# Patient Record
Sex: MALE | Race: WHITE | Employment: OTHER | URBAN - METROPOLITAN AREA
[De-identification: names, ages, dates, MRNs, and addresses within clinical notes are randomized per-mention and may not be internally consistent; named-entity substitution may affect disease eponyms.]

---

## 2017-08-11 ENCOUNTER — CARE COORDINATION (OUTPATIENT)
Dept: CARE COORDINATION | Age: 67
End: 2017-08-11

## 2017-08-23 PROBLEM — I72.4 POPLITEAL ANEURYSM (HCC): Chronic | Status: ACTIVE | Noted: 2017-08-23

## 2018-01-26 PROBLEM — E11.622 DIABETIC ULCER OF LOWER LEG (HCC): Status: ACTIVE | Noted: 2018-01-26

## 2018-01-26 PROBLEM — L97.909 DIABETIC ULCER OF LOWER LEG (HCC): Status: ACTIVE | Noted: 2018-01-26

## 2018-03-15 ENCOUNTER — HOSPITAL ENCOUNTER (OUTPATIENT)
Dept: WOUND CARE | Age: 68
Discharge: HOME | End: 2018-03-15
Payer: MEDICARE

## 2018-03-15 VITALS
RESPIRATION RATE: 18 BRPM | HEART RATE: 72 BPM | DIASTOLIC BLOOD PRESSURE: 68 MMHG | TEMPERATURE: 98.7 F | SYSTOLIC BLOOD PRESSURE: 118 MMHG

## 2018-03-15 PROCEDURE — 11042 DBRDMT SUBQ TIS 1ST 20SQCM/<: CPT

## 2018-03-15 PROCEDURE — 11045 DBRDMT SUBQ TISS EACH ADDL: CPT

## 2018-03-15 NOTE — PROGRESS NOTES
[facility]aling Center Followup Visit Note    Referring Physician : Ginger Ho  Luis F Bruner  MEDICAL RECORD NUMBER:  [id number]  AGE: 79 y.o. GENDER: male  : 1950  EPISODE DATE:  3/15/2018    Subjective:     Chief Complaint   Patient presents with    Wound Check     Right Leg wound Care Follow Up Appt      HISTORY of PRESENT ILLNESS HPI   Luis F Bruner is a 79 y.o. male who presents today for wound/ulcer evaluation. 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     Past Surgical History:   Procedure Laterality Date    FRACTURE SURGERY      right ankle repair     Family History   Problem Relation Age of Onset    Heart Disease Mother     Heart Disease Father      Social History   Substance Use Topics    Smoking status: Never Smoker    Smokeless tobacco: Never Used    Alcohol use No     Allergies   Allergen Reactions    Metformin And Related Diarrhea     Current Outpatient Prescriptions on File Prior to Encounter   Medication Sig Dispense Refill    clotrimazole (LOTRIMIN) 1 % cream Apply 1 % topically 2 times daily Apply topically 2 times daily.  aspirin (ASPIRIN CHILDRENS) 81 MG chewable tablet Take 1 tablet by mouth daily 30 tablet 3     No current facility-administered medications on file prior to encounter.         REVIEW OF SYSTEMS See HPI    Objective:    /68   Pulse 72   Temp 98.7 °F (37.1 °C) (Oral) Resp 18   Wt Readings from Last 3 Encounters:   03/01/18 (!) 370 lb (167.8 kg)   02/22/18 (!) 370 lb (167.8 kg)   02/20/18 (!) 370 lb (167.8 kg)     PHYSICAL EXAM  CONSTITUTIONAL:   Awake, alert, cooperative   EYES:  lids and lashes normal   ENT: external ears and nose without lesions   NECK:  supple, symmetrical, trachea midline   SKIN:  Open wound Present    Assessment:     Patient Active Problem List   Diagnosis Code    Controlled type 2 diabetes mellitus with hyperglycemia ([address] E11.65    Obesity E66.9    Lymphedema I89.0    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 with fat layer exposed ([address] L97.912    Non-pressure chronic ulcer of left lower leg with fat layer exposed ([address] L97.922    Venous ulcer of left leg ([address] I83.029    Venous ulcer of right leg ([address] I83.019    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 Performed by: Raul Serna DPM    Consent obtained:  Yes    Time out taken:  Yes    Pain Control: Anesthetic  Anesthetic: 2% Lidocaine Gel Topical     Debridement:Excisional Debridement    Using curette the wound(s)/ulcer(s) was/were sharply debrided down through and including the removal of subcutaneous tissue. Devitalized Tissue Debrided:  fibrin, biofilm, slough and necrotic/eschar to stimulate bleeding to promote healing, post debridement good bleeding base and wound edges noted    Pre Debridement Measurements:  Are located in the [place]  Documentation Flow Sheet    Wound/Ulcer #: 6    Post Debridement Measurements:  Wound/Ulcer Descriptions are Pre Debridement except measurements:    Wound 07/20/17 Venous ulcer Leg Right; Lower; Lateral;Posterior #6 acq 6/20/17 peterson 1 (Active)   Wound Image   3/1/2018  1:03 PM   Wound Type Wound 3/1/2018  1:03 PM

## 2018-03-22 ENCOUNTER — HOSPITAL ENCOUNTER (OUTPATIENT)
Dept: WOUND CARE | Age: 68
Discharge: HOME | End: 2018-03-22
Payer: MEDICARE

## 2018-03-22 VITALS
TEMPERATURE: 98.4 F | RESPIRATION RATE: 20 BRPM | WEIGHT: 315 LBS | DIASTOLIC BLOOD PRESSURE: 80 MMHG | HEART RATE: 84 BPM | HEIGHT: 74 IN | BODY MASS INDEX: 40.43 KG/M2 | SYSTOLIC BLOOD PRESSURE: 130 MMHG

## 2018-03-22 PROCEDURE — 11042 DBRDMT SUBQ TIS 1ST 20SQCM/<: CPT

## 2018-03-22 PROCEDURE — 87186 SC STD MICRODIL/AGAR DIL: CPT

## 2018-03-22 PROCEDURE — 11045 DBRDMT SUBQ TISS EACH ADDL: CPT

## 2018-03-22 PROCEDURE — 87077 CULTURE AEROBIC IDENTIFY: CPT

## 2018-03-22 PROCEDURE — 87147 CULTURE TYPE IMMUNOLOGIC: CPT

## 2018-03-22 PROCEDURE — 87070 CULTURE OTHR SPECIMN AEROBIC: CPT

## 2018-03-22 PROCEDURE — 87075 CULTR BACTERIA EXCEPT BLOOD: CPT

## 2018-03-22 ASSESSMENT — PAIN SCALES - GENERAL: PAINLEVEL_OUTOF10: 0

## 2018-03-22 NOTE — PROGRESS NOTES
(36.9 °C) (Oral)   Resp 20   Ht 6' 2\" (1.88 m)   Wt (!) 370 lb (167.8 kg)   BMI 47.51 kg/m²   Wt Readings from Last 3 Encounters:   03/22/18 (!) 370 lb (167.8 kg)   03/01/18 (!) 370 lb (167.8 kg)   02/22/18 (!) 370 lb (167.8 kg)     PHYSICAL EXAM  CONSTITUTIONAL:   Awake, alert, cooperative   EYES:  lids and lashes normal   ENT: external ears and nose without lesions   NECK:  supple, symmetrical, trachea midline   SKIN:  Open wound Present    Assessment:     Patient Active Problem List   Diagnosis Code    Controlled type 2 diabetes mellitus with hyperglycemia ([facility]) E11.65    Obesity E66.9    Lymphedema I89.0    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 with fat layer exposed ([address] L97.912    Non-pressure chronic ulcer of left lower leg with fat layer exposed ([address] L97.922    Venous ulcer of left leg ([facility]) I83.029    Venous ulcer of right leg ([address] I83.019    Popliteal aneurysm ([facility]) I72.4    Diabetic ulcer of lower leg ([facility]) E11.622, L97.909     Procedure Note  Indications:  Based on my examination of this patient's wound(s)/ulcer(s) today, debridement is required to promote healing and evaluate the wound base. Performed by: Oly Johnson DPM    Consent obtained:  Yes    Time out taken:  Yes    Pain Control: Anesthetic  Anesthetic: 2% Lidocaine Gel Topical     Debridement:Excisional Debridement    Using #15 blade scalpel the wound(s)/ulcer(s) was/were sharply debrided down through and including the removal of subcutaneous tissue. Devitalized Tissue Debrided:  fibrin, biofilm, slough and necrotic/eschar to stimulate bleeding to promote healing, post debridement good bleeding base and wound edges noted    Pre Debridement Measurements:  Are located in the [place]  Documentation Flow Sheet    Wound/Ulcer #: 6 and 8    Post Debridement Measurements:  Wound/Ulcer Descriptions are Pre Debridement except measurements:    Wound 07/20/17 Venous ulcer Leg Right; Lower; Lateral;Posterior #6

## 2018-03-24 LAB — ANAEROBIC CULTURE: NORMAL

## 2018-03-26 LAB
ORGANISM: ABNORMAL
WOUND/ABSCESS: ABNORMAL

## 2018-03-29 ENCOUNTER — HOSPITAL ENCOUNTER (OUTPATIENT)
Dept: WOUND CARE | Age: 68
Discharge: HOME | End: 2018-03-29
Payer: MEDICARE

## 2018-03-29 VITALS
RESPIRATION RATE: 20 BRPM | DIASTOLIC BLOOD PRESSURE: 80 MMHG | HEIGHT: 74 IN | TEMPERATURE: 97.7 F | SYSTOLIC BLOOD PRESSURE: 122 MMHG | HEART RATE: 80 BPM | WEIGHT: 315 LBS | BODY MASS INDEX: 40.43 KG/M2

## 2018-03-29 PROCEDURE — 11045 DBRDMT SUBQ TISS EACH ADDL: CPT

## 2018-03-29 PROCEDURE — 11042 DBRDMT SUBQ TIS 1ST 20SQCM/<: CPT

## 2018-03-29 RX ORDER — CEPHALEXIN 500 MG/1
500 CAPSULE ORAL 4 TIMES DAILY
Qty: 40 CAPSULE | Refills: 0 | Status: SHIPPED | OUTPATIENT
Start: 2018-03-29 | End: 2018-04-12 | Stop reason: ALTCHOICE

## 2018-03-29 NOTE — PROGRESS NOTES
Depth (cm)  0.9 3/29/2018  2:43 PM   Calculated Wound Size (cm^2) (l*w) 253.5 cm^2 3/29/2018  2:43 PM   Change in Wound Size % (l*w) 69.77 3/29/2018  2:43 PM   Undermining Starts ___ O'Clock 12 3/22/2018  1:25 PM   Undermining Ends___ O'Clock 12 3/22/2018  1:25 PM   Undermining Maxium Distance (cm) 1.0 3/22/2018  1:25 PM   Wound Assessment Pink;Slough; Yellow 3/29/2018  1:48 PM   Drainage Amount Copious 3/29/2018  1:48 PM   Drainage Description Black 3/29/2018  1:48 PM   Odor Mild 3/29/2018  1:48 PM   Colleen-wound Assessment Dry; Intact 3/29/2018  1:48 PM   [place]%Wound Bed 100 2/8/2018  1:27 PM   Culture Taken Yes 1/18/2018  8:54 AM   Debridement per physician Subcutaneous 2/22/2018  2:22 PM   Time out Yes 3/29/2018  2:43 PM   Procedural Pain 0 3/29/2018  2:43 PM   Post procedural Pain 0 3/1/2018  1:53 PM   Number of days: 252       Wound 03/22/18 Venous ulcer Leg Lateral;Left;Lower Wound 8 acquired 3/20/2018 (Active)   Wound Image   3/22/2018  1:25 PM   Dressing Status Clean;Dry; Intact 3/22/2018  2:37 PM   Dressing Changed Changed/New 3/22/2018  2:37 PM   Dressing/Treatment Silver dressing;Dry dressing 3/22/2018  2:37 PM   Wound Cleansed Wound cleanser 3/22/2018  2:37 PM   Wound Length (cm) 11 cm 3/29/2018  2:43 PM   Wound Width (cm) 11 cm 3/29/2018  2:43 PM   Wound Depth (cm)  0.2 3/29/2018  2:43 PM   Calculated Wound Size (cm^2) (l*w) 121 cm^2 3/29/2018  2:43 PM   Change in Wound Size % (l*w) -72.86 3/29/2018  2:43 PM   Wound Assessment Yellow;Pink 3/29/2018  1:48 PM   Drainage Amount Moderate 3/29/2018  1:48 PM   Drainage Description Yellow;Brown 3/29/2018  1:48 PM   Odor None 3/29/2018  1:48 PM   Colleen-wound Assessment Pink 3/22/2018  1:25 PM   Time out Yes 3/29/2018  2:43 PM   Procedural Pain 0 3/29/2018  2:43 PM   Number of days: 7     Percent of Wound/Ulcer Debrided: 100%    Total Surface Area Debrided:  370 sq cm     Estimated Blood Loss:  Minimal  Hemostasis Achieved:  by pressure    Procedural Pain:  2  / 10 Post Procedural Pain:  2 / 10     Response to treatment:  Well tolerated by patient. Plan:   Treatment Note please see attached Discharge Instructions    Written patient dismissal instructions given to patient and signed by patient or POA. Discharge Instructions       Visit Discharge/Physician Orders      Discharge condition: Stable      Assessment of pain at discharge:no      Anesthetic used:LIDOCAINE 2 % GEL      Discharge to: HOME      Left via:VAN      Accompanied by: Shailesh Stevenson  ECF/HHA: [address] WITH SOAP AND WATER WITH EACH DRESSING CHANGE      Dressing Orders:CLEANSE RIGHT  AND LEFT ULCERS WITH NORMAL STERILE SALINE, APPLY AQUACEL AG , PACK INTO AREA OF DEPTH, Billy Angst . Change 3 x week Tuesday Thursday AND SATURDAY  ANTIFUNGAL CREAM TO BE APPLIED TO LOWER LEG DRY THICK SKIN WITH EACH DRESSING CHANGE            ELEVATE LEGS ABOVE THE LEVEL OF THE HEART FOR 30 MINUTES 3-4 X DAY--      Treatment Orders:Eat a diet high in protein and vitamin C. Take a multiple vitamin daily unless contraindicated.   CULTURE DONE 3-22-18     CONSIDERING DERIC MLD TREATMENTS IF CULTURE NEGATIVE  [facility] followup visit  1  WEEK DR Lamar White _______________________                             (Please note your next appointment above and if you are unable to keep, kindly give a 24 hour notice.  Thank you.)  If unable to schedule rides with [employer] service attempt to initiate [address] to change wraps Monday Wednesday Friday.      Physician signature:__________________________  Ting Galvan  If you experience any of the following, please call the [facility] during business hours:      * Increase in Pain  * Temperature over 101  * Increase in drainage from your wound  * Drainage with a foul odor  * Bleeding  * Increase in swelling  * Need for compression bandage changes due to slippage, breakthrough drainage.      If you need medical attention outside of the business hours of the [facility]

## 2018-04-05 ENCOUNTER — HOSPITAL ENCOUNTER (OUTPATIENT)
Dept: WOUND CARE | Age: 68
Discharge: HOME | End: 2018-04-05
Payer: MEDICARE

## 2018-04-05 VITALS
BODY MASS INDEX: 40.43 KG/M2 | RESPIRATION RATE: 18 BRPM | HEIGHT: 74 IN | WEIGHT: 315 LBS | TEMPERATURE: 98 F | SYSTOLIC BLOOD PRESSURE: 144 MMHG | HEART RATE: 84 BPM | DIASTOLIC BLOOD PRESSURE: 72 MMHG

## 2018-04-05 PROCEDURE — 99213 OFFICE O/P EST LOW 20 MIN: CPT

## 2018-04-12 ENCOUNTER — HOSPITAL ENCOUNTER (OUTPATIENT)
Dept: WOUND CARE | Age: 68
Discharge: HOME | End: 2018-04-12
Payer: MEDICARE

## 2018-04-12 ENCOUNTER — HOSPITAL ENCOUNTER (OUTPATIENT)
Age: 68
Discharge: HOME | End: 2018-04-14
Payer: MEDICARE

## 2018-04-12 VITALS
HEART RATE: 72 BPM | SYSTOLIC BLOOD PRESSURE: 138 MMHG | DIASTOLIC BLOOD PRESSURE: 68 MMHG | TEMPERATURE: 98.7 F | HEIGHT: 74 IN | RESPIRATION RATE: 20 BRPM | WEIGHT: 315 LBS | BODY MASS INDEX: 40.43 KG/M2

## 2018-04-12 PROCEDURE — 88305 TISSUE EXAM BY PATHOLOGIST: CPT

## 2018-04-12 PROCEDURE — 11045 DBRDMT SUBQ TISS EACH ADDL: CPT

## 2018-04-12 PROCEDURE — 11042 DBRDMT SUBQ TIS 1ST 20SQCM/<: CPT

## 2018-04-19 ENCOUNTER — HOSPITAL ENCOUNTER (OUTPATIENT)
Dept: WOUND CARE | Age: 68
Discharge: HOME | End: 2018-04-19
Payer: MEDICARE

## 2018-04-19 VITALS
DIASTOLIC BLOOD PRESSURE: 70 MMHG | SYSTOLIC BLOOD PRESSURE: 128 MMHG | TEMPERATURE: 98 F | HEART RATE: 80 BPM | RESPIRATION RATE: 18 BRPM

## 2018-04-19 PROCEDURE — 87070 CULTURE OTHR SPECIMN AEROBIC: CPT

## 2018-04-19 PROCEDURE — 97597 DBRDMT OPN WND 1ST 20 CM/<: CPT

## 2018-04-19 PROCEDURE — 11045 DBRDMT SUBQ TISS EACH ADDL: CPT

## 2018-04-19 PROCEDURE — 87077 CULTURE AEROBIC IDENTIFY: CPT

## 2018-04-19 PROCEDURE — 87186 SC STD MICRODIL/AGAR DIL: CPT

## 2018-04-19 PROCEDURE — 11042 DBRDMT SUBQ TIS 1ST 20SQCM/<: CPT

## 2018-04-22 LAB
ORGANISM: ABNORMAL
ORGANISM: ABNORMAL
WOUND/ABSCESS: ABNORMAL

## 2018-04-26 ENCOUNTER — HOSPITAL ENCOUNTER (OUTPATIENT)
Dept: WOUND CARE | Age: 68
Discharge: HOME | End: 2018-04-26
Payer: MEDICARE

## 2018-04-26 VITALS
SYSTOLIC BLOOD PRESSURE: 122 MMHG | RESPIRATION RATE: 18 BRPM | TEMPERATURE: 98 F | HEIGHT: 74 IN | WEIGHT: 315 LBS | BODY MASS INDEX: 40.43 KG/M2 | HEART RATE: 84 BPM | DIASTOLIC BLOOD PRESSURE: 80 MMHG

## 2018-04-26 PROCEDURE — 11042 DBRDMT SUBQ TIS 1ST 20SQCM/<: CPT

## 2018-04-26 PROCEDURE — 11045 DBRDMT SUBQ TISS EACH ADDL: CPT

## 2018-05-03 ENCOUNTER — HOSPITAL ENCOUNTER (OUTPATIENT)
Dept: WOUND CARE | Age: 68
Discharge: HOME | End: 2018-05-03
Payer: MEDICARE

## 2018-05-03 VITALS
TEMPERATURE: 98.7 F | BODY MASS INDEX: 40.43 KG/M2 | HEART RATE: 78 BPM | HEIGHT: 74 IN | WEIGHT: 315 LBS | SYSTOLIC BLOOD PRESSURE: 130 MMHG | DIASTOLIC BLOOD PRESSURE: 60 MMHG | RESPIRATION RATE: 20 BRPM

## 2018-05-03 PROCEDURE — 11042 DBRDMT SUBQ TIS 1ST 20SQCM/<: CPT

## 2018-05-03 PROCEDURE — 11045 DBRDMT SUBQ TISS EACH ADDL: CPT

## 2018-05-03 ASSESSMENT — PAIN DESCRIPTION - PAIN TYPE: TYPE: CHRONIC PAIN

## 2018-05-03 ASSESSMENT — PAIN SCALES - GENERAL: PAINLEVEL_OUTOF10: 0

## 2018-05-10 ENCOUNTER — HOSPITAL ENCOUNTER (OUTPATIENT)
Dept: WOUND CARE | Age: 68
Discharge: HOME | End: 2018-05-10
Payer: MEDICARE

## 2018-05-10 VITALS
BODY MASS INDEX: 40.43 KG/M2 | SYSTOLIC BLOOD PRESSURE: 122 MMHG | RESPIRATION RATE: 18 BRPM | TEMPERATURE: 98.4 F | HEART RATE: 80 BPM | WEIGHT: 315 LBS | HEIGHT: 74 IN | DIASTOLIC BLOOD PRESSURE: 70 MMHG

## 2018-05-10 PROCEDURE — 97597 DBRDMT OPN WND 1ST 20 CM/<: CPT

## 2018-05-10 PROCEDURE — 11042 DBRDMT SUBQ TIS 1ST 20SQCM/<: CPT

## 2018-05-10 ASSESSMENT — PAIN SCALES - GENERAL: PAINLEVEL_OUTOF10: 0

## 2018-05-17 ENCOUNTER — HOSPITAL ENCOUNTER (OUTPATIENT)
Dept: WOUND CARE | Age: 68
Discharge: HOME | End: 2018-05-17
Payer: MEDICARE

## 2018-05-17 VITALS
WEIGHT: 315 LBS | HEART RATE: 72 BPM | BODY MASS INDEX: 40.43 KG/M2 | SYSTOLIC BLOOD PRESSURE: 104 MMHG | RESPIRATION RATE: 18 BRPM | DIASTOLIC BLOOD PRESSURE: 60 MMHG | TEMPERATURE: 98.3 F | HEIGHT: 74 IN

## 2018-05-17 PROCEDURE — 11042 DBRDMT SUBQ TIS 1ST 20SQCM/<: CPT

## 2018-05-17 PROCEDURE — 99212 OFFICE O/P EST SF 10 MIN: CPT

## 2018-05-17 RX ORDER — CLOTRIMAZOLE 1 %
CREAM (GRAM) TOPICAL
Qty: 60 G | Refills: 1 | Status: SHIPPED | OUTPATIENT
Start: 2018-05-17 | End: 2018-05-24

## 2018-05-25 ENCOUNTER — TELEPHONE (OUTPATIENT)
Dept: INTERNAL MEDICINE | Age: 68
End: 2018-05-25

## 2018-06-11 ENCOUNTER — OFFICE VISIT (OUTPATIENT)
Dept: INTERNAL MEDICINE | Age: 68
End: 2018-06-11
Payer: MEDICARE

## 2018-06-11 VITALS
RESPIRATION RATE: 18 BRPM | WEIGHT: 315 LBS | SYSTOLIC BLOOD PRESSURE: 122 MMHG | TEMPERATURE: 97.7 F | DIASTOLIC BLOOD PRESSURE: 71 MMHG | HEART RATE: 64 BPM | HEIGHT: 75 IN | BODY MASS INDEX: 39.17 KG/M2

## 2018-06-11 DIAGNOSIS — R73.03 PREDIABETES: ICD-10-CM

## 2018-06-11 DIAGNOSIS — E78.01 FAMILIAL HYPERCHOLESTEROLEMIA: Primary | ICD-10-CM

## 2018-06-11 LAB — HBA1C MFR BLD: 5.9 %

## 2018-06-11 PROCEDURE — 99212 OFFICE O/P EST SF 10 MIN: CPT | Performed by: INTERNAL MEDICINE

## 2018-06-11 PROCEDURE — 4040F PNEUMOC VAC/ADMIN/RCVD: CPT | Performed by: INTERNAL MEDICINE

## 2018-06-11 PROCEDURE — 83036 HEMOGLOBIN GLYCOSYLATED A1C: CPT | Performed by: INTERNAL MEDICINE

## 2018-06-11 PROCEDURE — 3017F COLORECTAL CA SCREEN DOC REV: CPT | Performed by: INTERNAL MEDICINE

## 2018-06-11 PROCEDURE — 1036F TOBACCO NON-USER: CPT | Performed by: INTERNAL MEDICINE

## 2018-06-11 PROCEDURE — 1123F ACP DISCUSS/DSCN MKR DOCD: CPT | Performed by: INTERNAL MEDICINE

## 2018-06-11 PROCEDURE — G8427 DOCREV CUR MEDS BY ELIG CLIN: HCPCS | Performed by: INTERNAL MEDICINE

## 2018-06-11 PROCEDURE — 99213 OFFICE O/P EST LOW 20 MIN: CPT | Performed by: INTERNAL MEDICINE

## 2018-06-11 PROCEDURE — G8417 CALC BMI ABV UP PARAM F/U: HCPCS | Performed by: INTERNAL MEDICINE

## 2018-06-11 RX ORDER — ASPIRIN 81 MG/1
81 TABLET, CHEWABLE ORAL DAILY
Qty: 30 TABLET | Refills: 5 | Status: SHIPPED | OUTPATIENT
Start: 2018-06-11 | End: 2018-11-26 | Stop reason: SDUPTHER

## 2018-06-11 ASSESSMENT — ENCOUNTER SYMPTOMS
HEARTBURN: 0
DIARRHEA: 0
BLURRED VISION: 0
CONSTIPATION: 0
COUGH: 0
NAUSEA: 0
ABDOMINAL PAIN: 0
DOUBLE VISION: 0
SHORTNESS OF BREATH: 0
VOMITING: 0

## 2018-11-08 ENCOUNTER — HOSPITAL ENCOUNTER (OUTPATIENT)
Age: 68
Discharge: HOME | End: 2018-11-08
Payer: MEDICARE

## 2018-11-08 DIAGNOSIS — E78.01 FAMILIAL HYPERCHOLESTEROLEMIA: ICD-10-CM

## 2018-11-08 LAB
CHOLESTEROL, TOTAL: 168 MG/DL (ref 0–199)
HDLC SERPL-MCNC: 54 MG/DL
LDL CHOLESTEROL CALCULATED: 104 MG/DL (ref 0–99)
TRIGL SERPL-MCNC: 48 MG/DL (ref 0–149)
VLDLC SERPL CALC-MCNC: 10 MG/DL

## 2018-11-08 PROCEDURE — 36415 COLL VENOUS BLD VENIPUNCTURE: CPT

## 2018-11-08 PROCEDURE — 80061 LIPID PANEL: CPT

## 2018-11-26 ENCOUNTER — OFFICE VISIT (OUTPATIENT)
Dept: INTERNAL MEDICINE | Age: 68
End: 2018-11-26
Payer: MEDICARE

## 2018-11-26 VITALS
DIASTOLIC BLOOD PRESSURE: 78 MMHG | RESPIRATION RATE: 20 BRPM | HEART RATE: 76 BPM | WEIGHT: 315 LBS | BODY MASS INDEX: 39.17 KG/M2 | SYSTOLIC BLOOD PRESSURE: 120 MMHG | HEIGHT: 75 IN | TEMPERATURE: 97.4 F

## 2018-11-26 DIAGNOSIS — R73.03 PREDIABETES: Primary | ICD-10-CM

## 2018-11-26 DIAGNOSIS — I83.029 VENOUS ULCER OF LEFT LEG (HCC): ICD-10-CM

## 2018-11-26 DIAGNOSIS — I72.4 POPLITEAL ANEURYSM (HCC): ICD-10-CM

## 2018-11-26 DIAGNOSIS — L97.929 VENOUS ULCER OF LEFT LEG (HCC): ICD-10-CM

## 2018-11-26 DIAGNOSIS — L97.919 VENOUS ULCER OF RIGHT LEG (HCC): ICD-10-CM

## 2018-11-26 DIAGNOSIS — I83.019 VENOUS ULCER OF RIGHT LEG (HCC): ICD-10-CM

## 2018-11-26 DIAGNOSIS — E78.5 DYSLIPIDEMIA: ICD-10-CM

## 2018-11-26 PROCEDURE — G8417 CALC BMI ABV UP PARAM F/U: HCPCS | Performed by: INTERNAL MEDICINE

## 2018-11-26 PROCEDURE — 1036F TOBACCO NON-USER: CPT | Performed by: INTERNAL MEDICINE

## 2018-11-26 PROCEDURE — 4040F PNEUMOC VAC/ADMIN/RCVD: CPT | Performed by: INTERNAL MEDICINE

## 2018-11-26 PROCEDURE — 99212 OFFICE O/P EST SF 10 MIN: CPT | Performed by: INTERNAL MEDICINE

## 2018-11-26 PROCEDURE — G8427 DOCREV CUR MEDS BY ELIG CLIN: HCPCS | Performed by: INTERNAL MEDICINE

## 2018-11-26 PROCEDURE — G8484 FLU IMMUNIZE NO ADMIN: HCPCS | Performed by: INTERNAL MEDICINE

## 2018-11-26 PROCEDURE — 1123F ACP DISCUSS/DSCN MKR DOCD: CPT | Performed by: INTERNAL MEDICINE

## 2018-11-26 PROCEDURE — 99213 OFFICE O/P EST LOW 20 MIN: CPT | Performed by: INTERNAL MEDICINE

## 2018-11-26 PROCEDURE — 1101F PT FALLS ASSESS-DOCD LE1/YR: CPT | Performed by: INTERNAL MEDICINE

## 2018-11-26 PROCEDURE — 3017F COLORECTAL CA SCREEN DOC REV: CPT | Performed by: INTERNAL MEDICINE

## 2018-11-26 RX ORDER — ASPIRIN 81 MG/1
81 TABLET, CHEWABLE ORAL DAILY
Qty: 30 TABLET | Refills: 5 | Status: SHIPPED | OUTPATIENT
Start: 2018-11-26

## 2018-11-26 RX ORDER — ATORVASTATIN CALCIUM 40 MG/1
40 TABLET, FILM COATED ORAL NIGHTLY
Qty: 30 TABLET | Refills: 5 | Status: SHIPPED | OUTPATIENT
Start: 2018-11-26 | End: 2019-05-23

## 2018-11-26 ASSESSMENT — ENCOUNTER SYMPTOMS
CONSTIPATION: 0
VOMITING: 0
ABDOMINAL PAIN: 0
SHORTNESS OF BREATH: 0
COUGH: 0
NAUSEA: 0
DIARRHEA: 0

## 2018-11-26 ASSESSMENT — PATIENT HEALTH QUESTIONNAIRE - PHQ9
SUM OF ALL RESPONSES TO PHQ QUESTIONS 1-9: 0
SUM OF ALL RESPONSES TO PHQ9 QUESTIONS 1 & 2: 0
2. FEELING DOWN, DEPRESSED OR HOPELESS: 0
1. LITTLE INTEREST OR PLEASURE IN DOING THINGS: 0
SUM OF ALL RESPONSES TO PHQ QUESTIONS 1-9: 0

## 2018-11-26 NOTE — PROGRESS NOTES
Patient verbalized understanding of office instructions. He will call with questions or concerns. Pt was given discharge instructions, all questions were fully answered.  Printed AVS given

## 2018-11-26 NOTE — PROGRESS NOTES
[facility]  InternalMedicine Residency Program  ACC Note      SUBJECTIVE:  CC: had concerns including Check-Up (recently had Lab work done). Cem Fields with PMH of prediabetes, venous stasis ulcers with chronic lymphedema, morbid obesity presents to the ambulatory care clinic for routine follow-up appointment. He doesn't have any active complaints today. He is following with treatment or for his venous stasis ulcers. He denies any increased complaints and his ulcers are stable. No complaints for now. Recently completed wound care process.       DM  Hemoglobin A1C 6.2 12/27/17; A1c on 6/18 was 5.9  He is watching his diet. He lost 4 pounds since last visit on June. Keeps losing weight  Not taking anything for now  Follows [facility] for the leg wounds, with venous stasis ulcers     Venous stasis, chronic lymphedema  -With the pressure bandages bilaterally below knee  -Was following with wound care clinic every Thursday; recently released  - Now follows with [facility]     HFpEF  Stage 1 DD on ECHO 2014     Morbid Obesity   Lost weight  Not interested in surgery     Popliteal artery aneurysm  Vascular surgery doesn't think it is aneurysm  -On Aspirin  - No symptoms    The 10-year ASCVD risk score (Elvia Paul., et al., 2013) is: 23.1%  Agreeable to start high intensity statin    HCM  Refuses vaccinations  Refuses colonoscopy      Review of Systems   Constitutional: Negative for chills and fever. HENT: Negative for hearing loss and tinnitus. Respiratory: Negative for cough and shortness of breath. Cardiovascular: Negative for chest pain and leg swelling. Gastrointestinal: Negative for abdominal pain, constipation, diarrhea, nausea and vomiting. Genitourinary: Negative for dysuria and urgency. Musculoskeletal: Negative for myalgias. Skin: Negative for rash. Dressing on place for b/l lower extremities   Neurological: Negative for dizziness and headaches. Psychiatric/Behavioral: Negative for suicidal ideas. Current Outpatient Prescriptions on File Prior to Visit   Medication Sig Dispense Refill    clotrimazole (LOTRIMIN) 1 % cream Apply 1 % topically 2 times daily Apply topically 2 times daily. No current facility-administered medications on file prior to visit. OBJECTIVE:    VS: /78   Pulse 76   Temp 97.4 °F (36.3 °C) (Oral)   Resp 20   Ht 6' 3\" (1.905 m)   Wt (!) 362 lb 8 oz (164.4 kg)   BMI 45.31 kg/m²   Physical Exam   Constitutional: He is oriented to person, place, and time. He appears well-developed and well-nourished. HENT:   Head: Normocephalic and atraumatic. Eyes: Pupils are equal, round, and reactive to light. EOM are normal.   Neck: Normal range of motion. Neck supple. Cardiovascular: Normal rate, regular rhythm and normal heart sounds. Exam reveals no gallop and no friction rub. No murmur heard. Pulmonary/Chest: Effort normal and breath sounds normal. No respiratory distress. He has no wheezes. He has no rales. Abdominal: Soft. Bowel sounds are normal. He exhibits no distension. There is no tenderness. Musculoskeletal:   uses walker since he has b/l lower extremity wounds. Neurological: He is alert and oriented to person, place, and time. Skin: Skin is warm and dry. Psychiatric: He has a normal mood and affect. His behavior is normal.       ASSESSMENT/PLAN:  Kristy Rowe was seen today for check-up. Diagnoses and all orders for this visit:    Prediabetes  -     atorvastatin (LIPITOR) 40 MG tablet; Take 1 tablet by mouth nightly  -     aspirin (ASPIRIN CHILDRENS) 81 MG chewable tablet; Take 1 tablet by mouth daily    Venous ulcer of b/l legs ([address]  Follows with [facility]    Dyslipidemia  -     atorvastatin (LIPITOR) 40 MG tablet; Take 1 tablet by mouth nightly    HCC gaps addressed.      I have reviewed allpertient PMHx, PSHx, FamHx, Social Hx, medications, and allergies and updated history as

## 2019-05-23 ENCOUNTER — HOSPITAL ENCOUNTER (OUTPATIENT)
Dept: WOUND CARE | Age: 69
Discharge: HOME | End: 2019-05-23
Payer: MEDICARE

## 2019-05-23 VITALS
HEIGHT: 76 IN | WEIGHT: 315 LBS | BODY MASS INDEX: 38.36 KG/M2 | HEART RATE: 66 BPM | RESPIRATION RATE: 18 BRPM | TEMPERATURE: 98.8 F

## 2019-05-23 DIAGNOSIS — I83.019 VENOUS ULCER OF RIGHT LEG (HCC): Primary | ICD-10-CM

## 2019-05-23 DIAGNOSIS — L97.929 VENOUS ULCER OF LEFT LEG (HCC): ICD-10-CM

## 2019-05-23 DIAGNOSIS — I83.029 VENOUS ULCER OF LEFT LEG (HCC): ICD-10-CM

## 2019-05-23 DIAGNOSIS — L97.922 NON-PRESSURE CHRONIC ULCER OF LEFT LOWER LEG WITH FAT LAYER EXPOSED (HCC): ICD-10-CM

## 2019-05-23 DIAGNOSIS — L97.912 NON-PRESSURE CHRONIC ULCER OF RIGHT LOWER LEG WITH FAT LAYER EXPOSED (HCC): ICD-10-CM

## 2019-05-23 DIAGNOSIS — L97.919 VENOUS ULCER OF RIGHT LEG (HCC): Primary | ICD-10-CM

## 2019-05-23 DIAGNOSIS — I89.0 LYMPHEDEMA: Chronic | ICD-10-CM

## 2019-05-23 PROCEDURE — 99213 OFFICE O/P EST LOW 20 MIN: CPT

## 2019-05-23 PROCEDURE — 11042 DBRDMT SUBQ TIS 1ST 20SQCM/<: CPT

## 2019-05-23 RX ORDER — LIDOCAINE HYDROCHLORIDE 20 MG/ML
JELLY TOPICAL ONCE
Status: DISCONTINUED | OUTPATIENT
Start: 2019-05-23 | End: 2019-05-24 | Stop reason: HOSPADM

## 2019-05-23 NOTE — PROGRESS NOTES
Wound Healing Center  History and Physical/Consultation  Podiatry    Referring Physician : Deanne Carlson MD  [address] [place] [address] [place] RECORD NUMBER:  [id number]  AGE: 76 y.o. GENDER: male  : 1950  EPISODE DATE:  2019  Subjective:     Chief Complaint   Patient presents with    Wound Check     cassie lower legs         HISTORY of PRESENT ILLNESS HPI     Jah Alaniz is a 76 y.o. male who presents today for wound/ulcer evaluation. History of Wound Context:  The patient has had a wound of lower extremities, chronic in nature. Patient has been following with [facility] for lymphedema therapy. Patient last seen here in the wound care center approximately 1 year ago. Overall doing well, responding to the therapy. Patient has had a history of chronic wounds of the lower extremities for quite some time. Patient at present relates responding to treatment. He denies any nausea, vomiting, fever or chills. No shortness of breath or chest pain. Pt is currently not on abx.       Wound/Ulcer Pain Timing/Severity: none  Quality of pain: N/A  Severity:  0 / 10   Modifying Factors: None  Associated Signs/Symptoms: none    Ulcer Identification:  Ulcer Type: venous  Contributing Factors: edema, venous stasis and lymphedema    Diabetic/Pressure/Non Pressure Ulcers onl y:  Ulcer: Non-Pressure ulcer, fat layer exposed    If patient has diabetic lower extremity wounds  Mitchell Classification of diabetic lower extremity wounds:    Grade Description   []  0 No open wound   []  1 Superficial ulcer involving the full skin thickness   []  2 Deep ulcer involves ligament, tendon, joint capsule, or fascia  No bone involvement or abscess presence   []  3 Deep Ulcer with abcess formation and/or osteomyelitis   []  4 Localized gangrene   []  5 Extensive gangrene of the foot     Wound: N/A        PAST MEDICAL HISTORY      Diagnosis Date    Cellulitis of right lower leg     Diabetes mellitus (HCC)     Lymphedema     Obesity, Class III, intact to light touch   Palpation of the foot does not cause pain   5/5 strength DF/PF  L LE Open wounds are noted, superficial. Serous drainage. No odor. Skin color is abnormal with stasis changes   Edema is  noted   Sensation intact to light touch   Palpation of the foot does not cause pain   5/5 strength DF/PF  R dorsalis pedis + L dorsalis pedis +   R posterior tibial + L posterior tibial +     Assessment:     Problem List Items Addressed This Visit     Lymphedema (Chronic)    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 with fat layer exposed ([address]    Non-pressure chronic ulcer of left lower leg with fat layer exposed ([address]    Venous ulcer of left leg ([address]    Venous ulcer of right leg ([address] - Primary         Procedure Note  Indications:  Based on my examination of this patient's wound(s)/ulcer(s) today, debridement is required to promote healing and evaluate the wound base. Performed by: Winifred Marina DPM    Consent obtained:  Yes    Time out taken:  Yes    Pain Control:       Debridement:Excisional Debridement    Using curette the wound(s)/ulcer(s) was/were sharply debrided down through and including the removal of subcutaneous tissue. Devitalized Tissue Debrided:  fibrin, biofilm and slough    Pre Debridement Measurements:  Are located in the [place]  Documentation Flow Sheet    Wound/Ulcer #: 1    Post Debridement Measurements:  Wound/Ulcer Descriptions are Pre Debridement except measurements:    Wound 05/23/19 Leg Right; Lower; Lateral #1 aquired 1-3-19 (Active)   Wound Image   5/23/2019  1:26 PM   Wound Venous 5/23/2019  1:26 PM   Wound Length (cm) 10.5 cm 5/23/2019  1:26 PM   Wound Width (cm) 6 cm 5/23/2019  1:26 PM   Wound Depth (cm) 0.5 cm 5/23/2019  1:26 PM   Wound Surface Area (cm^2) 63 cm^2 5/23/2019  1:26 PM   Wound Volume (cm^3) 31.5 cm^3 5/23/2019  1:26 PM   Number of days: 0       Wound 05/23/19 Leg Left; Lower; Lateral #2 aquired 1-3-19 (Active)   Wound Image   5/23/2019  1:28 PM Wound Venous 5/23/2019  1:28 PM   Wound Length (cm) 2 cm 5/23/2019  1:28 PM   Wound Width (cm) 5 cm 5/23/2019  1:28 PM   Wound Depth (cm) 0.1 cm 5/23/2019  1:28 PM   Wound Surface Area (cm^2) 10 cm^2 5/23/2019  1:28 PM   Wound Volume (cm^3) 1 cm^3 5/23/2019  1:28 PM   Number of days: 0       Percent of Wound/Ulcer Debrided: 10%    Total Surface Area Debrided:  6.3 sq cm     Estimated Blood Loss:  Minimal    Hemostasis Achieved:  by pressure    Procedural Pain:  0  / 10     Post Procedural Pain:  0 / 10     Response to treatment:  Well tolerated by patient. A culture was done. Plan:   Lymphedema therapy with compression. Elevate as much as possible. Any questions or concerns contact wound care center  Pt has never been a smoker   - Discussed relationship of smoking and negative affects on wound healing   - Emphasized importance of tobacco avoidace/cessation       In my professional opinion and based off the information that is available at this time this patient has appropriate indication for HBO Therapy: No    Treatment Note please see attached Discharge Instructions    Written patient dismissal instructions given to patient and signed by patient or POA. Discharge Instructions       Visit Discharge/Physician Orders    Discharge condition: Stable    Assessment of pain at discharge:none    Anesthetic used: 2% lidocaine    Discharge to: Home    Left via:Private automobile    Accompanied by: accompanied by self    ECF/HHA:     Dressing Orders:CLEANSE ULCERS BILATERAL LOWER LEGS WITH NORMAL SALINE APPLY AQUACEL [address] 3 X WEEK    Treatment Orders:  CONTINUE DERIC LYMPHEDEMA TREATMENT    Eat foods high in protein and vitamin c    Take multivitamin daily. [facility] followup visit ________1 week Dr. Franklin_____________________  (Please note your next appointment above and if you are unable to keep, kindly give a 24 hour notice.  Thank you.)    Physician signature:__________________________      If you experience any of the following, please call the "[facility] during business hours:    * Increase in Pain  * Temperature over 101  * Increase in drainage from your wound  * Drainage with a foul odor  * Bleeding  * Increase in swelling  * Need for compression bandage changes due to slippage, breakthrough drainage. If you need medical attention outside of the business hours of the "[facility] [employer] please contact your PCP or go to the nearest emergency room.         Electronically signed by Margy Merino DPM on 5/23/2019 at 1:29 PM

## 2019-05-30 ENCOUNTER — HOSPITAL ENCOUNTER (OUTPATIENT)
Dept: WOUND CARE | Age: 69
Discharge: HOME | End: 2019-05-30
Payer: MEDICARE

## 2019-05-30 VITALS
SYSTOLIC BLOOD PRESSURE: 112 MMHG | TEMPERATURE: 97.9 F | RESPIRATION RATE: 20 BRPM | DIASTOLIC BLOOD PRESSURE: 72 MMHG | HEART RATE: 82 BPM

## 2019-05-30 DIAGNOSIS — L97.912 NON-PRESSURE CHRONIC ULCER OF RIGHT LOWER LEG WITH FAT LAYER EXPOSED (HCC): ICD-10-CM

## 2019-05-30 DIAGNOSIS — I83.029 VENOUS ULCER OF LEFT LEG (HCC): Primary | ICD-10-CM

## 2019-05-30 DIAGNOSIS — I83.019 VENOUS ULCER OF RIGHT LEG (HCC): ICD-10-CM

## 2019-05-30 DIAGNOSIS — L97.929 VENOUS ULCER OF LEFT LEG (HCC): Primary | ICD-10-CM

## 2019-05-30 DIAGNOSIS — I89.0 LYMPHEDEMA: Chronic | ICD-10-CM

## 2019-05-30 DIAGNOSIS — L97.919 VENOUS ULCER OF RIGHT LEG (HCC): ICD-10-CM

## 2019-05-30 DIAGNOSIS — L97.922 NON-PRESSURE CHRONIC ULCER OF LEFT LOWER LEG WITH FAT LAYER EXPOSED (HCC): ICD-10-CM

## 2019-05-30 PROCEDURE — 11045 DBRDMT SUBQ TISS EACH ADDL: CPT

## 2019-05-30 PROCEDURE — 11042 DBRDMT SUBQ TIS 1ST 20SQCM/<: CPT

## 2019-05-30 NOTE — PROGRESS NOTES
Wound Healing Center Followup Visit Note    Referring Physician : Francis Yarbrough MD  [address] [place] [address] [place] RECORD NUMBER:  [id number]  AGE: 76 y.o. GENDER: male  : 1950  EPISODE DATE:  2019    Subjective:     No chief complaint on file. HISTORY of PRESENT ILLNESS HPI   Valeri Morse is a 76 y.o. male who presents today in regards to follow up evaluation and treatment of wound/ulcer. That patient's past medical, family and social hx were reviewed and changes were made if present. Patient relates that he did get his dressing and wraps changed on Tuesday however prior to that it was last Thursday. Apparently he did not get his supplies so his niece could not change them. He is tolerating dressing changes without issue. No nausea, vomiting, fever or chills. No other complaints.     History of Wound Context:       Wound/Ulcer Pain Timing/Severity: none  Quality of pain: N/A  Severity:  0 / 10   Modifying Factors: None  Associated Signs/Symptoms: none    Ulcer Identification:  Ulcer Type: venous  Contributing Factors: edema, venous stasis and lymphedema    Diabetic/Pressure/Non Pressure Ulcers only:  Ulcer: Non-Pressure ulcer, fat layer exposed    Wound: N/A        PAST MEDICAL HISTORY      Diagnosis Date    Cellulitis of right lower leg     [facility] mellitus ([facility])     Lymphedema     Obesity, Class III, BMI 40-49.9 (morbid obesity) ([facility])     [facility] 2017    Left     Past Surgical History:   Procedure Laterality Date    FRACTURE SURGERY      right ankle repair     Family History   Problem Relation Age of Onset    Heart Disease Mother     Heart Disease Father      Social History     Tobacco Use    Smoking status: Never Smoker    Smokeless tobacco: Never Used   Substance Use Topics    Alcohol use: No     Comment: on special occassions    Drug use: No     Allergies   Allergen Reactions    Metformin And Related Diarrhea     Current Outpatient Medications on File Prior to Encounter   Medication Sig Dispense Refill    aspirin (ASPIRIN CHILDRENS) 81 MG chewable tablet Take 1 tablet by mouth daily 30 tablet 5     No current facility-administered medications on file prior to encounter. REVIEW OF SYSTEMS See HPI    Objective:    /72   Pulse 82   Temp 97.9 °F (36.6 °C) (Oral)   Resp 20   Wt Readings from Last 3 Encounters:   05/23/19 (!) 340 lb (154.2 kg)   11/26/18 (!) 362 lb 8 oz (164.4 kg)   06/11/18 (!) 366 lb 12.8 oz (166.4 kg)     PHYSICAL EXAM  CONSTITUTIONAL:   Awake, alert, cooperative   EYES:  lids and lashes normal   ENT: external ears and nose without lesions   NECK:  supple, symmetrical, trachea midline   SKIN:  Open wounds bilateral lower extremities. Both areas increased in size, 30% devitalized nonviable tissue. Through subcutaneous tissue. No purulence or odor. No surrounding erythema. Stasis changes. Vascular intact. Positive edema. Assessment:     Venous ulcers bilateral lower extremities. Edema. Procedure Note  Indications:  Based on my examination of this patient's wound(s)/ulcer(s) today, debridement is required to promote healing and evaluate the wound base. Performed by: Lizz Gama DPM    Consent obtained:  Yes    Time out taken:  Yes    Pain Control:       Debridement:Excisional Debridement    Using curette the wound(s)/ulcer(s) was/were sharply debrided down through and including the removal of subcutaneous tissue. Devitalized Tissue Debrided:  biofilm, slough and necrotic/eschar to stimulate bleeding to promote healing, post debridement good bleeding base and wound edges noted    Pre Debridement Measurements:  Are located in the Wound/Ulcer Documentation Flow Sheet    Wound/Ulcer #: 1 and 2    Post Debridement Measurements:  Wound/Ulcer Descriptions are Pre Debridement except measurements:    Wound 05/23/19 Leg Right; Lower; Lateral #1 aquired 1-3-19 (Active)   Wound Image   5/23/2019  1:26 PM   Wound Venous 5/23/2019 1:26 PM   Dressing Status Clean;Dry; Intact 5/23/2019  1:30 PM   Dressing Changed Changed/New 5/23/2019  1:30 PM   Dressing/Treatment Alginate 5/23/2019  1:30 PM   Wound Cleansed Rinsed/Irrigated with saline 5/23/2019  1:30 PM   Wound Length (cm) 11 cm 5/30/2019  1:33 PM   Wound Width (cm) 6 cm 5/30/2019  1:33 PM   Wound Depth (cm) 0.5 cm 5/30/2019  1:33 PM   Wound Surface Area (cm^2) 66 cm^2 5/30/2019  1:33 PM   Change in Wound Size % (l*w) -4.76 5/30/2019  1:33 PM   Wound Volume (cm^3) 33 cm^3 5/30/2019  1:33 PM   Wound Healing % -5 5/30/2019  1:33 PM   Post-Procedure Length (cm) 11 cm 5/30/2019  1:49 PM   Post-Procedure Width (cm) 6 cm 5/30/2019  1:49 PM   Post-Procedure Depth (cm) 0.5 cm 5/30/2019  1:49 PM   Post-Procedure Surface Area (cm^2) 66 cm^2 5/30/2019  1:49 PM   Post-Procedure Volume (cm^3) 33 cm^3 5/30/2019  1:49 PM   Wound Assessment White;Pale 5/30/2019  1:33 PM   Drainage Amount Moderate 5/30/2019  1:33 PM   Drainage Description Serosanguinous 5/30/2019  1:33 PM   Odor None 5/30/2019  1:33 PM   Colleen-wound Assessment Maceration 5/30/2019  1:33 PM   Number of days: 7       Wound 05/23/19 Leg Left; Lower; Lateral #2 aquired 1-3-19 (Active)   Wound Image   5/23/2019  1:28 PM   Wound Venous 5/23/2019  1:28 PM   Dressing Status Clean;Dry; Intact 5/23/2019  1:30 PM   Dressing Changed Changed/New 5/23/2019  1:30 PM   Dressing/Treatment Alginate 5/23/2019  1:30 PM   Wound Cleansed Rinsed/Irrigated with saline 5/23/2019  1:30 PM   Wound Length (cm) 9 cm 5/30/2019  1:33 PM   Wound Width (cm) 7.6 cm 5/30/2019  1:33 PM   Wound Depth (cm) 0.2 cm 5/30/2019  1:33 PM   Wound Surface Area (cm^2) 68.4 cm^2 5/30/2019  1:33 PM   Change in Wound Size % (l*w) -584 5/30/2019  1:33 PM   Wound Volume (cm^3) 13.68 cm^3 5/30/2019  1:33 PM   Wound Healing % -1268 5/30/2019  1:33 PM   Post-Procedure Length (cm) 9 cm 5/30/2019  1:49 PM   Post-Procedure Width (cm) 7.6 cm 5/30/2019  1:49 PM   Post-Procedure Depth (cm) 0.2 cm 5/30/2019 1:49 PM   Post-Procedure Surface Area (cm^2) 68.4 cm^2 5/30/2019  1:49 PM   Post-Procedure Volume (cm^3) 13.68 cm^3 5/30/2019  1:49 PM   Wound Assessment Pink;Red 5/30/2019  1:33 PM   Drainage Amount Moderate 5/30/2019  1:33 PM   Drainage Description Serous 5/30/2019  1:33 PM   Odor None 5/30/2019  1:33 PM   Colleen-wound Assessment Fragile; Maceration 5/30/2019  1:33 PM   Number of days: 7     Percent of Wound/Ulcer Debrided: 30%    Total Surface Area Debrided:  39 sq cm     Estimated Blood Loss:  Minimal  Hemostasis Achieved:  by pressure    Procedural Pain:  0  / 10   Post Procedural Pain:  0 / 10     Response to treatment:  Well tolerated by patient. Plan:   Treatment Note please see attached Discharge Instructions. Supply company was contacted, he should have his supplies spiked today or tomorrow. If any questions or concerns contact wound care center. Written patient dismissal instructions given to patient and signed by patient or POA. Discharge Instructions         Visit Discharge/Physician Orders     Discharge condition: Stable     Assessment of pain at discharge:none     Anesthetic used: 2% lidocaine     Discharge to: Home     Left via:Private automobile     Accompanied by: accompanied by self     ECF/HHA:      Dressing Orders:CLEANSE ULCERS BILATERAL LOWER LEGS WITH NORMAL SALINE APPLY AQUACEL KERLIX AND COBAN  3 X WEEK     Treatment Orders:  CONTINUE DERIC LYMPHEDEMA TREATMENT     Eat foods high in protein and vitamin c     Take multivitamin daily.     [facility] followup visit ________1 week Dr. Franklin_____________________  (Please note your next appointment above and if you are unable to keep, kindly give a 24 hour notice.  Thank you.)     Physician signature:__________________________        If you experience any of the following, please call the [facility] [address] during business hours:     * Increase in Pain  * Temperature over 101  * Increase in drainage from your wound  * Drainage with a foul odor  * Bleeding  * Increase in swelling  * Need for compression bandage changes due to slippage, breakthrough drainage.     If you need medical attention outside of the business hours of the [address] [place] [address] Road please contact your PCP or go to the nearest emergency room.                   Electronically signed by Winifred Marina DPM on 5/30/2019 at 1:56 PM

## 2019-06-06 ENCOUNTER — HOSPITAL ENCOUNTER (OUTPATIENT)
Dept: WOUND CARE | Age: 69
Discharge: HOME | End: 2019-06-06
Payer: MEDICARE

## 2019-06-06 VITALS
RESPIRATION RATE: 20 BRPM | HEART RATE: 84 BPM | HEIGHT: 76 IN | TEMPERATURE: 98.1 F | WEIGHT: 315 LBS | DIASTOLIC BLOOD PRESSURE: 78 MMHG | SYSTOLIC BLOOD PRESSURE: 122 MMHG | BODY MASS INDEX: 38.36 KG/M2

## 2019-06-06 PROCEDURE — 11045 DBRDMT SUBQ TISS EACH ADDL: CPT

## 2019-06-06 PROCEDURE — 11042 DBRDMT SUBQ TIS 1ST 20SQCM/<: CPT

## 2019-06-06 RX ORDER — LIDOCAINE HYDROCHLORIDE 20 MG/ML
JELLY TOPICAL PRN
Status: DISCONTINUED | OUTPATIENT
Start: 2019-06-06 | End: 2019-06-07 | Stop reason: HOSPADM

## 2019-06-06 NOTE — PROGRESS NOTES
Wound Healing Center Followup Visit Note    Referring Physician : Merari Glass MD  [address] [place] [address] [place] RECORD NUMBER:  [id number]  AGE: 76 y.o. GENDER: male  : 1950  EPISODE DATE:  2019    Subjective:     Chief Complaint   Patient presents with    Wound Check     right and left leg wounds      HISTORY of PRESENT ILLNESS HPI   Abi Holguin is a 76 y.o. male who presents today in regards to follow up evaluation and treatment of wound/ulcer. That patient's past medical, family and social hx were reviewed and changes were made if present. Patient at present denies any pain. Tolerating dressing changes, he relates his niece did attempt to do it once however difficult. With her busy schedule. Patient denies any nausea, vomiting, fever or chills.     History of Wound Context:       Wound/Ulcer Pain Timing/Severity: none  Quality of pain: N/A  Severity:  0 / 10   Modifying Factors: None  Associated Signs/Symptoms: none    Ulcer Identification:  Ulcer Type: venous  Contributing Factors: edema, venous stasis and lymphedema    Diabetic/Pressure/Non Pressure Ulcers only:  Ulcer: Non-Pressure ulcer, fat layer exposed    Wound: N/A        PAST MEDICAL HISTORY      Diagnosis Date    Cellulitis of right lower leg     Diabetes mellitus (H[facility])     Lymphedema     Obesity, Class III, BMI 40-49.9 (morbid obesity) ([facility])     [facility]opl[facility] ([facility] 2017    Left     Past Surgical History:   Procedure Laterality Date    FRACTURE SURGERY      right ankle repair     Family History   Problem Relation Age of Onset    Heart Disease Mother     Heart Disease Father      Social History     Tobacco Use    Smoking status: Never Smoker    Smokeless tobacco: Never Used   Substance Use Topics    Alcohol use: No     Comment: on special occassions    Drug use: No     Allergies   Allergen Reactions    Metformin And Related Diarrhea     Current Outpatient Medications on File Prior to Encounter   Medication Sig Dispense Refill    aspirin (ASPIRIN CHILDRENS) 81 MG chewable tablet Take 1 tablet by mouth daily 30 tablet 5     No current facility-administered medications on file prior to encounter. REVIEW OF SYSTEMS See HPI    Objective:    /78   Pulse 84   Temp 98.1 °F (36.7 °C) (Oral)   Resp 20   Ht 6' 4\" (1.93 m)   Wt (!) 340 lb (154.2 kg)   BMI 41.39 kg/m²   Wt Readings from Last 3 Encounters:   06/06/19 (!) 340 lb (154.2 kg)   05/23/19 (!) 340 lb (154.2 kg)   11/26/18 (!) 362 lb 8 oz (164.4 kg)     PHYSICAL EXAM  CONSTITUTIONAL:   Awake, alert, cooperative   EYES:  lids and lashes normal   ENT: external ears and nose without lesions   NECK:  supple, symmetrical, trachea midline   SKIN:  Open wounds bilateral lower extremities, 30% devitalized nonviable tissue. Through subcutaneous tissue. No purulence or odor. No surrounding erythema. Stasis changes. Vascular intact. Positive edema. Assessment:     Problem List Items Addressed This Visit     None        Procedure Note  Indications:  Based on my examination of this patient's wound(s)/ulcer(s) today, debridement is required to promote healing and evaluate the wound base. Performed by: Winifred Marina DPM    Consent obtained:  Yes    Time out taken:  Yes    Pain Control: Anesthetic  Anesthetic: 2% Lidocaine Gel Topical     Debridement:Excisional Debridement    Using curette the wound(s)/ulcer(s) was/were sharply debrided down through and including the removal of subcutaneous tissue. Devitalized Tissue Debrided:  fibrin, biofilm and slough to stimulate bleeding to promote healing, post debridement good bleeding base and wound edges noted    Pre Debridement Measurements:  Are located in the Wound/Ulcer Documentation Flow Sheet    Wound/Ulcer #: 1 and 2    Post Debridement Measurements:  Wound/Ulcer Descriptions are Pre Debridement except measurements:    Wound 05/23/19 Leg Right; Lower; Lateral #1 aquired 1-3-19 (Active)   Wound Image 5/23/2019  1:26 PM   Wound Venous 5/23/2019  1:26 PM   Dressing Status Clean;Dry; Intact 5/30/2019  2:05 PM   Dressing Changed Changed/New 5/30/2019  2:05 PM   Dressing/Treatment Alginate 5/30/2019  2:05 PM   Wound Cleansed Rinsed/Irrigated with saline 5/30/2019  2:05 PM   Wound Length (cm) 12.5 cm 6/6/2019  1:47 PM   Wound Width (cm) 13 cm 6/6/2019  1:47 PM   Wound Depth (cm) 0.4 cm 6/6/2019  1:47 PM   Wound Surface Area (cm^2) 162.5 cm^2 6/6/2019  1:47 PM   Change in Wound Size % (l*w) -157.94 6/6/2019  1:47 PM   Wound Volume (cm^3) 65 cm^3 6/6/2019  1:47 PM   Wound Healing % -106 6/6/2019  1:47 PM   Post-Procedure Length (cm) 12.5 cm 6/6/2019  1:56 PM   Post-Procedure Width (cm) 13 cm 6/6/2019  1:56 PM   Post-Procedure Depth (cm) 0.4 cm 6/6/2019  1:56 PM   Post-Procedure Surface Area (cm^2) 162.5 cm^2 6/6/2019  1:56 PM   Post-Procedure Volume (cm^3) 65 cm^3 6/6/2019  1:56 PM   Wound Assessment Pale;Pink; White 6/6/2019  1:47 PM   Drainage Amount Large 6/6/2019  1:47 PM   Drainage Description Serosanguinous;Tan;Yellow 6/6/2019  1:47 PM   Odor None 6/6/2019  1:47 PM   Colleen-wound Assessment Intact 6/6/2019  1:47 PM   Number of days: 14       Wound 05/23/19 Leg Left; Lower; Lateral #2 aquired 1-3-19 (Active)   Wound Image   5/23/2019  1:28 PM   Wound Venous 5/23/2019  1:28 PM   Dressing Status Clean;Dry; Intact 5/30/2019  2:05 PM   Dressing Changed Changed/New 5/30/2019  2:05 PM   Dressing/Treatment Alginate 5/30/2019  2:05 PM   Wound Cleansed Rinsed/Irrigated with saline 5/30/2019  2:05 PM   Wound Length (cm) 13.5 cm 6/6/2019  1:47 PM   Wound Width (cm) 7.5 cm 6/6/2019  1:47 PM   Wound Depth (cm) 0.1 cm 6/6/2019  1:47 PM   Wound Surface Area (cm^2) 101.25 cm^2 6/6/2019  1:47 PM   Change in Wound Size % (l*w) -912.5 6/6/2019  1:47 PM   Wound Volume (cm^3) 10.12 cm^3 6/6/2019  1:47 PM   Wound Healing % -912 6/6/2019  1:47 PM   Post-Procedure Length (cm) 13.5 cm 6/6/2019  1:56 PM   Post-Procedure Width (cm) 7.5 cm 6/6/2019

## 2019-06-13 ENCOUNTER — HOSPITAL ENCOUNTER (OUTPATIENT)
Dept: WOUND CARE | Age: 69
Discharge: HOME | End: 2019-06-13
Payer: MEDICARE

## 2019-06-13 VITALS
TEMPERATURE: 98.1 F | HEIGHT: 76 IN | RESPIRATION RATE: 16 BRPM | WEIGHT: 315 LBS | DIASTOLIC BLOOD PRESSURE: 70 MMHG | SYSTOLIC BLOOD PRESSURE: 120 MMHG | HEART RATE: 68 BPM | BODY MASS INDEX: 38.36 KG/M2

## 2019-06-13 PROCEDURE — 11042 DBRDMT SUBQ TIS 1ST 20SQCM/<: CPT

## 2019-06-13 RX ORDER — LIDOCAINE HYDROCHLORIDE 20 MG/ML
JELLY TOPICAL ONCE
Status: DISCONTINUED | OUTPATIENT
Start: 2019-06-13 | End: 2019-06-14 | Stop reason: HOSPADM

## 2019-06-13 NOTE — PROGRESS NOTES
mouth daily 30 tablet 5     No current facility-administered medications on file prior to encounter. REVIEW OF SYSTEMS See HPI    Objective:    /70   Pulse 68   Temp 98.1 °F (36.7 °C) (Oral)   Resp 16   Ht 6' 4\" (1.93 m)   Wt (!) 340 lb (154.2 kg)   BMI 41.39 kg/m²   Wt Readings from Last 3 Encounters:   06/13/19 (!) 340 lb (154.2 kg)   06/06/19 (!) 340 lb (154.2 kg)   05/23/19 (!) 340 lb (154.2 kg)     PHYSICAL EXAM  CONSTITUTIONAL:   Awake, alert, cooperative   EYES:  lids and lashes normal   ENT: external ears and nose without lesions   NECK:  supple, symmetrical, trachea midline   SKIN:  Open wounds bilateral lower extremities, 10% devitalized nonviable tissue. Through subcutaneous tissue. No purulence or odor. No surrounding erythema. Stasis changes. Vascular intact. Positive edema. Assessment:     Venous ulcers    Procedure Note  Indications:  Based on my examination of this patient's wound(s)/ulcer(s) today, debridement is required to promote healing and evaluate the wound base. Performed by: Tera Richter DPM    Consent obtained:  Yes    Time out taken:  Yes    Pain Control: Anesthetic  Anesthetic: 2% Lidocaine Gel Topical     Debridement:Excisional Debridement    Using curette the wound(s)/ulcer(s) was/were sharply debrided down through and including the removal of subcutaneous tissue. Devitalized Tissue Debrided:  fibrin, biofilm and slough to stimulate bleeding to promote healing, post debridement good bleeding base and wound edges noted    Pre Debridement Measurements:  Are located in the Wound/Ulcer Documentation Flow Sheet    Wound/Ulcer #: 1 and 2    Post Debridement Measurements:  Wound/Ulcer Descriptions are Pre Debridement except measurements:    Wound 05/23/19 Leg Right; Lower; Lateral #1 aquired 1-3-19 (Active)   Wound Image   5/23/2019  1:26 PM   Wound Venous 5/23/2019  1:26 PM   Dressing Status Clean;Dry; Intact 6/13/2019  1:41 PM   Dressing Changed Changed/New 6/13/2019  1:41 PM   Dressing/Treatment Alginate;Dry Dressing 6/13/2019  1:41 PM   Wound Cleansed Rinsed/Irrigated with saline 6/13/2019  1:41 PM   Wound Length (cm) 10 cm 6/13/2019  1:17 PM   Wound Width (cm) 7 cm 6/13/2019  1:17 PM   Wound Depth (cm) 0.1 cm 6/13/2019  1:17 PM   Wound Surface Area (cm^2) 70 cm^2 6/13/2019  1:17 PM   Change in Wound Size % (l*w) -11.11 6/13/2019  1:17 PM   Wound Volume (cm^3) 7 cm^3 6/13/2019  1:17 PM   Wound Healing % 78 6/13/2019  1:17 PM   Post-Procedure Length (cm) 10 cm 6/13/2019  1:38 PM   Post-Procedure Width (cm) 7 cm 6/13/2019  1:38 PM   Post-Procedure Depth (cm) 0.1 cm 6/13/2019  1:38 PM   Post-Procedure Surface Area (cm^2) 70 cm^2 6/13/2019  1:38 PM   Post-Procedure Volume (cm^3) 7 cm^3 6/13/2019  1:38 PM   Wound Assessment Pale;Pink; White 6/13/2019  1:17 PM   Drainage Amount Large 6/13/2019  1:17 PM   Drainage Description Serosanguinous;Tan;Yellow 6/13/2019  1:17 PM   Odor None 6/13/2019  1:17 PM   Colleen-wound Assessment Intact;Dry 6/13/2019  1:17 PM   Number of days: 21       Wound 05/23/19 Leg Left; Lower; Lateral #2 aquired 1-3-19 (Active)   Wound Image   5/23/2019  1:28 PM   Wound Venous 5/23/2019  1:28 PM   Dressing Status Clean;Dry; Intact 6/13/2019  1:41 PM   Dressing Changed Changed/New 6/13/2019  1:41 PM   Dressing/Treatment Alginate;Dry Dressing 6/13/2019  1:41 PM   Wound Cleansed Rinsed/Irrigated with saline 6/13/2019  1:41 PM   Wound Length (cm) 10.3 cm 6/13/2019  1:17 PM   Wound Width (cm) 9 cm 6/13/2019  1:17 PM   Wound Depth (cm) 0.1 cm 6/13/2019  1:17 PM   Wound Surface Area (cm^2) 92.7 cm^2 6/13/2019  1:17 PM   Change in Wound Size % (l*w) -827 6/13/2019  1:17 PM   Wound Volume (cm^3) 9.27 cm^3 6/13/2019  1:17 PM   Wound Healing % -827 6/13/2019  1:17 PM   Post-Procedure Length (cm) 10.3 cm 6/13/2019  1:38 PM   Post-Procedure Width (cm) 7 cm 6/13/2019  1:38 PM   Post-Procedure Depth (cm) 0.1 cm 6/13/2019  1:38 PM   Post-Procedure Surface Area (cm^2) 72.1 cm^2 6/13/2019  1:38 PM   Post-Procedure Volume (cm^3) 7.21 cm^3 6/13/2019  1:38 PM   Wound Assessment Pink; White 6/13/2019  1:17 PM   Drainage Amount Moderate 6/13/2019  1:17 PM   Drainage Description Serous; Yellow;Serosanguinous 6/13/2019  1:17 PM   Odor None 6/13/2019  1:17 PM   Colleen-wound Assessment Intact;Dry 6/13/2019  1:17 PM   Number of days: 21     Percent of Wound/Ulcer Debrided: 10%    Total Surface Area Debrided:  17 sq cm     Estimated Blood Loss:  Minimal  Hemostasis Achieved:  by pressure    Procedural Pain:  0  / 10   Post Procedural Pain:  0 / 10     Response to treatment:  Well tolerated by patient. Plan:   Treatment Note please see attached Discharge Instructions    Written patient dismissal instructions given to patient and signed by patient or POA. Discharge Instructions         Visit Discharge/Physician Orders     Discharge condition: Stable     Assessment of pain at discharge:none     Anesthetic used: 2% lidocaine     Discharge to: Home     Left via:Private automobile     Accompanied by: accompanied by self     ECF/HHA: [facility]     Dressing Orders:CLEANSE ULCERS BILATERAL LOWER LEGS WITH NORMAL SALINE APPLY AQUACEL KERLIX AND COBAN  3 X WEEK     Treatment Orders:     Eat foods high in protein and vitamin c     Take multivitamin daily.     [facility] followup visit ________1 week Dr. Franklin_____________________  (Please note your next appointment above and if you are unable to keep, kindly give a 24 hour notice.  Thank you.)     Physician signature:__________________________        If you experience any of the following, please call the [address] [place] [address] Road during business hours:     * Increase in Pain  * Temperature over 101  * Increase in drainage from your wound  * Drainage with a foul odor  * Bleeding  * Increase in swelling  * Need for compression bandage changes due to slippage, breakthrough drainage.     If you need medical attention outside of the business hours of the Wound Care Centers please contact your PCP or go to the nearest emergency room.                   Electronically signed by Huber Daugherty DPM on 6/13/2019 at 1:46 PM

## 2019-06-20 ENCOUNTER — HOSPITAL ENCOUNTER (OUTPATIENT)
Dept: WOUND CARE | Age: 69
Discharge: HOME | End: 2019-06-20
Payer: MEDICARE

## 2019-06-20 VITALS
RESPIRATION RATE: 18 BRPM | HEIGHT: 76 IN | WEIGHT: 315 LBS | HEART RATE: 72 BPM | SYSTOLIC BLOOD PRESSURE: 136 MMHG | DIASTOLIC BLOOD PRESSURE: 74 MMHG | BODY MASS INDEX: 38.36 KG/M2 | TEMPERATURE: 97.7 F

## 2019-06-20 PROCEDURE — 11045 DBRDMT SUBQ TISS EACH ADDL: CPT

## 2019-06-20 PROCEDURE — 11042 DBRDMT SUBQ TIS 1ST 20SQCM/<: CPT

## 2019-06-20 RX ORDER — LIDOCAINE HYDROCHLORIDE 20 MG/ML
JELLY TOPICAL ONCE
Status: DISCONTINUED | OUTPATIENT
Start: 2019-06-20 | End: 2019-06-21 | Stop reason: HOSPADM

## 2019-06-20 NOTE — PROGRESS NOTES
No current facility-administered medications on file prior to encounter. REVIEW OF SYSTEMS See HPI    Objective:    /74   Pulse 72   Temp 97.7 °F (36.5 °C) (Oral)   Resp 18   Ht 6' 4\" (1.93 m)   Wt (!) 340 lb (154.2 kg)   BMI 41.39 kg/m²   Wt Readings from Last 3 Encounters:   06/20/19 (!) 340 lb (154.2 kg)   06/13/19 (!) 340 lb (154.2 kg)   06/06/19 (!) 340 lb (154.2 kg)     PHYSICAL EXAM  CONSTITUTIONAL:   Awake, alert, cooperative   EYES:  lids and lashes normal   ENT: external ears and nose without lesions   NECK:  supple, symmetrical, trachea midline   SKIN:  Open wound Present    Assessment:     Problem List Items Addressed This Visit     None        Procedure Note  Indications:  Based on my examination of this patient's wound(s)/ulcer(s) today, debridement is required to promote healing and evaluate the wound base. Performed by: Shaquille Ibrahim DPM    Consent obtained:  Yes    Time out taken:  Yes    Pain Control: Anesthetic  Anesthetic: 2% Lidocaine Gel Topical     Debridement:Excisional Debridement    Using curette the wound(s)/ulcer(s) was/were sharply debrided down through and including the removal of subcutaneous tissue. Devitalized Tissue Debrided:  fibrin, biofilm and slough to stimulate bleeding to promote healing, post debridement good bleeding base and wound edges noted    Pre Debridement Measurements:  Are located in the [place]  Documentation Flow Sheet    Wound/Ulcer #: 2    Post Debridement Measurements:  Wound/Ulcer Descriptions are Pre Debridement except measurements:    Wound 05/23/19 Leg Right; Lower; Lateral #1 aquired 1-3-19 (Active)   Wound Image   6/20/2019  1:31 PM   Wound Venous 5/23/2019  1:26 PM   Dressing Status Clean;Dry; Intact 6/13/2019  1:41 PM   Dressing Changed Changed/New 6/13/2019  1:41 PM   Dressing/Treatment Alginate;Dry Dressing 6/13/2019  1:41 PM   Wound Cleansed Rinsed/Irrigated with saline 6/13/2019  1:41 PM   Wound Length (cm) 8.6 cm 6/20/2019  1:31 PM   Wound Width (cm) 8 cm 6/20/2019  1:31 PM   Wound Depth (cm) 0.2 cm 6/20/2019  1:31 PM   Wound Surface Area (cm^2) 68.8 cm^2 6/20/2019  1:31 PM   Change in Wound Size % (l*w) -9.21 6/20/2019  1:31 PM   Wound Volume (cm^3) 13.76 cm^3 6/20/2019  1:31 PM   Wound Healing % 56 6/20/2019  1:31 PM   Post-Procedure Length (cm) 8.6 cm 6/20/2019  1:59 PM   Post-Procedure Width (cm) 8 cm 6/20/2019  1:59 PM   Post-Procedure Depth (cm) 0.2 cm 6/20/2019  1:59 PM   Post-Procedure Surface Area (cm^2) 68.8 cm^2 6/20/2019  1:59 PM   Post-Procedure Volume (cm^3) 13.76 cm^3 6/20/2019  1:59 PM   Wound Assessment Pink;Yellow 6/20/2019  1:31 PM   Drainage Amount Small 6/20/2019  1:31 PM   Drainage Description Serosanguinous; Yellow 6/20/2019  1:31 PM   Odor None 6/20/2019  1:31 PM   Colleen-wound Assessment Intact 6/20/2019  1:31 PM   Number of days: 28       Wound 05/23/19 Leg Left; Lower; Lateral #2 aquired 1-3-19 (Active)   Wound Image   6/20/2019  1:31 PM   Wound Venous 5/23/2019  1:28 PM   Dressing Status Clean;Dry; Intact 6/13/2019  1:41 PM   Dressing Changed Changed/New 6/13/2019  1:41 PM   Dressing/Treatment Alginate;Dry Dressing 6/13/2019  1:41 PM   Wound Cleansed Rinsed/Irrigated with saline 6/13/2019  1:41 PM   Wound Length (cm) 13.4 cm 6/20/2019  1:31 PM   Wound Width (cm) 12.1 cm 6/20/2019  1:31 PM   Wound Depth (cm) 0.1 cm 6/20/2019  1:31 PM   Wound Surface Area (cm^2) 162.14 cm^2 6/20/2019  1:31 PM   Change in Wound Size % (l*w) -1521.4 6/20/2019  1:31 PM   Wound Volume (cm^3) 16.21 cm^3 6/20/2019  1:31 PM   Wound Healing % -1521 6/20/2019  1:31 PM   Post-Procedure Length (cm) 13.4 cm 6/20/2019  1:59 PM   Post-Procedure Width (cm) 12.1 cm 6/20/2019  1:59 PM   Post-Procedure Depth (cm) 0.2 cm 6/20/2019  1:59 PM   Post-Procedure Surface Area (cm^2) 162.14 cm^2 6/20/2019  1:59 PM   Post-Procedure Volume (cm^3) 32.43 cm^3 6/20/2019  1:59 PM   Wound Assessment Pale;Pink 6/20/2019  1:31 PM   Drainage Amount Large 6/20/2019  1:31 PM   Drainage Description Serous; Yellow 6/20/2019  1:31 PM   Odor None 6/20/2019  1:31 PM   Colleen-wound Assessment Maceration 6/20/2019  1:31 PM   Number of days: 28     Percent of Wound/Ulcer Debrided: 100%    Total Surface Area Debrided:  160 sq cm     Estimated Blood Loss:  Minimal  Hemostasis Achieved:  by pressure    Procedural Pain:  2  / 10   Post Procedural Pain:  0 / 10     Response to treatment:  Well tolerated by patient. Plan:   Treatment Note please see attached Discharge Instructions    Written patient dismissal instructions given to patient and signed by patient or POA. Discharge Instructions         Visit Discharge/Physician Orders     Discharge condition: Stable     Assessment of pain at discharge:none     Anesthetic used: 2% lidocaine     Discharge to: Home     Left via:Private automobile     Accompanied by: accompanied by self     ECF/HHA: [facility]     Dressing Orders:CLEANSE ULCERS BILATERAL LOWER LEGS WITH NORMAL SALINE APPLY AQUACEL [address] [facility] [address] [place]  3 X WEEK     Treatment Orders:     Eat foods high in protein and vitamin c     Take multivitamin daily.     [facility] followup visit ________1 week ____________________  (Please note your next appointment above and if you are unable to keep, kindly give a 24 hour notice.  Thank you.)     Physician signature:__________________________        If you experience any of the following, please call the [facility] during business hours:     * Increase in Pain  * Temperature over 101  * Increase in drainage from your wound  * Drainage with a foul odor  * Bleeding  * Increase in swelling  * Need for compression bandage changes due to slippage, breakthrough drainage.     If you need medical attention outside of the business hours of the [facility] please contact your PCP or go to the nearest emergency room.                                Electronically signed by Lissa Moran DPM on 6/20/2019 at 2:18

## 2019-06-27 ENCOUNTER — HOSPITAL ENCOUNTER (OUTPATIENT)
Dept: WOUND CARE | Age: 69
Discharge: HOME | End: 2019-06-27
Payer: MEDICARE

## 2019-06-27 VITALS
BODY MASS INDEX: 38.36 KG/M2 | TEMPERATURE: 98 F | HEIGHT: 76 IN | DIASTOLIC BLOOD PRESSURE: 70 MMHG | RESPIRATION RATE: 16 BRPM | WEIGHT: 315 LBS | HEART RATE: 68 BPM | SYSTOLIC BLOOD PRESSURE: 120 MMHG

## 2019-06-27 PROCEDURE — 11045 DBRDMT SUBQ TISS EACH ADDL: CPT

## 2019-06-27 PROCEDURE — 11042 DBRDMT SUBQ TIS 1ST 20SQCM/<: CPT

## 2019-06-27 RX ORDER — LIDOCAINE HYDROCHLORIDE 20 MG/ML
JELLY TOPICAL ONCE
Status: DISCONTINUED | OUTPATIENT
Start: 2019-06-27 | End: 2019-06-28 | Stop reason: HOSPADM

## 2019-06-27 NOTE — PROGRESS NOTES
Wound Healing Center Followup Visit Note    Referring Physician : Mateo Mcduffie MD  [address] [place] [address] [place] RECORD NUMBER:  [id number]  AGE: 76 y.o. GENDER: male  : 1950  EPISODE DATE:  2019    Subjective:     Chief Complaint   Patient presents with    Wound Check     patient here for treatment of bilateral lower leg ulcers      HISTORY of PRESENT ILLNESS VAMSHI Brewster is a 76 y.o. male who presents today in regards to follow up evaluation and treatment of wound/ulcer. That patient's past medical, family and social hx were reviewed and changes were made if present.     History of Wound Context:  Follow up and debridement     Wound/Ulcer Pain Timing/Severity: none  Quality of pain: N/A  Severity:  0 / 10   Modifying Factors: None  Associated Signs/Symptoms: edema and drainage    Ulcer Identification:  Ulcer Type: venous  Contributing Factors: edema, venous stasis and lymphedema    Diabetic/Pressure/Non Pressure Ulcers only:  Ulcer: Non-Pressure ulcer, fat layer exposed    Wound: N/A        PAST MEDICAL HISTORY      Diagnosis Date    Cellulitis of right lower leg     Diabetes mellitus (HCC)     Lymphedema     Obesity, Class III, BMI 40-49.9 (morbid obesity) ([facility])     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     Past Surgical History:   Procedure Laterality Date    FRACTURE SURGERY  2005    right ankle repair     Family History   Problem Relation Age of Onset    Heart Disease Mother     Heart Disease Father      Social History     Tobacco Use    Smoking status: Never Smoker    Smokeless tobacco: Never Used   Substance Use Topics    Alcohol use: No     Comment: on special occassions    Drug use: No     Allergies   Allergen Reactions    Metformin And Related Diarrhea     Current Outpatient Medications on File Prior to Encounter   Medication Sig Dispense Refill    aspirin (ASPIRIN CHILDRENS) 81 MG chewable tablet Take 1 tablet by mouth daily 30 tablet 5     No current facility-administered medications on file prior to encounter. REVIEW OF SYSTEMS See HPI    Objective:    /70   Pulse 68   Temp 98 °F (36.7 °C) (Oral)   Resp 16   Ht 6' 4\" (1.93 m)   Wt (!) 340 lb (154.2 kg)   BMI 41.39 kg/m²   Wt Readings from Last 3 Encounters:   06/27/19 (!) 340 lb (154.2 kg)   06/20/19 (!) 340 lb (154.2 kg)   06/13/19 (!) 340 lb (154.2 kg)     PHYSICAL EXAM  CONSTITUTIONAL:   Awake, alert, cooperative   EYES:  lids and lashes normal   ENT: external ears and nose without lesions   NECK:  supple, symmetrical, trachea midline   SKIN:  Open wound Present    Assessment:     Problem List Items Addressed This Visit     None        Procedure Note  Indications:  Based on my examination of this patient's wound(s)/ulcer(s) today, debridement is required to promote healing and evaluate the wound base. Performed by: Yovany Daugherty DPM    Consent obtained:  Yes    Time out taken:  Yes    Pain Control: Anesthetic  Anesthetic: 2% Lidocaine Gel Topical     Debridement:Excisional Debridement    Using curette the wound(s)/ulcer(s) was/were sharply debrided down through and including the removal of subcutaneous tissue. Devitalized Tissue Debrided:  fibrin, biofilm and slough to stimulate bleeding to promote healing, post debridement good bleeding base and wound edges noted    Pre Debridement Measurements:  Are located in the [place]  Documentation Flow Sheet    Wound/Ulcer #: 1    Post Debridement Measurements:  Wound/Ulcer Descriptions are Pre Debridement except measurements:    Wound 05/23/19 Leg Right; Lower; Lateral #1 aquired 1-3-19 (Active)   Wound Image   6/20/2019  1:31 PM   Wound Venous 5/23/2019  1:26 PM   Dressing Status Clean;Dry; Intact 6/13/2019  1:41 PM   Dressing Changed Changed/New 6/20/2019  4:12 PM   Dressing/Treatment Alginate 6/20/2019  4:12 PM   Wound Cleansed Rinsed/Irrigated with saline 6/20/2019  4:12 PM   Wound Length (cm) 4.5 cm 6/27/2019  1:21 PM   Wound Width (cm) 7.5 cm 6/27/2019  1:21 PM   Wound Depth (cm) 0.2 cm 6/27/2019  1:21 PM   Wound Surface Area (cm^2) 33.75 cm^2 6/27/2019  1:21 PM   Change in Wound Size % (l*w) 46.43 6/27/2019  1:21 PM   Wound Volume (cm^3) 6.75 cm^3 6/27/2019  1:21 PM   Wound Healing % 79 6/27/2019  1:21 PM   Post-Procedure Length (cm) 4.5 cm 6/27/2019  1:29 PM   Post-Procedure Width (cm) 7.5 cm 6/27/2019  1:29 PM   Post-Procedure Depth (cm) 0.2 cm 6/27/2019  1:29 PM   Post-Procedure Surface Area (cm^2) 33.75 cm^2 6/27/2019  1:29 PM   Post-Procedure Volume (cm^3) 6.75 cm^3 6/27/2019  1:29 PM   Wound Assessment Pink;Yellow 6/27/2019  1:21 PM   Drainage Amount Small 6/27/2019  1:21 PM   Drainage Description Serosanguinous; Yellow 6/27/2019  1:21 PM   Odor None 6/27/2019  1:21 PM   Colleen-wound Assessment Intact 6/27/2019  1:21 PM   Number of days: 35       Wound 05/23/19 Leg Left; Lower; Lateral #2 aquired 1-3-19 (Active)   Wound Image   6/20/2019  1:31 PM   Wound Venous 5/23/2019  1:28 PM   Dressing Status Clean;Dry; Intact 6/13/2019  1:41 PM   Dressing Changed Changed/New 6/20/2019  4:12 PM   Dressing/Treatment Alginate 6/20/2019  4:12 PM   Wound Cleansed Rinsed/Irrigated with saline 6/20/2019  4:12 PM   Wound Length (cm) 14.5 cm 6/27/2019  1:21 PM   Wound Width (cm) 10 cm 6/27/2019  1:21 PM   Wound Depth (cm) 0.2 cm 6/27/2019  1:21 PM   Wound Surface Area (cm^2) 145 cm^2 6/27/2019  1:21 PM   Change in Wound Size % (l*w) -1350 6/27/2019  1:21 PM   Wound Volume (cm^3) 29 cm^3 6/27/2019  1:21 PM   Wound Healing % -2800 6/27/2019  1:21 PM   Post-Procedure Length (cm) 14.5 cm 6/27/2019  1:29 PM   Post-Procedure Width (cm) 10 cm 6/27/2019  1:29 PM   Post-Procedure Depth (cm) 0.2 cm 6/27/2019  1:29 PM   Post-Procedure Surface Area (cm^2) 145 cm^2 6/27/2019  1:29 PM   Post-Procedure Volume (cm^3) 29 cm^3 6/27/2019  1:29 PM   Wound Assessment Pale;Pink 6/27/2019  1:21 PM   Drainage Amount Large 6/27/2019  1:21 PM   Drainage Description Serous; Yellow

## 2019-07-11 ENCOUNTER — HOSPITAL ENCOUNTER (OUTPATIENT)
Dept: WOUND CARE | Age: 69
Discharge: HOME | End: 2019-07-11
Payer: MEDICARE

## 2019-07-11 VITALS
HEART RATE: 70 BPM | DIASTOLIC BLOOD PRESSURE: 70 MMHG | TEMPERATURE: 98.7 F | SYSTOLIC BLOOD PRESSURE: 138 MMHG | RESPIRATION RATE: 18 BRPM

## 2019-07-11 PROCEDURE — 11042 DBRDMT SUBQ TIS 1ST 20SQCM/<: CPT

## 2019-07-11 PROCEDURE — 11045 DBRDMT SUBQ TISS EACH ADDL: CPT

## 2019-07-12 ENCOUNTER — PREP FOR PROCEDURE (OUTPATIENT)
Dept: PODIATRY | Age: 69
End: 2019-07-12

## 2019-07-12 NOTE — PROGRESS NOTES
on file prior to encounter. REVIEW OF SYSTEMS See HPI    Objective:    /70   Pulse 70   Temp 98.7 °F (37.1 °C) (Oral)   Resp 18   Wt Readings from Last 3 Encounters:   06/27/19 (!) 340 lb (154.2 kg)   06/20/19 (!) 340 lb (154.2 kg)   06/13/19 (!) 340 lb (154.2 kg)     PHYSICAL EXAM  CONSTITUTIONAL:   Awake, alert, cooperative   EYES:  lids and lashes normal   ENT: external ears and nose without lesions   NECK:  supple, symmetrical, trachea midline   SKIN:  Open wound Present bilateral LE, 30% devitalized nonviable tissue, no purulence or odor. Stasis changes, + edema. Assessment:     Venous ulcers Bilateral LE. Lymphedema. Procedure Note  Indications:  Based on my examination of this patient's wound(s)/ulcer(s) today, debridement is required to promote healing and evaluate the wound base. Performed by: Jean Paul Israel DPM    Consent obtained:  Yes    Time out taken:  Yes    Pain Control:       Debridement:Excisional Debridement    Using curette the wound(s)/ulcer(s) was/were sharply debrided down through and including the removal of subcutaneous tissue. Devitalized Tissue Debrided:  fibrin, biofilm, slough and necrotic/eschar to stimulate bleeding to promote healing, post debridement good bleeding base and wound edges noted    Pre Debridement Measurements:  Are located in the Wound/Ulcer Documentation Flow Sheet    Wound/Ulcer #: 1 and 2    Post Debridement Measurements:  Wound/Ulcer Descriptions are Pre Debridement except measurements:    Wound 05/23/19 Leg Right; Lower; Lateral #1 aquired 1-3-19 (Active)   Wound Image   6/20/2019  1:31 PM   Wound Venous 5/23/2019  1:26 PM   Dressing Status Clean;Dry; Intact 7/11/2019  2:22 PM   Dressing Changed Changed/New 7/11/2019  2:22 PM   Dressing/Treatment ABD; Alginate;Dry Dressing 7/11/2019  2:22 PM   Wound Cleansed Rinsed/Irrigated with saline 7/11/2019  2:22 PM   Wound Length (cm) 13.5 cm 7/11/2019  1:18 PM   Wound Width (cm) 9 cm 7/11/2019 PM

## 2019-07-16 ENCOUNTER — TELEPHONE (OUTPATIENT)
Dept: INTERNAL MEDICINE | Age: 69
End: 2019-07-16

## 2019-07-18 ENCOUNTER — HOSPITAL ENCOUNTER (OUTPATIENT)
Dept: WOUND CARE | Age: 69
Discharge: HOME | End: 2019-07-18
Payer: MEDICARE

## 2019-07-18 VITALS
WEIGHT: 315 LBS | HEART RATE: 80 BPM | SYSTOLIC BLOOD PRESSURE: 150 MMHG | BODY MASS INDEX: 38.36 KG/M2 | HEIGHT: 76 IN | DIASTOLIC BLOOD PRESSURE: 80 MMHG | TEMPERATURE: 98.5 F | RESPIRATION RATE: 16 BRPM

## 2019-07-18 PROCEDURE — 11045 DBRDMT SUBQ TISS EACH ADDL: CPT

## 2019-07-18 PROCEDURE — 11042 DBRDMT SUBQ TIS 1ST 20SQCM/<: CPT

## 2019-07-18 RX ORDER — LIDOCAINE HYDROCHLORIDE 20 MG/ML
JELLY TOPICAL ONCE
Status: DISCONTINUED | OUTPATIENT
Start: 2019-07-18 | End: 2019-07-19 | Stop reason: HOSPADM

## 2019-07-18 NOTE — PROGRESS NOTES
Wound Healing Center Followup Visit Note    Referring Physician : Andrea Guillermo MD  [address] [place] [address] [place] RECORD NUMBER:  [id number]  AGE: 71 y.o. GENDER: male  : 1950  EPISODE DATE:  2019    Subjective:     Chief Complaint   Patient presents with    Wound Check     patient here for treatment of  bilateral lower leg ulcers       HISTORY of PRESENT ILLNESS HPI    Yarely Greene is a 71 y.o. male who presents today in regards to follow up evaluation and treatment of wound/ulcer. That patient's past medical, family and social hx were reviewed and changes were made if present.   Doing well, tolerating dressing changes, no N/V/F/C/SOB/CP.     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     Past Surgical History:   Procedure Laterality Date    FRACTURE SURGERY      right ankle repair     Family History   Problem Relation Age of Onset    Heart Disease Mother     Heart Disease Father      Social History     Tobacco Use    Smoking status: Never Smoker    Smokeless tobacco: Never Used   Substance Use Topics    Alcohol use: No     Comment: on special occassions    Drug use: No     Allergies   Allergen Reactions    Metformin And Related Diarrhea     Current Outpatient Medications on File Prior to Encounter   Medication Sig Dispense Refill    aspirin (ASPIRIN CHILDRENS) 81 MG chewable tablet Take 1 tablet by mouth daily 30 tablet 5 No current facility-administered medications on file prior to encounter. REVIEW OF SYSTEMS See HPI    Objective:    BP (!) 150/80   Pulse 80   Temp 98.5 °F (36.9 °C) (Oral)   Resp 16   Ht 6' 4\" (1.93 m)   Wt (!) 340 lb (154.2 kg)   BMI 41.39 kg/m²   Wt Readings from Last 3 Encounters:   07/18/19 (!) 340 lb (154.2 kg)   06/27/19 (!) 340 lb (154.2 kg)   06/20/19 (!) 340 lb (154.2 kg)     PHYSICAL EXAM  CONSTITUTIONAL:   Awake, alert, cooperative   EYES:  lids and lashes normal   ENT: external ears and nose without lesions   NECK:  supple, symmetrical, trachea midline   SKIN:  Open wound Present bilateral LE, 30% devitalized nonviable tissue, no purulence or odor. Stasis changes, + edema. Assessment:     Venous ulcers Bilateral LE. Lymphedema. Procedure Note  Indications:  Based on my examination of this patient's wound(s)/ulcer(s) today, debridement is required to promote healing and evaluate the wound base. Performed by: Oli Mojica DPM    Consent obtained:  Yes    Time out taken:  Yes    Pain Control: Anesthetic  Anesthetic: 2% Lidocaine Gel Topical     Debridement:Excisional Debridement    Using curette the wound(s)/ulcer(s) was/were sharply debrided down through and including the removal of subcutaneous tissue. Devitalized Tissue Debrided:  fibrin, biofilm and slough to stimulate bleeding to promote healing, post debridement good bleeding base and wound edges noted    Pre Debridement Measurements:  Are located in the Wound/Ulcer Documentation Flow Sheet    Wound/Ulcer #: 1 and 2    Post Debridement Measurements:  Wound/Ulcer Descriptions are Pre Debridement except measurements:    Wound 05/23/19 Leg Right; Lower; Lateral #1 aquired 1-3-19 (Active)   Wound Image   7/18/2019  1:37 PM   Wound Venous 5/23/2019  1:26 PM   Dressing Status Clean;Dry; Intact 7/11/2019  2:22 PM   Dressing Changed Changed/New 7/11/2019  2:22 PM   Dressing/Treatment ABD; Alginate;Dry Dressing 7/11/2019

## 2019-07-19 ENCOUNTER — TELEPHONE (OUTPATIENT)
Dept: INTERNAL MEDICINE | Age: 69
End: 2019-07-19

## 2019-07-23 ENCOUNTER — OFFICE VISIT (OUTPATIENT)
Dept: INTERNAL MEDICINE | Age: 69
End: 2019-07-23
Payer: MEDICARE

## 2019-07-23 VITALS
HEART RATE: 57 BPM | OXYGEN SATURATION: 97 % | WEIGHT: 315 LBS | DIASTOLIC BLOOD PRESSURE: 78 MMHG | HEIGHT: 74 IN | SYSTOLIC BLOOD PRESSURE: 138 MMHG | BODY MASS INDEX: 40.43 KG/M2

## 2019-07-23 DIAGNOSIS — D64.9 NORMOCYTIC ANEMIA: ICD-10-CM

## 2019-07-23 DIAGNOSIS — L97.912 NON-PRESSURE CHRONIC ULCER OF RIGHT LOWER LEG WITH FAT LAYER EXPOSED (HCC): ICD-10-CM

## 2019-07-23 DIAGNOSIS — E66.01 MORBID OBESITY WITH BMI OF 45.0-49.9, ADULT (HCC): Primary | ICD-10-CM

## 2019-07-23 DIAGNOSIS — L97.922 NON-PRESSURE CHRONIC ULCER OF LEFT LOWER LEG WITH FAT LAYER EXPOSED (HCC): ICD-10-CM

## 2019-07-23 DIAGNOSIS — R73.03 PREDIABETES: ICD-10-CM

## 2019-07-23 PROBLEM — I72.4 POPLITEAL ANEURYSM (HCC): Status: ACTIVE | Noted: 2017-08-23

## 2019-07-23 LAB — HBA1C MFR BLD: 5.8 %

## 2019-07-23 PROCEDURE — 4040F PNEUMOC VAC/ADMIN/RCVD: CPT | Performed by: INTERNAL MEDICINE

## 2019-07-23 PROCEDURE — G8417 CALC BMI ABV UP PARAM F/U: HCPCS | Performed by: INTERNAL MEDICINE

## 2019-07-23 PROCEDURE — 83036 HEMOGLOBIN GLYCOSYLATED A1C: CPT | Performed by: INTERNAL MEDICINE

## 2019-07-23 PROCEDURE — 3017F COLORECTAL CA SCREEN DOC REV: CPT | Performed by: INTERNAL MEDICINE

## 2019-07-23 PROCEDURE — 99212 OFFICE O/P EST SF 10 MIN: CPT | Performed by: INTERNAL MEDICINE

## 2019-07-23 PROCEDURE — 1123F ACP DISCUSS/DSCN MKR DOCD: CPT | Performed by: INTERNAL MEDICINE

## 2019-07-23 PROCEDURE — 99214 OFFICE O/P EST MOD 30 MIN: CPT | Performed by: INTERNAL MEDICINE

## 2019-07-23 PROCEDURE — 1036F TOBACCO NON-USER: CPT | Performed by: INTERNAL MEDICINE

## 2019-07-23 PROCEDURE — G8427 DOCREV CUR MEDS BY ELIG CLIN: HCPCS | Performed by: INTERNAL MEDICINE

## 2019-07-23 RX ORDER — ATORVASTATIN CALCIUM 40 MG/1
40 TABLET, FILM COATED ORAL DAILY
Qty: 30 TABLET | Refills: 3 | Status: CANCELLED | OUTPATIENT
Start: 2019-07-23

## 2019-07-23 ASSESSMENT — ENCOUNTER SYMPTOMS
ABDOMINAL PAIN: 0
CONSTIPATION: 0
SHORTNESS OF BREATH: 0
NAUSEA: 0
VOMITING: 0
COUGH: 0
DIARRHEA: 0

## 2019-07-23 ASSESSMENT — PATIENT HEALTH QUESTIONNAIRE - PHQ9
SUM OF ALL RESPONSES TO PHQ9 QUESTIONS 1 & 2: 0
1. LITTLE INTEREST OR PLEASURE IN DOING THINGS: 0
SUM OF ALL RESPONSES TO PHQ QUESTIONS 1-9: 0
SUM OF ALL RESPONSES TO PHQ QUESTIONS 1-9: 0
2. FEELING DOWN, DEPRESSED OR HOPELESS: 0

## 2019-07-23 NOTE — PROGRESS NOTES
[facility]    Attending Physician Statement:  Tk Hoffman M.D., F.A.C.P. I have discussed the case, including pertinent history and exam findings with the resident. I have seen and examined the patient and the key elements of the encounter have been performed by me. I agree with the assessment, plan and orders as documented by the resident. Patient here for routine follow up of medical problems. No acute complaints   Following with Wound care for chronic wounds- likely secondary to chronic lymphedema- improving at this time   Tolerating all meds    Pre-DM- A1c 5.6%   Not on meds   A1c remains stable    Lifestyle changes recommended     Stage III Obesity   BMI was elevated today, and weight loss plan recommended is : refusing any referral to surgical or medical weight loss centers. Patient is motivated to lose weight. 8 lb weight loss since last appt in Nov 2018 per report   Additional weights in computer are not accurate at this time    HPL   ASCVD risk score is elevated with association of Pre-DM    Recommend statin initiation- discussed importance but patient refusing   Stated previous intolerance to atorvastatin- discussed additional options- but patient continued to refuse    Normocytic anemia    Repeat CBC and follow   Consider additional work-up including bita smear     Healthcare maintenance    Refusing all screening measures as outlined    Refuses colonoscopy referral- understands and accepts all risks of delay in potential dx of colorectal cancer; discussed normocytic anemia and recommend strongly screening- gave options for colonoscopy referral vs. FIT testing to start- patient understands but continues to refuse despite counseling. Refusing all vaccinations despite counseling based on ACIP criteria       Remainder of medical problems as per resident note.
medications, and allergies and updated history as appropriate.     RTC: 6 months    I have reviewed my findings and recommendations with Tyrese Quintero and Dr Nate Sosa MD PGY-2  7/23/2019 11:44 AM

## 2019-07-25 ENCOUNTER — HOSPITAL ENCOUNTER (OUTPATIENT)
Dept: WOUND CARE | Age: 69
Discharge: HOME | End: 2019-07-25
Payer: MEDICARE

## 2019-07-25 VITALS
WEIGHT: 315 LBS | SYSTOLIC BLOOD PRESSURE: 130 MMHG | BODY MASS INDEX: 40.43 KG/M2 | HEART RATE: 70 BPM | DIASTOLIC BLOOD PRESSURE: 66 MMHG | RESPIRATION RATE: 18 BRPM | TEMPERATURE: 98.2 F | HEIGHT: 74 IN

## 2019-07-25 PROCEDURE — 11042 DBRDMT SUBQ TIS 1ST 20SQCM/<: CPT

## 2019-07-25 RX ORDER — LIDOCAINE HYDROCHLORIDE 20 MG/ML
JELLY TOPICAL ONCE
Status: DISCONTINUED | OUTPATIENT
Start: 2019-07-25 | End: 2019-07-26 | Stop reason: HOSPADM

## 2019-08-01 ENCOUNTER — HOSPITAL ENCOUNTER (OUTPATIENT)
Dept: WOUND CARE | Age: 69
Discharge: HOME | End: 2019-08-01
Payer: MEDICARE

## 2019-08-01 VITALS
TEMPERATURE: 97.7 F | HEART RATE: 68 BPM | DIASTOLIC BLOOD PRESSURE: 76 MMHG | SYSTOLIC BLOOD PRESSURE: 132 MMHG | RESPIRATION RATE: 16 BRPM

## 2019-08-01 PROCEDURE — 11042 DBRDMT SUBQ TIS 1ST 20SQCM/<: CPT

## 2019-08-01 RX ORDER — LIDOCAINE HYDROCHLORIDE 20 MG/ML
JELLY TOPICAL ONCE
Status: DISCONTINUED | OUTPATIENT
Start: 2019-08-01 | End: 2019-08-02 | Stop reason: HOSPADM

## 2019-08-01 NOTE — PROGRESS NOTES
4:02 PM   Dressing/Treatment Alginate;Dry Dressing 7/25/2019  4:02 PM   Wound Cleansed Rinsed/Irrigated with saline 7/25/2019  4:02 PM   Wound Length (cm) 2 cm 8/1/2019  1:08 PM   Wound Width (cm) 12.5 cm 8/1/2019  1:08 PM   Wound Depth (cm) 0.2 cm 8/1/2019  1:08 PM   Wound Surface Area (cm^2) 25 cm^2 8/1/2019  1:08 PM   Change in Wound Size % (l*w) 60.32 8/1/2019  1:08 PM   Wound Volume (cm^3) 5 cm^3 8/1/2019  1:08 PM   Wound Healing % 84 8/1/2019  1:08 PM   Post-Procedure Length (cm) 2 cm 8/1/2019  1:20 PM   Post-Procedure Width (cm) 12 cm 8/1/2019  1:20 PM   Post-Procedure Depth (cm) 0.2 cm 8/1/2019  1:20 PM   Post-Procedure Surface Area (cm^2) 24 cm^2 8/1/2019  1:20 PM   Post-Procedure Volume (cm^3) 4.8 cm^3 8/1/2019  1:20 PM   Wound Assessment Pink; White 8/1/2019  1:06 PM   Drainage Amount Scant 8/1/2019  1:06 PM   Drainage Description Tan 8/1/2019  1:06 PM   Odor None 8/1/2019  1:06 PM   Colleen-wound Assessment Dry;Calloused 8/1/2019  1:08 PM   Number of days: 70       Wound 05/23/19 Leg Left; Lower; Lateral #2 aquired 1-3-19 (Active)   Wound Image   7/18/2019  1:37 PM   Wound Venous 5/23/2019  1:28 PM   Dressing Status Clean;Dry; Intact 7/25/2019  4:02 PM   Dressing Changed Changed/New 7/25/2019  4:02 PM   Dressing/Treatment Alginate;Dry Dressing 7/25/2019  4:02 PM   Wound Cleansed Rinsed/Irrigated with saline 7/25/2019  4:02 PM   Wound Length (cm) 12.3 cm 8/1/2019  1:08 PM   Wound Width (cm) 12 cm 8/1/2019  1:08 PM   Wound Depth (cm) 0.1 cm 8/1/2019  1:08 PM   Wound Surface Area (cm^2) 147.6 cm^2 8/1/2019  1:08 PM   Change in Wound Size % (l*w) -1376 8/1/2019  1:08 PM   Wound Volume (cm^3) 14.76 cm^3 8/1/2019  1:08 PM   Wound Healing % -1376 8/1/2019  1:08 PM   Post-Procedure Length (cm) 12.3 cm 8/1/2019  1:20 PM   Post-Procedure Width (cm) 12 cm 8/1/2019  1:20 PM   Post-Procedure Depth (cm) 0.1 cm 8/1/2019  1:20 PM   Post-Procedure Surface Area (cm^2) 147.6 cm^2 8/1/2019  1:20 PM   Post-Procedure Volume (cm^3)

## 2019-08-08 ENCOUNTER — HOSPITAL ENCOUNTER (OUTPATIENT)
Dept: WOUND CARE | Age: 69
Discharge: HOME | End: 2019-08-08
Payer: MEDICARE

## 2019-08-08 VITALS — SYSTOLIC BLOOD PRESSURE: 128 MMHG | RESPIRATION RATE: 20 BRPM | HEART RATE: 80 BPM | DIASTOLIC BLOOD PRESSURE: 70 MMHG

## 2019-08-08 PROCEDURE — 11042 DBRDMT SUBQ TIS 1ST 20SQCM/<: CPT

## 2019-08-08 NOTE — PROGRESS NOTES
Take 1 tablet by mouth daily 30 tablet 5     No current facility-administered medications on file prior to encounter. REVIEW OF SYSTEMS See HPI    Objective:    /70   Pulse 80   Resp 20   Wt Readings from Last 3 Encounters:   07/25/19 (!) 342 lb (155.1 kg)   07/23/19 (!) 355 lb (161 kg)   07/18/19 (!) 340 lb (154.2 kg)     PHYSICAL EXAM  CONSTITUTIONAL:   Awake, alert, cooperative   EYES:  lids and lashes normal   ENT: external ears and nose without lesions   NECK:  supple, symmetrical, trachea midline   SKIN:  Open wound right lower leg, area 10% with some devitalized nonviable tissue. There is no purulence or odor. No surrounding erythema or increase in temperature. Left lower leg at present superficial, + edema. Stasis changes. Vascular intact       Assessment:     Venous ulcer. Lymphedema. Procedure Note  Indications:  Based on my examination of this patient's wound(s)/ulcer(s) today, debridement is required to promote healing and evaluate the wound base. Performed by: Angel Smith DPM    Consent obtained:  Yes    Time out taken:  Yes    Pain Control:       Debridement:Excisional Debridement    Using curette the wound(s)/ulcer(s) was/were sharply debrided down through and including the removal of subcutaneous tissue. Devitalized Tissue Debrided:  biofilm, slough and necrotic/eschar to stimulate bleeding to promote healing, post debridement good bleeding base and wound edges noted    Pre Debridement Measurements:  Are located in the [facility]  Documentation Flow Sheet    Wound/Ulcer #: 1    Post Debridement Measurements:  Wound/Ulcer Descriptions are Pre Debridement except measurements:    Wound 05/23/19 Leg Right; Lower; Lateral #1 aquired 1-3-19 (Active)   Wound Image   7/18/2019  1:37 PM   Wound Venous 5/23/2019  1:26 PM   Dressing Status Clean;Dry; Intact 8/1/2019  1:37 PM   Dressing Changed Changed/New 8/1/2019  1:37 PM   Dressing/Treatment Alginate;Dry Dressing 8/1/2019 Wound Assessment Pale;Pink 7/25/2019  1:28 PM   Drainage Amount Large 8/8/2019  1:29 PM   Drainage Description Yellow; Tan 8/8/2019  1:29 PM   Odor None 8/8/2019  1:29 PM   Colleen-wound Assessment White; Maceration 8/8/2019  1:29 PM   Number of days: 77     Percent of Wound/Ulcer Debrided: 10%    Total Surface Area Debrided:  10 sq cm     Estimated Blood Loss:  Minimal  Hemostasis Achieved:  by pressure    Procedural Pain:  0  / 10   Post Procedural Pain:  0 / 10     Response to treatment:  Well tolerated by patient. Plan:   Treatment Note please see attached Discharge Instructions. Elevate as much as possible. Written patient dismissal instructions given to patient and signed by patient or POA. Discharge Instructions       Visit Discharge/Physician Orders     Discharge condition: Stable     Assessment of pain at discharge:none     Anesthetic used: 2% lidocaine     Discharge to: Home     Left via:Private automobile     Accompanied by: accompanied by self     ECF/HHA: [facility]      Dressing Orders:Apply hydrophor or comparable emollient to dry, intact skin of legs and fee. Sung Chalk ULCERS BILATERAL LOWER LEGS WITH IODINE APPLY AQUACEL [address] X WEEK     Treatment Orders:     Eat foods high in protein and vitamin c     Take multivitamin daily.     [facility] followup visit ________1 week __DR. ABBOTT__________________  (Please note your next appointment above and if you are unable to keep, kindly give a 24 hour notice.  Thank you.)     Physician signature:__________________________        If you experience any of the following, please call the [address] [place] [address] Road during business hours:     * Increase in Pain  * Temperature over 101  * Increase in drainage from your wound  * Drainage with a foul odor  * Bleeding  * Increase in swelling  * Need for compression bandage changes due to slippage, breakthrough drainage.     If you need medical attention outside of the business hours of the Wound Care

## 2019-08-15 ENCOUNTER — HOSPITAL ENCOUNTER (OUTPATIENT)
Dept: WOUND CARE | Age: 69
Discharge: HOME | End: 2019-08-15
Payer: MEDICARE

## 2019-08-15 VITALS
DIASTOLIC BLOOD PRESSURE: 64 MMHG | HEART RATE: 80 BPM | RESPIRATION RATE: 20 BRPM | WEIGHT: 315 LBS | SYSTOLIC BLOOD PRESSURE: 110 MMHG | TEMPERATURE: 98.3 F | HEIGHT: 74 IN | BODY MASS INDEX: 40.43 KG/M2

## 2019-08-15 PROCEDURE — 11042 DBRDMT SUBQ TIS 1ST 20SQCM/<: CPT

## 2019-08-15 RX ORDER — LIDOCAINE HYDROCHLORIDE 20 MG/ML
JELLY TOPICAL ONCE
Status: DISCONTINUED | OUTPATIENT
Start: 2019-08-15 | End: 2019-08-16 | Stop reason: HOSPADM

## 2019-08-15 NOTE — PROGRESS NOTES
CHILDRENS) 81 MG chewable tablet Take 1 tablet by mouth daily 30 tablet 5     No current facility-administered medications on file prior to encounter. REVIEW OF SYSTEMS See HPI    Objective:    /64   Pulse 80   Temp 98.3 °F (36.8 °C) (Oral)   Resp 20   Ht 6' 2\" (1.88 m)   Wt (!) 342 lb (155.1 kg)   BMI 43.91 kg/m²   Wt Readings from Last 3 Encounters:   08/15/19 (!) 342 lb (155.1 kg)   07/25/19 (!) 342 lb (155.1 kg)   07/23/19 (!) 355 lb (161 kg)     PHYSICAL EXAM  CONSTITUTIONAL:   Awake, alert, cooperative   EYES:  lids and lashes normal   ENT: external ears and nose without lesions   NECK:  supple, symmetrical, trachea midline   SKIN:  Open wound RLE10% with some devitalized nonviable tissue.  There is no purulence or odor.  No surrounding erythema or increase in temperature.  Left lower leg at present superficial, + edema.  Stasis changes.  Vascular intact    Assessment:   Venous ulcer. Lymphedema. Procedure Note  Indications:  Based on my examination of this patient's wound(s)/ulcer(s) today, debridement is required to promote healing and evaluate the wound base. Performed by: Angel Smith DPM    Consent obtained:  Yes    Time out taken:  Yes    Pain Control: Anesthetic  Anesthetic: 2% Lidocaine Gel Topical     Debridement:Excisional Debridement    Using curette the wound(s)/ulcer(s) was/were sharply debrided down through and including the removal of subcutaneous tissue. Devitalized Tissue Debrided:  biofilm, slough and necrotic/eschar to stimulate bleeding to promote healing, post debridement good bleeding base and wound edges noted    Pre Debridement Measurements:  Are located in the [facility]  Documentation Flow Sheet    Wound/Ulcer #: 1    Post Debridement Measurements:  Wound/Ulcer Descriptions are Pre Debridement except measurements:    Wound 05/23/19 Leg Right; Lower; Lateral #1 aquired 1-3-19 (Active)   Wound Image   8/15/2019  1:01 PM   Wound Venous 5/23/2019  1:26 PM

## 2019-08-22 ENCOUNTER — HOSPITAL ENCOUNTER (OUTPATIENT)
Dept: WOUND CARE | Age: 69
Discharge: HOME | End: 2019-08-22
Payer: MEDICARE

## 2019-08-22 VITALS
SYSTOLIC BLOOD PRESSURE: 132 MMHG | WEIGHT: 315 LBS | HEART RATE: 84 BPM | HEIGHT: 74 IN | RESPIRATION RATE: 20 BRPM | TEMPERATURE: 97.9 F | BODY MASS INDEX: 40.43 KG/M2 | DIASTOLIC BLOOD PRESSURE: 76 MMHG

## 2019-08-22 PROCEDURE — 11042 DBRDMT SUBQ TIS 1ST 20SQCM/<: CPT

## 2019-08-22 RX ORDER — LIDOCAINE HYDROCHLORIDE 20 MG/ML
JELLY TOPICAL ONCE
Status: DISCONTINUED | OUTPATIENT
Start: 2019-08-22 | End: 2019-08-23 | Stop reason: HOSPADM

## 2019-08-29 ENCOUNTER — HOSPITAL ENCOUNTER (OUTPATIENT)
Dept: WOUND CARE | Age: 69
Discharge: HOME | End: 2019-08-29
Payer: MEDICARE

## 2019-08-29 VITALS
WEIGHT: 315 LBS | SYSTOLIC BLOOD PRESSURE: 140 MMHG | RESPIRATION RATE: 18 BRPM | DIASTOLIC BLOOD PRESSURE: 80 MMHG | TEMPERATURE: 98.2 F | HEART RATE: 88 BPM | HEIGHT: 74 IN | BODY MASS INDEX: 40.43 KG/M2

## 2019-08-29 PROCEDURE — 11042 DBRDMT SUBQ TIS 1ST 20SQCM/<: CPT

## 2019-08-29 RX ORDER — LIDOCAINE HYDROCHLORIDE 20 MG/ML
JELLY TOPICAL ONCE
Status: DISCONTINUED | OUTPATIENT
Start: 2019-08-29 | End: 2019-08-30 | Stop reason: HOSPADM

## 2019-08-29 NOTE — PROGRESS NOTES
tablet 5     No current facility-administered medications on file prior to encounter. REVIEW OF SYSTEMS See HPI    Objective:    BP (!) 140/80   Pulse 88   Temp 98.2 °F (36.8 °C) (Oral)   Resp 18   Ht 6' 2\" (1.88 m)   Wt (!) 342 lb (155.1 kg)   BMI 43.91 kg/m²   Wt Readings from Last 3 Encounters:   08/29/19 (!) 342 lb (155.1 kg)   08/22/19 (!) 342 lb (155.1 kg)   08/15/19 (!) 342 lb (155.1 kg)     PHYSICAL EXAM  CONSTITUTIONAL:   Awake, alert, cooperative   EYES:  lids and lashes normal   ENT: external ears and nose without lesions   NECK:  supple, symmetrical, trachea midline   SKIN:  Open wounds bilateral lower extremity, left superficial some areas denuded. There is islands of healed tissue in between. Positive serous drainage only. Right lower extremity area with devitalized nonviable tissue approximately 50%. No purulence or odor. Stasis changes. Positive edema. Intact vascular status  Assessment:     Venous ulcer. Lymphedema. Procedure Note  Indications:  Based on my examination of this patient's wound(s)/ulcer(s) today, debridement is required to promote healing and evaluate the wound base. Performed by: Jean Paul Israel DPM    Consent obtained:  Yes    Time out taken:  Yes    Pain Control: Anesthetic  Anesthetic: 2% Lidocaine Gel Topical     Debridement:Excisional Debridement    Using curette the wound(s)/ulcer(s) was/were sharply debrided down through and including the removal of subcutaneous tissue. Devitalized Tissue Debrided:  biofilm, slough and necrotic/eschar to stimulate bleeding to promote healing, post debridement good bleeding base and wound edges noted    Pre Debridement Measurements:  Are located in the [facility]  Documentation Flow Sheet    Wound/Ulcer #: 1    Post Debridement Measurements:  Wound/Ulcer Descriptions are Pre Debridement except measurements:    Wound 05/23/19 Leg Right; Lower; Lateral #1 aquired 1-3-19 (Active)   Wound Image   8/15/2019  1:01 PM Wound Venous 5/23/2019  1:26 PM   Dressing Status Clean;Dry; Intact 8/15/2019  1:51 PM   Dressing Changed Changed/New 8/15/2019  1:51 PM   Dressing/Treatment Alginate;Dry Dressing 8/15/2019  1:51 PM   Wound Cleansed Rinsed/Irrigated with saline 8/15/2019  1:51 PM   Wound Length (cm) 15 cm 8/29/2019  1:10 PM   Wound Width (cm) 13.5 cm 8/29/2019  1:10 PM   Wound Depth (cm) 0.1 cm 8/29/2019  1:10 PM   Wound Surface Area (cm^2) 202.5 cm^2 8/29/2019  1:10 PM   Change in Wound Size % (l*w) -221.43 8/29/2019  1:10 PM   Wound Volume (cm^3) 20.25 cm^3 8/29/2019  1:10 PM   Wound Healing % 36 8/29/2019  1:10 PM   Post-Procedure Length (cm) 3 cm 8/22/2019  1:23 PM   Post-Procedure Width (cm) 1 cm 8/22/2019  1:23 PM   Post-Procedure Depth (cm) 0.2 cm 8/22/2019  1:23 PM   Post-Procedure Surface Area (cm^2) 3 cm^2 8/22/2019  1:23 PM   Post-Procedure Volume (cm^3) 0.6 cm^3 8/22/2019  1:23 PM   Wound Assessment Pink 8/29/2019  1:10 PM   Drainage Amount Moderate 8/29/2019  1:10 PM   Drainage Description Yellow 8/29/2019  1:10 PM   Odor None 8/29/2019  1:10 PM   Colleen-wound Assessment Maceration 8/29/2019  1:10 PM   Number of days: 98       Wound 05/23/19 Leg Left; Lower; Lateral #2 aquired 1-3-19 (Active)   Wound Image   8/15/2019  1:01 PM   Wound Venous 5/23/2019  1:28 PM   Dressing Status Clean;Dry; Intact 8/15/2019  1:51 PM   Dressing Changed Changed/New 8/15/2019  1:51 PM   Dressing/Treatment Dry Dressing 8/15/2019  1:51 PM   Wound Cleansed Rinsed/Irrigated with saline 8/15/2019  1:51 PM   Wound Length (cm) 12 cm 8/29/2019  1:10 PM   Wound Width (cm) 10 cm 8/29/2019  1:10 PM   Wound Depth (cm) 0.1 cm 8/29/2019  1:10 PM   Wound Surface Area (cm^2) 120 cm^2 8/29/2019  1:10 PM   Change in Wound Size % (l*w) -1100 8/29/2019  1:10 PM   Wound Volume (cm^3) 12 cm^3 8/29/2019  1:10 PM   Wound Healing % -1100 8/29/2019  1:10 PM   Post-Procedure Length (cm) 12 cm 8/29/2019  1:15 PM   Post-Procedure Width (cm) 10 cm 8/29/2019  1:15 PM

## 2019-09-05 ENCOUNTER — HOSPITAL ENCOUNTER (OUTPATIENT)
Dept: WOUND CARE | Age: 69
Discharge: HOME | End: 2019-09-05
Payer: MEDICARE

## 2019-09-05 VITALS
BODY MASS INDEX: 46.22 KG/M2 | HEART RATE: 72 BPM | SYSTOLIC BLOOD PRESSURE: 132 MMHG | DIASTOLIC BLOOD PRESSURE: 70 MMHG | TEMPERATURE: 98.5 F | WEIGHT: 315 LBS

## 2019-09-05 PROCEDURE — 11042 DBRDMT SUBQ TIS 1ST 20SQCM/<: CPT

## 2019-09-12 ENCOUNTER — HOSPITAL ENCOUNTER (OUTPATIENT)
Dept: WOUND CARE | Age: 69
Discharge: HOME | End: 2019-09-12
Payer: MEDICARE

## 2019-09-12 VITALS — RESPIRATION RATE: 20 BRPM | HEART RATE: 68 BPM | DIASTOLIC BLOOD PRESSURE: 66 MMHG | SYSTOLIC BLOOD PRESSURE: 138 MMHG

## 2019-09-12 PROCEDURE — 11042 DBRDMT SUBQ TIS 1ST 20SQCM/<: CPT

## 2019-09-12 ASSESSMENT — PAIN SCALES - GENERAL: PAINLEVEL_OUTOF10: 0

## 2019-09-12 NOTE — PROGRESS NOTES
Instructions. Continue local care, compression. Elevate. Written patient dismissal instructions given to patient and signed by patient or POA. Discharge Instructions        Visit Discharge/Physician Orders     Discharge condition: Stable     Assessment of pain at discharge:none     Anesthetic used: 2% lidocaine     Discharge to: Home     Left via:Private automobile     Accompanied by: accompanied by self     ECF/HHA: [facility]      Dressing Orders:Apply hydrophor or comparable emollient to dry, intact skin of legs and fee. Tsosie Ligas LEFT LOWER LEGS WITH IODINE-TO LEFT LEG APPLY AQUACEL [address] X WEEK     Treatment Orders:     Eat foods high in protein and vitamin c     Take multivitamin daily.     [facility] followup visit ________1 week __DR. ABBOTT__________________  (Please note your next appointment above and if you are unable to keep, kindly give a 24 hour notice.  Thank you.)     Physician signature:__________________________        If you experience any of the following, please call the [facility] during business hours:     * Increase in Pain  * Temperature over 101  * Increase in drainage from your wound  * Drainage with a foul odor  * Bleeding  * Increase in swelling  * Need for compression bandage changes due to slippage, breakthrough drainage.     If you need medical attention outside of the business hours of the [facility] please contact your PCP or go to the nearest emergency room.                                                     Electronically signed by Jaelyn Sanchez DPM on 9/12/2019 at 2:11 PM

## 2019-09-19 ENCOUNTER — HOSPITAL ENCOUNTER (OUTPATIENT)
Dept: WOUND CARE | Age: 69
Discharge: HOME | End: 2019-09-19
Payer: MEDICARE

## 2019-09-19 VITALS
BODY MASS INDEX: 40.43 KG/M2 | WEIGHT: 315 LBS | TEMPERATURE: 98 F | RESPIRATION RATE: 18 BRPM | HEIGHT: 74 IN | SYSTOLIC BLOOD PRESSURE: 126 MMHG | HEART RATE: 70 BPM | DIASTOLIC BLOOD PRESSURE: 70 MMHG

## 2019-09-19 PROCEDURE — 11042 DBRDMT SUBQ TIS 1ST 20SQCM/<: CPT

## 2019-09-19 RX ORDER — LIDOCAINE HYDROCHLORIDE 20 MG/ML
JELLY TOPICAL ONCE
Status: DISCONTINUED | OUTPATIENT
Start: 2019-09-19 | End: 2019-09-20 | Stop reason: HOSPADM

## 2019-09-19 ASSESSMENT — PAIN SCALES - GENERAL: PAINLEVEL_OUTOF10: 0

## 2019-09-19 NOTE — PROGRESS NOTES
Wound Healing Center Followup Visit Note    Referring Physician : Reilly Reid MD  [address] [place] [address] [place] RECORD NUMBER:  [id number]  AGE: 71 y.o. GENDER: male  : 1950  EPISODE DATE:  2019    Subjective:     Chief Complaint   Patient presents with    Wound Check     right and left leg wounds      HISTORY of PRESENT ILLNESS HPI   Shira Webster is a 71 y.o. male who presents today in regards to follow up evaluation and treatment of wound/ulcer. That patient's past medical, family and social hx were reviewed and changes were made if present. Pt went well, no pain. No nausea, vomiting, fever or chills.     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     Past Surgical History:   Procedure Laterality Date    FRACTURE SURGERY      right ankle repair     Family History   Problem Relation Age of Onset    Heart Disease Mother     Heart Disease Father      Social History     Tobacco Use    Smoking status: Never Smoker    Smokeless tobacco: Never Used   Substance Use Topics    Alcohol use: No     Comment: on special occassions    Drug use: No     Allergies   Allergen Reactions    Metformin And Related Diarrhea     Current Outpatient Medications on File Prior to Encounter   Medication Sig Dispense Refill    aspirin (ASPIRIN CHILDRENS) 81 MG chewable tablet Take 1 tablet by mouth daily 30 tablet 5     No current facility-administered medications on file prior to Intact; Pink 9/19/2019  1:26 PM   Number of days: 119         Plan:   Treatment Note please see attached Discharge Instructions call care left. Needs to start utilizing his compression stocking on the right. I want him to bring it in next week so I can evaluate. Questions or concerns contact wound care center    Written patient dismissal instructions given to patient and signed by patient or POA. Discharge Instructions        Visit Discharge/Physician Orders     Discharge condition: Stable     Assessment of pain at discharge:none     Anesthetic used: 2% lidocaine     Discharge to: Home     Left via:Private automobile     Accompanied by: accompanied by self     ECF/HHA: [facility]      Dressing Orders:Apply hydrophor or comparable emollient to dry, intact skin of legs and fee. Geri Fish bilateral LOWER LEGS WITH IODINE-TO LEFT LEG APPLY AQUACEL [address] X WEEK     Treatment Orders:     Eat foods high in protein and vitamin c     Take multivitamin daily.     [facility] followup visit ________1 week __DR. ABBOTT__________________  (Please note your next appointment above and if you are unable to keep, kindly give a 24 hour notice.  Thank you.)     Physician signature:__________________________        If you experience any of the following, please call the [email or web address] during business hours:     * Increase in Pain  * Temperature over 101  * Increase in drainage from your wound  * Drainage with a foul odor  * Bleeding  * Increase in swelling  * Need for compression bandage changes due to slippage, breakthrough drainage.     If you need medical attention outside of the business hours of the [email or web address] please contact your PCP or go to the nearest emergency room.                                                                          Electronically signed by Miriam Jenkins DPM on 9/19/2019 at 1:50 PM

## 2019-09-26 ENCOUNTER — HOSPITAL ENCOUNTER (OUTPATIENT)
Dept: WOUND CARE | Age: 69
Discharge: HOME | End: 2019-09-26
Payer: MEDICARE

## 2019-09-26 VITALS
BODY MASS INDEX: 40.43 KG/M2 | WEIGHT: 315 LBS | HEART RATE: 64 BPM | SYSTOLIC BLOOD PRESSURE: 126 MMHG | HEIGHT: 74 IN | TEMPERATURE: 98.3 F | DIASTOLIC BLOOD PRESSURE: 60 MMHG | RESPIRATION RATE: 20 BRPM

## 2019-09-26 PROCEDURE — 99213 OFFICE O/P EST LOW 20 MIN: CPT

## 2019-09-26 RX ORDER — LIDOCAINE HYDROCHLORIDE 20 MG/ML
JELLY TOPICAL ONCE
Status: DISCONTINUED | OUTPATIENT
Start: 2019-09-26 | End: 2019-09-27 | Stop reason: HOSPADM

## 2019-09-26 NOTE — PROGRESS NOTES
Wound Healing Center Followup Visit Note    Referring Physician : Gustavo Cardoso MD  [address] [place] [address] [place] RECORD NUMBER:  [id number]  AGE: 71 y.o. GENDER: male  : 1950  EPISODE DATE:  2019    Subjective:     Chief Complaint   Patient presents with    Wound Check     patient here for treatment of bilateral lower leg ulcers      HISTORY of PRESENT ILLNESS VAMSHI Kelley is a 71 y.o. male who presents today in regards to follow up evaluation and treatment of wound/ulcer. That patient's past medical, family and social hx were reviewed and changes were made if present. Doing well, pleased, no N/V/F/C.     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     Past Surgical History:   Procedure Laterality Date    FRACTURE SURGERY      right ankle repair     Family History   Problem Relation Age of Onset    Heart Disease Mother     Heart Disease Father      Social History     Tobacco Use    Smoking status: Never Smoker    Smokeless tobacco: Never Used   Substance Use Topics    Alcohol use: No     Comment: on special occassions    Drug use: No     Allergies   Allergen Reactions    Metformin And Related Diarrhea     Current Outpatient Medications on File Prior to Encounter   Medication Sig Dispense Refill    aspirin (ASPIRIN CHILDRENS) 81 MG chewable tablet Take 1 tablet by mouth daily 30 tablet 5     No current facility-administered medications on file prior to

## 2019-10-03 ENCOUNTER — HOSPITAL ENCOUNTER (OUTPATIENT)
Dept: WOUND CARE | Age: 69
Discharge: HOME | End: 2019-10-03
Payer: MEDICARE

## 2019-10-03 VITALS
BODY MASS INDEX: 40.43 KG/M2 | TEMPERATURE: 98.6 F | RESPIRATION RATE: 18 BRPM | HEIGHT: 74 IN | HEART RATE: 70 BPM | DIASTOLIC BLOOD PRESSURE: 66 MMHG | WEIGHT: 315 LBS | SYSTOLIC BLOOD PRESSURE: 118 MMHG

## 2019-10-03 PROCEDURE — 11042 DBRDMT SUBQ TIS 1ST 20SQCM/<: CPT

## 2019-10-03 RX ORDER — LIDOCAINE HYDROCHLORIDE 20 MG/ML
JELLY TOPICAL ONCE
Status: DISCONTINUED | OUTPATIENT
Start: 2019-10-03 | End: 2019-10-04 | Stop reason: HOSPADM

## 2019-10-03 ASSESSMENT — PAIN SCALES - GENERAL: PAINLEVEL_OUTOF10: 0

## 2019-10-10 ENCOUNTER — HOSPITAL ENCOUNTER (OUTPATIENT)
Dept: WOUND CARE | Age: 69
Discharge: HOME | End: 2019-10-10
Payer: MEDICARE

## 2019-10-10 VITALS
DIASTOLIC BLOOD PRESSURE: 62 MMHG | RESPIRATION RATE: 16 BRPM | HEIGHT: 74 IN | SYSTOLIC BLOOD PRESSURE: 120 MMHG | TEMPERATURE: 98.2 F | WEIGHT: 315 LBS | BODY MASS INDEX: 40.43 KG/M2 | HEART RATE: 72 BPM

## 2019-10-10 PROCEDURE — 99213 OFFICE O/P EST LOW 20 MIN: CPT

## 2019-10-10 RX ORDER — LIDOCAINE HYDROCHLORIDE 20 MG/ML
JELLY TOPICAL ONCE
Status: DISCONTINUED | OUTPATIENT
Start: 2019-10-10 | End: 2019-10-11 | Stop reason: HOSPADM

## 2019-10-11 ENCOUNTER — TELEPHONE (OUTPATIENT)
Dept: ADMINISTRATIVE | Age: 69
End: 2019-10-11

## 2019-10-17 ENCOUNTER — HOSPITAL ENCOUNTER (OUTPATIENT)
Dept: WOUND CARE | Age: 69
Discharge: HOME | End: 2019-10-17
Payer: MEDICARE

## 2019-10-17 VITALS — SYSTOLIC BLOOD PRESSURE: 118 MMHG | DIASTOLIC BLOOD PRESSURE: 70 MMHG

## 2019-10-17 PROCEDURE — 99213 OFFICE O/P EST LOW 20 MIN: CPT

## 2019-10-18 ENCOUNTER — HOSPITAL ENCOUNTER (OUTPATIENT)
Age: 69
Discharge: HOME | End: 2019-10-18
Payer: MEDICARE

## 2019-10-18 DIAGNOSIS — D64.9 NORMOCYTIC ANEMIA: ICD-10-CM

## 2019-10-18 DIAGNOSIS — E66.01 MORBID OBESITY WITH BMI OF 45.0-49.9, ADULT (HCC): ICD-10-CM

## 2019-10-18 DIAGNOSIS — R73.03 PREDIABETES: ICD-10-CM

## 2019-10-18 LAB
ALBUMIN SERPL-MCNC: 3.9 G/DL (ref 3.5–5.2)
ALP BLD-CCNC: 64 U/L (ref 40–129)
ALT SERPL-CCNC: 12 U/L (ref 0–40)
ANION GAP SERPL CALCULATED.3IONS-SCNC: 13 MMOL/L (ref 7–16)
AST SERPL-CCNC: 15 U/L (ref 0–39)
BILIRUB SERPL-MCNC: 0.3 MG/DL (ref 0–1.2)
BILIRUBIN DIRECT: <0.2 MG/DL (ref 0–0.3)
BILIRUBIN, INDIRECT: ABNORMAL MG/DL (ref 0–1)
BUN BLDV-MCNC: 23 MG/DL (ref 8–23)
CALCIUM SERPL-MCNC: 9.2 MG/DL (ref 8.6–10.2)
CHLORIDE BLD-SCNC: 98 MMOL/L (ref 98–107)
CO2: 26 MMOL/L (ref 22–29)
CREAT SERPL-MCNC: 1.1 MG/DL (ref 0.7–1.2)
CREATININE URINE: 122 MG/DL (ref 40–278)
GFR AFRICAN AMERICAN: >60
GFR NON-AFRICAN AMERICAN: >60 ML/MIN/1.73
GLUCOSE BLD-MCNC: 110 MG/DL (ref 74–99)
HCT VFR BLD CALC: 41.2 % (ref 37–54)
HEMOGLOBIN: 13.1 G/DL (ref 12.5–16.5)
MCH RBC QN AUTO: 27.6 PG (ref 26–35)
MCHC RBC AUTO-ENTMCNC: 31.8 % (ref 32–34.5)
MCV RBC AUTO: 86.9 FL (ref 80–99.9)
MICROALBUMIN UR-MCNC: <12 MG/L
MICROALBUMIN/CREAT UR-RTO: ABNORMAL (ref 0–30)
PDW BLD-RTO: 15.3 FL (ref 11.5–15)
PLATELET # BLD: 232 E9/L (ref 130–450)
PMV BLD AUTO: 10 FL (ref 7–12)
POTASSIUM SERPL-SCNC: 4.1 MMOL/L (ref 3.5–5)
RBC # BLD: 4.74 E12/L (ref 3.8–5.8)
SODIUM BLD-SCNC: 137 MMOL/L (ref 132–146)
TOTAL PROTEIN: 8.8 G/DL (ref 6.4–8.3)
WBC # BLD: 7.9 E9/L (ref 4.5–11.5)

## 2019-10-18 PROCEDURE — 82044 UR ALBUMIN SEMIQUANTITATIVE: CPT

## 2019-10-18 PROCEDURE — 82570 ASSAY OF URINE CREATININE: CPT

## 2019-10-18 PROCEDURE — 36415 COLL VENOUS BLD VENIPUNCTURE: CPT

## 2019-10-18 PROCEDURE — 80076 HEPATIC FUNCTION PANEL: CPT

## 2019-10-18 PROCEDURE — 80048 BASIC METABOLIC PNL TOTAL CA: CPT

## 2019-10-18 PROCEDURE — 85027 COMPLETE CBC AUTOMATED: CPT

## 2019-10-24 ENCOUNTER — HOSPITAL ENCOUNTER (OUTPATIENT)
Dept: WOUND CARE | Age: 69
Discharge: HOME | End: 2019-10-24
Payer: MEDICARE

## 2019-10-24 VITALS
TEMPERATURE: 98.1 F | SYSTOLIC BLOOD PRESSURE: 128 MMHG | HEART RATE: 72 BPM | RESPIRATION RATE: 18 BRPM | DIASTOLIC BLOOD PRESSURE: 70 MMHG

## 2019-10-24 PROCEDURE — 99213 OFFICE O/P EST LOW 20 MIN: CPT

## 2019-10-24 PROCEDURE — 11042 DBRDMT SUBQ TIS 1ST 20SQCM/<: CPT

## 2019-10-24 ASSESSMENT — PAIN SCALES - GENERAL: PAINLEVEL_OUTOF10: 0

## 2019-10-31 ENCOUNTER — HOSPITAL ENCOUNTER (OUTPATIENT)
Dept: WOUND CARE | Age: 69
Discharge: HOME | End: 2019-10-31
Payer: MEDICARE

## 2019-10-31 VITALS
RESPIRATION RATE: 18 BRPM | BODY MASS INDEX: 40.43 KG/M2 | SYSTOLIC BLOOD PRESSURE: 144 MMHG | HEIGHT: 74 IN | HEART RATE: 84 BPM | TEMPERATURE: 97 F | WEIGHT: 315 LBS | DIASTOLIC BLOOD PRESSURE: 70 MMHG

## 2019-10-31 PROCEDURE — 99213 OFFICE O/P EST LOW 20 MIN: CPT

## 2019-11-07 ENCOUNTER — HOSPITAL ENCOUNTER (OUTPATIENT)
Dept: WOUND CARE | Age: 69
Discharge: HOME | End: 2019-11-07
Payer: MEDICARE

## 2019-11-07 VITALS
RESPIRATION RATE: 16 BRPM | HEIGHT: 74 IN | SYSTOLIC BLOOD PRESSURE: 136 MMHG | DIASTOLIC BLOOD PRESSURE: 80 MMHG | BODY MASS INDEX: 40.43 KG/M2 | TEMPERATURE: 98.1 F | HEART RATE: 80 BPM | WEIGHT: 315 LBS

## 2019-11-07 PROCEDURE — 99213 OFFICE O/P EST LOW 20 MIN: CPT

## 2019-11-07 RX ORDER — LIDOCAINE HYDROCHLORIDE 20 MG/ML
JELLY TOPICAL ONCE
Status: DISCONTINUED | OUTPATIENT
Start: 2019-11-07 | End: 2019-11-08 | Stop reason: HOSPADM

## 2019-11-14 ENCOUNTER — HOSPITAL ENCOUNTER (OUTPATIENT)
Dept: WOUND CARE | Age: 69
Discharge: HOME | End: 2019-11-14
Payer: MEDICARE

## 2019-11-14 VITALS
DIASTOLIC BLOOD PRESSURE: 80 MMHG | SYSTOLIC BLOOD PRESSURE: 136 MMHG | HEART RATE: 76 BPM | RESPIRATION RATE: 18 BRPM | TEMPERATURE: 97.2 F

## 2019-11-14 PROCEDURE — 99213 OFFICE O/P EST LOW 20 MIN: CPT

## 2019-11-14 ASSESSMENT — PAIN SCALES - GENERAL: PAINLEVEL_OUTOF10: 0

## 2019-11-21 ENCOUNTER — HOSPITAL ENCOUNTER (OUTPATIENT)
Dept: WOUND CARE | Age: 69
Discharge: HOME | End: 2019-11-21
Payer: MEDICARE

## 2019-11-21 VITALS
BODY MASS INDEX: 40.43 KG/M2 | WEIGHT: 315 LBS | RESPIRATION RATE: 16 BRPM | HEART RATE: 76 BPM | DIASTOLIC BLOOD PRESSURE: 80 MMHG | TEMPERATURE: 97.9 F | SYSTOLIC BLOOD PRESSURE: 150 MMHG | HEIGHT: 74 IN

## 2019-11-21 PROCEDURE — 99213 OFFICE O/P EST LOW 20 MIN: CPT

## 2019-11-21 RX ORDER — LIDOCAINE HYDROCHLORIDE 20 MG/ML
JELLY TOPICAL ONCE
Status: DISCONTINUED | OUTPATIENT
Start: 2019-11-21 | End: 2019-11-22 | Stop reason: HOSPADM

## 2019-12-05 ENCOUNTER — HOSPITAL ENCOUNTER (OUTPATIENT)
Dept: WOUND CARE | Age: 69
Discharge: HOME | End: 2019-12-05
Payer: MEDICARE

## 2019-12-05 VITALS
HEART RATE: 80 BPM | RESPIRATION RATE: 18 BRPM | DIASTOLIC BLOOD PRESSURE: 60 MMHG | HEIGHT: 74 IN | TEMPERATURE: 98 F | BODY MASS INDEX: 40.43 KG/M2 | SYSTOLIC BLOOD PRESSURE: 130 MMHG | WEIGHT: 315 LBS

## 2019-12-05 PROCEDURE — 99212 OFFICE O/P EST SF 10 MIN: CPT

## 2019-12-05 RX ORDER — LIDOCAINE HYDROCHLORIDE 20 MG/ML
JELLY TOPICAL ONCE
Status: DISCONTINUED | OUTPATIENT
Start: 2019-12-05 | End: 2019-12-06 | Stop reason: HOSPADM

## 2019-12-05 ASSESSMENT — PAIN SCALES - GENERAL: PAINLEVEL_OUTOF10: 0

## 2019-12-12 ENCOUNTER — HOSPITAL ENCOUNTER (OUTPATIENT)
Dept: WOUND CARE | Age: 69
Discharge: HOME | End: 2019-12-12
Payer: MEDICARE

## 2019-12-12 VITALS
SYSTOLIC BLOOD PRESSURE: 136 MMHG | DIASTOLIC BLOOD PRESSURE: 68 MMHG | TEMPERATURE: 97.7 F | BODY MASS INDEX: 40.43 KG/M2 | RESPIRATION RATE: 18 BRPM | HEART RATE: 75 BPM | HEIGHT: 74 IN | WEIGHT: 315 LBS

## 2019-12-12 PROCEDURE — 99213 OFFICE O/P EST LOW 20 MIN: CPT

## 2019-12-12 RX ORDER — LIDOCAINE 40 MG/G
CREAM TOPICAL ONCE
Status: DISCONTINUED | OUTPATIENT
Start: 2019-12-12 | End: 2019-12-13 | Stop reason: HOSPADM

## 2019-12-19 ENCOUNTER — HOSPITAL ENCOUNTER (OUTPATIENT)
Dept: WOUND CARE | Age: 69
Discharge: HOME | End: 2019-12-19
Payer: MEDICARE

## 2019-12-19 VITALS
SYSTOLIC BLOOD PRESSURE: 120 MMHG | HEART RATE: 68 BPM | RESPIRATION RATE: 16 BRPM | DIASTOLIC BLOOD PRESSURE: 70 MMHG | TEMPERATURE: 97.9 F | HEIGHT: 74 IN | WEIGHT: 315 LBS | BODY MASS INDEX: 40.43 KG/M2

## 2019-12-19 PROCEDURE — 99212 OFFICE O/P EST SF 10 MIN: CPT

## 2019-12-19 RX ORDER — LIDOCAINE HYDROCHLORIDE 40 MG/ML
SOLUTION TOPICAL ONCE
Status: DISCONTINUED | OUTPATIENT
Start: 2019-12-19 | End: 2019-12-19

## 2020-01-02 ENCOUNTER — TELEPHONE (OUTPATIENT)
Dept: INTERNAL MEDICINE | Age: 70
End: 2020-01-02

## 2020-02-19 ENCOUNTER — OFFICE VISIT (OUTPATIENT)
Dept: INTERNAL MEDICINE | Age: 70
End: 2020-02-19
Payer: MEDICARE

## 2020-02-19 VITALS
WEIGHT: 315 LBS | HEART RATE: 70 BPM | OXYGEN SATURATION: 95 % | TEMPERATURE: 98.2 F | DIASTOLIC BLOOD PRESSURE: 82 MMHG | SYSTOLIC BLOOD PRESSURE: 138 MMHG | HEIGHT: 74 IN | BODY MASS INDEX: 40.43 KG/M2 | RESPIRATION RATE: 22 BRPM

## 2020-02-19 PROCEDURE — 1123F ACP DISCUSS/DSCN MKR DOCD: CPT | Performed by: INTERNAL MEDICINE

## 2020-02-19 PROCEDURE — G0438 PPPS, INITIAL VISIT: HCPCS | Performed by: INTERNAL MEDICINE

## 2020-02-19 PROCEDURE — 4040F PNEUMOC VAC/ADMIN/RCVD: CPT | Performed by: INTERNAL MEDICINE

## 2020-02-19 PROCEDURE — 3017F COLORECTAL CA SCREEN DOC REV: CPT | Performed by: INTERNAL MEDICINE

## 2020-02-19 PROCEDURE — 99212 OFFICE O/P EST SF 10 MIN: CPT | Performed by: INTERNAL MEDICINE

## 2020-02-19 PROCEDURE — G8484 FLU IMMUNIZE NO ADMIN: HCPCS | Performed by: INTERNAL MEDICINE

## 2020-02-19 PROCEDURE — 99212 OFFICE O/P EST SF 10 MIN: CPT

## 2020-02-19 SDOH — ECONOMIC STABILITY: INCOME INSECURITY: HOW HARD IS IT FOR YOU TO PAY FOR THE VERY BASICS LIKE FOOD, HOUSING, MEDICAL CARE, AND HEATING?: HARD

## 2020-02-19 SDOH — ECONOMIC STABILITY: FOOD INSECURITY: WITHIN THE PAST 12 MONTHS, THE FOOD YOU BOUGHT JUST DIDN'T LAST AND YOU DIDN'T HAVE MONEY TO GET MORE.: NEVER TRUE

## 2020-02-19 SDOH — ECONOMIC STABILITY: FOOD INSECURITY: WITHIN THE PAST 12 MONTHS, YOU WORRIED THAT YOUR FOOD WOULD RUN OUT BEFORE YOU GOT MONEY TO BUY MORE.: NEVER TRUE

## 2020-02-19 ASSESSMENT — PATIENT HEALTH QUESTIONNAIRE - PHQ9
SUM OF ALL RESPONSES TO PHQ QUESTIONS 1-9: 0
SUM OF ALL RESPONSES TO PHQ QUESTIONS 1-9: 0

## 2020-02-19 NOTE — PROGRESS NOTES
Discharge instructions reviewed with patient. Patient verbalized understanding. Patient will call with any questions or concerns.

## 2020-02-19 NOTE — PATIENT INSTRUCTIONS
These can increase your chances of quitting for good. · Limit alcohol to 2 drinks a day for men and 1 drink a day for women. Too much alcohol can cause health problems. If you have a BMI higher than 25  · Your doctor may do other tests to check your risk for weight-related health problems. This may include measuring the distance around your waist. A waist measurement of more than 40 inches in men or 35 inches in women can increase the risk of weight-related health problems. · Talk with your doctor about steps you can take to stay healthy or improve your health. You may need to make lifestyle changes to lose weight and stay healthy, such as changing your diet and getting regular exercise. If you have a BMI lower than 18.5  · Your doctor may do other tests to check your risk for health problems. · Talk with your doctor about steps you can take to stay healthy or improve your health. You may need to make lifestyle changes to gain or maintain weight and stay healthy, such as getting more healthy foods in your diet and doing exercises to build muscle. Where can you learn more? Go to https://Totsydong.[employer]. org and sign in to your [facility] account. Enter S176 in the Signal box to learn more about \"Body Mass Index: Care Instructions. \"     If you do not have an account, please click on the \"Sign Up Now\" link. Current as of: March 28, 2019  Content Version: 12.3  © 6400-2488 Healthwise, Incorporated. Care instructions adapted under license by [facility] ([facility]). If you have questions about a medical condition or this instruction, always ask your healthcare professional. [place] [address] any warranty or liability for your use of this information. DASH Diet: Care Instructions  Your Care Instructions    The DASH diet is an eating plan that can help lower your blood pressure. DASH stands for Dietary Approaches to Stop Hypertension.  Hypertension is high blood pressure. The DASH diet focuses on eating foods that are high in calcium, potassium, and magnesium. These nutrients can lower blood pressure. The foods that are highest in these nutrients are fruits, vegetables, low-fat dairy products, nuts, seeds, and legumes. But taking calcium, potassium, and magnesium supplements instead of eating foods that are high in those nutrients does not have the same effect. The DASH diet also includes whole grains, fish, and poultry. The DASH diet is one of several lifestyle changes your doctor may recommend to lower your high blood pressure. Your doctor may also want you to decrease the amount of sodium in your diet. Lowering sodium while following the DASH diet can lower blood pressure even further than just the DASH diet alone. Follow-up care is a key part of your treatment and safety. Be sure to make and go to all appointments, and call your doctor if you are having problems. It's also a good idea to know your test results and keep a list of the medicines you take. How can you care for yourself at home? Following the DASH diet  · Eat 4 to 5 servings of fruit each day. A serving is 1 medium-sized piece of fruit, ½ cup chopped or canned fruit, 1/4 cup dried fruit, or 4 ounces (½ cup) of fruit juice. Choose fruit more often than fruit juice. · Eat 4 to 5 servings of vegetables each day. A serving is 1 cup of lettuce or raw leafy vegetables, ½ cup of chopped or cooked vegetables, or 4 ounces (½ cup) of vegetable juice. Choose vegetables more often than vegetable juice. · Get 2 to 3 servings of low-fat and fat-free dairy each day. A serving is 8 ounces of milk, 1 cup of yogurt, or 1 ½ ounces of cheese. · Eat 6 to 8 servings of grains each day. A serving is 1 slice of bread, 1 ounce of dry cereal, or ½ cup of cooked rice, pasta, or cooked cereal. Try to choose whole-grain products as much as possible. · Limit lean meat, poultry, and fish to 2 servings each day.  A serving is 3 ounces, about the size of a deck of cards. · Eat 4 to 5 servings of nuts, seeds, and legumes (cooked dried beans, lentils, and split peas) each week. A serving is 1/3 cup of nuts, 2 tablespoons of seeds, or ½ cup of cooked beans or peas. · Limit fats and oils to 2 to 3 servings each day. A serving is 1 teaspoon of vegetable oil or 2 tablespoons of salad dressing. · Limit sweets and added sugars to 5 servings or less a week. A serving is 1 tablespoon jelly or jam, ½ cup sorbet, or 1 cup of lemonade. · Eat less than 2,300 milligrams (mg) of sodium a day. If you limit your sodium to 1,500 mg a day, you can lower your blood pressure even more. Tips for success  · Start small. Do not try to make dramatic changes to your diet all at once. You might feel that you are missing out on your favorite foods and then be more likely to not follow the plan. Make small changes, and stick with them. Once those changes become habit, add a few more changes. · Try some of the following:  ? Make it a goal to eat a fruit or vegetable at every meal and at snacks. This will make it easy to get the recommended amount of fruits and vegetables each day. ? Try yogurt topped with fruit and nuts for a snack or healthy dessert. ? Add lettuce, tomato, cucumber, and onion to sandwiches. ? Combine a ready-made pizza crust with low-fat mozzarella cheese and lots of vegetable toppings. Try using tomatoes, squash, spinach, broccoli, carrots, cauliflower, and onions. ? Have a variety of cut-up vegetables with a low-fat dip as an appetizer instead of chips and dip. ? Sprinkle sunflower seeds or chopped almonds over salads. Or try adding chopped walnuts or almonds to cooked vegetables. ? Try some vegetarian meals using beans and peas. Add garbanzo or kidney beans to salads. Make burritos and tacos with mashed herrera beans or black beans. Where can you learn more? Go to https://Innovational Fundingdong.health-partners. org and sign in to your MyChart account. Enter E865 in the [facility] box to learn more about \"DASH Diet: Care Instructions. \"     If you do not have an account, please click on the \"Sign Up Now\" link. Current as of: April 9, 2019  Content Version: 12.3  © 9863-2525 Healthwise, Incorporated. Care instructions adapted under license by [facility] ([facility]). If you have questions about a medical condition or this instruction, always ask your healthcare professional. [address] any warranty or liability for your use of this information. Learning About Cutting Calories  How do calories affect your weight? Food gives your body energy. Energy from the food you eat is measured in calories. This energy keeps your heart beating, your brain active, and your muscles working. Your body needs a certain number of calories each day. After your body uses the calories it needs, it stores extra calories as fat. To lose weight safely, you have to eat fewer calories while eating in a healthy way. How many calories do you need each day? The more active you are, the more calories you need. When you are less active, you need fewer calories. How many calories you need each day also depends on several things, including your age and whether you are male or female. Here are some general guidelines for adults:  · Less active women and older adults need 1,600 to 2,000 calories each day. · Active women and less active men need 2,000 to 2,400 calories each day. · Active men need 2,400 to 3,000 calories each day. How can you cut calories and eat healthy meals? Whole grains, vegetables and fruits, and dried beans are good lower-calorie foods. They give you lots of nutrients and fiber. And they fill you up. Sweets, energy drinks, and soda pop are high in calories. They give you few nutrients and no fiber. Try to limit soda pop, fruit juice, and energy drinks. Drink water instead. Some fats can be part of a healthy diet.  But cutting back on fats from highly processed foods like fast foods and many snack foods is a good way to lower the calories in your diet. Also, use smaller amounts of fats like butter, margarine, salad dressing, and mayonnaise. Add fresh garlic, lemon, or herbs to your meals to add flavor without adding fat. Meats and dairy products can be a big source of hidden fats. Try to choose lean or low-fat versions of these products. Fat-free cookies, candies, chips, and frozen treats can still be high in sugar and calories. Some fat-free foods have more calories than regular ones. Eat fat-free treats in moderation, as you would other foods. If your favorite foods are high in fat, salt, sugar, or calories, limit how often you eat them. Eat smaller servings, or look for healthy substitutes. Fill up on fruits, vegetables, and whole grains. Eating at home  · Use meat as a side dish instead of as the main part of your meal.  · Try main dishes that use whole wheat pasta, brown rice, dried beans, or vegetables. · Find ways to cook with little or no fat, such as broiling, steaming, or grilling. · Use cooking spray instead of oil. If you use oil, use a monounsaturated oil, such as canola or olive oil. · Trim fat from meats before you cook them. · Drain off fat after you brown the meat or while you roast it. · Chill soups and stews after you cook them. Then skim the fat off the top after it hardens. Eating out  · Order foods that are broiled or poached rather than fried or breaded. · Cut back on the amount of butter or margarine that you use on bread. · Order sauces, gravies, and salad dressings on the side, and use only a little. · When you order pasta, choose tomato sauce rather than cream sauce. · Ask for salsa with your baked potato instead of sour cream, butter, cheese, or marks. · Order meals in a small size instead of upgrading to a large.   · Share an entree, or take part of your food home to eat as another meal.  · Share appetizers and desserts. Where can you learn more? Go to https://chpepiceweb.[employer]mittance [email or web address]. org and sign in to your "[employer] account. Enter K505 in the [facility] box to learn more about \"Learning About Cutting Calories. \"     If you do not have an account, please click on the \"Sign Up Now\" link. Current as of: August 21, 2019  Content Version: 12.3  © 4043-0532 Healthwise, [employer]. Care instructions adapted under license by [facility] ([facility]). If you have questions about a medical condition or this instruction, always ask your healthcare professional. [address] any warranty or liability for your use of this information. Learning About Healthy Weight  What is a healthy weight? A healthy weight is the weight at which you feel good about yourself and have energy for work and play. It's also one that lowers your risk for health problems. What can you do to stay at a healthy weight? It can be hard to stay at a healthy weight, especially when fast food, vending-machine snacks, and processed foods are so easy to find. And with your busy lifestyle, activity may be low on your list of things to do. But staying at a healthy weight may be easier than you think. Here are some dos and don'ts for staying at a healthy weight:  Do eat healthy foods  The kinds of foods you eat have a big impact on both your weight and your health. Reaching and staying at a healthy weight is not about going on a diet. It's about making healthier food choices every day and changing your diet for good. Healthy eating means eating a variety of foods so that you get all the nutrients you need. Your body needs protein, carbohydrate, and fats for energy. They keep your heart beating, your brain active, and your muscles working. On most days, try to eat from each food group. This means eating a variety of:  · Whole grains, such as whole wheat breads and pastas.   · Fruits and activity that makes your heart beat faster and keeps it there for a while counts. A brisk walk, run, or swim will get your heart beating faster. So will climbing stairs, shooting baskets, or cycling. Even some household chores like vacuuming and mowing the lawn will get your heart rate up. Pick activities that you enjoy--ones that make your heart beat faster, your muscles stronger, and your muscles and joints more flexible. If you find more than one thing you like doing, do them all. You don't have to do the same thing every day. Don't diet  Diets don't work. Diets are temporary. Because you give up so much when you diet, you may be hungry and think about food all the time. And after you stop dieting, you also may overeat to make up for what you missed. Most people who diet end up gaining back the pounds they lost--and more. Remember that healthy bodies come in lots of shapes and sizes. Everyone can get healthier by eating better and being more active. Where can you learn more? Go to https://finalsite.[employer]. org and sign in to your [employer] account. Enter [id number] [id number] in the [employer] box to learn more about \"Learning About Healthy Weight. \"     If you do not have an account, please click on the \"Sign Up Now\" link. Current as of: March 28, 2019  Content Version: 12.3  © 3823-9055 Healthwise, Incorporated. Care instructions adapted under license by [facility] ([facility]). If you have questions about a medical condition or this instruction, always ask your healthcare professional. [place] [address] any warranty or liability for your use of this information. Personalized Preventive Plan for Humaira Buenrostro - 2/19/2020  Medicare offers a range of preventive health benefits. Some of the tests and screenings are paid in full while other may be subject to a deductible, co-insurance, and/or copay.     Some of these benefits include a comprehensive review of your medical history including

## 2020-02-19 NOTE — PROGRESS NOTES
Medicare Annual Wellness Visit  Name: Randi Mortimer Date: 2020   MRN: <[id number]> Sex: Male   Age: 71 y.o. Ethnicity: Non-/Non    : 1950 Race: Ozzie Platt is here for Medicare AWV    Screenings for behavioral, psychosocial and functional/safety risks, and cognitive dysfunction are all negative except as indicated below. These results, as well as other patient data from the [address] form, are documented in Flowsheets linked to this Encounter. Allergies   Allergen Reactions    Metformin And Related Diarrhea       Prior to Visit Medications    Medication Sig Taking? Authorizing Provider   aspirin (ASPIRIN CHILDRENS) 81 MG chewable tablet Take 1 tablet by mouth daily Yes Humaira Del Rio MD       Past Medical History:   Diagnosis Date    Cellulitis of right lower leg     Diabetes mellitus ([address]     Lymphedema     Obesity, Class III, BMI 40-49.9 (morbid obesity) ([address]     Popliteal aneurysm (HCC) 2017    Left       Past Surgical History:   Procedure Laterality Date    FRACTURE SURGERY      right ankle repair       Family History   Problem Relation Age of Onset    Heart Disease Mother     Heart Disease Father        CareTeam (Including outside providers/suppliers regularly involved in providing care):   Patient Care Team:  Humaira Del Rio MD as PCP - General (Internal Medicine)    Wt Readings from Last 3 Encounters:   20 (!) 362 lb (164.2 kg)   19 (!) 355 lb (161 kg)   19 (!) 355 lb (161 kg)     Vitals:    20 1008   BP: 138/82   Site: Right Upper Arm   Position: Sitting   Cuff Size: Large Adult   Pulse: 70   Resp: 22   Temp: 98.2 °F (36.8 °C)   TempSrc: Temporal   SpO2: 95%   Weight: (!) 362 lb (164.2 kg)   Height: 6' 2\" (1.88 m)     Body mass index is 46.48 kg/m². Based upon direct observation of the patient, evaluation of cognition reveals recent and remote memory intact.     General Appearance: alert and oriented to person, place and time, well-developed and well-nourished, in no acute distress  Head: normocephalic and atraumatic  Pulmonary/Chest: clear to auscultation bilaterally- no wheezes, rales or rhonchi, normal air movement, no respiratory distress  Cardiovascular: normal rate, normal S1 and S2, no gallops, intact distal pulses and no carotid bruits  Abdomen: soft, non-tender, non-distended, normal bowel sounds, no masses or organomegaly  Extremities: no cyanosis and no clubbing  Musculoskeletal: normal range of motion, no joint swelling, deformity or tenderness  Neurologic: gait and coordination normal and speech normal    Patient's complete Health Risk Assessment and screening values have been reviewed and are found in Flowsheets. The following problems were reviewed today and where indicated follow up appointments were made and/or referrals ordered. Positive Risk Factor Screenings with Interventions:     General Health:  General  In general, how would you say your health is?: Fair  In the past 7 days, have you experienced any of the following? New or Increased Pain, New or Increased Fatigue, Loneliness, Social Isolation, Stress or Anger?: None of These  Do you get the social and emotional support that you need?: Yes  Do you have a Living Will?: (!) No  General Health Risk Interventions:  · No Living Will: additional information provided discussed the options and notified him to think about them    Health Habits/Nutrition:  Health Habits/Nutrition  Do you exercise for at least 20 minutes 2-3 times per week?: (!) No  Have you lost any weight without trying in the past 3 months?: No  Do you eat fewer than 2 meals per day?: No  Have you seen a dentist within the past year?: (!) No  Body mass index is 46.48 kg/m².   Health Habits/Nutrition Interventions:  · Inadequate physical activity:  patient is not ready to increase his/her physical activity level at this time    Hearing/Vision:  No exam data present  Hearing/Vision  Do you or your family notice any trouble with your hearing?: No  Do you have difficulty driving, watching TV, or doing any of your daily activities because of your eyesight?: No  Have you had an eye exam within the past year?: (!) No  Hearing/Vision Interventions:  · none    Safety:  Safety  Do you have working smoke detectors?: (!) No  Have all throw rugs been removed or fastened?: (!) No  Do you have non-slip mats or surfaces in all bathtubs/showers?: Yes  Do all of your stairways have a railing or banister?: Yes  Are your doorways, halls and stairs free of clutter?: Yes  Do you always fasten your seatbelt when you are in a car?: (!) No  Safety Interventions:  · Patient declines any further evaluation/treatment for this issue    ADL:  ADLs  In the past 7 days, did you need help from others to perform any of the following everyday activities? Eating, dressing, grooming, bathing, toileting, or walking/balance?: (!) Walking/Balance  In the past 7 days, did you need help from others to take care of any of the following? Laundry, housekeeping, banking/finances, shopping, telephone use, food preparation, transportation, or taking medications?: [employer], Housekeeping, Shopping, Food Preparation, Transportation  ADL Interventions:  · Patient declines any further evaluation/treatment for this issue    Personalized Preventive Plan   Current Health Maintenance Status    There is no immunization history on file for this patient.      Health Maintenance   Topic Date Due    DTaP/Tdap/Td vaccine (1 - Tdap) 07/10/1961    Hepatitis B vaccine (1 of 3 - Risk 3-dose series) 07/10/1969    Shingles Vaccine (1 of 2) 07/10/2000    Colon cancer screen colonoscopy  07/10/2000    Pneumococcal 65+ years Vaccine (1 of 1 - PPSV23) 07/10/2015    Diabetic retinal exam  06/10/2016    Diabetic foot exam  06/21/2018    Annual Wellness Visit (AWV)  06/19/2019    Flu vaccine (1) 09/01/2019    Lipid screen

## 2020-07-02 ENCOUNTER — HOSPITAL ENCOUNTER (OUTPATIENT)
Dept: WOUND CARE | Age: 70
Discharge: HOME | End: 2020-07-02
Payer: MEDICARE

## 2020-07-02 VITALS
HEART RATE: 76 BPM | WEIGHT: 315 LBS | HEIGHT: 74 IN | BODY MASS INDEX: 40.43 KG/M2 | SYSTOLIC BLOOD PRESSURE: 142 MMHG | TEMPERATURE: 97.1 F | DIASTOLIC BLOOD PRESSURE: 76 MMHG | RESPIRATION RATE: 16 BRPM

## 2020-07-02 PROBLEM — M79.674 PAIN IN TOES OF BOTH FEET: Status: ACTIVE | Noted: 2020-07-02

## 2020-07-02 PROBLEM — I87.2 PERIPHERAL VENOUS INSUFFICIENCY: Status: ACTIVE | Noted: 2020-07-02

## 2020-07-02 PROBLEM — M79.675 PAIN IN TOES OF BOTH FEET: Status: ACTIVE | Noted: 2020-07-02

## 2020-07-02 PROBLEM — L97.921 NON-PRESSURE CHRONIC ULCER OF LEFT LOWER LEG, LIMITED TO BREAKDOWN OF SKIN (HCC): Status: ACTIVE | Noted: 2020-07-02

## 2020-07-02 PROBLEM — B35.1 DERMATOPHYTOSIS OF NAIL: Status: ACTIVE | Noted: 2020-07-02

## 2020-07-02 PROCEDURE — 99211 OFF/OP EST MAY X REQ PHY/QHP: CPT

## 2020-07-02 PROCEDURE — 11721 DEBRIDE NAIL 6 OR MORE: CPT

## 2020-07-02 RX ORDER — LIDOCAINE HYDROCHLORIDE 40 MG/ML
SOLUTION TOPICAL ONCE
Status: DISCONTINUED | OUTPATIENT
Start: 2020-07-02 | End: 2020-07-03 | Stop reason: HOSPADM

## 2020-07-02 NOTE — PROGRESS NOTES
Wound Healing Center  History and Physical/Consultation  Podiatry    Referring Physician : Shanon Rodriguez MD  [address] [place] [address] [place] RECORD NUMBER:  [id number]  AGE: 71 y.o. GENDER: male  : 1950  EPISODE DATE:  2020  Subjective:     Chief Complaint   Patient presents with    Wound Check     left lower leg ulcer         HISTORY of PRESENT ILLNESS HPI     Raeann Rader is a 71 y.o. male who presents today for wound/ulcer evaluation. History of Wound Context:  The patient has had a wound of left leg which was first noted approximately 2020. Charo Arreguin This has not been treated. On their initial visit to the wound healing center, 20 ,  the patient has noted that the wound has been improving. Relates initially serous drainage, this has decreased. The patient has had similar previous wounds in the past.      2020 pt also c/o very painful toenails with ambulation and pressure from shoe gear that severely limits activity. At present he specifically points 1 through 5 on the right as well as first left. Pt is not on abx at time of initial visit.       Wound/Ulcer Pain Timing/Severity: none  Quality of pain: N/A  Severity:  0 / 10   Modifying Factors: None  Associated Signs/Symptoms: none    Ulcer Identification:  Ulcer Type: venous  Contributing Factors: edema, venous stasis and lymphedema    Diabetic/Pressure/Non Pressure Ulcers onl y:  Ulcer: N/A    If patient has diabetic lower extremity wounds  Mitchell Classification of diabetic lower extremity wounds:    Grade Description   []  0 No open wound   []  1 Superficial ulcer involving the full skin thickness   []  2 Deep ulcer involves ligament, tendon, joint capsule, or fascia  No bone involvement or abscess presence   []  3 Deep Ulcer with abcess formation and/or osteomyelitis   []  4 Localized gangrene   []  5 Extensive gangrene of the foot     Wound: N/A        PAST MEDICAL HISTORY      Diagnosis Date    Cellulitis of right lower leg     Diabetes mellitus (HCC)     Lymphedema     Obesity, Class III, BMI 40-49.9 (morbid obesity) [facility]     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     Past Surgical History:   Procedure Laterality Date    FRACTURE SURGERY  2005    right ankle repair     Family History   Problem Relation Age of Onset    Heart Disease Mother     Heart Disease Father      Social History     Tobacco Use    Smoking status: Never Smoker    Smokeless tobacco: Never Used   Substance Use Topics    Alcohol use: No     Comment: on special occassions    Drug use: No     Allergies   Allergen Reactions    Metformin And Related Diarrhea     Current Outpatient Medications on File Prior to Encounter   Medication Sig Dispense Refill    aspirin (ASPIRIN CHILDRENS) 81 MG chewable tablet Take 1 tablet by mouth daily 30 tablet 5     No current facility-administered medications on file prior to encounter. REVIEW OF SYSTEMS   ROS : All others Negative if blank [], Positive if [x]  General Vascular   [] Fevers [] Claudication   [] Chills [] Rest Pain   Skin Neurologic   [x] Tissue Loss [] Lower extremity neuropathy     Objective:    BP (!) 142/76   Pulse 76   Temp 97.1 °F (36.2 °C) (Temporal)   Resp 16   Ht 6' 2\" (1.88 m)   Wt (!) 363 lb (164.7 kg)   BMI 46.61 kg/m²   Wt Readings from Last 3 Encounters:   07/02/20 (!) 363 lb (164.7 kg)   02/19/20 (!) 362 lb (164.2 kg)   12/19/19 (!) 355 lb (161 kg)       PHYSICAL EXAM   CONSTITUTIONAL:   Awake, alert, cooperative   PSYCHIATRIC :  Oriented to time, place and person      normal insight to disease process  EXTREMITIES:   R LE Open wounds are not noted   Skin color is abnormal with stasis changes   Edema is  noted   Sensation intact to light touch   Palpation of the leg does not cause pain   5/5 strength DF/PF  L LE Open wounds are noted, at present superficial.  No purulence or odor. Serous drainage.    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)    Peripheral venous insufficiency    Dermatophytosis of nail    Pain in toes of both feet             Wound 07/02/20 Leg Left; Lower; Posterior #1 acq: 6-16-20  Venous (Active)   Wound Image   7/2/2020  1:11 PM   Wound Venous 7/2/2020  1:11 PM   Wound Length (cm) 15.5 cm 7/2/2020  1:11 PM   Wound Width (cm) 25 cm 7/2/2020  1:11 PM   Wound Depth (cm) 0.1 cm 7/2/2020  1:11 PM   Wound Surface Area (cm^2) 387.5 cm^2 7/2/2020  1:11 PM   Wound Volume (cm^3) 38.75 cm^3 7/2/2020  1:11 PM   Wound Assessment Pink; White 7/2/2020  1:11 PM   Drainage Amount Large 7/2/2020  1:11 PM   Drainage Description Yellow 7/2/2020  1:11 PM   Odor None 7/2/2020  1:11 PM   Colleen-wound Assessment Dry; Intact 7/2/2020  1:11 PM   Number of days: 0                A culture was not done. Plan:   Local care as instructed, compression, elevate. Toenails as above were debrided in length and thickness to decrease discomfort as well as prevent such hazards as secondary infection. Recommend topical antifungal daily. Follow-up as instructed  Pt is not a smoker   - Discussed relationship of smoking and negative affects on wound healing       In my professional opinion and based off the information that is available at this time this patient has appropriate indication for HBO Therapy: No    Treatment Note please see attached Discharge Instructions    Written patient dismissal instructions given to patient and signed by patient or POA.          Discharge Instructions       Visit Discharge/Physician Orders    Discharge condition: Stable    Assessment of pain at discharge:none    Anesthetic used: 4% lidocaine    Discharge to: Home    Left via:Private automobile    Accompanied by: accompanied by self    ECF/HHA:     Dressing Orders:LEFT LOWER LEG CLEANSE WITH BETADINE APPLY ALGINATE LYOFOAM AND PROFORE WRAP WEEKLY AT [address] [place] [address],[address]    Treatment Orders:  COMPRESSION HOSE TO RIGHT LEG  EAT FOODS HIGH IN PROTEIN AND VITAMIN C    TAKE MULTIVITAMIN DAILY. [facility] followup visit _______1 WEEK DR. ABBOTT_____________________  (Please note your next appointment above and if you are unable to keep, kindly give a 24 hour notice. Thank you.)    Physician signature:__________________________      If you experience any of the following, please call the [facility] [employer] during business hours:    * Increase in Pain  * Temperature over 101  * Increase in drainage from your wound  * Drainage with a foul odor  * Bleeding  * Increase in swelling  * Need for compression bandage changes due to slippage, breakthrough drainage. If you need medical attention outside of the business hours of the [facility] [employer] please contact your PCP or go to the nearest emergency room. Contact your doctor if you have a temperature above 100.4 with any of the following:                         Persistent cough             Shortness of breath            Chills            Fatigue            Chest pain or pressure            Difficulty breathing            Decreased consciousness of confusion             Headache    Recommend:                       Wash hands often and for 20 seconds or more             Avoid touching eyes, nose, mouth and face             Social distance ( 6 feet)             Stay at home as much as possible             Call health care provider with any concerns          Electronically signed by Caro Montiel DPM on 7/2/2020 at 1:45 PM

## 2020-07-09 ENCOUNTER — HOSPITAL ENCOUNTER (OUTPATIENT)
Dept: WOUND CARE | Age: 70
Discharge: HOME | End: 2020-07-09
Payer: MEDICARE

## 2020-07-09 VITALS
BODY MASS INDEX: 40.43 KG/M2 | WEIGHT: 315 LBS | RESPIRATION RATE: 18 BRPM | TEMPERATURE: 96.8 F | SYSTOLIC BLOOD PRESSURE: 142 MMHG | HEART RATE: 75 BPM | DIASTOLIC BLOOD PRESSURE: 70 MMHG | HEIGHT: 74 IN

## 2020-07-09 PROCEDURE — 99213 OFFICE O/P EST LOW 20 MIN: CPT

## 2020-07-09 RX ORDER — LIDOCAINE HYDROCHLORIDE 40 MG/ML
SOLUTION TOPICAL ONCE
Status: DISCONTINUED | OUTPATIENT
Start: 2020-07-09 | End: 2020-07-10 | Stop reason: HOSPADM

## 2020-07-09 NOTE — PROGRESS NOTES
Wound Healing Center Followup Visit Note    Referring Physician : Concepción Burris MD  [address] [place] [address] [place] RECORD NUMBER:  [id number]  AGE: 71 y.o. GENDER: male  : 1950  EPISODE DATE:  2020    Subjective:     Chief Complaint   Patient presents with    Wound Check     lt leg       HISTORY of PRESENT ILLNESS VAMSHI Porras is a 71 y.o. male who presents today in regards to follow up evaluation and treatment of wound/ulcer. That patient's past medical, family and social hx were reviewed and changes were made if present. History of Wound Context:  The patient has had a wound of left leg which was first noted approximately 2020. Charline Fernandes This has not been treated. On their initial visit to the wound healing center, 20 ,  the patient has noted that the wound has been improving. Relates initially serous drainage, this has decreased. The patient has had similar previous wounds in the past.       2020 pt also c/o very painful toenails with ambulation and pressure from shoe gear that severely limits activity. At present he specifically points 1 through 5 on the right as well as first left.     Pt is not on abx at time of initial visit.       Wound/Ulcer Pain Timing/Severity: none  Quality of pain: N/A  Severity:  0 / 10   Modifying Factors: None  Associated Signs/Symptoms: none     Ulcer Identification:  Ulcer Type: venous  Contributing Factors: edema, venous stasis and lymphedema        PAST MEDICAL HISTORY      Diagnosis Date    Cellulitis of right lower leg     Diabetes mellitus (HCC)     Lymphedema     Obesity, Class III, BMI 40-49.9 (morbid obesity) ([address]     Popliteal aneurysm ([facility] 2017    Left     Past Surgical History:   Procedure Laterality Date    FRACTURE SURGERY      right ankle repair     Family History   Problem Relation Age of Onset    Heart Disease Mother     Heart Disease Father      Social History     Tobacco Use    Smoking status: Never Smoker    Smokeless tobacco: Never Used   Substance Use Topics    Alcohol use: No     Comment: on special occassions    Drug use: No     Allergies   Allergen Reactions    Metformin And Related Diarrhea     Current Outpatient Medications on File Prior to Encounter   Medication Sig Dispense Refill    aspirin (ASPIRIN CHILDRENS) 81 MG chewable tablet Take 1 tablet by mouth daily 30 tablet 5     No current facility-administered medications on file prior to encounter. REVIEW OF SYSTEMS See HPI    Objective:    BP (!) 142/70   Pulse 75   Temp 96.8 °F (36 °C) (Temporal)   Resp 18   Ht 6' 2\" (1.88 m)   Wt (!) 363 lb (164.7 kg)   BMI 46.61 kg/m²   Wt Readings from Last 3 Encounters:   07/09/20 (!) 363 lb (164.7 kg)   07/02/20 (!) 363 lb (164.7 kg)   02/19/20 (!) 362 lb (164.2 kg)     PHYSICAL EXAM  CONSTITUTIONAL:   Awake, alert, cooperative   EYES:  lids and lashes normal   ENT: external ears and nose without lesions   NECK:  supple, symmetrical, trachea midline   SKIN:  Open wound at present superficial.  No purulence or odor. Serous drainage. ++ edema, decreased. Vascular intact, stasis changes. Assessment:     Problem List Items Addressed This Visit     Non-pressure chronic ulcer of left lower leg, limited to breakdown of skin ([address] - Primary             Wound 07/02/20 Leg Left; Lower; Posterior #1 acq: 6-16-20  Venous (Active)   Wound Image   7/2/2020  1:11 PM   Wound Venous 7/2/2020  1:11 PM   Dressing Status Clean;Dry; Intact; Changed 7/9/2020  1:34 PM   Dressing Changed Changed/New 7/9/2020  1:34 PM   Dressing/Treatment Alginate;Foam;ABD 7/9/2020  1:34 PM   Wound Cleansed Other (Comment) 7/9/2020  1:34 PM   Wound Length (cm) 13 cm 7/9/2020  1:11 PM   Wound Width (cm) 18.5 cm 7/9/2020  1:11 PM   Wound Depth (cm) 0.1 cm 7/9/2020  1:11 PM   Wound Surface Area (cm^2) 240.5 cm^2 7/9/2020  1:11 PM   Change in Wound Size % (l*w) 37.94 7/9/2020  1:11 PM   Wound Volume (cm^3) 24.05 cm^3 7/9/2020  1:11 PM   Wound Healing % 38 7/9/2020  1:11 PM   Wound Assessment Pink;Slough; White;Gray 7/9/2020  1:11 PM   Drainage Amount Large 7/9/2020  1:11 PM   Drainage Description Yellow 7/9/2020  1:11 PM   Odor Mild 7/9/2020  1:11 PM   Colleen-wound Assessment Dry; Intact 7/9/2020  1:11 PM   Number of days: 7              Plan:   Treatment Note please see attached Discharge Instructions, local care, elevation, compression, pumps. Written patient dismissal instructions given to patient and signed by patient or POA. Discharge Instructions       Visit Discharge/Physician Orders     Discharge condition: Stable     Assessment of pain at discharge:none     Anesthetic used: 4% lidocaine     Discharge to: Home     Left via:Private automobile     Accompanied by: accompanied by self     ECF/HHA:      Dressing Orders:LEFT LOWER LEG CLEANSE WITH BETADINE APPLY ALGINATE LYOFOAM AND PROFORE WRAP WEEKLY AT WOUND CARE CENTER     Treatment Orders:  COMPRESSION HOSE TO RIGHT LEG  EAT FOODS HIGH IN PROTEIN AND VITAMIN C     TAKE MULTIVITAMIN DAILY.     [facility] followup visit _______1 WEEK DR. ABBOTT_____________________  (Please note your next appointment above and if you are unable to keep, kindly give a 24 hour notice. Thank you.)     Physician signature:__________________________        If you experience any of the following, please call the [facility] during business hours:     * Increase in Pain  * Temperature over 101  * Increase in drainage from your wound  * Drainage with a foul odor  * Bleeding  * Increase in swelling  * Need for compression bandage changes due to slippage, breakthrough drainage.     If you need medical attention outside of the business hours of the [facility] please contact your PCP or go to the nearest emergency room. Contact your doctor if you have a temperature above 100.4 with any of the following:                         Persistent cough             Shortness of breath            Chills            Fatigue Chest pain or pressure            Difficulty breathing            Decreased consciousness of confusion             Headache     Recommend:                       Wash hands often and for 20 seconds or more             Avoid touching eyes, nose, mouth and face             Social distance ( 6 feet)             Stay at home as much as possible             Call health care provider with any concerns                    Electronically signed by Krishna Hou DPM on 7/9/2020 at 1:40 PM

## 2020-07-16 ENCOUNTER — HOSPITAL ENCOUNTER (OUTPATIENT)
Dept: WOUND CARE | Age: 70
Discharge: HOME | End: 2020-07-16
Payer: MEDICARE

## 2020-07-16 VITALS
WEIGHT: 315 LBS | TEMPERATURE: 97.5 F | HEART RATE: 76 BPM | HEIGHT: 74 IN | SYSTOLIC BLOOD PRESSURE: 114 MMHG | DIASTOLIC BLOOD PRESSURE: 64 MMHG | RESPIRATION RATE: 18 BRPM | BODY MASS INDEX: 40.43 KG/M2

## 2020-07-16 PROCEDURE — 11042 DBRDMT SUBQ TIS 1ST 20SQCM/<: CPT

## 2020-07-16 PROCEDURE — 11045 DBRDMT SUBQ TISS EACH ADDL: CPT

## 2020-07-16 RX ORDER — LIDOCAINE HYDROCHLORIDE 40 MG/ML
SOLUTION TOPICAL ONCE
Status: DISCONTINUED | OUTPATIENT
Start: 2020-07-16 | End: 2020-07-17 | Stop reason: HOSPADM

## 2020-07-16 NOTE — PROGRESS NOTES
Wound Healing Center Followup Visit Note    Referring Physician : Francis Paiz MD  [address] [place] [address] [place] RECORD NUMBER:  [id number]  AGE: 79 y.o. GENDER: male  : 1950  EPISODE DATE:  2020    Subjective:     Chief Complaint   Patient presents with    Wound Check     right and left leg wounds      HISTORY of PRESENT ILLNESS VAMSHI Meredith is a 79 y.o. male who presents today in regards to follow up evaluation and treatment of wound/ulcer. That patient's past medical, family and social hx were reviewed and changes were made if present. History of Wound Context:  The patient has had a wound of left leg which was first noted approximately 2020. .  This has not been treated. On their initial visit to the wound healing center, 20 ,  the patient has noted that the wound has been improving.  Relates initially serous drainage, this has decreased. The patient has had similar previous wounds in the past.       2020 pt also c/o very painful toenails with ambulation and pressure from shoe gear that severely limits activity.  At present he specifically points 1 through 5 on the right as well as first left. 2020 noticed drainage on his right lower leg recently.   No pain.     Pt is not on abx at time of initial visit.      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     Past Surgical History:   Procedure Laterality Date    FRACTURE SURGERY  2005    right ankle repair     Family History   Problem Relation Age of Onset    Heart Disease Mother     Heart Disease Father      Social History     Tobacco Use    Smoking status: Never Smoker    Smokeless tobacco: Never Used   Substance Use Topics    Alcohol use: No     Comment: on special occassions    Drug use: No     Allergies   Allergen Reactions    Metformin And Related Diarrhea     Current Outpatient Medications on File Prior to Encounter   Medication Sig Dispense Refill    aspirin (ASPIRIN CHILDRENS) 81 MG chewable tablet Take 1 tablet by mouth daily 30 tablet 5     No current facility-administered medications on file prior to encounter. REVIEW OF SYSTEMS See HPI    Objective:    /64   Pulse 76   Temp 97.5 °F (36.4 °C)   Resp 18   Ht 6' 2\" (1.88 m)   Wt (!) 363 lb (164.7 kg)   BMI 46.61 kg/m²   Wt Readings from Last 3 Encounters:   07/16/20 (!) 363 lb (164.7 kg)   07/09/20 (!) 363 lb (164.7 kg)   07/02/20 (!) 363 lb (164.7 kg)     PHYSICAL EXAM  CONSTITUTIONAL:   Awake, alert, cooperative   EYES:  lids and lashes normal   ENT: external ears and nose without lesions   NECK:  supple, symmetrical, trachea midline   SKIN:  Open wound at present superficial.  No purulence or odor.  Serous drainage. ++ edema, decreased. Vascular intact, stasis changes. Right lower leg with open wound 10% devitalized nonviable tissue through subcutaneous tissue. No purulence or odor. No surrounding erythema. Stasis changes. Negative Homans. +++ edema. Assessment:     Problem List Items Addressed This Visit     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 with fat layer exposed ([address] - Primary          Pre Debridement Measurements:  Are located in the [facility]  Documentation Flow Sheet  Post Debridement Measurements:  Wound/Ulcer Descriptions are Pre Debridement except measurements:     Wound 07/02/20 Leg Left; Lower; Posterior #1 acq: 6-16-20  Venous (Active)   Wound Image   07/02/20 1311   Wound Venous 07/02/20 1311   Dressing Status Clean;Dry; Intact; Changed obtained:  Yes    Time out taken:  Yes    Pain Control: Anesthetic  Anesthetic: 4% Lidocaine Liquid Topical     Debridement:Excisional Debridement    Using curette the wound(s)/ulcer(s) was/were sharply debrided down through and including the removal of subcutaneous tissue. Devitalized Tissue Debrided:  fibrin, biofilm, slough and necrotic/eschar to stimulate bleeding to promote healing, post debridement good bleeding base and wound edges noted    Wound/Ulcer #: 2    Percent of Wound/Ulcer Debrided: 10%    Total Surface Area Debrided:  23 sq cm     Estimated Blood Loss:  Minimal  Hemostasis Achieved:  by pressure    Procedural Pain:  0  / 10   Post Procedural Pain:  0 / 10     Response to treatment:  Well tolerated by patient. Plan:   Treatment Note please see attached Discharge Instructions. Compression, elevate. Written patient dismissal instructions given to patient and signed by patient or POA. Discharge Instructions        Visit Discharge/Physician Orders     Discharge condition: Stable     Assessment of pain at discharge:none     Anesthetic used: 4% lidocaine     Discharge to: Home     Left via:Private automobile     Accompanied by: accompanied by self     ECF/HHA:      Dressing Orders:LEFT/RIGHT LOWER LEGS CLEANSE WITH BETADINE APPLY ALGINATE LYOFOAM AND PROFORE WRAP WEEKLY AT WOUND CARE CENTER     Treatment Orders:  COMPRESSION HOSE TO RIGHT LEG  EAT FOODS HIGH IN PROTEIN AND VITAMIN C     TAKE MULTIVITAMIN DAILY.     [facility] followup visit _______1 WEEK DR. ABBOTT_____________________  (Please note your next appointment above and if you are unable to keep, kindly give a 24 hour notice.  Thank you.)     Physician signature:__________________________        If you experience any of the following, please call the [address] [place] [address] during business hours:     * Increase in Pain  * Temperature over 101  * Increase in drainage from your wound  * Drainage with a foul odor  * Bleeding  * Increase in swelling  * Need for compression bandage changes due to slippage, breakthrough drainage.     If you need medical attention outside of the business hours of the [address] [place] [address] Road please contact your PCP or go to the nearest emergency room. Contact your doctor if you have a temperature above 100.4 with any of the following:                         Persistent cough             Shortness of breath            Chills            Fatigue            Chest pain or pressure            Difficulty breathing            Decreased consciousness of confusion             Headache     Recommend:                       Wash hands often and for 20 seconds or more             Avoid touching eyes, nose, mouth and face             Social distance ( 6 feet)             Stay at home as much as possible             Call health care provider with any concerns                                           Electronically signed by Joss [place]en[place]ro DPM on 7/16/2020 at 1:36 PM

## 2020-07-23 ENCOUNTER — HOSPITAL ENCOUNTER (OUTPATIENT)
Dept: WOUND CARE | Age: 70
Discharge: HOME | End: 2020-07-23
Payer: MEDICARE

## 2020-07-23 VITALS
RESPIRATION RATE: 20 BRPM | DIASTOLIC BLOOD PRESSURE: 80 MMHG | TEMPERATURE: 98.1 F | HEART RATE: 96 BPM | SYSTOLIC BLOOD PRESSURE: 130 MMHG | HEIGHT: 74 IN | WEIGHT: 315 LBS | BODY MASS INDEX: 40.43 KG/M2

## 2020-07-23 PROCEDURE — 11042 DBRDMT SUBQ TIS 1ST 20SQCM/<: CPT

## 2020-07-23 RX ORDER — LIDOCAINE HYDROCHLORIDE 40 MG/ML
SOLUTION TOPICAL ONCE
Status: DISCONTINUED | OUTPATIENT
Start: 2020-07-23 | End: 2020-07-24 | Stop reason: HOSPADM

## 2020-07-23 NOTE — PROGRESS NOTES
Onset    Heart Disease Mother     Heart Disease Father      Social History     Tobacco Use    Smoking status: Never Smoker    Smokeless tobacco: Never Used   Substance Use Topics    Alcohol use: No     Comment: on special occassions    Drug use: No     Allergies   Allergen Reactions    Metformin And Related Diarrhea     Current Outpatient Medications on File Prior to Encounter   Medication Sig Dispense Refill    aspirin (ASPIRIN CHILDRENS) 81 MG chewable tablet Take 1 tablet by mouth daily 30 tablet 5     No current facility-administered medications on file prior to encounter. REVIEW OF SYSTEMS See HPI    Objective:    /80   Pulse 96   Temp 98.1 °F (36.7 °C) (Temporal)   Resp 20   Ht 6' 2\" (1.88 m)   Wt (!) 363 lb (164.7 kg)   BMI 46.61 kg/m²   Wt Readings from Last 3 Encounters:   07/23/20 (!) 363 lb (164.7 kg)   07/16/20 (!) 363 lb (164.7 kg)   07/09/20 (!) 363 lb (164.7 kg)     PHYSICAL EXAM  CONSTITUTIONAL:   Awake, alert, cooperative   EYES:  lids and lashes normal   ENT: external ears and nose without lesions   NECK:  supple, symmetrical, trachea midline   SKIN:  Open wound LLE at present superficial.  No purulence or odor.  Serous drainage. + edema, decreased. Vascular intact, stasis changes. Right lower leg with open wound 10% devitalized nonviable tissue through subcutaneous tissue. No purulence or odor. No surrounding erythema. Stasis changes. Negative Homans. ++ edema. Assessment:     Problem List Items Addressed This Visit     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 with fat layer exposed ([address]    Non-pressure chronic ulcer of left lower leg, limited to breakdown of skin ([address] - Primary          Pre Debridement Measurements:  Are located in the [facility]  Documentation Flow Sheet  Post Debridement Measurements:  Wound/Ulcer Descriptions are Pre Debridement except measurements:     Wound 07/02/20 Leg Left; Lower; Posterior #1 acq: 6-16-20  Venous (Active)   Wound Image   07/02/20 1311   Wound Venous 07/02/20 1311   Dressing Status Clean;Dry; Intact 07/16/20 1353   Dressing Changed Changed/New 07/16/20 1353   Dressing/Treatment ABD; Alginate;Dry dressing; Foam 07/16/20 1353   Wound Cleansed Rinsed/Irrigated with saline 07/16/20 1353   Wound Length (cm) 10.8 cm 07/23/20 1324   Wound Width (cm) 10 cm 07/23/20 1324   Wound Depth (cm) 0.1 cm 07/23/20 1324   Wound Surface Area (cm^2) 108 cm^2 07/23/20 1324   Change in Wound Size % (l*w) 72.13 07/23/20 1324   Wound Volume (cm^3) 10.8 cm^3 07/23/20 1324   Wound Healing % 72 07/23/20 1324   Post-Procedure Length (cm) 13 cm 07/23/20 1335   Post-Procedure Width (cm) 10 cm 07/23/20 1335   Post-Procedure Depth (cm) 0.1 cm 07/23/20 1335   Post-Procedure Surface Area (cm^2) 130 cm^2 07/23/20 1335   Post-Procedure Volume (cm^3) 13 cm^3 07/23/20 1335   Wound Assessment Pink;Yellow 07/23/20 1324   Drainage Amount Large 07/23/20 1324   Drainage Description Yellow; Silva 07/23/20 1324   Odor None 07/23/20 1324   Colleen-wound Assessment Maceration;Pink 07/23/20 1324   Number of days: 21       Wound 07/16/20 Leg Right;Lateral #2 acquired 6/10/20 (Active)   Dressing Status Clean;Dry; Intact 07/16/20 1353   Dressing Changed Changed/New 07/16/20 1353   Dressing/Treatment ABD; Alginate;Dry dressing; Foam 07/16/20 1353   Wound Cleansed Rinsed/Irrigated with saline 07/16/20 1353   Wound Length (cm) 12.8 cm 07/23/20 1324   Wound Width (cm) 20 cm 07/23/20 1324   Wound Depth (cm) 0.1 cm 07/23/20 1324   Wound Surface Area (cm^2) 256 cm^2 07/23/20 1324   Change in Wound Size % (l*w) -11.11 07/23/20 1324   Wound Volume (cm^3) 25.6 cm^3 07/23/20 1324   Wound Healing % -11 07/23/20 1324   Post-Procedure Length (cm) 12.8 cm 07/23/20 1335   Post-Procedure Width (cm) 20 cm 07/23/20 1335   Post-Procedure Depth (cm) 0.1 cm 07/23/20 1335   Post-Procedure Surface Area (cm^2) 256 cm^2 07/23/20 1335   Post-Procedure Volume (cm^3) 25.6 cm^3 07/23/20 1335   Wound Assessment Yellow;Pink 07/23/20 1324   Drainage Amount Large 07/23/20 1324   Drainage Description Yellow; Silva 07/23/20 1324   Odor None 07/23/20 1324   Colleen-wound Assessment Intact 07/23/20 1324   Number of days: 7          Procedure Note  Indications:  Based on my examination of this patient's wound(s)/ulcer(s) today, debridement is required to promote healing and evaluate the wound base. Performed by: Corinne Montane, DPM    Consent obtained:  Yes    Time out taken:  Yes    Pain Control: Anesthetic  Anesthetic: 4% Lidocaine Liquid Topical     Debridement:Excisional Debridement    Using curette the wound(s)/ulcer(s) was/were sharply debrided down through and including the removal of subcutaneous tissue. Devitalized Tissue Debrided:  fibrin, biofilm, slough and necrotic/eschar to stimulate bleeding to promote healing, post debridement good bleeding base and wound edges noted    Wound/Ulcer #: 2    Percent of Wound/Ulcer Debrided: 10%    Total Surface Area Debrided:  10 sq cm (through sub q)    Estimated Blood Loss:  Minimal  Hemostasis Achieved:  by pressure    Procedural Pain:  0  / 10   Post Procedural Pain:  0 / 10     Response to treatment:  Well tolerated by patient. Plan:   Treatment Note please see attached Discharge Instructions. Compression, elevate. Written patient dismissal instructions given to patient and signed by patient or POA. Discharge Instructions         Visit Discharge/Physician Orders     Discharge condition: Stable     Assessment of pain at discharge:none     Anesthetic used: 4% lidocaine     Discharge to: Home     Left via:Private automobile     Accompanied by: accompanied by self     ECF/HHA:      Dressing Orders:LEFT/RIGHT LOWER LEGS CLEANSE WITH BETADINE APPLY ALGINATE LYOFOAM AND PROFORE WRAP WEEKLY AT WOUND CARE CENTER     Treatment Orders:    EAT FOODS HIGH IN PROTEIN AND VITAMIN C     TAKE MULTIVITAMIN DAILY.     [address] [facility] followup visit _______1 WEEK  YANNUCCI_____________________  (Please note your next appointment above and if you are unable to keep, kindly give a 24 hour notice. Thank you.)     Physician signature:__________________________        If you experience any of the following, please call the [facility] during business hours:     * Increase in Pain  * Temperature over 101  * Increase in drainage from your wound  * Drainage with a foul odor  * Bleeding  * Increase in swelling  * Need for compression bandage changes due to slippage, breakthrough drainage.     If you need medical attention outside of the business hours of the [facility] please contact your PCP or go to the nearest emergency room. Contact your doctor if you have a temperature above 100.4 with any of the following:                         Persistent cough             Shortness of breath            Chills            Fatigue            Chest pain or pressure            Difficulty breathing            Decreased consciousness of confusion             Headache     Recommend:                       Wash hands often and for 20 seconds or more             Avoid touching eyes, nose, mouth and face             Social distance ( 6 feet)             Stay at home as much as possible             Call health care provider with any concerns                                                                  Electronically signed by Silvano Kaiser DPM on 7/23/2020 at 1:39 PM

## 2020-07-30 ENCOUNTER — HOSPITAL ENCOUNTER (OUTPATIENT)
Dept: WOUND CARE | Age: 70
Discharge: HOME | End: 2020-07-30
Payer: MEDICARE

## 2020-07-30 VITALS
TEMPERATURE: 94.4 F | HEART RATE: 60 BPM | HEIGHT: 74 IN | DIASTOLIC BLOOD PRESSURE: 76 MMHG | SYSTOLIC BLOOD PRESSURE: 126 MMHG | WEIGHT: 315 LBS | RESPIRATION RATE: 16 BRPM | BODY MASS INDEX: 40.43 KG/M2

## 2020-07-30 PROCEDURE — 11042 DBRDMT SUBQ TIS 1ST 20SQCM/<: CPT

## 2020-07-30 RX ORDER — LIDOCAINE HYDROCHLORIDE 40 MG/ML
SOLUTION TOPICAL ONCE
Status: DISCONTINUED | OUTPATIENT
Start: 2020-07-30 | End: 2020-07-31 | Stop reason: HOSPADM

## 2020-07-30 NOTE — PROGRESS NOTES
Wound Healing Center Followup Visit Note    Referring Physician : Gayle Agarwal MD  [address] [place] [address] [place] RECORD NUMBER:  [id number]  AGE: 79 y.o. GENDER: male  : 1950  EPISODE DATE:  2020    Subjective:     Chief Complaint   Patient presents with    Wound Check     bilateral lower leg ulcers      HISTORY of PRESENT ILLNESS HPI   Sukhdeep Cardoza is a 79 y.o. male who presents today in regards to follow up evaluation and treatment of wound/ulcer. That patient's past medical, family and social hx were reviewed and changes were made if present. History of Wound Context:  The patient has had a wound of left leg which was first noted approximately 2020. .  This has not been treated. On their initial visit to the wound healing center, 20 ,  the patient has noted that the wound has been improving.  Relates initially serous drainage, this has decreased. The patient has had similar previous wounds in the past.       2020 pt also c/o very painful toenails with ambulation and pressure from shoe gear that severely limits activity.  At present he specifically points 1 through 5 on the right as well as first left. 2020 noticed drainage on his right lower leg recently.   No pain.     Pt is not on abx at time of initial visit.      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     Past Surgical History:   Procedure Laterality Date    FRACTURE SURGERY  2005    right ankle repair     Family History   Problem Relation Age of Clean;Dry; Intact 07/16/20 1353   Dressing Changed Changed/New 07/16/20 1353   Dressing/Treatment ABD; Alginate;Dry dressing; Foam 07/16/20 1353   Wound Cleansed Rinsed/Irrigated with saline 07/16/20 1353   Wound Length (cm) 6 cm 07/30/20 1257   Wound Width (cm) 5.5 cm 07/30/20 1257   Wound Depth (cm) 0.1 cm 07/30/20 1257   Wound Surface Area (cm^2) 33 cm^2 07/30/20 1257   Change in Wound Size % (l*w) 91.48 07/30/20 1257   Wound Volume (cm^3) 3.3 cm^3 07/30/20 1257   Wound Healing % 91 07/30/20 1257   Post-Procedure Length (cm) 13 cm 07/23/20 1335   Post-Procedure Width (cm) 10 cm 07/23/20 1335   Post-Procedure Depth (cm) 0.1 cm 07/23/20 1335   Post-Procedure Surface Area (cm^2) 130 cm^2 07/23/20 1335   Post-Procedure Volume (cm^3) 13 cm^3 07/23/20 1335   Wound Assessment Pink;Yellow 07/30/20 1257   Drainage Amount Large 07/30/20 1257   Drainage Description Yellow; Tan 07/30/20 1257   Odor None 07/30/20 1257   Colleen-wound Assessment Maceration;Pink 07/30/20 1257   Number of days: 28       Wound 07/16/20 Leg Right;Lateral #2 acquired 6/10/20 (Active)   Dressing Status Clean;Dry; Intact 07/16/20 1353   Dressing Changed Changed/New 07/16/20 1353   Dressing/Treatment ABD; Alginate;Dry dressing; Foam 07/16/20 1353   Wound Cleansed Rinsed/Irrigated with saline 07/16/20 1353   Wound Length (cm) 5.5 cm 07/30/20 1257   Wound Width (cm) 10.5 cm 07/30/20 1257   Wound Depth (cm) 0.1 cm 07/30/20 1257   Wound Surface Area (cm^2) 57.75 cm^2 07/30/20 1257   Change in Wound Size % (l*w) 74.93 07/30/20 1257   Wound Volume (cm^3) 5.78 cm^3 07/30/20 1257   Wound Healing % 75 07/30/20 1257   Post-Procedure Length (cm) 12.8 cm 07/30/20 1315   Post-Procedure Width (cm) 10.5 cm 07/30/20 1315   Post-Procedure Depth (cm) 0.2 cm 07/30/20 1315   Post-Procedure Surface Area (cm^2) 134.4 cm^2 07/30/20 1315   Post-Procedure Volume (cm^3) 26.88 cm^3 07/30/20 1315   Wound Assessment Yellow;[place] 07/30/20 1257   Drainage Amount Large 07/30/20 1257   Drainage Description Yellow; Tan 07/30/20 1257   Odor None 07/30/20 1257   Colleen-wound Assessment Intact 07/30/20 1257   Number of days: 14          Procedure Note  Indications:  Based on my examination of this patient's wound(s)/ulcer(s) today, debridement is required to promote healing and evaluate the wound base. Performed by: Luis Elias DPM    Consent obtained:  Yes    Time out taken:  Yes    Pain Control: Anesthetic  Anesthetic: 4% Lidocaine Liquid Topical     Debridement:Excisional Debridement    Using curette the wound(s)/ulcer(s) was/were sharply debrided down through and including the removal of subcutaneous tissue. Devitalized Tissue Debrided:  fibrin, biofilm, slough and necrotic/eschar to stimulate bleeding to promote healing, post debridement good bleeding base and wound edges noted    Wound/Ulcer #: 2    Percent of Wound/Ulcer Debrided: 20%    Total Surface Area Debrided:  11 sq cm (through sub q)    Estimated Blood Loss:  Minimal  Hemostasis Achieved:  by pressure    Procedural Pain:  0  / 10   Post Procedural Pain:  0 / 10     Response to treatment:  Well tolerated by patient. Plan:   Treatment Note please see attached Discharge Instructions. Compression, elevate. Written patient dismissal instructions given to patient and signed by patient or POA. Discharge Instructions       Visit Discharge/Physician Orders     Discharge condition: Stable     Assessment of pain at discharge:none     Anesthetic used: 4% lidocaine     Discharge to: Home     Left via:Private automobile     Accompanied by: accompanied by self     ECF/HHA:      Dressing Orders:LEFT/RIGHT LOWER LEGS CLEANSE WITH BETADINE APPLY ALGINATE LYOFOAM AND PROFORE WRAP WEEKLY AT WOUND CARE CENTER     Treatment Orders:     EAT FOODS HIGH IN PROTEIN AND VITAMIN C     TAKE MULTIVITAMIN DAILY.     [address] [facility] followup visit _______1 WEEK DR[address] ABBOTT_____________________  (Please note your next appointment above and if you are unable to keep, kindly give a 24 hour notice. Thank you.)     Physician signature:__________________________        If you experience any of the following, please call the [facility] during business hours:     * Increase in Pain  * Temperature over 101  * Increase in drainage from your wound  * Drainage with a foul odor  * Bleeding  * Increase in swelling  * Need for compression bandage changes due to slippage, breakthrough drainage.     If you need medical attention outside of the business hours of the [facility] please contact your PCP or go to the nearest emergency room. Contact your doctor if you have a temperature above 100.4 with any of the following:                         Persistent cough             Shortness of breath            Chills            Fatigue            Chest pain or pressure            Difficulty breathing            Decreased consciousness of confusion             Headache     Recommend:                       Wash hands often and for 20 seconds or more             Avoid touching eyes, nose, mouth and face             Social distance ( 6 feet)             Stay at home as much as possible             Call health care provider with any concerns                                                                                         Electronically signed by Alex Leyva DPM on 7/30/2020 at 1:22 PM

## 2020-08-06 ENCOUNTER — HOSPITAL ENCOUNTER (OUTPATIENT)
Dept: WOUND CARE | Age: 70
Discharge: HOME | End: 2020-08-06
Payer: MEDICARE

## 2020-08-06 VITALS
DIASTOLIC BLOOD PRESSURE: 76 MMHG | WEIGHT: 315 LBS | RESPIRATION RATE: 16 BRPM | SYSTOLIC BLOOD PRESSURE: 128 MMHG | BODY MASS INDEX: 46.61 KG/M2 | TEMPERATURE: 97.5 F | HEART RATE: 68 BPM

## 2020-08-06 PROBLEM — L97.911 NON-PRESSURE CHRONIC ULCER OF RIGHT LOWER LEG, LIMITED TO BREAKDOWN OF SKIN (HCC): Status: ACTIVE | Noted: 2020-08-06

## 2020-08-06 PROCEDURE — 99213 OFFICE O/P EST LOW 20 MIN: CPT

## 2020-08-06 NOTE — PROGRESS NOTES
Wound Healing Center Followup Visit Note    Referring Physician : Lon Cabrera MD  [address] [place] [address] [place] RECORD NUMBER:  [id number]  AGE: 79 y.o. GENDER: male  : 1950  EPISODE DATE:  2020    Subjective:     Chief Complaint   Patient presents with    Wound Check     bilateral legs      HISTORY of PRESENT ILLNESS HPI   Matias Garsia is a 79 y.o. male who presents today in regards to follow up evaluation and treatment of wound/ulcer. That patient's past medical, family and social hx were reviewed and changes were made if present. History of Wound Context:  The patient has had a wound of left leg which was first noted approximately 2020. .  This has not been treated. On their initial visit to the wound healing center, 20 ,  the patient has noted that the wound has been improving.  Relates initially serous drainage, this has decreased. The patient has had similar previous wounds in the past.       2020 pt also c/o very painful toenails with ambulation and pressure from shoe gear that severely limits activity.  At present he specifically points 1 through 5 on the right as well as first left. 2020 noticed drainage on his right lower leg recently.   No pain.     Pt is not on abx at time of initial visit.      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     Past Surgical History:   Procedure Laterality Date    FRACTURE SURGERY  2005    right ankle repair     Family History   Problem Relation Age of Onset    Heart Disease Mother     Heart Disease Father      Social History     Tobacco Use    Smoking status: Never Smoker    Smokeless tobacco: Never Used   Substance Use Topics    Alcohol use: No     Comment: on special occassions    Drug use: No     Allergies   Allergen Reactions    Metformin And Related Diarrhea     Current Outpatient Medications on File Prior to Encounter   Medication Sig Dispense Refill    aspirin (ASPIRIN CHILDRENS) 81 MG chewable tablet Take 1 tablet by mouth daily 30 tablet 5     No current facility-administered medications on file prior to encounter. REVIEW OF SYSTEMS See HPI    Objective:    /76   Pulse 68   Temp 97.5 °F (36.4 °C) (Temporal)   Resp 16   Wt (!) 363 lb (164.7 kg)   BMI 46.61 kg/m²   Wt Readings from Last 3 Encounters:   08/06/20 (!) 363 lb (164.7 kg)   07/30/20 (!) 363 lb (164.7 kg)   07/23/20 (!) 363 lb (164.7 kg)     PHYSICAL EXAM  CONSTITUTIONAL:   Awake, alert, cooperative   EYES:  lids and lashes normal   ENT: external ears and nose without lesions   NECK:  supple, symmetrical, trachea midline   SKIN:  Open wound B/L LE at present superficial, clean.  No purulence or odor.  Serous drainage. + edema, decreased. Vascular intact, stasis changes. No surrounding erythema. Stasis changes. Negative Homans. Assessment:     Problem List Items Addressed This Visit     Non-pressure chronic ulcer of left lower leg, limited to breakdown of skin ([address] - Primary    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, limited to breakdown of skin ([address]            Wound 07/02/20 Leg Left; Lower; Posterior #1 acq: 6-16-20  Venous (Active)   Wound Image   07/02/20 1311   Wound Venous 07/02/20 1311   Dressing Status Changed 07/30/20 1330   Dressing Changed Changed/New 07/30/20 1330   Dressing/Treatment Alginate;Foam;ABD 07/30/20 1330   Wound Cleansed Rinsed/Irrigated with saline 07/30/20 1330   Wound Length (cm) 3 cm 08/06/20 1356   Wound Width (cm) 2.5 cm 08/06/20 1356 Wound Depth (cm) 0.1 cm 08/06/20 1356   Wound Surface Area (cm^2) 7.5 cm^2 08/06/20 1356   Change in Wound Size % (l*w) 98.06 08/06/20 1356   Wound Volume (cm^3) 0.75 cm^3 08/06/20 1356   Wound Healing % 98 08/06/20 1356   Post-Procedure Length (cm) 13 cm 07/23/20 1335   Post-Procedure Width (cm) 10 cm 07/23/20 1335   Post-Procedure Depth (cm) 0.1 cm 07/23/20 1335   Post-Procedure Surface Area (cm^2) 130 cm^2 07/23/20 1335   Post-Procedure Volume (cm^3) 13 cm^3 07/23/20 1335   Wound Assessment [place] 08/06/20 1356   Drainage Amount Moderate 08/06/20 1356   Drainage Description Yellow; Tan 08/06/20 1356   Odor None 08/06/20 1356   Colleen-wound Assessment Pink;Dry 08/06/20 1356   Number of days: 35       Wound 07/16/20 Leg Right;Lateral #2 acquired 6/10/20 (Active)   Dressing Status Changed 07/30/20 1330   Dressing Changed Changed/New 07/30/20 1330   Dressing/Treatment Alginate;Foam;ABD 07/30/20 1330   Wound Cleansed Rinsed/Irrigated with saline 07/30/20 1330   Wound Length (cm) 6 cm 08/06/20 1356   Wound Width (cm) 14.9 cm 08/06/20 1356   Wound Depth (cm) 0.1 cm 08/06/20 1356   Wound Surface Area (cm^2) 89.4 cm^2 08/06/20 1356   Change in Wound Size % (l*w) 61.2 08/06/20 1356   Wound Volume (cm^3) 8.94 cm^3 08/06/20 1356   Wound Healing % 61 08/06/20 1356   Post-Procedure Length (cm) 12.8 cm 07/30/20 1315   Post-Procedure Width (cm) 10.5 cm 07/30/20 1315   Post-Procedure Depth (cm) 0.2 cm 07/30/20 1315   Post-Procedure Surface Area (cm^2) 134.4 cm^2 07/30/20 1315   Post-Procedure Volume (cm^3) 26.88 cm^3 07/30/20 1315   Wound Assessment Yellow;Pink 08/06/20 1356   Drainage Amount Large 08/06/20 1356   Drainage Description Yellow; Tan 08/06/20 1356   Odor None 08/06/20 1356   Colleen-wound Assessment Maceration;Pink 08/06/20 1356   Number of days: 21              Plan:   Treatment Note please see attached Discharge Instructions. Compression, elevate.     Written patient dismissal instructions given to patient and signed by                        Electronically signed by Alex Leyva DPM on 8/6/2020 at 2:18 PM

## 2020-08-13 ENCOUNTER — HOSPITAL ENCOUNTER (OUTPATIENT)
Dept: WOUND CARE | Age: 70
Discharge: HOME | End: 2020-08-13
Payer: MEDICARE

## 2020-08-13 VITALS
BODY MASS INDEX: 46.61 KG/M2 | SYSTOLIC BLOOD PRESSURE: 114 MMHG | DIASTOLIC BLOOD PRESSURE: 62 MMHG | RESPIRATION RATE: 18 BRPM | TEMPERATURE: 96.7 F | WEIGHT: 315 LBS | HEART RATE: 66 BPM

## 2020-08-13 PROCEDURE — 99213 OFFICE O/P EST LOW 20 MIN: CPT

## 2020-08-13 NOTE — PROGRESS NOTES
Wound Healing Center Followup Visit Note    Referring Physician : Francis Paiz MD  [address] [place] [address] [place] RECORD NUMBER:  [id number]  AGE: 79 y.o. GENDER: male  : 1950  EPISODE DATE:  2020    Subjective:     Chief Complaint   Patient presents with    Wound Check      HISTORY of PRESENT ILLNESS HPI   Alcides Meredith is a 79 y.o. male who presents today in regards to follow up evaluation and treatment of wound/ulcer. That patient's past medical, family and social hx were reviewed and changes were made if present. History of Wound Context:  The patient has had a wound of left leg which was first noted approximately 2020. .  This has not been treated. On their initial visit to the wound healing center, 20 ,  the patient has noted that the wound has been improving.  Relates initially serous drainage, this has decreased. The patient has had similar previous wounds in the past.       2020 pt also c/o very painful toenails with ambulation and pressure from shoe gear that severely limits activity.  At present he specifically points 1 through 5 on the right as well as first left. 2020 noticed drainage on his right lower leg recently.   No pain.     Pt is not on abx at time of initial visit.      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     Past Surgical History:   Procedure Laterality Date    FRACTURE SURGERY      right ankle repair     Family History   Problem Relation Age of Onset    Heart Disease Mother  Heart Disease Father      Social History     Tobacco Use    Smoking status: Never Smoker    Smokeless tobacco: Never Used   Substance Use Topics    Alcohol use: No     Comment: on special occassions    Drug use: No     Allergies   Allergen Reactions    Metformin And Related Diarrhea     Current Outpatient Medications on File Prior to Encounter   Medication Sig Dispense Refill    aspirin (ASPIRIN CHILDRENS) 81 MG chewable tablet Take 1 tablet by mouth daily 30 tablet 5     No current facility-administered medications on file prior to encounter. REVIEW OF SYSTEMS See HPI    Objective:    /62   Pulse 66   Temp 96.7 °F (35.9 °C) (Temporal)   Resp 18   Wt (!) 363 lb (164.7 kg)   BMI 46.61 kg/m²   Wt Readings from Last 3 Encounters:   08/13/20 (!) 363 lb (164.7 kg)   08/06/20 (!) 363 lb (164.7 kg)   07/30/20 (!) 363 lb (164.7 kg)     PHYSICAL EXAM  CONSTITUTIONAL:   Awake, alert, cooperative   EYES:  lids and lashes normal   ENT: external ears and nose without lesions   NECK:  supple, symmetrical, trachea midline   SKIN:  Open wound B/L LE at present superficial, clean.  No purulence or odor.  Serous drainage. + edema, decreased. Vascular intact, stasis changes. No surrounding erythema. Stasis changes. Negative Homans. Few areas hypergranular tissue. Assessment:     Problem List Items Addressed This Visit     None            Wound 07/02/20 Leg Left; Lower; Posterior #1 acq: 6-16-20  Venous (Active)   Wound Image   07/02/20 1311   Wound Venous 07/02/20 1311   Dressing Status Changed 07/30/20 1330   Dressing Changed Changed/New 07/30/20 1330   Dressing/Treatment Alginate;Foam;ABD 07/30/20 1330   Wound Cleansed Rinsed/Irrigated with saline 07/30/20 1330   Wound Length (cm) 6 cm 08/13/20 1234   Wound Width (cm) 1.7 cm 08/13/20 1234   Wound Depth (cm) 0.1 cm 08/13/20 1234   Wound Surface Area (cm^2) 10.2 cm^2 08/13/20 1234   Change in Wound Size % (l*w) 97.37 08/13/20 1234   Wound Volume (cm^3) 1.02 cm^3 08/13/20 1234   Wound Healing % 97 08/13/20 1234   Post-Procedure Length (cm) 13 cm 07/23/20 1335   Post-Procedure Width (cm) 10 cm 07/23/20 1335   Post-Procedure Depth (cm) 0.1 cm 07/23/20 1335   Post-Procedure Surface Area (cm^2) 130 cm^2 07/23/20 1335   Post-Procedure Volume (cm^3) 13 cm^3 07/23/20 1335   Wound Assessment Pink;Yellow 08/13/20 1234   Drainage Amount Small 08/13/20 1234   Drainage Description Serosanguinous 08/13/20 1234   Odor None 08/13/20 1234   Colleen-wound Assessment Dry 08/13/20 1234   Number of days: 41       Wound 07/16/20 Leg Right;Lateral #2 acquired 6/10/20 (Active)   Dressing Status Changed 07/30/20 1330   Dressing Changed Changed/New 07/30/20 1330   Dressing/Treatment Alginate;Foam;ABD 07/30/20 1330   Wound Cleansed Rinsed/Irrigated with saline 07/30/20 1330   Wound Length (cm) 1 cm 08/13/20 1234   Wound Width (cm) 2 cm 08/13/20 1234   Wound Depth (cm) 0.1 cm 08/13/20 1234   Wound Surface Area (cm^2) 2 cm^2 08/13/20 1234   Change in Wound Size % (l*w) 99.13 08/13/20 1234   Wound Volume (cm^3) 0.2 cm^3 08/13/20 1234   Wound Healing % 99 08/13/20 1234   Post-Procedure Length (cm) 12.8 cm 07/30/20 1315   Post-Procedure Width (cm) 10.5 cm 07/30/20 1315   Post-Procedure Depth (cm) 0.2 cm 07/30/20 1315   Post-Procedure Surface Area (cm^2) 134.4 cm^2 07/30/20 1315   Post-Procedure Volume (cm^3) 26.88 cm^3 07/30/20 1315   Wound Assessment Pale;Pink 08/13/20 1234   Drainage Amount Small 08/13/20 1234   Drainage Description Serosanguinous 08/13/20 1234   Odor None 08/06/20 1356   Colleen-wound Assessment Pink 08/13/20 1234   Number of days: 27              Plan:   Treatment Note please see attached Discharge Instructions. Compression, elevate. Silver nitrate to hypergranular areas. Written patient dismissal instructions given to patient and signed by patient or POA.          Discharge Instructions       Visit Discharge/Physician Orders    Discharge condition: Stable     Assessment of pain at discharge:none     Anesthetic used: 4% lidocaine     Discharge to: Home     Left via:Private automobile     Accompanied by: accompanied by self     ECF/HHA:      Dressing Orders:LEFT/RIGHT LOWER LEGS CLEANSE WITH BETADINE APPLY ALGINATE LYOFOAM AND PROFORE WRAP WEEKLY AT WOUND CARE CENTER     Treatment Orders:     EAT FOODS HIGH IN PROTEIN AND VITAMIN C     TAKE MULTIVITAMIN DAILY.     [facility] followup visit _______1 WEEK DR. ABBOTT_____________________  (Please note your next appointment above and if you are unable to keep, kindly give a 24 hour notice. Thank you.)    Physician signature:__________________________      If you experience any of the following, please call the [facility] during business hours:    * Increase in Pain  * Temperature over 101  * Increase in drainage from your wound  * Drainage with a foul odor  * Bleeding  * Increase in swelling  * Need for compression bandage changes due to slippage, breakthrough drainage. If you need medical attention outside of the business hours of the [facility] please contact your PCP or go to the nearest emergency room.         Electronically signed by Jatinder Dove DPM on 8/13/2020 at 12:48 PM

## 2020-08-13 NOTE — PROGRESS NOTES
Multilayer Compression Wrap   (Not Unna) Below the Knee    NAME:  Darryl Hauser  YOB: 1950  MEDICAL RECORD NUMBER:  [id number]  DATE:  8/13/2020    Multilayer compression wrap: Removed old Multilayer wrap if indicated and wash leg with mild soap/water. Applied moisturizing agent to dry skin as needed. Applied primary and secondary dressing as ordered. Applied multilayered dressing below the knee to right lower leg. Applied multilayered dressing below the knee to left lower leg. Instructed patient/caregiver not to remove dressing and to keep it clean and dry. Instructed patient/caregiver on complications to report to provider, such as pain, numbness in toes, heavy drainage, and slippage of dressing. Instructed patient on purpose of compression dressing and on activity and exercise recommendations.       Electronically signed by Scarlett Salinas RN on 8/13/2020 at 12:46 PM

## 2020-08-19 ENCOUNTER — HOSPITAL ENCOUNTER (OUTPATIENT)
Age: 70
Discharge: HOME | End: 2020-08-21
Payer: MEDICARE

## 2020-08-19 ENCOUNTER — OFFICE VISIT (OUTPATIENT)
Dept: INTERNAL MEDICINE | Age: 70
End: 2020-08-19
Payer: MEDICARE

## 2020-08-19 VITALS
TEMPERATURE: 97.1 F | SYSTOLIC BLOOD PRESSURE: 124 MMHG | OXYGEN SATURATION: 99 % | WEIGHT: 315 LBS | HEIGHT: 74 IN | BODY MASS INDEX: 40.43 KG/M2 | HEART RATE: 101 BPM | RESPIRATION RATE: 16 BRPM | DIASTOLIC BLOOD PRESSURE: 82 MMHG

## 2020-08-19 LAB
BASOPHILS ABSOLUTE: 0.02 E9/L (ref 0–0.2)
BASOPHILS RELATIVE PERCENT: 0.3 % (ref 0–2)
CREATININE URINE: 134 MG/DL (ref 40–278)
EOSINOPHILS ABSOLUTE: 0.08 E9/L (ref 0.05–0.5)
EOSINOPHILS RELATIVE PERCENT: 1.2 % (ref 0–6)
HBA1C MFR BLD: 5.8 %
HCT VFR BLD CALC: 46.7 % (ref 37–54)
HEMOGLOBIN: 14.2 G/DL (ref 12.5–16.5)
IMMATURE GRANULOCYTES #: 0.03 E9/L
IMMATURE GRANULOCYTES %: 0.4 % (ref 0–5)
LYMPHOCYTES ABSOLUTE: 1.01 E9/L (ref 1.5–4)
LYMPHOCYTES RELATIVE PERCENT: 14.5 % (ref 20–42)
MCH RBC QN AUTO: 26.5 PG (ref 26–35)
MCHC RBC AUTO-ENTMCNC: 30.4 % (ref 32–34.5)
MCV RBC AUTO: 87.1 FL (ref 80–99.9)
MICROALBUMIN UR-MCNC: <12 MG/L
MICROALBUMIN/CREAT UR-RTO: ABNORMAL (ref 0–30)
MONOCYTES ABSOLUTE: 0.57 E9/L (ref 0.1–0.95)
MONOCYTES RELATIVE PERCENT: 8.2 % (ref 2–12)
NEUTROPHILS ABSOLUTE: 5.24 E9/L (ref 1.8–7.3)
NEUTROPHILS RELATIVE PERCENT: 75.4 % (ref 43–80)
PDW BLD-RTO: 16.4 FL (ref 11.5–15)
PLATELET # BLD: 229 E9/L (ref 130–450)
PMV BLD AUTO: 10.9 FL (ref 7–12)
RBC # BLD: 5.36 E12/L (ref 3.8–5.8)
WBC # BLD: 7 E9/L (ref 4.5–11.5)

## 2020-08-19 PROCEDURE — 82044 UR ALBUMIN SEMIQUANTITATIVE: CPT

## 2020-08-19 PROCEDURE — 4040F PNEUMOC VAC/ADMIN/RCVD: CPT | Performed by: INTERNAL MEDICINE

## 2020-08-19 PROCEDURE — 99213 OFFICE O/P EST LOW 20 MIN: CPT | Performed by: INTERNAL MEDICINE

## 2020-08-19 PROCEDURE — 3017F COLORECTAL CA SCREEN DOC REV: CPT | Performed by: INTERNAL MEDICINE

## 2020-08-19 PROCEDURE — 1036F TOBACCO NON-USER: CPT | Performed by: INTERNAL MEDICINE

## 2020-08-19 PROCEDURE — 1123F ACP DISCUSS/DSCN MKR DOCD: CPT | Performed by: INTERNAL MEDICINE

## 2020-08-19 PROCEDURE — 82570 ASSAY OF URINE CREATININE: CPT

## 2020-08-19 PROCEDURE — 83036 HEMOGLOBIN GLYCOSYLATED A1C: CPT | Performed by: INTERNAL MEDICINE

## 2020-08-19 PROCEDURE — 36415 COLL VENOUS BLD VENIPUNCTURE: CPT

## 2020-08-19 PROCEDURE — G8427 DOCREV CUR MEDS BY ELIG CLIN: HCPCS | Performed by: INTERNAL MEDICINE

## 2020-08-19 PROCEDURE — G8417 CALC BMI ABV UP PARAM F/U: HCPCS | Performed by: INTERNAL MEDICINE

## 2020-08-19 PROCEDURE — 85025 COMPLETE CBC W/AUTO DIFF WBC: CPT

## 2020-08-19 NOTE — PROGRESS NOTES
I discussed the patient with Dr. Souleymane Rice. Patient here for routine follow-up. Needs labs completed. Has venous insufficiency which is followed by wound care and podiatry. Declined colonoscopy, pneumonia vaccine and influenza     Pre-DM - 5.8 HBA1c     Assessment/Plan:  1. Pre-DM - continue to monitor   Encourage lifestyle therapy    2. Health maintenance - outstanding needs discussed with patient    Continue to encourage patient to update outstanding items    3. Venous insufficiency - ongoing   Continue follow-up with consultants.      Deepika Noe MD  8/20/2020

## 2020-08-19 NOTE — PATIENT INSTRUCTIONS
It was scott to meet you today! Please get blood work done as soon as you are able to. Please return to clinic in 6 months for follow up. Remember what your doctors told you!! No  Junk food, avoid potatoes, Brown rice, eat healthy! Eat fruits and vegetables. Avoid fatty/ [place]yan [place] foods!! You want something unhealthy take a bite, small portions, dont eat it all!!.    Patient Education     Heart-Healthy Diet: Care Instructions  Your Care Instructions     A heart-healthy diet has lots of vegetables, fruits, nuts, beans, and whole grains, and is low in salt. It limits foods that are high in saturated fat, such as meats, cheeses, and fried foods. It may be hard to change your diet, but even small changes can lower your risk of heart attack and heart disease. Follow-up care is a key part of your treatment and safety. Be sure to make and go to all appointments, and call your doctor if you are having problems. It's also a good idea to know your test results and keep a list of the medicines you take. How can you care for yourself at home? Watch your portions  · Learn what a serving is. A \"serving\" and a \"portion\" are not always the same thing. Make sure that you are not eating larger portions than are recommended. For example, a serving of pasta is ½ cup. A serving size of meat is 2 to 3 ounces. A 3-ounce serving is about the size of a deck of cards. Measure serving sizes until you are good at [place]" them. Keep in mind that restaurants often serve portions that are 2 or 3 times the size of one serving. · To keep your energy level up and keep you from feeling hungry, eat often but in smaller portions. · Eat only the number of calories you need to stay at a healthy weight. If you need to lose weight, eat fewer calories than your body burns (through exercise and other physical activity). Eat more fruits and vegetables  · Eat a variety of fruit and vegetables every day.  Dark green, deep orange, red, or yellow fruits and vegetables are especially good for you. Examples include spinach, carrots, peaches, and berries. · Keep carrots, celery, and other veggies handy for snacks. Buy fruit that is in season and store it where you can see it so that you will be tempted to eat it. · Cook dishes that have a lot of veggies in them, such as stir-fries and soups. Limit saturated and trans fat  · Read food labels, and try to avoid saturated and trans fats. They increase your risk of heart disease. · Use olive or canola oil when you cook. · Bake, broil, grill, or steam foods instead of frying them. · Choose lean meats instead of high-fat meats such as hot dogs and sausages. Cut off all visible fat when you prepare meat. · Eat fish, skinless poultry, and meat alternatives such as soy products instead of high-fat meats. Soy products, such as tofu, may be especially good for your heart. · Choose low-fat or fat-free milk and dairy products. Eat foods high in fiber  · Eat a variety of grain products every day. Include whole-grain foods that have lots of fiber and nutrients. Examples of whole-grain foods include oats, whole wheat bread, and brown rice. · Buy whole-grain breads and cereals, instead of white bread or pastries. Limit salt and sodium  · Limit how much salt and sodium you eat to help lower your blood pressure. · Taste food before you salt it. Add only a little salt when you think you need it. With time, your taste buds will adjust to less salt. · Eat fewer snack items, fast foods, and other high-salt, processed foods. Check food labels for the amount of sodium in packaged foods. · Choose low-sodium versions of canned goods (such as soups, vegetables, and beans). Limit sugar  · Limit drinks and foods with added sugar. These include candy, desserts, and soda pop. Limit alcohol  · Limit alcohol to no more than 2 drinks a day for men and 1 drink a day for women.  Too much alcohol can cause health problems. When should you call for help? Watch closely for changes in your health, and be sure to contact your doctor if:  · You would like help planning heart-healthy meals. Where can you learn more? Go to [email or web address].healthBlend. org and sign in to your [employer] account. Enter V137 in the [employer] box to learn more about \"Heart-Healthy Diet: Care Instructions. \"     If you do not have an account, please click on the \"Sign Up Now\" link. Current as of: August 22, 2019               Content Version: 12.5  © 4281-9386 Healthwise, Incorporated. Care instructions adapted under license by [facility] ([facility]). If you have questions about a medical condition or this instruction, always ask your healthcare professional. [address] any warranty or liability for your use of this information.

## 2020-08-20 ENCOUNTER — HOSPITAL ENCOUNTER (OUTPATIENT)
Dept: WOUND CARE | Age: 70
Discharge: HOME | End: 2020-08-20
Payer: MEDICARE

## 2020-08-20 ENCOUNTER — HOSPITAL ENCOUNTER (OUTPATIENT)
Age: 70
Discharge: HOME | End: 2020-08-20
Payer: MEDICARE

## 2020-08-20 VITALS
SYSTOLIC BLOOD PRESSURE: 140 MMHG | HEIGHT: 74 IN | HEART RATE: 72 BPM | WEIGHT: 315 LBS | RESPIRATION RATE: 16 BRPM | DIASTOLIC BLOOD PRESSURE: 80 MMHG | TEMPERATURE: 97.4 F | BODY MASS INDEX: 40.43 KG/M2

## 2020-08-20 LAB
CHOLESTEROL, TOTAL: 197 MG/DL (ref 0–199)
HDLC SERPL-MCNC: 67 MG/DL
LDL CHOLESTEROL CALCULATED: 119 MG/DL (ref 0–99)
TRIGL SERPL-MCNC: 53 MG/DL (ref 0–149)
VLDLC SERPL CALC-MCNC: 11 MG/DL

## 2020-08-20 PROCEDURE — 99213 OFFICE O/P EST LOW 20 MIN: CPT

## 2020-08-20 PROCEDURE — 36415 COLL VENOUS BLD VENIPUNCTURE: CPT

## 2020-08-20 PROCEDURE — 80061 LIPID PANEL: CPT

## 2020-08-20 RX ORDER — LIDOCAINE HYDROCHLORIDE 40 MG/ML
SOLUTION TOPICAL ONCE
Status: DISCONTINUED | OUTPATIENT
Start: 2020-08-20 | End: 2020-08-21 | Stop reason: HOSPADM

## 2020-08-20 NOTE — PROGRESS NOTES
Wound Healing Center Followup Visit Note    Referring Physician : Montse Larsen MD  [address] [place] [address] [place] RECORD NUMBER:  [id number]  AGE: 79 y.o. GENDER: male  : 1950  EPISODE DATE:  2020    Subjective:     Chief Complaint   Patient presents with    Wound Check     bilateral lower leg ulcers      HISTORY of PRESENT ILLNESS VAMSHI Dowd is a 79 y.o. male who presents today in regards to follow up evaluation and treatment of wound/ulcer. That patient's past medical, family and social hx were reviewed and changes were made if present. History of Wound Context:  The patient has had a wound of left leg which was first noted approximately 2020. .  This has not been treated. On their initial visit to the wound healing center, 20 ,  the patient has noted that the wound has been improving.  Relates initially serous drainage, this has decreased. The patient has had similar previous wounds in the past.       2020 pt also c/o very painful toenails with ambulation and pressure from shoe gear that severely limits activity.  At present he specifically points 1 through 5 on the right as well as first left. 2020 noticed drainage on his right lower leg recently.   No pain.     Pt is not on abx at time of initial visit.      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     Past Surgical History:   Procedure Laterality Date    FRACTURE SURGERY      right ankle repair     Family History   Problem Relation Age of Onset    Heart Disease Mother     Heart Disease Father      Social History     Tobacco Use    Smoking status: Never Smoker    Smokeless tobacco: Never Used   Substance Use Topics    Alcohol use: No     Comment: on special occassions    Drug use: No     Allergies   Allergen Reactions    Metformin And Related Diarrhea     Current Outpatient Medications on File Prior to Encounter   Medication Sig Dispense Refill    aspirin (ASPIRIN CHILDRENS) 81 MG chewable tablet Take 1 tablet by mouth daily 30 tablet 5     No current facility-administered medications on file prior to encounter. REVIEW OF SYSTEMS See HPI    Objective:    BP (!) 140/80   Pulse 72   Temp 97.4 °F (36.3 °C) (Temporal)   Resp 16   Ht 6' 2\" (1.88 m)   Wt (!) 367 lb (166.5 kg)   BMI 47.12 kg/m²   Wt Readings from Last 3 Encounters:   08/20/20 (!) 367 lb (166.5 kg)   08/19/20 (!) 367 lb (166.5 kg)   08/13/20 (!) 363 lb (164.7 kg)     PHYSICAL EXAM  CONSTITUTIONAL:   Awake, alert, cooperative   EYES:  lids and lashes normal   ENT: external ears and nose without lesions   NECK:  supple, symmetrical, trachea midline   SKIN:  Open wound LLE at present superficial, clean.  No purulence or odor.  Serous drainage. + edema, decreased. Vascular intact, stasis changes. No surrounding erythema. Stasis changes. Negative Homans. Hypergranular Area. RLE healed. Assessment:     Problem List Items Addressed This Visit     Non-pressure chronic ulcer of left lower leg, limited to breakdown of skin ([address] - Primary            Wound 07/02/20 Leg Left; Lower; Posterior #1 acq: 6-16-20  Venous (Active)   Wound Image   08/20/20 1335   Wound Venous 07/02/20 1311   Dressing Status Changed 07/30/20 1330   Dressing Changed Changed/New 08/13/20 1312   Dressing/Treatment Alginate; Foam 08/13/20 1312   Wound Cleansed Rinsed/Irrigated with saline 08/13/20 1312   Wound Length (cm) 0.8 cm 08/20/20 1335   Wound Width (cm) 0.8 cm 08/20/20 1335   Wound Depth (cm) 0.2 cm 08/20/20 1335   Wound Surface Area (cm^2) 0.64 cm^2 08/20/20 1335   Change in Wound Size % (l*w) 99.83 08/20/20 1335   Wound Volume (cm^3) 0.13 cm^3 08/20/20 1335   Wound Healing % 100 08/20/20 1335   Post-Procedure Length (cm) 13 cm 07/23/20 1335   Post-Procedure Width (cm) 10 cm 07/23/20 1335   Post-Procedure Depth (cm) 0.1 cm 07/23/20 1335   Post-Procedure Surface Area (cm^2) 130 cm^2 07/23/20 1335   Post-Procedure Volume (cm^3) 13 cm^3 07/23/20 1335   Wound Assessment Pink;Yellow 08/20/20 1335   Drainage Amount Small 08/20/20 1335   Drainage Description Serosanguinous 08/20/20 1335   Odor None 08/20/20 1335   Colleen-wound Assessment Dry 08/20/20 1335   Number of days: 49       Wound 07/16/20 Leg Right;Lateral #2 acquired 6/10/20 (Active)   Wound Image   08/20/20 1335   Dressing Status Changed 07/30/20 1330   Dressing Changed Changed/New 08/13/20 1312   Dressing/Treatment Alginate; Foam 08/13/20 1312   Wound Cleansed Rinsed/Irrigated with saline 08/13/20 1312   Wound Length (cm) 0 cm 08/20/20 1335   Wound Width (cm) 0 cm 08/20/20 1335   Wound Depth (cm) 0 cm 08/20/20 1335   Wound Surface Area (cm^2) 0 cm^2 08/20/20 1335   Change in Wound Size % (l*w) 100 08/20/20 1335   Wound Volume (cm^3) 0 cm^3 08/20/20 1335   Wound Healing % 100 08/20/20 1335   Post-Procedure Length (cm) 12.8 cm 07/30/20 1315   Post-Procedure Width (cm) 10.5 cm 07/30/20 1315   Post-Procedure Depth (cm) 0.2 cm 07/30/20 1315   Post-Procedure Surface Area (cm^2) 134.4 cm^2 07/30/20 1315   Post-Procedure Volume (cm^3) 26.88 cm^3 07/30/20 1315   Wound Assessment Pale;Pink 08/13/20 1234   Drainage Amount Small 08/13/20 1234   Drainage Description Serosanguinous 08/13/20 1234   Odor None 08/06/20 1356   Colleen-wound Assessment Pink 08/13/20 1234   Number of days: 35              Plan:   Treatment Note please see attached Discharge Instructions. Compression, elevate. Silver nitrate to hypergranular area.     Written patient dismissal instructions given to patient and signed by patient or POA. Discharge Instructions       Visit Discharge/Physician Orders    Discharge condition: Stable     Assessment of pain at discharge:none     Anesthetic used: 4% lidocaine     Discharge to: Home     Left via:Private automobile     Accompanied by: accompanied by self     ECF/HHA:      Dressing Orders:LEFT/RIGHT LOWER LEGS CLEANSE WITH BETADINE APPLY ALGINATE LYOFOAM AND PROFORE WRAP WEEKLY AT WOUND CARE CENTER     Treatment Orders:     EAT FOODS HIGH IN PROTEIN AND VITAMIN C     TAKE MULTIVITAMIN DAILY.     [address] [place] [address],[address] followup visit _______1 WEEK DR. ABBOTT_____________________  (Please note your next appointment above and if you are unable to keep, kindly give a 24 hour notice. Thank you.)    Physician signature:__________________________      If you experience any of the following, please call the [facility] during business hours:    * Increase in Pain  * Temperature over 101  * Increase in drainage from your wound  * Drainage with a foul odor  * Bleeding  * Increase in swelling  * Need for compression bandage changes due to slippage, breakthrough drainage. If you need medical attention outside of the business hours of the [address] please contact your PCP or go to the nearest emergency room.         Electronically signed by Jatinder Dove DPM on 8/20/2020 at 1:59 PM

## 2020-08-27 ENCOUNTER — HOSPITAL ENCOUNTER (OUTPATIENT)
Dept: WOUND CARE | Age: 70
Discharge: HOME | End: 2020-08-27
Payer: MEDICARE

## 2020-08-27 VITALS
HEART RATE: 75 BPM | RESPIRATION RATE: 18 BRPM | DIASTOLIC BLOOD PRESSURE: 82 MMHG | TEMPERATURE: 97.4 F | SYSTOLIC BLOOD PRESSURE: 132 MMHG

## 2020-08-27 PROCEDURE — 99212 OFFICE O/P EST SF 10 MIN: CPT

## 2020-08-27 NOTE — PROGRESS NOTES
Wound Healing Center Followup Visit Note    Referring Physician : Laisha Vyas MD  [address] [place] [address] [place] RECORD NUMBER:  [id number]  AGE: 79 y.o. GENDER: male  : 1950  EPISODE DATE:  2020    Subjective:     Chief Complaint   Patient presents with    Wound Check     bilateral legs      HISTORY of PRESENT ILLNESS HPI   Sridhar Valdez is a 79 y.o. male who presents today in regards to follow up evaluation and treatment of wound/ulcer. That patient's past medical, family and social hx were reviewed and changes were made if present. History of Wound Context:  The patient has had a wound of left leg which was first noted approximately 2020. .  This has not been treated. On their initial visit to the wound healing center, 20 ,  the patient has noted that the wound has been improving.  Relates initially serous drainage, this has decreased. The patient has had similar previous wounds in the past.       2020 pt also c/o very painful toenails with ambulation and pressure from shoe gear that severely limits activity.  At present he specifically points 1 through 5 on the right as well as first left. 2020 noticed drainage on his right lower leg recently.   No pain.     Pt is not on abx at time of initial visit.      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     Past Surgical History:   Procedure Laterality Date    FRACTURE SURGERY  2005    right ankle repair     Family History   Problem Relation Age of Onset  Heart Disease Mother     Heart Disease Father      Social History     Tobacco Use    Smoking status: Never Smoker    Smokeless tobacco: Never Used   Substance Use Topics    Alcohol use: No     Comment: on special occassions    Drug use: No     Allergies   Allergen Reactions    Metformin And Related Diarrhea     Current Outpatient Medications on File Prior to Encounter   Medication Sig Dispense Refill    aspirin (ASPIRIN CHILDRENS) 81 MG chewable tablet Take 1 tablet by mouth daily 30 tablet 5     No current facility-administered medications on file prior to encounter. REVIEW OF SYSTEMS See HPI    Objective:    /82   Pulse 75   Temp 97.4 °F (36.3 °C) (Temporal)   Resp 18   Wt Readings from Last 3 Encounters:   08/20/20 (!) 367 lb (166.5 kg)   08/19/20 (!) 367 lb (166.5 kg)   08/13/20 (!) 363 lb (164.7 kg)     PHYSICAL EXAM  CONSTITUTIONAL:   Awake, alert, cooperative   EYES:  lids and lashes normal   ENT: external ears and nose without lesions   NECK:  supple, symmetrical, trachea midline   SKIN:  LLE wound healed, edema, decreased. Vascular intact, stasis changes. No surrounding erythema. Assessment:     Problem List Items Addressed This Visit     Non-pressure chronic ulcer of left lower leg, limited to breakdown of skin ([address] - Primary            Wound 07/02/20 Leg Left; Lower; Posterior #1 acq: 6-16-20  Venous (Active)   Wound Image   08/27/20 1323   Wound Venous 07/02/20 1311   Dressing Status Clean;Dry; Intact 08/20/20 1432   Dressing Changed Changed/New 08/20/20 1432   Dressing/Treatment Alginate; Foam 08/20/20 1432   Wound Cleansed Rinsed/Irrigated with saline 08/20/20 1432   Wound Length (cm) 0 cm 08/27/20 1323   Wound Width (cm) 0 cm 08/27/20 1323   Wound Depth (cm) 0 cm 08/27/20 1323   Wound Surface Area (cm^2) 0 cm^2 08/27/20 1323   Change in Wound Size % (l*w) 100 08/27/20 1323   Wound Volume (cm^3) 0 cm^3 08/27/20 1323   Wound Healing % 100 08/27/20 1323   Post-Procedure

## 2020-10-22 ENCOUNTER — HOSPITAL ENCOUNTER (OUTPATIENT)
Dept: WOUND CARE | Age: 70
Discharge: HOME | End: 2020-10-22
Payer: MEDICARE

## 2020-10-22 VITALS
SYSTOLIC BLOOD PRESSURE: 138 MMHG | BODY MASS INDEX: 40.43 KG/M2 | HEIGHT: 74 IN | HEART RATE: 84 BPM | WEIGHT: 315 LBS | TEMPERATURE: 97.3 F | RESPIRATION RATE: 20 BRPM | DIASTOLIC BLOOD PRESSURE: 84 MMHG

## 2020-10-22 PROCEDURE — 99213 OFFICE O/P EST LOW 20 MIN: CPT

## 2020-10-22 PROCEDURE — 11042 DBRDMT SUBQ TIS 1ST 20SQCM/<: CPT

## 2020-10-22 RX ORDER — LIDOCAINE HYDROCHLORIDE 40 MG/ML
SOLUTION TOPICAL ONCE
Status: DISCONTINUED | OUTPATIENT
Start: 2020-10-22 | End: 2020-10-23 | Stop reason: HOSPADM

## 2020-10-22 NOTE — PROGRESS NOTES
Wound Healing Center  History and Physical/Consultation  Podiatry    Referring Physician : Taylor Gregorio MD  [address] [place] [address] [place] RECORD NUMBER:  [id number]  AGE: 79 y.o. GENDER: male  : 1950  EPISODE DATE:  10/22/2020  Subjective:     Chief Complaint   Patient presents with    Wound Check      left lower leg ulcer         HISTORY of PRESENT ILLNESS HPI     Sha Enamorado is a 79 y.o. male who presents today for wound/ulcer evaluation. History of Wound Context:  The patient has had a wound of left lower leg which was first noted approximately 1 week ago. This has not been treated. On their initial visit to the wound healing center, 10/22/20 ,  the patient has noted that the wound has not been improving. The patient has had similar previous wounds in the past.      Pt is not on abx at time of initial visit. 10- patient relates he does have lymphedema pumps at home but apparently they are the wrong size. He did contact the rep. May have to wait till after the first of the year for new.       Wound/Ulcer Pain Timing/Severity: none  Quality of pain:   Severity:   / 10   Modifying Factors:   Associated Signs/Symptoms:     Ulcer Identification:  Ulcer Type: venous  Contributing Factors: edema, venous stasis and lymphedema    Diabetic/Pressure/Non Pressure Ulcers onl y:  Ulcer: N/A    If patient has diabetic lower extremity wounds  Mitchell Classification of diabetic lower extremity wounds:    Grade Description   []  0 No open wound   []  1 Superficial ulcer involving the full skin thickness   []  2 Deep ulcer involves ligament, tendon, joint capsule, or fascia  No bone involvement or abscess presence   []  3 Deep Ulcer with abcess formation and/or osteomyelitis   []  4 Localized gangrene   []  5 Extensive gangrene of the foot     Wound: N/A        PAST MEDICAL HISTORY      Diagnosis Date    Cellulitis of right lower leg     Diabetes mellitus (HCC)     Lymphedema     Obesity, Class III, strength DF/PF    R dorsalis pedis + L dorsalis pedis +   R posterior tibial + L posterior tibial +     Assessment:     Problem List Items Addressed This Visit     Non-pressure chronic ulcer of left lower leg, limited to breakdown of skin ([address] - Primary             Wound 10/22/20 Leg Left; Lower; Lateral #1 (Active)   Wound Image   10/22/20 1304   Wound Length (cm) 15 cm 10/22/20 1304   Wound Width (cm) 12 cm 10/22/20 1304   Wound Depth (cm) 0.1 cm 10/22/20 1304   Wound Surface Area (cm^2) 180 cm^2 10/22/20 1304   Wound Volume (cm^3) 18 cm^3 10/22/20 1304   Wound Assessment Pale granulation tissue 10/22/20 1304   Drainage Amount Large 10/22/20 1304   Drainage Description Serosanguinous; Serous 10/22/20 1304   Odor None 10/22/20 1304   Colleen-wound Assessment Fragile; Maceration 10/22/20 1304   Number of days: 0               A culture was not done. Plan:   Local care. Compression. Elevate. Pt is not a smoker   - Discussed relationship of smoking and negative affects on wound healing   - Emphasized importance of tobacco avoidace/cessation   -    In my professional opinion and based off the information that is available at this time this patient has appropriate indication for HBO Therapy: No    Treatment Note please see attached Discharge Instructions    Written patient dismissal instructions given to patient and signed by patient or POA. Discharge Instructions       Visit Discharge/Physician Orders    Discharge condition: Stable    Assessment of pain at discharge:none    Anesthetic used: 4% lidocaine    Discharge to: Home    Left via:public transportation    Accompanied by: accompanied by self    ECF/HHA:     Dressing Orders:LEFT LOWER LEGS CLEANSE WITH BETADINE APPLY ALGINATE LYOFOAM AND PROFORE WRAP CHANGE WEEKLY AT [facility].     Treatment Orders:  Eat foods high in protein and vitamin c    Take multivitamin daily    Use compression pumps    [facility] followup visit _______1 week  Yannucci______________________  (Please note your next appointment above and if you are unable to keep, kindly give a 24 hour notice. Thank you.)    Physician signature:__________________________      If you experience any of the following, please call the [facility] during business hours:    * Increase in Pain  * Temperature over 101  * Increase in drainage from your wound  * Drainage with a foul odor  * Bleeding  * Increase in swelling  * Need for compression bandage changes due to slippage, breakthrough drainage. If you need medical attention outside of the business hours of the [facility] please contact your PCP or go to the nearest emergency room. Contact your doctor if you have a temperature above 100.4 with any of the following:                         Persistent cough             Shortness of breath            Chills            Fatigue            Chest pain or pressure            Difficulty breathing            Decreased consciousness of confusion             Headache    Recommend:                       Wash hands often and for 20 seconds or more             Avoid touching eyes, nose, mouth and face             Social distance ( 6 feet)             Stay at home as much as possible             Call health care provider with any concerns          Electronically signed by Yuridia Mazariegos DPM on 10/22/2020 at 1:18 PM

## 2020-10-29 ENCOUNTER — HOSPITAL ENCOUNTER (OUTPATIENT)
Dept: WOUND CARE | Age: 70
Discharge: HOME | End: 2020-10-29
Payer: MEDICARE

## 2020-10-29 VITALS
RESPIRATION RATE: 20 BRPM | HEIGHT: 74 IN | HEART RATE: 84 BPM | BODY MASS INDEX: 40.43 KG/M2 | TEMPERATURE: 97.6 F | WEIGHT: 315 LBS | DIASTOLIC BLOOD PRESSURE: 74 MMHG | SYSTOLIC BLOOD PRESSURE: 144 MMHG

## 2020-10-29 PROCEDURE — 99213 OFFICE O/P EST LOW 20 MIN: CPT

## 2020-10-29 RX ORDER — LIDOCAINE HYDROCHLORIDE 40 MG/ML
SOLUTION TOPICAL ONCE
Status: DISCONTINUED | OUTPATIENT
Start: 2020-10-29 | End: 2020-10-30 | Stop reason: HOSPADM

## 2020-10-29 NOTE — PROGRESS NOTES
Wound Healing Center Followup Visit Note    Referring Physician : Eliana Goldman MD  [address] [place] [address] [place] RECORD NUMBER:  [id number]  AGE: 79 y.o. GENDER: male  : 1950  EPISODE DATE:  10/29/2020    Subjective:     Chief Complaint   Patient presents with    Wound Check     left leg wound      HISTORY of PRESENT ILLNESS HPI   Rivera Mcdoewll is a 79 y.o. male who presents today in regards to follow up evaluation and treatment of wound/ulcer. That patient's past medical, family and social hx were reviewed and changes were made if present. History of Wound Context:  The patient has had a wound of left lower leg which was first noted approximately 1 week ago. This has not been treated. On their initial visit to the wound healing center, 10/22/20 ,  the patient has noted that the wound has not been improving. The patient has had similar previous wounds in the past.       Pt is not on abx at time of initial visit.     10- patient relates he does have lymphedema pumps at home but apparently they are the wrong size. He did contact the rep. May have to wait till after the first of the year for new.       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     Past Surgical History:   Procedure Laterality Date    FRACTURE SURGERY      right ankle repair     Family History   Problem Relation Age of Onset    Heart Disease Mother     Heart Disease Father      Social History     Tobacco Use    Smoking status: Never Smoker    Smokeless tobacco: Never Used   Substance Use Topics    Alcohol use: No     Comment: on special occassions    Drug use: No     Allergies   Allergen Reactions    Metformin And Related Diarrhea     Current Outpatient Medications on File Prior to Encounter   Medication Sig Dispense Refill    aspirin (ASPIRIN CHILDRENS) 81 MG chewable tablet Take 1 tablet by mouth daily 30 tablet 5     No current facility-administered medications on file prior to encounter. REVIEW OF SYSTEMS See HPI    Objective:    BP (!) 144/74   Pulse 84   Temp 97.6 °F (36.4 °C) (Temporal)   Resp 20   Ht 6' 2\" (1.88 m)   Wt (!) 367 lb (166.5 kg)   BMI 47.12 kg/m²   Wt Readings from Last 3 Encounters:   10/29/20 (!) 367 lb (166.5 kg)   10/22/20 (!) 367 lb (166.5 kg)   08/20/20 (!) 367 lb (166.5 kg)     PHYSICAL EXAM   CONSTITUTIONAL:   Awake, alert, cooperative   PSYCHIATRIC :          Oriented to time, place and person                                       normal insight to disease process  EXTREMITIES:   LLE    Open wound beefy, superficial.  There is no purulence or odor. Serous drainage. Skin color is abnormal with stasis changes              Edema is  noted              Sensation intact to light touch              Palpation of the leg does not cause pain    Assessment:     Problem List Items Addressed This Visit     Non-pressure chronic ulcer of left lower leg, limited to breakdown of skin ([address] - Primary               Wound 10/22/20 Leg Left; Lower; Lateral #1 (Active)   Wound Image   10/22/20 1304   Wound Length (cm) 12.5 cm 10/29/20 1319   Wound Width (cm) 10 cm 10/29/20 1319   Wound Depth (cm) 0.1 cm 10/29/20 1319   Wound Surface Area (cm^2) 125 cm^2 10/29/20 1319   Change in Wound Size % (l*w) 30.56 10/29/20 1319   Wound Volume (cm^3) 12.5 cm^3 10/29/20 1319   Wound Healing % 31 10/29/20 1319   Wound Assessment Pale granulation tissue 10/29/20 1319   Drainage Amount Moderate 10/29/20 1319   Drainage Description Yellow 10/29/20 1319   Odor None 10/29/20 1319   Colleen-wound Assessment Fragile; Maceration 10/29/20 1319   Number of days: 7              Plan:   Treatment Note please see attached Discharge Instructions    Written patient dismissal instructions given to patient and signed by patient or POA. Discharge Instructions       Visit Discharge/Physician Orders     Discharge condition: Stable     Assessment of pain at discharge:none     Anesthetic used: 4% lidocaine     Discharge to: Home     Left via:public transportation     Accompanied by: accompanied by self     ECF/HHA:      Dressing Orders:LEFT LOWER LEGS CLEANSE WITH BETADINE APPLY ALGINATE LYOFOAM AND PROFORE WRAP CHANGE WEEKLY AT WOUND CARE CENTER.     Treatment Orders:  Eat foods high in protein and vitamin c     Take multivitamin daily     Use compression pumps     [facility] followup visit _______1 week Dr. Franklin______________________  (Please note your next appointment above and if you are unable to keep, kindly give a 24 hour notice. Thank you.)     Physician signature:__________________________        If you experience any of the following, please call the [facility] during business hours:     * Increase in Pain  * Temperature over 101  * Increase in drainage from your wound  * Drainage with a foul odor  * Bleeding  * Increase in swelling  * Need for compression bandage changes due to slippage, breakthrough drainage.     If you need medical attention outside of the business hours of the [facility] please contact your PCP or go to the nearest emergency room. Contact your doctor if you have a temperature above 100.4 with any of the following:                         Persistent cough             Shortness of breath            Chills            Fatigue            Chest pain or pressure            Difficulty breathing            Decreased consciousness of confusion             Headache     Recommend:                       Wash hands often and for

## 2020-11-05 ENCOUNTER — HOSPITAL ENCOUNTER (OUTPATIENT)
Dept: WOUND CARE | Age: 70
Discharge: HOME | End: 2020-11-05
Payer: MEDICARE

## 2020-11-05 VITALS
BODY MASS INDEX: 40.43 KG/M2 | DIASTOLIC BLOOD PRESSURE: 72 MMHG | RESPIRATION RATE: 18 BRPM | WEIGHT: 315 LBS | SYSTOLIC BLOOD PRESSURE: 118 MMHG | TEMPERATURE: 97.9 F | HEART RATE: 78 BPM | HEIGHT: 74 IN

## 2020-11-05 PROCEDURE — 99213 OFFICE O/P EST LOW 20 MIN: CPT

## 2020-11-05 RX ORDER — LIDOCAINE HYDROCHLORIDE 40 MG/ML
SOLUTION TOPICAL ONCE
Status: DISCONTINUED | OUTPATIENT
Start: 2020-11-05 | End: 2020-11-06 | Stop reason: HOSPADM

## 2020-11-05 NOTE — PROGRESS NOTES
Wound Healing Center Followup Visit Note    Referring Physician : Brayan Hinojosa MD  [address] [place] [address] [place] RECORD NUMBER:  [id number]  AGE: 79 y.o. GENDER: male  : 1950  EPISODE DATE:  2020    Subjective:     Chief Complaint   Patient presents with    Wound Check     left leg ulcer      HISTORY of PRESENT ILLNESS HPI   Dahiana Horvath is a 79 y.o. male who presents today in regards to follow up evaluation and treatment of wound/ulcer. That patient's past medical, family and social hx were reviewed and changes were made if present. History of Wound Context:  The patient has had a wound of left lower leg which was first noted approximately 1 week ago. This has not been treated. On their initial visit to the wound healing center, 10/22/20 ,  the patient has noted that the wound has not been improving. The patient has had similar previous wounds in the past.       Pt is not on abx at time of initial visit.     10- patient relates he does have lymphedema pumps at home but apparently they are the wrong size. He did contact the rep. May have to wait till after the first of the year for new.       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     Past Surgical History:   Procedure Laterality Date    FRACTURE SURGERY      right ankle repair     Family History   Problem Relation Age of Onset    Heart Disease Mother     Heart Disease Father      Social History     Tobacco Use    Smoking status: Never Smoker    Smokeless tobacco: Never Used   Substance Use Topics    Alcohol use: No     Comment: on special occassions    Drug use: No     Allergies   Allergen Reactions    Metformin And Related Diarrhea     Current Outpatient Medications on File Prior to Encounter   Medication Sig Dispense Refill    aspirin (ASPIRIN CHILDRENS) 81 MG chewable tablet Take 1 tablet by mouth daily 30 tablet 5     No current facility-administered medications on file prior to encounter. REVIEW OF SYSTEMS See HPI    Objective:    /72   Pulse 78   Temp 97.9 °F (36.6 °C) (Temporal)   Resp 18   Ht 6' 2\" (1.88 m)   Wt (!) 367 lb (166.5 kg)   BMI 47.12 kg/m²   Wt Readings from Last 3 Encounters:   11/05/20 (!) 367 lb (166.5 kg)   10/29/20 (!) 367 lb (166.5 kg)   10/22/20 (!) 367 lb (166.5 kg)     PHYSICAL EXAM   CONSTITUTIONAL:   Awake, alert, cooperative   PSYCHIATRIC :          Oriented to time, place and person                                       normal insight to disease process  EXTREMITIES:   LLE    Open wound beefy, superficial.  There is no purulence or odor.  + Serous drainage. Skin color is abnormal with stasis changes              Edema decreased              Sensation intact to light touch              Palpation of the leg does not cause pain    Assessment:     Problem List Items Addressed This Visit     Non-pressure chronic ulcer of left lower leg, limited to breakdown of skin ([address] - Primary    Peripheral venous insufficiency               Wound 10/22/20 Leg Left; Lower; Lateral #1 (Active)   Wound Image   10/22/20 1304   Dressing Status New dressing applied 10/29/20 1429   Wound Cleansed Betadine/povidone iodine 10/29/20 1429   Dressing/Treatment Alginate with Ag;Hydrating gel with Ag; Foam 10/29/20 1429   Offloading for Diabetic Foot Ulcers No 10/29/20 1429   Wound Length (cm) 10 cm 11/05/20 1259   Wound Width (cm) 6 cm 11/05/20 1259   Wound Depth (cm) 0.1 cm 11/05/20 1259   Wound Surface Area (cm^2) 60 cm^2 11/05/20 1259   Change in Wound Size % (l*w) 66.67 11/05/20 1259   Wound Volume (cm^3) 6 cm^3 11/05/20 1259   Wound Healing % 67 11/05/20 1259   Wound Assessment Pale granulation tissue;Fibrin 11/05/20 1259   Drainage Amount Moderate 11/05/20 1259   Drainage Description Yellow;Serosanguinous 11/05/20 1259   Odor None 11/05/20 1259   Colleen-wound Assessment Fragile; Maceration 11/05/20 1259   Number of days: 14              Plan:   Treatment Note please see attached Discharge Instructions    Written patient dismissal instructions given to patient and signed by patient or POA. Discharge Instructions        Visit Discharge/Physician Orders     Discharge condition: Stable     Assessment of pain at discharge:none     Anesthetic used: 4% lidocaine     Discharge to: Home     Left via:public transportation     Accompanied by: accompanied by self     ECF/HHA:      Dressing Orders:LEFT LOWER LEGS CLEANSE WITH BETADINE APPLY ALGINATE LYOFOAM AND PROFORE WRAP CHANGE WEEKLY AT WOUND CARE CENTER.     Treatment Orders:  Eat foods high in protein and vitamin c     Take multivitamin daily     Use compression pumps     [facility] followup visit _______1 week Dr. Franklin______________________  (Please note your next appointment above and if you are unable to keep, kindly give a 24 hour notice. Thank you.)     Physician signature:__________________________        If you experience any of the following, please call the [facility] during business hours:     * Increase in Pain  * Temperature over 101  * Increase in drainage from your wound  * Drainage with a foul odor  * Bleeding  * Increase in swelling  * Need for compression bandage changes due to slippage, breakthrough drainage.     If you need medical attention outside of the business hours of the [facility] please contact your PCP or go to the nearest emergency room. Contact your doctor if you have a temperature above 100.4 with any of the following: Persistent cough             Shortness of breath            Chills            Fatigue            Chest pain or pressure            Difficulty breathing            Decreased consciousness of confusion             Headache     Recommend:                       Wash hands often and for 20 seconds or more             Avoid touching eyes, nose, mouth and face             Social distance ( 6 feet)             Stay at home as much as possible             Call health care provider with any concerns                                             Electronically signed by Loraine Romero DPM on 11/5/2020 at 1:11 PM

## 2020-11-12 ENCOUNTER — HOSPITAL ENCOUNTER (OUTPATIENT)
Dept: WOUND CARE | Age: 70
Discharge: HOME | End: 2020-11-12
Payer: MEDICARE

## 2020-11-12 VITALS
WEIGHT: 315 LBS | HEART RATE: 80 BPM | SYSTOLIC BLOOD PRESSURE: 130 MMHG | BODY MASS INDEX: 40.43 KG/M2 | TEMPERATURE: 97.5 F | DIASTOLIC BLOOD PRESSURE: 74 MMHG | RESPIRATION RATE: 18 BRPM | HEIGHT: 74 IN

## 2020-11-12 PROCEDURE — 11042 DBRDMT SUBQ TIS 1ST 20SQCM/<: CPT

## 2020-11-12 PROCEDURE — 11042 DBRDMT SUBQ TIS 1ST 20SQCM/<: CPT | Performed by: NURSE PRACTITIONER

## 2020-11-12 RX ORDER — LIDOCAINE HYDROCHLORIDE 40 MG/ML
SOLUTION TOPICAL ONCE
Status: DISCONTINUED | OUTPATIENT
Start: 2020-11-12 | End: 2020-11-13 | Stop reason: HOSPADM

## 2020-11-12 NOTE — PROGRESS NOTES
Wound Healing Center Followup Visit Note    Referring Physician : Ozzie Parikh MD  [address] [place] [address] [place] RECORD NUMBER:  [id number]  AGE: 79 y.o. GENDER: male  : 1950  EPISODE DATE:  2020    Subjective:     Chief Complaint   Patient presents with    Wound Check     right and left leg wounds      HISTORY of PRESENT ILLNESS HPI   Ruba Ramirez is a 79 y.o. male who presents today in regards to follow up evaluation and treatment of wound/ulcer. That patient's past medical, family and social hx were reviewed and changes were made if present. The patient has had a wound of left lower leg which was first noted approximately 1 week ago. This has not been treated. On their initial visit to the wound healing center, 10/22/20 ,  the patient has noted that the wound has not been improving. The patient has had similar previous wounds in the past.       Pt is not on abx at time of initial visit.     10- patient relates he does have lymphedema pumps at home but apparently they are the wrong size. He did contact the rep. May have to wait till after the first of the year for new. 20- Wounds on the left leg improved. Some blistering noted on the right calf. Not open yet.        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     Past Surgical History:   Procedure Laterality Date    FRACTURE SURGERY      right ankle repair     Family History   Problem Relation Age of Onset    Heart Disease Mother  Heart Disease Father      Social History     Tobacco Use    Smoking status: Never Smoker    Smokeless tobacco: Never Used   Substance Use Topics    Alcohol use: No     Comment: on special occassions    Drug use: No     Allergies   Allergen Reactions    Metformin And Related Diarrhea     Current Outpatient Medications on File Prior to Encounter   Medication Sig Dispense Refill    aspirin (ASPIRIN CHILDRENS) 81 MG chewable tablet Take 1 tablet by mouth daily 30 tablet 5     No current facility-administered medications on file prior to encounter. REVIEW OF SYSTEMS See HPI    Objective:    /74   Pulse 80   Temp 97.5 °F (36.4 °C) (Temporal)   Resp 18   Ht 6' 2\" (1.88 m)   Wt (!) 368 lb (166.9 kg)   BMI 47.25 kg/m²   Wt Readings from Last 3 Encounters:   11/12/20 (!) 368 lb (166.9 kg)   11/05/20 (!) 367 lb (166.5 kg)   10/29/20 (!) 367 lb (166.5 kg)     PHYSICAL EXAM  CONSTITUTIONAL:   Awake, alert, cooperative   EYES:  lids and lashes normal   ENT: external ears and nose without lesions   NECK:  supple, symmetrical, trachea midline   SKIN:  Open wound Present    Assessment:     Problem List Items Addressed This Visit     None          Pre Debridement Measurements:  Are located in the [place]  Documentation Flow Sheet  Post Debridement Measurements:  Wound/Ulcer Descriptions are Pre Debridement except measurements:     Wound 10/22/20 Leg Left; Lower; Lateral #1 (Active)   Wound Image   10/22/20 1304   Dressing Status New dressing applied 10/29/20 1429   Wound Cleansed Betadine/povidone iodine 10/29/20 1429   Dressing/Treatment Alginate with Ag;Hydrating gel with Ag; Foam 10/29/20 1429   Offloading for Diabetic Foot Ulcers No 10/29/20 1429   Wound Length (cm) 12 cm 11/12/20 1244   Wound Width (cm) 2.5 cm 11/12/20 1244   Wound Depth (cm) 0.1 cm 11/12/20 1244   Wound Surface Area (cm^2) 30 cm^2 11/12/20 1244   Change in Wound Size % (l*w) 83.33 11/12/20 1244   Wound Volume (cm^3) 3 cm^3 11/12/20 accompanied by self     ECF/HHA:      Dressing Orders:LEFT/ Right LOWER LEGS CLEANSE WITH BETADINE APPLY ALGINATE LYOFOAM AND PROFORE WRAP CHANGE WEEKLY AT WOUND CARE CENTER.     Treatment Orders:  Eat foods high in protein and vitamin c     Take multivitamin daily     Use compression pumps     [facility] followup visit _______1 week Dr. [facility]______________________  (Please note your next appointment above and if you are unable to keep, kindly give a 24 hour notice. Thank you.)     Physician signature:__________________________        If you experience any of the following, please call the [facility] during business hours:     * Increase in Pain  * Temperature over 101  * Increase in drainage from your wound  * Drainage with a foul odor  * Bleeding  * Increase in swelling  * Need for compression bandage changes due to slippage, breakthrough drainage.     If you need medical attention outside of the business hours of the [facility] please contact your PCP or go to the nearest emergency room. Contact your doctor if you have a temperature above 100.4 with any of the following:                         Persistent cough             Shortness of breath            Chills            Fatigue            Chest pain or pressure            Difficulty breathing            Decreased consciousness of confusion             Headache     Recommend:                       Wash hands often and for 20 seconds or more             Avoid touching eyes, nose, mouth and face             Social distance ( 6 feet)             Stay at home as much as possible             Call health care provider with any concerns                                                                    Electronically signed by JO Mcneil CNP on 11/12/2020 at 1:04 PM

## 2020-11-19 ENCOUNTER — HOSPITAL ENCOUNTER (OUTPATIENT)
Dept: WOUND CARE | Age: 70
Discharge: HOME | End: 2020-11-19
Payer: MEDICARE

## 2020-11-19 VITALS
SYSTOLIC BLOOD PRESSURE: 140 MMHG | BODY MASS INDEX: 40.43 KG/M2 | HEIGHT: 74 IN | RESPIRATION RATE: 16 BRPM | HEART RATE: 76 BPM | DIASTOLIC BLOOD PRESSURE: 80 MMHG | TEMPERATURE: 97.1 F | WEIGHT: 315 LBS

## 2020-11-19 PROCEDURE — 11042 DBRDMT SUBQ TIS 1ST 20SQCM/<: CPT | Performed by: NURSE PRACTITIONER

## 2020-11-19 PROCEDURE — 11042 DBRDMT SUBQ TIS 1ST 20SQCM/<: CPT

## 2020-11-19 RX ORDER — LIDOCAINE HYDROCHLORIDE 40 MG/ML
SOLUTION TOPICAL ONCE
Status: DISCONTINUED | OUTPATIENT
Start: 2020-11-19 | End: 2020-11-20 | Stop reason: HOSPADM

## 2020-11-19 NOTE — PROGRESS NOTES
Wound Healing Center Followup Visit Note    Referring Physician : Marva Zheng MD  [address] [place] [address] [place] RECORD NUMBER:  [id number]  AGE: 79 y.o. GENDER: male  : 1950  EPISODE DATE:  2020    Subjective:     Chief Complaint   Patient presents with    Wound Check     leg ulcers      HISTORY of PRESENT ILLNESS HPI   Yuniel Leyva is a 79 y.o. male who presents today in regards to follow up evaluation and treatment of wound/ulcer. That patient's past medical, family and social hx were reviewed and changes were made if present. The patient has had a wound of left lower leg which was first noted approximately 1 week ago. This has not been treated. On their initial visit to the wound healing center, 10/22/20 ,  the patient has noted that the wound has not been improving. The patient has had similar previous wounds in the past.       Pt is not on abx at time of initial visit.     10- patient relates he does have lymphedema pumps at home but apparently they are the wrong size. He did contact the rep. May have to wait till after the first of the year for new. 20- Wounds on the left leg improved. Some blistering noted on the right calf. Not open yet. 20- Wounds improved on the left. New open areas on right calf.        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     Past Surgical History:   Procedure Laterality Date    FRACTURE SURGERY  2005    right ankle repair     Family History Problem Relation Age of Onset    Heart Disease Mother     Heart Disease Father      Social History     Tobacco Use    Smoking status: Never Smoker    Smokeless tobacco: Never Used   Substance Use Topics    Alcohol use: No     Comment: on special occassions    Drug use: No     Allergies   Allergen Reactions    Metformin And Related Diarrhea     Current Outpatient Medications on File Prior to Encounter   Medication Sig Dispense Refill    aspirin (ASPIRIN CHILDRENS) 81 MG chewable tablet Take 1 tablet by mouth daily 30 tablet 5     No current facility-administered medications on file prior to encounter. REVIEW OF SYSTEMS See HPI    Objective:    BP (!) 140/80   Pulse 76   Temp 97.1 °F (36.2 °C) (Temporal)   Resp 16   Ht 6' 2\" (1.88 m)   Wt (!) 368 lb (166.9 kg)   BMI 47.25 kg/m²   Wt Readings from Last 3 Encounters:   11/19/20 (!) 368 lb (166.9 kg)   11/12/20 (!) 368 lb (166.9 kg)   11/05/20 (!) 367 lb (166.5 kg)     PHYSICAL EXAM  CONSTITUTIONAL:   Awake, alert, cooperative   EYES:  lids and lashes normal   ENT: external ears and nose without lesions   NECK:  supple, symmetrical, trachea midline   SKIN:  Open wound Present    Assessment:     Problem List Items Addressed This Visit     None          Pre Debridement Measurements:  Are located in the [place]  Documentation Flow Sheet  Post Debridement Measurements:  Wound/Ulcer Descriptions are Pre Debridement except measurements:     Wound 10/22/20 Leg Left; Lower; Lateral #1 (Active)   Wound Image   10/22/20 1304   Dressing Status New dressing applied 11/12/20 1317   Wound Cleansed Betadine/povidone iodine 11/12/20 1317   Dressing/Treatment Alginate; Foam 11/12/20 1317   Offloading for Diabetic Foot Ulcers No 11/12/20 1317   Wound Length (cm) 10.5 cm 11/19/20 1238   Wound Width (cm) 1 cm 11/19/20 1238   Wound Depth (cm) 0.1 cm 11/19/20 1238   Wound Surface Area (cm^2) 10.5 cm^2 11/19/20 1238   Change in Wound Size % (l*w) 94.17 11/19/20 1238 Wound Volume (cm^3) 1.05 cm^3 11/19/20 1238   Wound Healing % 94 11/19/20 1238   Post-Procedure Length (cm) 10.5 cm 11/19/20 1251   Post-Procedure Width (cm) 1 cm 11/19/20 1251   Post-Procedure Depth (cm) 0.1 cm 11/19/20 1251   Post-Procedure Surface Area (cm^2) 10.5 cm^2 11/19/20 1251   Post-Procedure Volume (cm^3) 1.05 cm^3 11/19/20 1251   Wound Assessment Pale granulation tissue;Granulation tissue;Fibrin 11/19/20 1238   Drainage Amount Moderate 11/19/20 1238   Drainage Description Serosanguinous; Yellow 11/19/20 1238   Odor None 11/19/20 1238   Colleen-wound Assessment Intact 11/19/20 1238   Number of days: 28       Wound 11/19/20 Leg Right;Lateral #4 venous (Active)   Wound Image   11/19/20 1244   Wound Etiology Venous 11/19/20 1244   Wound Length (cm) 8 cm 11/19/20 1244   Wound Width (cm) 6 cm 11/19/20 1244   Wound Depth (cm) 0.1 cm 11/19/20 1244   Wound Surface Area (cm^2) 48 cm^2 11/19/20 1244   Wound Volume (cm^3) 4.8 cm^3 11/19/20 1244   Post-Procedure Length (cm) 8 cm 11/19/20 1251   Post-Procedure Width (cm) 6 cm 11/19/20 1251   Post-Procedure Depth (cm) 0.1 cm 11/19/20 1251   Post-Procedure Surface Area (cm^2) 48 cm^2 11/19/20 1251   Post-Procedure Volume (cm^3) 4.8 cm^3 11/19/20 1251   Wound Assessment Pale granulation tissue;Fibrin 11/19/20 1244   Drainage Amount Moderate 11/19/20 1244   Drainage Description Yellow; Other (Comment) 11/19/20 1244   Odor None 11/19/20 1244   Colleen-wound Assessment Maceration;Fragile 11/19/20 1244   Number of days: 0          Procedure Note  Indications:  Based on my examination of this patient's wound(s)/ulcer(s) today, debridement is required to promote healing and evaluate the wound base.     Performed by: JO Carl CNP    Consent obtained:  Yes    Time out taken:  Yes    Pain Control: Anesthetic  Anesthetic: 4% Lidocaine Liquid Topical     Debridement:Excisional Debridement    Using curette the wound(s)/ulcer(s) was/were sharply debrided down through and including the removal of subcutaneous tissue. Devitalized Tissue Debrided:  biofilm to stimulate bleeding to promote healing, post debridement good bleeding base and wound edges noted    Wound/Ulcer #: 1    Percent of Wound/Ulcer Debrided: 35% of wound within blocked area     Total Surface Area Debrided:  20 sq cm     Estimated Blood Loss:  None  Hemostasis Achieved:  not needed    Procedural Pain:  0  / 10   Post Procedural Pain:  0 / 10     Response to treatment:  Well tolerated by patient. Plan:   Treatment Note please see attached Discharge Instructions    Written patient dismissal instructions given to patient and signed by patient or POA. Discharge Instructions       Visit Discharge/Physician Orders     Discharge condition: Stable     Assessment of pain at discharge:none     Anesthetic used: 4% lidocaine     Discharge to: Home     Left via:public transportation     Accompanied by: accompanied by self     ECF/HHA:      Dressing Orders:LEFT/ Right LOWER LEGS CLEANSE WITH BETADINE APPLY ALGINATE LYOFOAM AND PROFORE WRAP CHANGE WEEKLY AT WOUND CARE CENTER.     Treatment Orders:  Eat foods high in protein and vitamin c     Take multivitamin daily     Use compression pumps     [facility] followup visit _______1 week Dr. Ivan Granados monday______________________  (Please note your next appointment above and if you are unable to keep, kindly give a 24 hour notice. Thank you.)     Physician signature:__________________________        If you experience any of the following, please call the [facility] during business hours:     * Increase in Pain  * Temperature over 101  * Increase in drainage from your wound  * Drainage with a foul odor  * Bleeding  * Increase in swelling  * Need for compression bandage changes due to slippage, breakthrough drainage.     If you need medical attention outside of the business hours of the [facility] please contact your PCP or go to the nearest emergency room. Contact

## 2020-11-25 ENCOUNTER — HOSPITAL ENCOUNTER (OUTPATIENT)
Dept: WOUND CARE | Age: 70
Discharge: HOME | End: 2020-11-25
Payer: MEDICARE

## 2020-11-25 VITALS
DIASTOLIC BLOOD PRESSURE: 74 MMHG | RESPIRATION RATE: 18 BRPM | HEART RATE: 80 BPM | WEIGHT: 315 LBS | SYSTOLIC BLOOD PRESSURE: 146 MMHG | BODY MASS INDEX: 40.43 KG/M2 | HEIGHT: 74 IN | TEMPERATURE: 96.9 F

## 2020-11-25 PROBLEM — I87.2 VENOUS STASIS ULCER OF RIGHT CALF WITH FAT LAYER EXPOSED WITHOUT VARICOSE VEINS (HCC): Chronic | Status: ACTIVE | Noted: 2020-11-25

## 2020-11-25 PROBLEM — L97.222 VENOUS STASIS ULCER OF LEFT CALF WITH FAT LAYER EXPOSED WITHOUT VARICOSE VEINS (HCC): Chronic | Status: ACTIVE | Noted: 2020-11-25

## 2020-11-25 PROBLEM — I87.2 VENOUS STASIS ULCER OF LEFT CALF WITH FAT LAYER EXPOSED WITHOUT VARICOSE VEINS (HCC): Chronic | Status: ACTIVE | Noted: 2020-11-25

## 2020-11-25 PROBLEM — L97.212 VENOUS STASIS ULCER OF RIGHT CALF WITH FAT LAYER EXPOSED WITHOUT VARICOSE VEINS (HCC): Chronic | Status: ACTIVE | Noted: 2020-11-25

## 2020-11-25 PROCEDURE — 11042 DBRDMT SUBQ TIS 1ST 20SQCM/<: CPT

## 2020-11-25 PROCEDURE — 11042 DBRDMT SUBQ TIS 1ST 20SQCM/<: CPT | Performed by: SURGERY

## 2020-11-25 RX ORDER — LIDOCAINE HYDROCHLORIDE 40 MG/ML
SOLUTION TOPICAL ONCE
Status: DISCONTINUED | OUTPATIENT
Start: 2020-11-25 | End: 2020-11-26 | Stop reason: HOSPADM

## 2020-11-25 NOTE — PROGRESS NOTES
Wound Healing Center Followup Visit Note    Referring Physician : Michelle Mathis MD  [address] [place] [address] [place] RECORD NUMBER:  [id number]  AGE: 79 y.o. GENDER: male  : 1950  EPISODE DATE:  2020    Subjective:     Chief Complaint   Patient presents with    Wound Check     right and left lower legs      HISTORY of PRESENT ILLNESS HPI   Rannie Shone is a 79 y.o. male who presents today in regards to follow up evaluation and treatment of wound/ulcer. That patient's past medical, family and social hx were reviewed and changes were made if present. The patient has had a wound of left lower leg which was first noted approximately 1 week ago. This has not been treated. On their initial visit to the wound healing center, 10/22/20 ,  the patient has noted that the wound has not been improving. The patient has had similar previous wounds in the past.       Pt is not on abx at time of initial visit.     10- patient relates he does have lymphedema pumps at home but apparently they are the wrong size. He did contact the rep. May have to wait till after the first of the year for new. 20- Wounds on the left leg improved. Some blistering noted on the right calf. Not open yet. 20- Wounds improved on the left. New open areas on right calf. 20 -wounds improved.       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     Past Surgical History:   Procedure Laterality Date    FRACTURE SURGERY 2005    right ankle repair     Family History   Problem Relation Age of Onset    Heart Disease Mother     Heart Disease Father      Social History     Tobacco Use    Smoking status: Never Smoker    Smokeless tobacco: Never Used   Substance Use Topics    Alcohol use: No     Comment: on special occassions    Drug use: No     Allergies   Allergen Reactions    Metformin And Related Diarrhea     Current Outpatient Medications on File Prior to Encounter   Medication Sig Dispense Refill    aspirin (ASPIRIN CHILDRENS) 81 MG chewable tablet Take 1 tablet by mouth daily 30 tablet 5     No current facility-administered medications on file prior to encounter. REVIEW OF SYSTEMS See HPI    Objective:    BP (!) 146/74   Pulse 80   Temp 96.9 °F (36.1 °C) (Temporal)   Resp 18   Ht 6' 2\" (1.88 m)   Wt (!) 368 lb (166.9 kg)   BMI 47.25 kg/m²   Wt Readings from Last 3 Encounters:   11/25/20 (!) 368 lb (166.9 kg)   11/19/20 (!) 368 lb (166.9 kg)   11/12/20 (!) 368 lb (166.9 kg)     PHYSICAL EXAM  CONSTITUTIONAL:   Awake, alert, cooperative   EYES:  lids and lashes normal   ENT: external ears and nose without lesions   NECK:  supple, symmetrical, trachea midline   SKIN:  Open wound Present    Dorsalis pedis pulses 3+ bilaterally. Assessment:     Problem List Items Addressed This Visit     Venous stasis ulcer of right calf with fat layer exposed without varicose veins (HCC) (Chronic)    * (Principal) Venous stasis ulcer of left calf with fat layer exposed without varicose veins (HCC) (Chronic)          Pre Debridement Measurements:  Are located in the [facility]  Documentation Flow Sheet  Post Debridement Measurements:  Wound/Ulcer Descriptions are Pre Debridement except measurements:     Wound 10/22/20 Leg Left; Lower; Lateral #1 (Active)   Wound Image   10/22/20 1304   Dressing Status New dressing applied 11/12/20 1317   Wound Cleansed Betadine/povidone iodine 11/12/20 1317   Dressing/Treatment Alginate; Foam 11/12/20 1317   Offloading for Diabetic Foot Ulcers No 11/12/20 1317   Wound Length (cm) 2.6 cm 11/25/20 1318   Wound Width (cm) 2 cm 11/25/20 1318   Wound Depth (cm) 0.1 cm 11/25/20 1318   Wound Surface Area (cm^2) 5.2 cm^2 11/25/20 1318   Change in Wound Size % (l*w) 97.11 11/25/20 1318   Wound Volume (cm^3) 0.52 cm^3 11/25/20 1318   Wound Healing % 97 11/25/20 1318   Post-Procedure Length (cm) 2.6 cm 11/25/20 1348   Post-Procedure Width (cm) 2 cm 11/25/20 1348   Post-Procedure Depth (cm) 0.1 cm 11/25/20 1348   Post-Procedure Surface Area (cm^2) 5.2 cm^2 11/25/20 1348   Post-Procedure Volume (cm^3) 0.52 cm^3 11/25/20 1348   Wound Assessment Granulation tissue;Fibrin 11/25/20 1318   Drainage Amount Small 11/25/20 1318   Drainage Description Brown;Yellow 11/25/20 1318   Odor None 11/25/20 1318   Colleen-wound Assessment Dry/flaky 11/25/20 1318   Number of days: 34       Wound 11/19/20 Leg Right;Lateral #4 venous (Active)   Wound Image   11/19/20 1244   Wound Etiology Venous 11/19/20 1244   Wound Length (cm) 2.2 cm 11/25/20 1318   Wound Width (cm) 5.3 cm 11/25/20 1318   Wound Depth (cm) 0.2 cm 11/25/20 1318   Wound Surface Area (cm^2) 11.66 cm^2 11/25/20 1318   Change in Wound Size % (l*w) 75.71 11/25/20 1318   Wound Volume (cm^3) 2.33 cm^3 11/25/20 1318   Wound Healing % 51 11/25/20 1318   Post-Procedure Length (cm) 2.2 cm 11/25/20 1348   Post-Procedure Width (cm) 5.3 cm 11/25/20 1348   Post-Procedure Depth (cm) 0.2 cm 11/25/20 1348   Post-Procedure Surface Area (cm^2) 11.66 cm^2 11/25/20 1348   Post-Procedure Volume (cm^3) 2.33 cm^3 11/25/20 1348   Wound Assessment Pale granulation tissue;Fibrin 11/25/20 1318   Drainage Amount Moderate 11/25/20 1318   Drainage Description Serosanguinous; Yellow 11/25/20 1318   Odor None 11/25/20 1318   Colleen-wound Assessment Dry/flaky 11/25/20 1318   Number of days: 6          Procedure Note  Indications:  Based on my examination of this patient's wound(s)/ulcer(s) today, debridement is required to promote healing and evaluate the wound base. Performed by: Dawood Colorado MD    Consent obtained:  Yes    Time out taken:  Yes    Pain Control: Anesthetic  Anesthetic: 4% Lidocaine Liquid Topical     Debridement:Excisional Debridement    Using curette the wound(s)/ulcer(s) was/were sharply debrided down through and including the removal of subcutaneous tissue. Devitalized Tissue Debrided:  biofilm to stimulate bleeding to promote healing, post debridement good bleeding base and wound edges noted    Wound/Ulcer #: 1    Percent of Wound/Ulcer Debrided: 100%      Total Surface Area Debrided:  15 sq cm     Estimated Blood Loss:  None  Hemostasis Achieved:  not needed    Procedural Pain:  0  / 10   Post Procedural Pain:  0 / 10     Response to treatment:  Well tolerated by patient. Plan:   Treatment Note please see attached Discharge Instructions    Written patient dismissal instructions given to patient and signed by patient or POA. Discharge Instructions        Visit Discharge/Physician Orders     Discharge condition: Stable     Assessment of pain at discharge:none     Anesthetic used: 4% lidocaine     Discharge to: Home     Left via:public transportation     Accompanied by: accompanied by self     ECF/HHA:      Dressing Orders:LEFT/ Right LOWER LEGS CLEANSE WITH BETADINE APPLY ALGINATE LYOFOAM AND PROFORE WRAP CHANGE WEEKLY AT WOUND CARE CENTER.     Treatment Orders:  Eat foods high in protein and vitamin c     Take multivitamin daily     Use compression pumps     [facility] followup visit _______1 week Dr. Victoriano Patel monday______________________  (Please note your next appointment above and if you are unable to keep, kindly give a 24 hour notice.  Thank you.)     Physician signature:__________________________        If you experience any of the following, please call the [address] [place] [address] Road during business hours:     * Increase in Pain  * Temperature over 101  * Increase in drainage from your wound  * Drainage with a foul odor  * Bleeding  * Increase in swelling  * Need for compression bandage changes due to slippage, breakthrough drainage.     If you need medical attention outside of the business hours of the [address] [place] [address] Road please contact your PCP or go to the nearest emergency room. Contact your doctor if you have a temperature above 100.4 with any of the following:                         Persistent cough             Shortness of breath            Chills            Fatigue            Chest pain or pressure            Difficulty breathing            Decreased consciousness of confusion             Headache     Recommend:                       Wash hands often and for 20 seconds or more             Avoid touching eyes, nose, mouth and face             Social distance ( 6 feet)             Stay at home as much as possible             Call health care provider with any concerns                                                                                                                  Electronically signed by Marylu Colon MD on 11/25/2020 at 2:18 PM

## 2020-12-03 ENCOUNTER — HOSPITAL ENCOUNTER (OUTPATIENT)
Dept: WOUND CARE | Age: 70
Discharge: HOME | End: 2020-12-03
Admitting: PODIATRIST
Payer: MEDICARE

## 2020-12-03 VITALS
DIASTOLIC BLOOD PRESSURE: 72 MMHG | HEIGHT: 74 IN | HEART RATE: 78 BPM | RESPIRATION RATE: 18 BRPM | TEMPERATURE: 98 F | WEIGHT: 315 LBS | SYSTOLIC BLOOD PRESSURE: 130 MMHG | BODY MASS INDEX: 40.43 KG/M2

## 2020-12-03 PROCEDURE — 11721 DEBRIDE NAIL 6 OR MORE: CPT

## 2020-12-03 PROCEDURE — 11042 DBRDMT SUBQ TIS 1ST 20SQCM/<: CPT

## 2020-12-03 NOTE — PROGRESS NOTES
Wound Healing Center Followup Visit Note    Referring Physician : Lyla Murcia MD  [address] [place] [address] [place] RECORD NUMBER:  [id number]  AGE: 79 y.o. GENDER: male  : 1950  EPISODE DATE:  12/3/2020    Subjective:     Chief Complaint   Patient presents with    Wound Check     bilateral lower leg ulcers      HISTORY of PRESENT ILLNESS HPI   Dheeraj Grimes is a 79 y.o. male who presents today in regards to follow up evaluation and treatment of wound/ulcer. That patient's past medical, family and social hx were reviewed and changes were made if present. The patient has had a wound of left lower leg which was first noted approximately 1 week ago. This has not been treated. On their initial visit to the wound healing center, 10/22/20 ,  the patient has noted that the wound has not been improving. The patient has had similar previous wounds in the past.       Pt is not on abx at time of initial visit.     10- patient relates he does have lymphedema pumps at home but apparently they are the wrong size. He did contact the rep. May have to wait till after the first of the year for new. 20- Wounds on the left leg improved. Some blistering noted on the right calf. Not open yet. 20- Wounds improved on the left. New open areas on right calf. 20 -wounds improved. 12-3-2020 pt also c/o very painful toenails with ambulation and pressure from shoe gear that severely limits activity. At present he specifically points 1 through 5 on the right as well as 1,5 left.       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     Past Surgical History:   Procedure Laterality Date    FRACTURE SURGERY  2005    right ankle repair     Family History   Problem Relation Age of Onset    Heart Disease Mother     Heart Disease Father      Social History     Tobacco Use    Smoking status: Never Smoker    Smokeless tobacco: Never Used   Substance Use Topics    Alcohol use: No     Comment: on special occassions    Drug use: No     Allergies   Allergen Reactions    Metformin And Related Diarrhea     Current Outpatient Medications on File Prior to Encounter   Medication Sig Dispense Refill    aspirin (ASPIRIN CHILDRENS) 81 MG chewable tablet Take 1 tablet by mouth daily 30 tablet 5     No current facility-administered medications on file prior to encounter. REVIEW OF SYSTEMS See HPI    Objective:    /72   Pulse 78   Temp 98 °F (36.7 °C)   Resp 18   Ht 6' 2\" (1.88 m)   Wt (!) 368 lb (166.9 kg)   BMI 47.25 kg/m²   Wt Readings from Last 3 Encounters:   12/03/20 (!) 368 lb (166.9 kg)   11/25/20 (!) 368 lb (166.9 kg)   11/19/20 (!) 368 lb (166.9 kg)     PHYSICAL EXAM  CONSTITUTIONAL:   Awake, alert, cooperative   EYES:  lids and lashes normal   ENT: external ears and nose without lesions   NECK:  supple, symmetrical, trachea midline   SKIN:  Open wounds bilateral lower extremities, covered with devitalized nonviable tissue. No purulence or odor. No surrounding erythema or increase in temperature. Positive edema. Stasis changes. Vascular intact.  Toenails 1-5 right, 1st left are very thick, well, dystrophic, crumbly, elongated with subungual debris and very painful to palpation    Assessment:     Problem List Items Addressed This Visit     Venous stasis ulcer of right calf with fat layer exposed without varicose veins (HCC) (Chronic)    Venous stasis ulcer of left calf with fat layer exposed without varicose veins (HCC) - Primary (Chronic)    Dermatophytosis of nail    Pain in toes of both feet          Pre Debridement Measurements:  Are located in the [place]  Documentation Flow Sheet  Post Debridement Measurements:  Wound/Ulcer Descriptions are Pre Debridement except measurements:     Wound 10/22/20 Leg Left; Lower; Lateral #1 (Active)   Wound Image   10/22/20 1304   Dressing Status Clean;Dry; Intact 11/25/20 1424   Wound Cleansed Cleansed with saline 11/25/20 1424   Dressing/Treatment Alginate;Foam;ABD;Dry dressing 11/25/20 1424   Offloading for Diabetic Foot Ulcers No 11/12/20 1317   Wound Length (cm) 1.8 cm 12/03/20 1305   Wound Width (cm) 0.6 cm 12/03/20 1305   Wound Depth (cm) 0.2 cm 12/03/20 1305   Wound Surface Area (cm^2) 1.08 cm^2 12/03/20 1305   Change in Wound Size % (l*w) 99.4 12/03/20 1305   Wound Volume (cm^3) 0.22 cm^3 12/03/20 1305   Wound Healing % 99 12/03/20 1305   Post-Procedure Length (cm) 1.9 cm 12/03/20 1312   Post-Procedure Width (cm) 0.6 cm 12/03/20 1312   Post-Procedure Depth (cm) 0.3 cm 12/03/20 1312   Post-Procedure Surface Area (cm^2) 1.14 cm^2 12/03/20 1312   Post-Procedure Volume (cm^3) 0.34 cm^3 12/03/20 1312   Wound Assessment Pale granulation tissue;Fibrin 12/03/20 1305   Drainage Amount Moderate 12/03/20 1305   Drainage Description Serosanguinous 12/03/20 1305   Odor None 12/03/20 1305   Colleen-wound Assessment Maceration 12/03/20 1305   Number of days: 42       Wound 11/19/20 Leg Right;Lateral #4 venous (Active)   Wound Image   11/19/20 1244   Wound Etiology Venous 11/19/20 1244   Dressing Status Clean;Dry; Intact 11/25/20 1424   Wound Cleansed Cleansed with saline 11/25/20 1424   Dressing/Treatment Alginate;Foam;ABD;Dry dressing 11/25/20 1424   Wound Length (cm) 0.3 cm 12/03/20 1305   Wound Width (cm) 0.3 cm 12/03/20 1305   Wound Depth (cm) 0.3 cm 12/03/20 1305   Wound Surface Area (cm^2) 0.09 cm^2 12/03/20 1305   Change in Wound Size % (l*w) 99.81 12/03/20 1305 Wound Volume (cm^3) 0.03 cm^3 12/03/20 1305   Wound Healing % 99 12/03/20 1305   Post-Procedure Length (cm) 0.3 cm 12/03/20 1312   Post-Procedure Width (cm) 0.3 cm 12/03/20 1312   Post-Procedure Depth (cm) 0.4 cm 12/03/20 1312   Post-Procedure Surface Area (cm^2) 0.09 cm^2 12/03/20 1312   Post-Procedure Volume (cm^3) 0.04 cm^3 12/03/20 1312   Wound Assessment Pale granulation tissue;Fibrin 12/03/20 1305   Drainage Amount Small 12/03/20 1305   Drainage Description Yellow 12/03/20 1305   Odor None 12/03/20 1305   Colleen-wound Assessment Intact 12/03/20 1305   Number of days: 14          Procedure Note  Indications:  Based on my examination of this patient's wound(s)/ulcer(s) today, debridement is required to promote healing and evaluate the wound base. Performed by: Zander Post DPM    Consent obtained:  Yes    Time out taken:  Yes    Pain Control: Anesthetic  Anesthetic: 4% Lidocaine Liquid Topical     Debridement:Excisional Debridement    Using curette the wound(s)/ulcer(s) was/were sharply debrided down through and including the removal of subcutaneous tissue. Devitalized Tissue Debrided:  biofilm to stimulate bleeding to promote healing, post debridement good bleeding base and wound edges noted    Wound/Ulcer #: 1,4    Percent of Wound/Ulcer Debrided: 100%      Total Surface Area Debrided:  1.38 sq cm     Estimated Blood Loss:  None  Hemostasis Achieved:  not needed    Procedural Pain:  0  / 10   Post Procedural Pain:  0 / 10     Response to treatment:  Well tolerated by patient. Plan:   Treatment Note please see attached Discharge Instructions. Toenails as above were debrided in length and thickness to decrease discomfort as well as prevent such hazards as secondary infection. Recommend topical antifungal daily. Follow-up as instructed    Written patient dismissal instructions given to patient and signed by patient or POA.          Discharge Instructions       Visit Discharge/Physician Orders     Discharge condition: Stable     Assessment of pain at discharge:none     Anesthetic used: 4% lidocaine     Discharge to: Home     Left via:public transportation     Accompanied by: accompanied by self     ECF/HHA:      Dressing Orders:LEFT/ Right LOWER LEGS CLEANSE WITH BETADINE APPLY ALGINATE LYOFOAM AND PROFORE WRAP CHANGE WEEKLY AT WOUND CARE CENTER.     Treatment Orders:  Eat foods high in protein and vitamin c     Take multivitamin daily     Use compression pumps     [facility] followup visit _______1 week Dr. Ivan Granados ______________________  (Please note your next appointment above and if you are unable to keep, kindly give a 24 hour notice. Thank you.)     Physician signature:__________________________        If you experience any of the following, please call the [facility] during business hours:     * Increase in Pain  * Temperature over 101  * Increase in drainage from your wound  * Drainage with a foul odor  * Bleeding  * Increase in swelling  * Need for compression bandage changes due to slippage, breakthrough drainage.     If you need medical attention outside of the business hours of the [facility] please contact your PCP or go to the nearest emergency room. Contact your doctor if you have a temperature above 100.4 with any of the following:                         Persistent cough             Shortness of breath            Chills            Fatigue            Chest pain or pressure            Difficulty breathing            Decreased consciousness of confusion             Headache     Recommend:                       Wash hands often and for 20 seconds or more             Avoid touching eyes, nose, mouth and face             Social distance ( 6 feet)             Stay at home as much as possible             Call health care provider with any concerns                                                                                                                             Electronically signed by Sotero Kaufman DPM on 12/3/2020 at 1:24 PM

## 2020-12-10 ENCOUNTER — HOSPITAL ENCOUNTER (OUTPATIENT)
Dept: WOUND CARE | Age: 70
Discharge: HOME | End: 2020-12-10
Payer: MEDICARE

## 2020-12-10 VITALS
TEMPERATURE: 96.9 F | RESPIRATION RATE: 20 BRPM | WEIGHT: 315 LBS | SYSTOLIC BLOOD PRESSURE: 132 MMHG | BODY MASS INDEX: 40.43 KG/M2 | HEART RATE: 84 BPM | DIASTOLIC BLOOD PRESSURE: 76 MMHG | HEIGHT: 74 IN

## 2020-12-10 PROCEDURE — 11042 DBRDMT SUBQ TIS 1ST 20SQCM/<: CPT

## 2020-12-10 RX ORDER — LIDOCAINE HYDROCHLORIDE 40 MG/ML
SOLUTION TOPICAL ONCE
Status: DISCONTINUED | OUTPATIENT
Start: 2020-12-10 | End: 2020-12-11 | Stop reason: HOSPADM

## 2020-12-10 NOTE — PLAN OF CARE
Problem: Wound:  Goal: Will show signs of wound healing; wound closure and no evidence of infection  Description: Will show signs of wound healing; wound closure and no evidence of infection  Outcome: Ongoing     Problem: Venous:  Goal: Signs of wound healing will improve  Description: Signs of wound healing will improve  Outcome: Ongoing

## 2020-12-10 NOTE — PROGRESS NOTES
Wound Healing Center Followup Visit Note    Referring Physician : Lavonne Parham MD  [address] [place] [address] [place] RECORD NUMBER:  [id number]  AGE: 79 y.o. GENDER: male  : 1950  EPISODE DATE:  12/10/2020    Subjective:     Chief Complaint   Patient presents with    Wound Check     right leg ulcer      HISTORY of PRESENT ILLNESS HPI   Miguel Alvarado is a 79 y.o. male who presents today in regards to follow up evaluation and treatment of wound/ulcer. That patient's past medical, family and social hx were reviewed and changes were made if present. The patient has had a wound of left lower leg which was first noted approximately 1 week ago. This has not been treated. On their initial visit to the wound healing center, 10/22/20 ,  the patient has noted that the wound has not been improving. The patient has had similar previous wounds in the past.       Pt is not on abx at time of initial visit.     10- patient relates he does have lymphedema pumps at home but apparently they are the wrong size. He did contact the rep. May have to wait till after the first of the year for new. 20- Wounds on the left leg improved. Some blistering noted on the right calf. Not open yet. 20- Wounds improved on the left. New open areas on right calf. 20 -wounds improved. 12-3-2020 pt also c/o very painful toenails with ambulation and pressure from shoe gear that severely limits activity. At present he specifically points 1 through 5 on the right as well as 1,5 left.       Wound/Ulcer Pain Timing/Severity: none  Quality of pain: na  Severity:  0 / 10   Modifying Factors: na  Associated Signs/Symptoms: na     Ulcer Identification:  Ulcer Type: venous  Contributing Factors: edema, venous stasis and lymphedema     Diabetic/Pressure/Non Pressure Ulcers only:  Ulcer: N/A              PAST MEDICAL HISTORY      Diagnosis Date    Cellulitis of right lower leg     Diabetes mellitus ([address]     Left;Lower; Lateral #1 (Active)   Wound Image   10/22/20 1304   Dressing Status New dressing applied 12/03/20 1343   Wound Cleansed Betadine/povidone iodine 12/03/20 1343   Dressing/Treatment Alginate;ABD;Dry dressing 12/03/20 1343   Offloading for Diabetic Foot Ulcers No offloading required 12/03/20 1343   Wound Length (cm) 1.7 cm 12/10/20 1236   Wound Width (cm) 3.5 cm 12/10/20 1236   Wound Depth (cm) 0.2 cm 12/10/20 1236   Wound Surface Area (cm^2) 5.95 cm^2 12/10/20 1236   Change in Wound Size % (l*w) 96.69 12/10/20 1236   Wound Volume (cm^3) 1.19 cm^3 12/10/20 1236   Wound Healing % 93 12/10/20 1236   Post-Procedure Length (cm) 1.7 cm 12/10/20 1245   Post-Procedure Width (cm) 3.5 cm 12/10/20 1245   Post-Procedure Depth (cm) 0.3 cm 12/10/20 1245   Post-Procedure Surface Area (cm^2) 5.95 cm^2 12/10/20 1245   Post-Procedure Volume (cm^3) 1.78 cm^3 12/10/20 1245   Wound Assessment Pale granulation tissue;Fibrin 12/10/20 1236   Drainage Amount Small 12/10/20 1236   Drainage Description Yellow 12/10/20 1236   Odor None 12/10/20 1236   Colleen-wound Assessment Intact;Dry/flaky 12/10/20 1236   Number of days: 49       Wound 11/19/20 Leg Right;Lateral #4 venous (Active)   Wound Image   11/19/20 1244   Wound Etiology Venous 11/19/20 1244   Dressing Status Clean;Dry; Intact 12/03/20 1343   Wound Cleansed Betadine/povidone iodine 12/03/20 1343   Dressing/Treatment Alginate;ABD;Dry dressing 12/03/20 1343   Offloading for Diabetic Foot Ulcers No offloading required 12/03/20 1343   Wound Length (cm) 0.2 cm 12/10/20 1236   Wound Width (cm) 0.1 cm 12/10/20 1236   Wound Depth (cm) 0.1 cm 12/10/20 1236   Wound Surface Area (cm^2) 0.02 cm^2 12/10/20 1236   Change in Wound Size % (l*w) 99.96 12/10/20 1236   Wound Volume (cm^3) 0 cm^3 12/10/20 1236   Wound Healing % 100 12/10/20 1236   Post-Procedure Length (cm) 0.3 cm 12/03/20 1312   Post-Procedure Width (cm) 0.3 cm 12/03/20 1312   Post-Procedure Depth (cm) 0.4 cm 12/03/20 1312 Post-Procedure Surface Area (cm^2) 0.09 cm^2 12/03/20 1312   Post-Procedure Volume (cm^3) 0.04 cm^3 12/03/20 1312   Wound Assessment Pale granulation tissue;Fibrin 12/10/20 1236   Drainage Amount Scant 12/10/20 1236   Drainage Description Serosanguinous 12/10/20 1236   Odor Fecal 12/10/20 1236   Colleen-wound Assessment Intact;Dry/flaky 12/10/20 1236   Number of days: 21          Procedure Note  Indications:  Based on my examination of this patient's wound(s)/ulcer(s) today, debridement is required to promote healing and evaluate the wound base. Performed by: Bhaskar Lee DPM    Consent obtained:  Yes    Time out taken:  Yes    Pain Control: Anesthetic  Anesthetic: 4% Lidocaine Liquid Topical     Debridement:Excisional Debridement    Using curette the wound(s)/ulcer(s) was/were sharply debrided down through and including the removal of subcutaneous tissue. Devitalized Tissue Debrided:  biofilm to stimulate bleeding to promote healing, post debridement good bleeding base and wound edges noted    Wound/Ulcer #: 1,    Percent of Wound/Ulcer Debrided: 100%      Total Surface Area Debrided:  1.48 sq cm     Estimated Blood Loss:  None  Hemostasis Achieved:  not needed    Procedural Pain:  0  / 10   Post Procedural Pain:  0 / 10     Response to treatment:  Well tolerated by patient. Plan:   Treatment Note please see attached Discharge Instructions. Recommend topical antifungal daily. Follow-up as instructed    Written patient dismissal instructions given to patient and signed by patient or POA.          Discharge Instructions       Visit Discharge/Physician Orders     Discharge condition: Stable     Assessment of pain at discharge:none     Anesthetic used: 4% lidocaine     Discharge to: Home     Left via:public transportation     Accompanied by: accompanied by self     ECF/HHA:      Dressing Orders:LEFT/ Right LOWER LEGS CLEANSE WITH BETADINE APPLY ALGINATE  AND PROFORE WRAP CHANGE WEEKLY AT WOUND CARE [place].     Treatment Orders:  Eat foods high in protein and vitamin c     Take multivitamin daily     Use compression pumps     [facility] followup visit _______1 week Dr. Victoriano Patel ______________________  (Please note your next appointment above and if you are unable to keep, kindly give a 24 hour notice. Thank you.)     Physician signature:__________________________        If you experience any of the following, please call the [facility] during business hours:     * Increase in Pain  * Temperature over 101  * Increase in drainage from your wound  * Drainage with a foul odor  * Bleeding  * Increase in swelling  * Need for compression bandage changes due to slippage, breakthrough drainage.     If you need medical attention outside of the business hours of the [facility] please contact your PCP or go to the nearest emergency room. Contact your doctor if you have a temperature above 100.4 with any of the following:                         Persistent cough             Shortness of breath            Chills            Fatigue            Chest pain or pressure            Difficulty breathing            Decreased consciousness of confusion             Headache     Recommend:                       Wash hands often and for 20 seconds or more             Avoid touching eyes, nose, mouth and face             Social distance ( 6 feet)             Stay at home as much as possible             Call health care provider with any concerns                                                                                                                                                                Electronically signed by Yuriy Mathis DPM on 12/10/2020 at 12:47 PM

## 2020-12-17 ENCOUNTER — HOSPITAL ENCOUNTER (OUTPATIENT)
Dept: WOUND CARE | Age: 70
Discharge: HOME | End: 2020-12-17
Payer: MEDICARE

## 2020-12-17 VITALS
HEIGHT: 74 IN | BODY MASS INDEX: 40.43 KG/M2 | WEIGHT: 315 LBS | TEMPERATURE: 97 F | RESPIRATION RATE: 20 BRPM | HEART RATE: 80 BPM

## 2020-12-17 PROCEDURE — 11042 DBRDMT SUBQ TIS 1ST 20SQCM/<: CPT

## 2020-12-17 NOTE — PROGRESS NOTES
Wound Healing Center Followup Visit Note    Referring Physician : Lionel Butts MD  [address] [place] [address] [place] RECORD NUMBER:  [id number]  AGE: 79 y.o. GENDER: male  : 1950  EPISODE DATE:  2020    Subjective:     Chief Complaint   Patient presents with    Wound Check     left leg wound      HISTORY of PRESENT ILLNESS VAMSHI Beltran is a 79 y.o. male who presents today in regards to follow up evaluation and treatment of wound/ulcer. That patient's past medical, family and social hx were reviewed and changes were made if present. The patient has had a wound of left lower leg which was first noted approximately 1 week ago. This has not been treated. On their initial visit to the wound healing center, 10/22/20 ,  the patient has noted that the wound has not been improving. The patient has had similar previous wounds in the past.       Pt is not on abx at time of initial visit.     10- patient relates he does have lymphedema pumps at home but apparently they are the wrong size. He did contact the rep. May have to wait till after the first of the year for new. 20- Wounds on the left leg improved. Some blistering noted on the right calf. Not open yet. 20- Wounds improved on the left. New open areas on right calf. 20 -wounds improved. 12-3-2020 pt also c/o very painful toenails with ambulation and pressure from shoe gear that severely limits activity. At present he specifically points 1 through 5 on the right as well as 1,5 left.     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     Past Surgical History:   Procedure Laterality Date    FRACTURE SURGERY  2005    right ankle repair     Family History   Problem Relation Age of Onset    Heart Disease Mother     Heart Disease Father      Social History     Tobacco Use    Smoking status: Never Smoker    Smokeless tobacco: Never Used   Substance Use Topics    Alcohol use: No     Comment: on special occassions    Drug use: No     Allergies   Allergen Reactions    Metformin And Related Diarrhea     Current Outpatient Medications on File Prior to Encounter   Medication Sig Dispense Refill    aspirin (ASPIRIN CHILDRENS) 81 MG chewable tablet Take 1 tablet by mouth daily 30 tablet 5     No current facility-administered medications on file prior to encounter. REVIEW OF SYSTEMS See HPI    Objective:    Pulse 80   Temp 97 °F (36.1 °C) (Temporal)   Resp 20   Ht 6' 2\" (1.88 m)   Wt (!) 368 lb (166.9 kg)   BMI 47.25 kg/m²   Wt Readings from Last 3 Encounters:   12/17/20 (!) 368 lb (166.9 kg)   12/10/20 (!) 368 lb (166.9 kg)   12/03/20 (!) 368 lb (166.9 kg)     PHYSICAL EXAM  CONSTITUTIONAL:   Awake, alert, cooperative   EYES:  lids and lashes normal   ENT: external ears and nose without lesions   NECK:  supple, symmetrical, trachea midline   SKIN:  Open wound LLE 50% with devitalized nonviable tissue. No purulence or odor. No surrounding erythema or increase in temperature. Positive edema. Stasis changes. Vascular intact. Assessment:     Non-pressure chronic ulcer of left lower leg with fat layer exposed ([facility]    Pre Debridement Measurements:  Are located in the [place]  Documentation Flow Sheet  Post Debridement Measurements:  Wound/Ulcer Descriptions are Pre Debridement except measurements:     Wound 10/22/20 Leg Left; Lower; Lateral #1 (Active)   Wound Image   10/22/20 1304   Dressing Status New dressing applied 12/03/20 1343   Wound Cleansed Betadine/povidone iodine 12/03/20 1343   Dressing/Treatment Alginate;ABD;Dry dressing 12/03/20 1343   Offloading for Diabetic Foot Ulcers No offloading required 12/03/20 1343   Wound Length (cm) 0.6 cm 12/17/20 1301   Wound Width (cm) 0.7 cm 12/17/20 1301   Wound Depth (cm) 0.2 cm 12/17/20 1301   Wound Surface Area (cm^2) 0.42 cm^2 12/17/20 1301   Change in Wound Size % (l*w) 99.77 12/17/20 1301   Wound Volume (cm^3) 0.08 cm^3 12/17/20 1301   Wound Healing % 100 12/17/20 1301   Post-Procedure Length (cm) 0.8 cm 12/17/20 1317   Post-Procedure Width (cm) 0.8 cm 12/17/20 1317   Post-Procedure Depth (cm) 0.2 cm 12/17/20 1317   Post-Procedure Surface Area (cm^2) 0.64 cm^2 12/17/20 1317   Post-Procedure Volume (cm^3) 0.13 cm^3 12/17/20 1317   Wound Assessment Pink/red;Fibrin 12/17/20 1301   Drainage Amount Moderate 12/17/20 1301   Drainage Description Yellow 12/17/20 1301   Odor None 12/10/20 1236   Colleen-wound Assessment Intact 12/17/20 1301   Number of days: 56       Wound 11/19/20 Leg Right;Lateral #4 venous (Active)   Wound Image   11/19/20 1244   Wound Etiology Venous 11/19/20 1244   Dressing Status Clean;Dry; Intact 12/03/20 1343   Wound Cleansed Betadine/povidone iodine 12/03/20 1343   Dressing/Treatment Alginate;ABD;Dry dressing 12/03/20 1343   Offloading for Diabetic Foot Ulcers No offloading required 12/03/20 1343   Wound Length (cm) 0.5 cm 12/17/20 1301   Wound Width (cm) 1.5 cm 12/17/20 1301   Wound Depth (cm) 0.1 cm 12/17/20 1301   Wound Surface Area (cm^2) 0.75 cm^2 12/17/20 1301   Change in Wound Size % (l*w) 98.44 12/17/20 1301   Wound Volume (cm^3) 0.08 cm^3 12/17/20 1301   Wound Healing % 98 12/17/20 1301   Post-Procedure Length (cm) 0.5 cm 12/17/20 1317   Post-Procedure Width (cm) 1.5 cm 12/17/20 1317   Post-Procedure Depth (cm) 0.2 cm 12/17/20 1317   Post-Procedure Surface Area (cm^2) 0.75 cm^2 12/17/20 1317   Post-Procedure

## 2020-12-21 ENCOUNTER — HOSPITAL ENCOUNTER (OUTPATIENT)
Dept: WOUND CARE | Age: 70
Discharge: HOME | End: 2020-12-21
Payer: MEDICARE

## 2020-12-21 VITALS
WEIGHT: 315 LBS | RESPIRATION RATE: 20 BRPM | BODY MASS INDEX: 40.43 KG/M2 | TEMPERATURE: 97.3 F | SYSTOLIC BLOOD PRESSURE: 130 MMHG | HEIGHT: 74 IN | DIASTOLIC BLOOD PRESSURE: 80 MMHG | HEART RATE: 68 BPM

## 2020-12-21 PROCEDURE — 11042 DBRDMT SUBQ TIS 1ST 20SQCM/<: CPT

## 2020-12-21 RX ORDER — LIDOCAINE HYDROCHLORIDE 40 MG/ML
SOLUTION TOPICAL ONCE
Status: DISCONTINUED | OUTPATIENT
Start: 2020-12-21 | End: 2020-12-22 | Stop reason: HOSPADM

## 2020-12-21 NOTE — PROGRESS NOTES
Wound Healing Center Followup Visit Note    Referring Physician : Colette Tinajero MD  [address] [place] [address] [place] RECORD NUMBER:  [id number]  AGE: 79 y.o. GENDER: male  : 1950  EPISODE DATE:  2020    Subjective:     Chief Complaint   Patient presents with    Wound Check     bilateral lower leg ulcers      HISTORY of PRESENT ILLNESS HPI   Lester Ramirez is a 79 y.o. male who presents today in regards to follow up evaluation and treatment of wound/ulcer. That patient's past medical, family and social hx were reviewed and changes were made if present. The patient has had a wound of left lower leg which was first noted approximately 1 week ago. This has not been treated. On their initial visit to the wound healing center, 10/22/20 ,  the patient has noted that the wound has not been improving. The patient has had similar previous wounds in the past.       Pt is not on abx at time of initial visit.     10- patient relates he does have lymphedema pumps at home but apparently they are the wrong size. He did contact the rep. May have to wait till after the first of the year for new. 20- Wounds on the left leg improved. Some blistering noted on the right calf. Not open yet. 20- Wounds improved on the left. New open areas on right calf. 20 -wounds improved. 12-3-2020 pt also c/o very painful toenails with ambulation and pressure from shoe gear that severely limits activity. At present he specifically points 1 through 5 on the right as well as 1,5 left.     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     Past Surgical History:   Procedure Laterality Date    FRACTURE SURGERY  2005    right ankle repair     Family History   Problem Relation Age of Onset    Heart Disease Mother     Heart Disease Father      Social History     Tobacco Use    Smoking status: Never Smoker    Smokeless tobacco: Never Used   Substance Use Topics    Alcohol use: No     Comment: on special occassions    Drug use: No     Allergies   Allergen Reactions    Metformin And Related Diarrhea     Current Outpatient Medications on File Prior to Encounter   Medication Sig Dispense Refill    aspirin (ASPIRIN CHILDRENS) 81 MG chewable tablet Take 1 tablet by mouth daily 30 tablet 5     No current facility-administered medications on file prior to encounter. REVIEW OF SYSTEMS See HPI    Objective:    /80   Pulse 68   Temp 97.3 °F (36.3 °C) (Temporal)   Resp 20   Ht 6' 2\" (1.88 m)   Wt (!) 368 lb (166.9 kg)   BMI 47.25 kg/m²   Wt Readings from Last 3 Encounters:   12/21/20 (!) 368 lb (166.9 kg)   12/17/20 (!) 368 lb (166.9 kg)   12/10/20 (!) 368 lb (166.9 kg)     PHYSICAL EXAM  CONSTITUTIONAL:   Awake, alert, cooperative   EYES:  lids and lashes normal   ENT: external ears and nose without lesions   NECK:  supple, symmetrical, trachea midline   SKIN:  Open wound LLE clean. No purulence or odor. No surrounding erythema or increase in temperature. Positive edema. Stasis changes. Vascular intact. Assessment:     Non-pressure chronic ulcer of left lower leg with fat layer exposed ([address]       Wound 10/22/20 Leg Left; Lower; Lateral #1 (Active)   Wound Image   10/22/20 1304   Dressing Status New dressing applied 12/17/20 1354   Wound Cleansed Cleansed with saline 12/17/20 1354   Dressing/Treatment Alginate;ABD 12/17/20 1354   Offloading for Diabetic Foot Ulcers No offloading required 12/17/20 1354   Wound Length (cm) 1.8 cm 12/21/20 1343   Wound Width (cm) 0.4 cm 12/21/20 1343   Wound Depth (cm) 0.2 cm 12/21/20 1343   Wound Surface Area (cm^2) 0.72 cm^2 12/21/20 1343   Change in Wound Size % (l*w) 99.6 12/21/20 1343   Wound Volume (cm^3) 0.14 cm^3 12/21/20 1343   Wound Healing % 99 12/21/20 1343   Post-Procedure Length (cm) 0.8 cm 12/17/20 1317   Post-Procedure Width (cm) 0.8 cm 12/17/20 1317   Post-Procedure Depth (cm) 0.2 cm 12/17/20 1317   Post-Procedure Surface Area (cm^2) 0.64 cm^2 12/17/20 1317   Post-Procedure Volume (cm^3) 0.13 cm^3 12/17/20 1317   Wound Assessment Pink/red;Fibrin 12/21/20 1343   Drainage Amount Moderate 12/21/20 1343   Drainage Description Yellow 12/21/20 1343   Odor None 12/10/20 1236   Colleen-wound Assessment Intact;Dry/flaky 12/21/20 1343   Number of days: 60       Wound 11/19/20 Leg Right;Lateral #4 venous (Active)   Wound Image   12/21/20 1343   Wound Etiology Venous 11/19/20 1244   Dressing Status New dressing applied 12/17/20 1354   Wound Cleansed Cleansed with saline 12/17/20 1354   Dressing/Treatment Alginate 12/17/20 1354   Offloading for Diabetic Foot Ulcers No offloading required 12/17/20 1354   Wound Length (cm) 0 cm 12/21/20 1343   Wound Width (cm) 0 cm 12/21/20 1343   Wound Depth (cm) 0 cm 12/21/20 1343   Wound Surface Area (cm^2) 0 cm^2 12/21/20 1343   Change in Wound Size % (l*w) 100 12/21/20 1343   Wound Volume (cm^3) 0 cm^3 12/21/20 1343   Wound Healing % 100 12/21/20 1343   Post-Procedure Length (cm) 0.5 cm 12/17/20 1317   Post-Procedure Width (cm) 1.5 cm 12/17/20 1317   Post-Procedure Depth (cm) 0.2 cm 12/17/20 1317   Post-Procedure Surface Area (cm^2) 0.75 cm^2 12/17/20 1317   Post-Procedure Volume (cm^3) 0.15 cm^3 12/17/20 1317   Wound Assessment Pink/red 12/17/20 1301   Drainage Amount Moderate 12/17/20 1301   Drainage Description Yellow;Brown 12/17/20 1301   Odor Fecal 12/10/20 1236   Colleen-wound Assessment Intact;Dry/flaky 12/17/20 1301   Number of days: 32              Plan:   Treatment Note please see attached Discharge Instructions. Follow-up as instructed    Written patient dismissal instructions given to patient and signed by patient or POA. Discharge Instructions        Visit Discharge/Physician Orders     Discharge condition: Stable     Assessment of pain at discharge:none     Anesthetic used: 4% lidocaine     Discharge to: Home     Left via:public transportation     Accompanied by: accompanied by self     ECF/HHA:      Dressing Orders:LEFT/ Right LOWER LEGS CLEANSE WITH BETADINE APPLY ALGINATE  AND PROFORE WRAP CHANGE WEEKLY AT WOUND CARE CENTER.     Treatment Orders:  Eat foods high in protein and vitamin c     Take multivitamin daily     Use compression pumps     [facility] followup visit _______1 week thursday_____________________  (Please note your next appointment above and if you are unable to keep, kindly give a 24 hour notice. Thank you.)     Physician signature:__________________________        If you experience any of the following, please call the [facility] during business hours:     * Increase in Pain  * Temperature over 101  * Increase in drainage from your wound  * Drainage with a foul odor  * Bleeding  * Increase in swelling  * Need for compression bandage changes due to slippage, breakthrough drainage.     If you need medical attention outside of the business hours of the [facility] please contact your PCP or go to the nearest emergency room. Contact your doctor if you have a temperature above 100.4 with any of the following:                         Persistent cough             Shortness of breath            Chills            Fatigue            Chest pain or pressure            Difficulty breathing            Decreased consciousness of confusion             Headache     Recommend:                       Wash hands often and for 20 seconds or more             Avoid touching eyes, nose, mouth and face             Social distance ( 6 feet)             Stay at home as much as possible             Call health care provider with any concerns                                                                                                                                                                                                          Electronically signed by Yuriy Mathis DPM on 12/21/2020 at 2:05 PM

## 2020-12-31 ENCOUNTER — HOSPITAL ENCOUNTER (OUTPATIENT)
Dept: WOUND CARE | Age: 70
Discharge: HOME | End: 2020-12-31
Payer: MEDICARE

## 2020-12-31 VITALS
TEMPERATURE: 97.3 F | DIASTOLIC BLOOD PRESSURE: 80 MMHG | WEIGHT: 315 LBS | HEIGHT: 74 IN | BODY MASS INDEX: 40.43 KG/M2 | SYSTOLIC BLOOD PRESSURE: 140 MMHG | RESPIRATION RATE: 20 BRPM | HEART RATE: 60 BPM

## 2020-12-31 PROCEDURE — 11042 DBRDMT SUBQ TIS 1ST 20SQCM/<: CPT

## 2020-12-31 RX ORDER — LIDOCAINE HYDROCHLORIDE 40 MG/ML
SOLUTION TOPICAL ONCE
Status: DISCONTINUED | OUTPATIENT
Start: 2020-12-31 | End: 2021-01-01 | Stop reason: HOSPADM

## 2020-12-31 NOTE — PROGRESS NOTES
Wound Healing Center Followup Visit Note    Referring Physician : Ashkan Pruitt MD  [address] [place] [address] [place] RECORD NUMBER:  [id number]  AGE: 79 y.o. GENDER: male  : 1950  EPISODE DATE:  2020    Subjective:     Chief Complaint   Patient presents with    Wound Check      left lower leg ulcer      HISTORY of PRESENT ILLNESS HPI   Vazquez Marcano is a 79 y.o. male who presents today in regards to follow up evaluation and treatment of wound/ulcer. That patient's past medical, family and social hx were reviewed and changes were made if present. The patient has had a wound of left lower leg which was first noted approximately 1 week ago. This has not been treated. On their initial visit to the wound healing center, 10/22/20 ,  the patient has noted that the wound has not been improving. The patient has had similar previous wounds in the past.       Pt is not on abx at time of initial visit.     10- patient relates he does have lymphedema pumps at home but apparently they are the wrong size. He did contact the rep. May have to wait till after the first of the year for new. 20- Wounds on the left leg improved. Some blistering noted on the right calf. Not open yet. 20- Wounds improved on the left. New open areas on right calf. 20 -wounds improved. 12-3-2020 pt also c/o very painful toenails with ambulation and pressure from shoe gear that severely limits activity. At present he specifically points 1 through 5 on the right as well as 1,5 left.     2020 doing well, wound improved     2020 tolerating dressings and compression     Wound/Ulcer Pain Timing/Severity: none  Quality of pain: na  Severity:  0 / 10   Modifying Factors: na  Associated Signs/Symptoms: na     Ulcer Identification:  Ulcer Type: venous  Contributing Factors: edema, venous stasis and lymphedema     Diabetic/Pressure/Non Pressure Ulcers only:  Ulcer: N/A              PAST MEDICAL Estimated Blood Loss:  None  Hemostasis Achieved:  not needed    Procedural Pain:  0  / 10   Post Procedural Pain:  0 / 10     Response to treatment:  Well tolerated by patient. Plan:   Treatment Note please see attached Discharge Instructions. Follow-up as instructed    Written patient dismissal instructions given to patient and signed by patient or POA. Discharge Instructions         Visit Discharge/Physician Orders     Discharge condition: Stable     Assessment of pain at discharge:none     Anesthetic used: 4% lidocaine     Discharge to: Home     Left via:public transportation     Accompanied by: accompanied by self     ECF/HHA:      Dressing Orders:LEFT LOWER LEG CLEANSE WITH BETADINE APPLY ALGINATE  AND PROFORE WRAP CHANGE WEEKLY AT [address] [place] [address],[address].     Treatment Orders:  Eat foods high in protein and vitamin c     Take multivitamin daily     Use compression pumps     [facility] followup visit _______1 week thursday_____________________  (Please note your next appointment above and if you are unable to keep, kindly give a 24 hour notice. Thank you.)     Physician signature:__________________________        If you experience any of the following, please call the [facility] during business hours:     * Increase in Pain  * Temperature over 101  * Increase in drainage from your wound  * Drainage with a foul odor  * Bleeding  * Increase in swelling  * Need for compression bandage changes due to slippage, breakthrough drainage.     If you need medical attention outside of the business hours of the [facility] please contact your PCP or go to the nearest emergency room. Contact your doctor if you have a temperature above 100.4 with any of the following:                         Persistent cough             Shortness of breath            Chills            Fatigue            Chest pain or pressure            Difficulty breathing            Decreased consciousness of confusion Headache     Recommend:                       Wash hands often and for 20 seconds or more             Avoid touching eyes, nose, mouth and face             Social distance ( 6 feet)             Stay at home as much as possible             Call health care provider with any concerns                                                                                                                                                                                                                               Electronically signed by Joel Mari DPM on 12/31/2020 at 1:03 PM

## 2021-01-07 ENCOUNTER — HOSPITAL ENCOUNTER (OUTPATIENT)
Dept: WOUND CARE | Age: 71
Discharge: HOME | End: 2021-01-07
Payer: MEDICARE

## 2021-01-07 VITALS
BODY MASS INDEX: 40.43 KG/M2 | HEART RATE: 83 BPM | WEIGHT: 315 LBS | RESPIRATION RATE: 16 BRPM | HEIGHT: 74 IN | DIASTOLIC BLOOD PRESSURE: 76 MMHG | TEMPERATURE: 96.7 F | SYSTOLIC BLOOD PRESSURE: 112 MMHG

## 2021-01-07 PROCEDURE — 11042 DBRDMT SUBQ TIS 1ST 20SQCM/<: CPT

## 2021-01-07 NOTE — PROGRESS NOTES
Wound Healing Center Followup Visit Note    Referring Physician : Candelaria Fields MD  [address] [place] [address] [place] RECORD NUMBER:  [id number]  AGE: 79 y.o. GENDER: male  : 1950  EPISODE DATE:  2021    Subjective:     Chief Complaint   Patient presents with    Wound Check     LEFT LOWER LEG ULCER      HISTORY of PRESENT ILLNESS HPI   Melissa Brewster is a 79 y.o. male who presents today in regards to follow up evaluation and treatment of wound/ulcer. That patient's past medical, family and social hx were reviewed and changes were made if present. The patient has had a wound of left lower leg which was first noted approximately 1 week ago. This has not been treated. On their initial visit to the wound healing center, 10/22/20 ,  the patient has noted that the wound has not been improving. The patient has had similar previous wounds in the past.       Pt is not on abx at time of initial visit.     10- patient relates he does have lymphedema pumps at home but apparently they are the wrong size. He did contact the rep. May have to wait till after the first of the year for new. 20- Wounds on the left leg improved. Some blistering noted on the right calf. Not open yet. 20- Wounds improved on the left. New open areas on right calf. 20 -wounds improved. 12-3-2020 pt also c/o very painful toenails with ambulation and pressure from shoe gear that severely limits activity. At present he specifically points 1 through 5 on the right as well as 1,5 left.     2020 doing well, wound improved     2020 tolerating dressings and compression    21 compression dressing rolled down over weekend, creating few new wounds     Wound/Ulcer Pain Timing/Severity: none  Quality of pain: na  Severity:  0 / 10   Modifying Factors: na  Associated Signs/Symptoms: na     Ulcer Identification:  Ulcer Type: venous  Contributing Factors: edema, venous stasis and lymphedema     Diabetic/Pressure/Non Pressure Ulcers only:  Ulcer: N/A              PAST MEDICAL HISTORY      Diagnosis Date    Cellulitis of right lower leg     [facility] mellitus ([facility]     Lymphedema     Obesity, Class III, BMI 40-49.9 (morbid obesity) ([facility] [address]     Popliteal aneurysm ([facility] [address] 8/23/2017    Left     Past Surgical History:   Procedure Laterality Date    FRACTURE SURGERY  2005    right ankle repair     Family History   Problem Relation Age of Onset    Heart Disease Mother     Heart Disease Father      Social History     Tobacco Use    Smoking status: Never Smoker    Smokeless tobacco: Never Used   Substance Use Topics    Alcohol use: No     Comment: on special occassions    Drug use: No     Allergies   Allergen Reactions    Metformin And Related Diarrhea     Current Outpatient Medications on File Prior to Encounter   Medication Sig Dispense Refill    aspirin (ASPIRIN CHILDRENS) 81 MG chewable tablet Take 1 tablet by mouth daily 30 tablet 5     No current facility-administered medications on file prior to encounter. REVIEW OF SYSTEMS See HPI    Objective:    /76   Pulse 83   Temp 96.7 °F (35.9 °C)   Resp 16   Ht 6' 2\" (1.88 m)   Wt (!) 368 lb (166.9 kg)   BMI 47.25 kg/m²   Wt Readings from Last 3 Encounters:   01/07/21 (!) 368 lb (166.9 kg)   12/31/20 (!) 368 lb (166.9 kg)   12/21/20 (!) 368 lb (166.9 kg)     PHYSICAL EXAM  CONSTITUTIONAL:   Awake, alert, cooperative   EYES:  lids and lashes normal   ENT: external ears and nose without lesions   NECK:  supple, symmetrical, trachea midline   SKIN:  Open wound LLE 25% devitalized nonviable tissue. No purulence or odor. No surrounding erythema or increase in temperature. Positive edema. Stasis changes. Vascular intact.     Assessment:     Non-pressure chronic ulcer of left lower leg with fat layer exposed ([facility] [address] [facility]    Pre Debridement Measurements:  Are located in the [facility]  Documentation Flow Sheet  Post Debridement Measurements:  Wound/Ulcer Descriptions are Pre Debridement except measurements:     Wound 10/22/20 Leg Left; Lower; Lateral #1 (Active)   Wound Image   10/22/20 1304   Dressing Status New dressing applied;Clean;Dry; Intact 01/07/21 1336   Wound Cleansed Betadine/povidone iodine;Cleansed with saline 01/07/21 1336   Dressing/Treatment Alginate;Betadine swabs/povidone iodine 01/07/21 1336   Offloading for Diabetic Foot Ulcers No offloading required 01/07/21 1336   Wound Length (cm) 11.9 cm 01/07/21 1306   Wound Width (cm) 1.5 cm 01/07/21 1306   Wound Depth (cm) 0.3 cm 01/07/21 1306   Wound Surface Area (cm^2) 17.85 cm^2 01/07/21 1306   Change in Wound Size % (l*w) 90.08 01/07/21 1306   Wound Volume (cm^3) 5.36 cm^3 01/07/21 1306   Wound Healing % 70 01/07/21 1306   Post-Procedure Length (cm) 11.9 cm 01/07/21 1336   Post-Procedure Width (cm) 1.5 cm 01/07/21 1336   Post-Procedure Depth (cm) 0.3 cm 01/07/21 1336   Post-Procedure Surface Area (cm^2) 17.85 cm^2 01/07/21 1336   Post-Procedure Volume (cm^3) 5.36 cm^3 01/07/21 1336   Wound Assessment Pale granulation tissue;Fibrin 01/07/21 1306   Drainage Amount Moderate 01/07/21 1306   Drainage Description Serosanguinous 01/07/21 1306   Odor None 01/07/21 1306   Colleen-wound Assessment Intact;Dry/flaky; Hemosiderin staining (brown yellow) 01/07/21 1306   Number of days: 77          Procedure Note  Indications:  Based on my examination of this patient's wound(s)/ulcer(s) today, debridement is required to promote healing and evaluate the wound base. Performed by: Courtney Brady DPM    Consent obtained:  Yes    Time out taken:  Yes    Pain Control: Anesthetic  Anesthetic: 4% Lidocaine Liquid Topical     Debridement:Excisional Debridement    Using curette the wound(s)/ulcer(s) was/were sharply debrided down through and including the removal of subcutaneous tissue.         Devitalized Tissue Debrided:  biofilm to stimulate bleeding to promote healing, post debridement good bleeding base and wound edges noted    Wound/Ulcer #: 1,    Percent of Wound/Ulcer Debrided: 25%      Total Surface Area Debrided:  4.5 sq cm     Estimated Blood Loss:  None  Hemostasis Achieved:  not needed    Procedural Pain:  0  / 10   Post Procedural Pain:  0 / 10     Response to treatment:  Well tolerated by patient. Plan:   Treatment Note please see attached Discharge Instructions. Follow-up as instructed    Written patient dismissal instructions given to patient and signed by patient or POA. Discharge Instructions       Visit Discharge/Physician Orders     Discharge condition: Stable     Assessment of pain at discharge:none     Anesthetic used: 4% lidocaine     Discharge to: Home     Left via:public transportation     Accompanied by: accompanied by self     ECF/HHA:      Dressing Orders:LEFT LOWER LEG CLEANSE WITH BETADINE APPLY ALGINATE  AND PROFORE WRAP CHANGE WEEKLY AT [facility].     Treatment Orders:  Eat foods high in protein and vitamin c     Take multivitamin daily     Use compression pumps     [facility] followup visit _______1 week thursday_____________________  (Please note your next appointment above and if you are unable to keep, kindly give a 24 hour notice. Thank you.)     Physician signature:__________________________        If you experience any of the following, please call the [facility] during business hours:     * Increase in Pain  * Temperature over 101  * Increase in drainage from your wound  * Drainage with a foul odor  * Bleeding  * Increase in swelling  * Need for compression bandage changes due to slippage, breakthrough drainage.     If you need medical attention outside of the business hours of the [facility] please contact your PCP or go to the nearest emergency room. Contact your doctor if you have a temperature above 100.4 with any of the following:                         Persistent cough             Shortness of breath            Chills            Fatigue Chest pain or pressure            Difficulty breathing            Decreased consciousness of confusion             Headache     Recommend:                       Wash hands often and for 20 seconds or more             Avoid touching eyes, nose, mouth and face             Social distance ( 6 feet)             Stay at home as much as possible             Call health care provider with any concerns                                                                                                                                                                                                                                                    Electronically signed by Gopla Townsend DPM on 1/7/2021 at 2:39 PM

## 2021-01-10 ENCOUNTER — HOSPITAL ENCOUNTER (EMERGENCY)
Age: 71
Discharge: HOME | End: 2021-01-10
Payer: MEDICARE

## 2021-01-10 VITALS
SYSTOLIC BLOOD PRESSURE: 138 MMHG | HEART RATE: 72 BPM | OXYGEN SATURATION: 100 % | RESPIRATION RATE: 18 BRPM | TEMPERATURE: 97.9 F | DIASTOLIC BLOOD PRESSURE: 79 MMHG

## 2021-01-10 DIAGNOSIS — Z48.00 ENCOUNTER FOR CHANGE OR REMOVAL OF WOUND DRESSING: Primary | ICD-10-CM

## 2021-01-10 PROCEDURE — 99282 EMERGENCY DEPT VISIT SF MDM: CPT

## 2021-01-10 NOTE — ED PROVIDER NOTES
[address]  Department of Emergency Medicine   ED  Encounter Note  Admit Date/RoomTime: 1/10/2021  2:23 PM  ED Room: SHAILA/SHAILA  NAME: Nikolas Lyn  : 1950  MRN: [id number]      Chief Complaint:  Other (wrap on LLE needs redone, pt follows with wound center)    HISTORY OF PRESENT ILLNESS        Nikolas Lyn is a 79 y.o. male who presents to the ED by private vehicle for a change of his dressing on his left lower leg. He sees Dr. Sotero Everett at wound care on . He states on occasion his dressing will fall or come undone and he has difficulty changing it therefore he is presenting to the emergency department for this. He has had no fever, chills, myalgias or malaise. He has not noticed any changes from baseline as far as drainage, swelling or pain. He presents purely for assistance in redressing his leg. ROS   Pertinent positives and negatives are stated within HPI, all other systems reviewed and are negative. Past Medical History:  has a past medical history of Cellulitis of right lower leg, Diabetes mellitus ([address] Lymphedema, Obesity, Class III, BMI 40-49.9 (morbid obesity) ([address] and Popliteal aneurysm ([address] Surgical History:  has a past surgical history that includes fracture surgery (). Social History:  reports that he has never smoked. He has never used smokeless tobacco. He reports that he does not drink alcohol or use drugs. Family History: family history includes Heart Disease in his father and mother. Allergies: Metformin and related    PHYSICAL EXAM   Oxygen Saturation Interpretation: Normal.        ED Triage Vitals   BP Temp Temp Source Pulse Resp SpO2 Height Weight   01/10/21 1419 01/10/21 1337 01/10/21 1337 01/10/21 1337 01/10/21 1337 01/10/21 1337 -- --   138/79 97.9 °F (36.6 °C) Temporal 83 22 98 %         Physical Exam  Constitutional/General: Alert and oriented x3, well appearing, non toxic  HEENT:  NC/NT. PERRLA,  Airway patent. Neck: Supple, full ROM, non tender to palpation in the midline, no stridor, no crepitus, no meningeal signs  Respiratory: Lungs clear to auscultation bilaterally, no wheezes, rales, or rhonchi. Not in respiratory distress  CV:  Regular rate. Regular rhythm. No murmurs, gallops, or rubs. 2+ distal pulses  Musculoskeletal: Moves all extremities x 4. Warm and well perfused, no clubbing, cyanosis. Capillary refill <3 seconds. Bilateral lower leg edema symmetrical with legs wrapped. Integument: skin warm and dry. No rashes. Neurologic: GCS 15, no focal deficits, symmetric strength 5/5 in the upper and lower extremities bilaterally  Psychiatric: Normal Affect    Lab / Imaging Results   (All laboratory and radiology results have been personally reviewed by myself)  Labs:  No results found for this visit on 01/10/21. Imaging: All Radiology results interpreted by Radiologist unless otherwise noted. No orders to display       ED Course / Medical Decision Making   Medications - No data to display     Re-examination:  1/10/21       Time: 1510  Patient states he is comfortable with his dressing change and comfortable. Consult(s):   None    Procedure(s):   none    MDM:   Low suspicion for infection or blood clots as patient states his legs appear and feel the same as they have been. He has a scheduled appointment with wound care on Thursday. His leg was redressed as requested. He is well-appearing, afebrile and neurovascularly intact. He departed in stable condition and appropriate for continued outpatient treatment and management. He is aware of signs and symptoms indicative of reevaluation in the emergency department setting and verbalizes understanding. Plan of Care/Counseling:  I reviewed today's visit with the patient in addition to providing specific details for the plan of care and counseling regarding the diagnosis and prognosis.   Questions are answered at this time and are agreeable with the plan. ASSESSMENT     1. Encounter for change or removal of wound dressing      PLAN   Discharged home. Patient condition is stable    New Medications     Discharge Medication List as of 1/10/2021  2:59 PM        Electronically signed by JO Brenner CNP   DD: 1/10/21  **This report was transcribed using voice recognition software. Every effort was made to ensure accuracy; however, inadvertent computerized transcription errors may be present.   END OF ED PROVIDER NOTE       JO Brenner CNP  01/10/21 0447

## 2021-01-10 NOTE — LETTER
[facility] Internal Medicine   [address] [facility], [address]      January 11, 2021    Ayesha Dunn  [address] [place] [address]      Dear Babak Spence,    This letter is regarding your Emergency Department (ED) visit at [facility] Emergency Department on 1/10/21. Sarwat Corbett wanted to make sure that you understand your discharge instructions and that you were able to fill any prescriptions that may have been ordered for you. Please contact the office at \"[phone]  if the ED advised to you follow up with Sarwat Corbett, or if you have any further questions or needs. Also did you know -   *Visiting the ED for a non-emergency could result in higher co-pays than you would normally be subject to paying? *You can call your doctor even after hours so they can direct you to the most appropriate care. [facility] ([facility]) practices can often offer you an appointment on the same day that you call. Many [address]  appointments; check our website for availability in your community , [email or web address]    Evisits are now available for patients for $36 through [employer] for certain conditions:  * Sinus, cold and or cough       * Diarrhea            * Headache  * Heartburn                                * Poison Rebecca          * Back pain     * Urinary problems                         Sincerely,     Verona Fabry, MD and your [facility]

## 2021-01-10 NOTE — ED NOTES
Wound cleaned with spray - replaced dressing with opticell to open wound which was first cleaned with iodine according to pts instructions. Covered wound with sterile 4x4's then wrapped with tearable gauze. Area then wrapped with kerlex, and then again with 4x4's. Pt states wound supplies were very similar to what they use at his wound care appointments. States comfort.       Loraine Jean RN  01/10/21 1470

## 2021-01-14 ENCOUNTER — HOSPITAL ENCOUNTER (OUTPATIENT)
Dept: WOUND CARE | Age: 71
Discharge: HOME | End: 2021-01-14
Payer: MEDICARE

## 2021-01-14 VITALS
WEIGHT: 315 LBS | BODY MASS INDEX: 40.43 KG/M2 | HEART RATE: 88 BPM | RESPIRATION RATE: 18 BRPM | TEMPERATURE: 97.6 F | DIASTOLIC BLOOD PRESSURE: 76 MMHG | HEIGHT: 74 IN | SYSTOLIC BLOOD PRESSURE: 144 MMHG

## 2021-01-14 PROCEDURE — 99212 OFFICE O/P EST SF 10 MIN: CPT

## 2021-01-14 RX ORDER — LIDOCAINE HYDROCHLORIDE 40 MG/ML
SOLUTION TOPICAL ONCE
Status: DISCONTINUED | OUTPATIENT
Start: 2021-01-14 | End: 2021-01-15 | Stop reason: HOSPADM

## 2021-01-14 NOTE — PLAN OF CARE
Problem: Wound:  Goal: Will show signs of wound healing; wound closure and no evidence of infection  Description: Will show signs of wound healing; wound closure and no evidence of infection  1/14/2021 1301 by Matt Long RN  Outcome: Met This Shift  1/14/2021 1259 by Matt Long RN  Outcome: Ongoing     Problem: Venous:  Goal: Signs of wound healing will improve  Description: Signs of wound healing will improve  1/14/2021 1301 by Matt Long RN  Outcome: Met This Shift  1/14/2021 1259 by Matt Long RN  Outcome: Ongoing

## 2021-01-14 NOTE — PROGRESS NOTES
Wound Healing Center Followup Visit Note    Referring Physician : Sp Prado MD  [address] [place] [address] [place] RECORD NUMBER:  [id number]  AGE: 79 y.o. GENDER: male  : 1950  EPISODE DATE:  2021    Subjective:     Chief Complaint   Patient presents with    Wound Check      left lateral lower leg ulcer      HISTORY of PRESENT ILLNESS HPI   Jay Grossman is a 79 y.o. male who presents today in regards to follow up evaluation and treatment of wound/ulcer. That patient's past medical, family and social hx were reviewed and changes were made if present. The patient has had a wound of left lower leg which was first noted approximately 1 week ago. This has not been treated. On their initial visit to the wound healing center, 10/22/20 ,  the patient has noted that the wound has not been improving. The patient has had similar previous wounds in the past.       Pt is not on abx at time of initial visit.     10- patient relates he does have lymphedema pumps at home but apparently they are the wrong size. He did contact the rep. May have to wait till after the first of the year for new. 20- Wounds on the left leg improved. Some blistering noted on the right calf. Not open yet. 20- Wounds improved on the left. New open areas on right calf. 20 -wounds improved. 12-3-2020 pt also c/o very painful toenails with ambulation and pressure from shoe gear that severely limits activity. At present he specifically points 1 through 5 on the right as well as 1,5 left. 2020 doing well, wound improved     2020 tolerating dressings and compression    21 compression dressing rolled down over weekend, creating few new wounds    21 relates went to the ER due to his wrap on his left lower leg, changed dressing. Today's visit demonstrated wounds healed.   Discontinue treatment, continue with compression to control swelling     Wound/Ulcer Pain Timing/Severity: none  Quality of pain: na  Severity:  0 / 10   Modifying Factors: na  Associated Signs/Symptoms: na     Ulcer Identification:  Ulcer Type: venous  Contributing Factors: edema, venous stasis and lymphedema     Diabetic/Pressure/Non Pressure Ulcers only:  Ulcer: N/A              PAST MEDICAL HISTORY      Diagnosis Date    Cellulitis of right lower leg     Diabetes mellitus (HCC)     Lymphedema     Obesity, Class III, BMI 40-49.9 (morbid obesity) ([facility]     Popliteal aneurysm ([facility] 8/23/2017    Left     Past Surgical History:   Procedure Laterality Date    FRACTURE SURGERY  2005    right ankle repair     Family History   Problem Relation Age of Onset    Heart Disease Mother     Heart Disease Father      Social History     Tobacco Use    Smoking status: Never Smoker    Smokeless tobacco: Never Used   Substance Use Topics    Alcohol use: No     Comment: on special occassions    Drug use: No     Allergies   Allergen Reactions    Metformin And Related Diarrhea     Current Outpatient Medications on File Prior to Encounter   Medication Sig Dispense Refill    aspirin (ASPIRIN CHILDRENS) 81 MG chewable tablet Take 1 tablet by mouth daily 30 tablet 5     No current facility-administered medications on file prior to encounter. REVIEW OF SYSTEMS See HPI    Objective:    BP (!) 144/76   Pulse 88   Temp 97.6 °F (36.4 °C) (Temporal)   Resp 18   Ht 6' 2\" (1.88 m)   Wt (!) 368 lb (166.9 kg)   BMI 47.25 kg/m²   Wt Readings from Last 3 Encounters:   01/14/21 (!) 368 lb (166.9 kg)   01/07/21 (!) 368 lb (166.9 kg)   12/31/20 (!) 368 lb (166.9 kg)     PHYSICAL EXAM  CONSTITUTIONAL:   Awake, alert, cooperative   EYES:  lids and lashes normal   ENT: external ears and nose without lesions   NECK:  supple, symmetrical, trachea midline   SKIN: Lower extremity wounds, healed. Positive edema. Stasis changes. Vascular intact.     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 Thank you.)     Physician signature:__________________________        If you experience any of the following, please call the [facility] during business hours:     * Increase in Pain  * Temperature over 101  * Increase in drainage from your wound  * Drainage with a foul odor  * Bleeding  * Increase in swelling  * Need for compression bandage changes due to slippage, breakthrough drainage.     If you need medical attention outside of the business hours of the [facility] please contact your PCP or go to the nearest emergency room. Contact your doctor if you have a temperature above 100.4 with any of the following:                         Persistent cough             Shortness of breath            Chills            Fatigue            Chest pain or pressure            Difficulty breathing            Decreased consciousness of confusion             Headache     Recommend:                       Wash hands often and for 20 seconds or more             Avoid touching eyes, nose, mouth and face             Social distance ( 6 feet)             Stay at home as much as possible             Call health care provider with any concerns                                                                                                                                                                                                                                                                         Electronically signed by Anthony Alfaro DPM on 1/14/2021 at 1:08 PM

## 2021-02-12 ENCOUNTER — OFFICE VISIT (OUTPATIENT)
Dept: INTERNAL MEDICINE | Age: 71
End: 2021-02-12
Payer: MEDICARE

## 2021-02-12 VITALS
HEART RATE: 85 BPM | DIASTOLIC BLOOD PRESSURE: 82 MMHG | WEIGHT: 315 LBS | SYSTOLIC BLOOD PRESSURE: 132 MMHG | TEMPERATURE: 96.6 F | OXYGEN SATURATION: 99 % | BODY MASS INDEX: 40.43 KG/M2 | HEIGHT: 74 IN | RESPIRATION RATE: 16 BRPM

## 2021-02-12 DIAGNOSIS — I87.2 PERIPHERAL VENOUS INSUFFICIENCY: Primary | ICD-10-CM

## 2021-02-12 DIAGNOSIS — E78.2 MIXED HYPERLIPIDEMIA: ICD-10-CM

## 2021-02-12 DIAGNOSIS — R73.03 PREDIABETES: ICD-10-CM

## 2021-02-12 DIAGNOSIS — E66.01 MORBID OBESITY WITH BMI OF 45.0-49.9, ADULT (HCC): ICD-10-CM

## 2021-02-12 PROBLEM — I72.4 POPLITEAL ANEURYSM (HCC): Status: RESOLVED | Noted: 2017-08-23 | Resolved: 2021-02-12

## 2021-02-12 PROBLEM — L97.909 DIABETIC ULCER OF LOWER LEG (HCC): Status: RESOLVED | Noted: 2018-01-26 | Resolved: 2021-02-12

## 2021-02-12 PROBLEM — E11.622 DIABETIC ULCER OF LOWER LEG (HCC): Status: RESOLVED | Noted: 2018-01-26 | Resolved: 2021-02-12

## 2021-02-12 PROBLEM — E78.5 HYPERLIPIDEMIA: Status: ACTIVE | Noted: 2021-02-12

## 2021-02-12 PROCEDURE — 3017F COLORECTAL CA SCREEN DOC REV: CPT | Performed by: INTERNAL MEDICINE

## 2021-02-12 PROCEDURE — 99212 OFFICE O/P EST SF 10 MIN: CPT | Performed by: INTERNAL MEDICINE

## 2021-02-12 PROCEDURE — G8484 FLU IMMUNIZE NO ADMIN: HCPCS | Performed by: INTERNAL MEDICINE

## 2021-02-12 PROCEDURE — 99213 OFFICE O/P EST LOW 20 MIN: CPT | Performed by: INTERNAL MEDICINE

## 2021-02-12 PROCEDURE — 1036F TOBACCO NON-USER: CPT | Performed by: INTERNAL MEDICINE

## 2021-02-12 PROCEDURE — G8417 CALC BMI ABV UP PARAM F/U: HCPCS | Performed by: INTERNAL MEDICINE

## 2021-02-12 PROCEDURE — 1123F ACP DISCUSS/DSCN MKR DOCD: CPT | Performed by: INTERNAL MEDICINE

## 2021-02-12 PROCEDURE — 4040F PNEUMOC VAC/ADMIN/RCVD: CPT | Performed by: INTERNAL MEDICINE

## 2021-02-12 PROCEDURE — G8427 DOCREV CUR MEDS BY ELIG CLIN: HCPCS | Performed by: INTERNAL MEDICINE

## 2021-02-12 NOTE — PROGRESS NOTES
Discharge instructions reviewed with patient per Dr. Nahun Carlson. Patient directed to  to  AVS and schedule follow up appt.

## 2021-02-12 NOTE — PROGRESS NOTES
[facility] Internal Medicine      SUBJECTIVE:  Nikolas Lyn (:  1950) is a 79 y.o. male here for evaluation of the following chief complaint(s):  6 Month Follow-Up  Chronic lower extremity wounds due to venous insufficiency and follows up with podiatry and wound care clinic. Pt states that he has been doing well. Pt uses a walker to move around. He lives alone at home and is able to care for himself along with the help of his niece who visits him x2 a week and helps clean. Pt was not open to considering Chinmay Dsouza at this time. Pt only takes aspirin. ASCVD was 11.3 %. He refuses to start Statins or any other medications. He states his stomach is very sensitive. Pt was counseled about risks and benefits including stroke prevention, cardiac disease etc.   Pt was offered vaccinations, colon cancer screening. Pt continues to refuse. States he is aware of the risks but him and his sisters dont believe in getting vaccines and are still doing well. Review of Systems   Constitutional: Negative for activity change and appetite change. HENT: Negative for congestion and ear pain. Eyes: Negative for discharge. Respiratory: Negative for apnea, chest tightness and shortness of breath. Cardiovascular: Positive for leg swelling. Negative for chest pain. Gastrointestinal: Negative for abdominal distention. Endocrine: Negative for cold intolerance. Musculoskeletal: Negative for arthralgias. Skin: Negative for color change. Neurological: Negative for dizziness and seizures. Hematological: Negative for adenopathy. Psychiatric/Behavioral: Negative for agitation. All other systems reviewed and are negative.       Current Outpatient Medications on File Prior to Visit   Medication Sig Dispense Refill    aspirin (ASPIRIN CHILDRENS) 81 MG chewable tablet Take 1 tablet by mouth daily 30 tablet 5     No current facility-administered medications on file prior to visit. OBJECTIVE:    VS:   Vitals:    02/12/21 1304   BP: 132/82   Pulse: 85   Resp: 16   Temp: 96.6 °F (35.9 °C)   TempSrc: Temporal   SpO2: 99%   Weight: (!) 374 lb (169.6 kg)   Height: 6' 2\" (1.88 m)     Physical Exam  Constitutional:       Appearance: He is obese. HENT:      Head: Normocephalic and atraumatic. Mouth/Throat:      Mouth: Mucous membranes are dry. Eyes:      Extraocular Movements: Extraocular movements intact. Cardiovascular:      Rate and Rhythm: Normal rate and regular rhythm. Pulses: Normal pulses. Heart sounds: Normal heart sounds. Pulmonary:      Effort: Pulmonary effort is normal.      Breath sounds: Normal breath sounds. Abdominal:      General: Abdomen is flat. Palpations: Abdomen is soft. Musculoskeletal: Normal range of motion. Skin:     General: Skin is warm and dry. Capillary Refill: Capillary refill takes less than 2 seconds. Neurological:      General: No focal deficit present. Mental Status: He is oriented to person, place, and time. Psychiatric:         Mood and Affect: Mood normal.         Behavior: Behavior normal.          Medical Problems   1. Chronic lower extremity wounds due to venous insufficiency   2. Hyperlipidemia   3. Morbid obesity   4. Prediabetes    5. Health maintenance - refused colonoscopy, refused pneumovax, tetanus vaccine, shingles, influenza     ASSESSMENT/PLAN:  1. Morbid obesity with BMI of 45.0-49.9, adult ([facility]  2. Mixed hyperlipidemia       RTC:  6 months      I have reviewed my findings and recommendations with Glenys Davey and Dr. Belle Quick.     Greg Wilkins MD   2/13/2021 9:19 AM

## 2021-02-12 NOTE — PATIENT INSTRUCTIONS
-Thank you for coming for your appointment. We're happy to know your leg wounds have improved. We would highly encourage you consider starting at least a gentle cholesterol medication. If you are interested in COVID vaccine- please call [phone]    Please consider getting pneumonia and tetanus vaccine as well. If you need anything, please give us a call. Should you have further questions in regards to this visit, you can review your clinical note and after visit summary document on your [employer] account. Other questions can be directed to our nurse line at [phone]. Patient Education        Preventing Falls: Care Instructions  Your Care Instructions     Getting around your home safely can be a challenge if you have injuries or health problems that make it easy for you to fall. Loose rugs and furniture in walkways are among the dangers for many older people who have problems walking or who have poor eyesight. People who have conditions such as arthritis, osteoporosis, or dementia also have to be careful not to fall. You can make your home safer with a few simple measures. Follow-up care is a key part of your treatment and safety. Be sure to make and go to all appointments, and call your doctor if you are having problems. It's also a good idea to know your test results and keep a list of the medicines you take. How can you care for yourself at home? Taking care of yourself  · You may get dizzy if you do not drink enough water. To prevent dehydration, drink plenty of fluids, enough so that your urine is light yellow or clear like water. Choose water and other caffeine-free clear liquids. If you have kidney, heart, or liver disease and have to limit fluids, talk with your doctor before you increase the amount of fluids you drink. · Exercise regularly to improve your strength, muscle tone, and balance. Walk if you can. Swimming may be a good choice if you cannot walk easily. · Have your vision and hearing checked each year or any time you notice a change. If you have trouble seeing and hearing, you might not be able to avoid objects and could lose your balance. · Know the side effects of the medicines you take. Ask your doctor or pharmacist whether the medicines you take can affect your balance. Sleeping pills or sedatives can affect your balance. · Limit the amount of alcohol you drink. Alcohol can impair your balance and other senses. · Ask your doctor whether calluses or corns on your feet need to be removed. If you wear loose-fitting shoes because of calluses or corns, you can lose your balance and fall. · Talk to your doctor if you have numbness in your feet. Preventing falls at home  · Remove raised doorway thresholds, throw rugs, and clutter. Repair loose carpet or raised areas in the floor. · Move furniture and electrical cords to keep them out of walking paths. · Use nonskid floor wax, and wipe up spills right away, especially on ceramic tile floors. · If you use a walker or cane, put rubber tips on it. If you use crutches, clean the bottoms of them regularly with an abrasive pad, such as steel wool. · Keep your house well lit, especially Teretha Randolph, and outside walkways. Use night-lights in areas such as hallways and bathrooms. Add extra light switches or use remote switches (such as switches that go on or off when you clap your hands) to make it easier to turn lights on if you have to get up during the night. · Install sturdy handrails on stairways. · Move items in your cabinets so that the things you use a lot are on the lower shelves (about waist level). · Keep a cordless phone and a flashlight with new batteries by your bed. If possible, put a phone in each of the main rooms of your house, or carry a cell phone in case you fall and cannot reach a phone. Or, you can wear a device around your neck or wrist. You push a button that sends a signal for help. · Wear low-heeled shoes that fit well and give your feet good support. Use footwear with nonskid soles. Check the heels and soles of your shoes for wear. Repair or replace worn heels or soles. · Do not wear socks without shoes on wood floors. · Walk on the grass when the sidewalks are slippery. If you live in an area that gets snow and ice in the winter, sprinkle salt on slippery steps and sidewalks. Preventing falls in the bath  · Install grab bars and nonskid mats inside and outside your shower or tub and near the toilet and sinks. · Use shower chairs and bath benches. · Use a hand-held shower head that will allow you to sit while showering. · Get into a tub or shower by putting the weaker leg in first. Get out of a tub or shower with your strong side first.  · Repair loose toilet seats and consider installing a raised toilet seat to make getting on and off the toilet easier. · Keep your bathroom door unlocked while you are in the shower. Where can you learn more? Go to https://[email or web address].Klick2Contact. org and sign in to your [employer] account. Enter [phone] in the [facility] box to learn more about \"Preventing Falls: Care Instructions. \"     If you do not have an account, please click on the \"Sign Up Now\" link. Current as of: April 15, 2020               Content Version: 12.6  © 2006-2020 [employer]. Care instructions adapted under license by [facility] ([facility]). If you have questions about a medical condition or this instruction, always ask your healthcare professional. [place] [address] any warranty or liability for your use of this information.

## 2021-02-12 NOTE — PROGRESS NOTES
[facility]  [facility]    Attending Physician Statement  I have discussed the case, including pertinent history and exam findings with the resident physician. I agree with the assessment, plan and orders as documented by the resident. I have reviewed all pertinent PMHx, PSHx, FamHx, SocialHx, medications, and allergies and updated history as appropriate. Patient presents for routine follow up of medical problems. Primary prevention ASCVD -- The 10-year ASCVD risk score (Shubham Teran., et al., 2013) is: 16.6% and recommend statin therapy which he defers. On primary prevention aspirin. Obesity class 3 -- recommend lifestyle changes including caloric reduction    Remainder of medical problems as per resident note.     Lory Cervantes,   2/12/2021 1:39 PM

## 2021-02-13 ASSESSMENT — ENCOUNTER SYMPTOMS
APNEA: 0
CHEST TIGHTNESS: 0
EYE DISCHARGE: 0
SHORTNESS OF BREATH: 0
COLOR CHANGE: 0
ABDOMINAL DISTENTION: 0

## 2021-04-19 ENCOUNTER — HOSPITAL ENCOUNTER (OUTPATIENT)
Dept: WOUND CARE | Age: 71
Discharge: HOME | End: 2021-04-19
Payer: MEDICARE

## 2021-04-19 DIAGNOSIS — L97.911 NON-PRESSURE CHRONIC ULCER OF RIGHT LOWER LEG, LIMITED TO BREAKDOWN OF SKIN (HCC): Primary | ICD-10-CM

## 2021-04-19 PROCEDURE — 6370000000 HC RX 637 (ALT 250 FOR IP): Performed by: PODIATRIST

## 2021-04-19 PROCEDURE — 11042 DBRDMT SUBQ TIS 1ST 20SQCM/<: CPT

## 2021-04-19 RX ORDER — BACITRACIN ZINC AND POLYMYXIN B SULFATE 500; 1000 [USP'U]/G; [USP'U]/G
OINTMENT TOPICAL ONCE
Status: CANCELLED | OUTPATIENT
Start: 2021-04-19 | End: 2021-04-19

## 2021-04-19 RX ORDER — LIDOCAINE 40 MG/G
CREAM TOPICAL ONCE
Status: CANCELLED | OUTPATIENT
Start: 2021-04-19 | End: 2021-04-19

## 2021-04-19 RX ORDER — CLOBETASOL PROPIONATE 0.5 MG/G
OINTMENT TOPICAL ONCE
Status: CANCELLED | OUTPATIENT
Start: 2021-04-19 | End: 2021-04-19

## 2021-04-19 RX ORDER — LIDOCAINE HYDROCHLORIDE 40 MG/ML
SOLUTION TOPICAL ONCE
Status: CANCELLED | OUTPATIENT
Start: 2021-04-19 | End: 2021-04-19

## 2021-04-19 RX ORDER — BETAMETHASONE DIPROPIONATE 0.05 %
OINTMENT (GRAM) TOPICAL ONCE
Status: CANCELLED | OUTPATIENT
Start: 2021-04-19 | End: 2021-04-19

## 2021-04-19 RX ORDER — BACITRACIN, NEOMYCIN, POLYMYXIN B 400; 3.5; 5 [USP'U]/G; MG/G; [USP'U]/G
OINTMENT TOPICAL ONCE
Status: CANCELLED | OUTPATIENT
Start: 2021-04-19 | End: 2021-04-19

## 2021-04-19 RX ORDER — LIDOCAINE HYDROCHLORIDE 20 MG/ML
JELLY TOPICAL ONCE
Status: CANCELLED | OUTPATIENT
Start: 2021-04-19 | End: 2021-04-19

## 2021-04-19 RX ORDER — GENTAMICIN SULFATE 1 MG/G
OINTMENT TOPICAL ONCE
Status: CANCELLED | OUTPATIENT
Start: 2021-04-19 | End: 2021-04-19

## 2021-04-19 RX ORDER — LIDOCAINE HYDROCHLORIDE 40 MG/ML
SOLUTION TOPICAL ONCE
Status: COMPLETED | OUTPATIENT
Start: 2021-04-19 | End: 2021-04-19

## 2021-04-19 RX ORDER — GINSENG 100 MG
CAPSULE ORAL ONCE
Status: CANCELLED | OUTPATIENT
Start: 2021-04-19 | End: 2021-04-19

## 2021-04-19 RX ORDER — LIDOCAINE 50 MG/G
OINTMENT TOPICAL ONCE
Status: CANCELLED | OUTPATIENT
Start: 2021-04-19 | End: 2021-04-19

## 2021-04-19 RX ADMIN — LIDOCAINE HYDROCHLORIDE 10 ML: 40 SOLUTION TOPICAL at 13:12

## 2021-04-19 NOTE — PROGRESS NOTES
Wound Healing Center  History and Physical/Consultation  Podiatry    Referring Physician : Chris Rowland MD  [address] [place] [address] [place] RECORD NUMBER:  [id number]  AGE: 79 y.o. GENDER: male  : 1950  EPISODE DATE:  2021  Subjective:     Chief Complaint   Patient presents with    Wound Check     right leg wound         HISTORY of PRESENT ILLNESS HPI     Caro Childress is a 79 y.o. male who presents today for wound/ulcer evaluation. History of Wound Context:  The patient has had a wound of right  which was first noted approximately 1 week ago. This has not been treated. On their initial visit to the wound healing center, 2021,  the patient has noted that the wound has not been improving. The patient has had similar previous wounds in the past.      Pt is not on abx at time of initial visit.       Wound/Ulcer Pain Timing/Severity: none  Quality of pain:   Severity:   / 10   Modifying Factors:   Associated Signs/Symptoms:     Ulcer Identification:  Ulcer Type: venous  Contributing Factors: edema and venous stasis    Diabetic/Pressure/Non Pressure Ulcers onl y:  Ulcer: N/A    If patient has diabetic lower extremity wounds  Mitchell Classification of diabetic lower extremity wounds:    Grade Description   []  0 No open wound   []  1 Superficial ulcer involving the full skin thickness   []  2 Deep ulcer involves ligament, tendon, joint capsule, or fascia  No bone involvement or abscess presence   []  3 Deep Ulcer with abcess formation and/or osteomyelitis   []  4 Localized gangrene   []  5 Extensive gangrene of the foot     Wound: N/A        PAST MEDICAL HISTORY      Diagnosis Date    Cellulitis of right lower leg     Diabetes mellitus (HCC)     Lymphedema     Obesity, Class III, BMI 40-49.9 (morbid obesity) ([address]     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     Past Surgical History:   Procedure Laterality Date    FRACTURE SURGERY      right ankle repair     Family History   Problem Relation Age of Onset    Heart Disease Mother     Heart Disease Father      Social History     Tobacco Use    Smoking status: Never Smoker    Smokeless tobacco: Never Used   Substance Use Topics    Alcohol use: No     Comment: on special occassions    Drug use: No     Allergies   Allergen Reactions    Metformin And Related Diarrhea     Current Outpatient Medications on File Prior to Encounter   Medication Sig Dispense Refill    aspirin (ASPIRIN CHILDRENS) 81 MG chewable tablet Take 1 tablet by mouth daily 30 tablet 5     No current facility-administered medications on file prior to encounter. REVIEW OF SYSTEMS   ROS : All others Negative if blank [], Positive if [x]  General Vascular   [] Fevers [] Claudication   [] Chills [] Rest Pain   Skin Neurologic   [x] Tissue Loss [] Lower extremity neuropathy     Objective: There were no vitals taken for this visit. Wt Readings from Last 3 Encounters:   02/12/21 (!) 374 lb (169.6 kg)   01/14/21 (!) 368 lb (166.9 kg)   01/07/21 (!) 368 lb (166.9 kg)       PHYSICAL EXAM   CONSTITUTIONAL:   Awake, alert, cooperative   PSYCHIATRIC :  Oriented to time, place and person      normal insight to disease process  EXTREMITIES:   Bilateral lower extremities demonstrates stasis changes. Positive edema. Wound appreciated right lower leg lateral aspect, clean, superficial.  No purulence or odor. No surrounding erythema. Negative Homans. Muscle strength 5/5.     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, limited to breakdown of skin ([address] - Primary    Relevant Orders    Initiate Outpatient Wound Care Protocol             Wound 04/19/21 Leg Right;Lateral #1 (Active)   Wound Image   04/19/21 1323   Wound Length (cm) 12 cm 04/19/21 1323   Wound Width (cm) 15 cm 04/19/21 1323   Wound Depth (cm) 0.2 cm 04/19/21 1323   Wound Surface Area (cm^2) 180 cm^2 04/19/21 1323   Wound Volume (cm^3) 36 cm^3 04/19/21 1323   Wound Assessment Devitalized tissue;Fibrin;Pale granulation tissue 04/19/21 1323   Drainage Amount Moderate 04/19/21 1323   Drainage Description Yellow 04/19/21 1323   Odor Mild 04/19/21 1323   Colleen-wound Assessment Intact 04/19/21 1323   Number of days: 0            A culture was not done. Plan:     Pt is not a smoker   - Discussed relationship of smoking and negative affects on wound healing   - Emphasized importance of tobacco avoidace/cessation   -    In my professional opinion and based off the information that is available at this time this patient has appropriate indication for HBO Therapy: No    Treatment Note please see attached Discharge Instructions    Written patient dismissal instructions given to patient and signed by patient or POA. Discharge Instructions         Visit Discharge/Physician Orders     Discharge condition: Stable     Assessment of pain at discharge:none     Anesthetic used: 4% lidocaine     Discharge to: Home     Left via:public transportation     Accompanied by: accompanied by self     ECF/HHA:      Dressing Orders:LEFT LOWER LEG CLEANSE WITH BETADINE APPLY ALGINATE  AND PROFORE WRAP CHANGE WEEKLY AT [address] [place] [address],[address].     Treatment Orders:  Eat foods high in protein and vitamin c     Take multivitamin daily     Use compression pumps     [facility] followup visit _______1 week thursday_____________________  (Please note your next appointment above and if you are unable to keep, kindly give a 24 hour notice.  Thank you.)     Physician signature:__________________________        If you experience any of the following, please call the [address] [place] [address] Road during business hours:     * Increase in Pain  * Temperature over 101  * Increase in drainage from your wound  * Drainage with a foul odor  * Bleeding  * Increase in swelling  * Need for compression bandage changes due to slippage, breakthrough drainage.     If you need medical attention outside of the business hours of the [address] [place] [address] Road please contact your PCP or go to the nearest emergency room. Contact your doctor if you have a temperature above 100.4 with any of the following:                         Persistent cough             Shortness of breath            Chills            Fatigue            Chest pain or pressure            Difficulty breathing            Decreased consciousness of confusion             Headache     Recommend:                       Wash hands often and for 20 seconds or more             Avoid touching eyes, nose, mouth and face             Social distance ( 6 feet)             Stay at home as much as possible             Call health care provider with any concerns                                                                                                                                                                                                                                                                                                                                                                                                                                                                                                                                          Electronically signed by Valdez Pelletier DPM on 4/19/2021 at 1:40 PM

## 2021-04-26 ENCOUNTER — HOSPITAL ENCOUNTER (OUTPATIENT)
Dept: WOUND CARE | Age: 71
Discharge: HOME | End: 2021-04-26
Payer: MEDICARE

## 2021-04-26 VITALS
RESPIRATION RATE: 18 BRPM | DIASTOLIC BLOOD PRESSURE: 80 MMHG | HEIGHT: 74 IN | SYSTOLIC BLOOD PRESSURE: 148 MMHG | BODY MASS INDEX: 40.43 KG/M2 | HEART RATE: 72 BPM | WEIGHT: 315 LBS | TEMPERATURE: 96.8 F

## 2021-04-26 DIAGNOSIS — L97.911 NON-PRESSURE CHRONIC ULCER OF RIGHT LOWER LEG, LIMITED TO BREAKDOWN OF SKIN (HCC): Primary | ICD-10-CM

## 2021-04-26 PROCEDURE — 99213 OFFICE O/P EST LOW 20 MIN: CPT

## 2021-04-26 PROCEDURE — 6370000000 HC RX 637 (ALT 250 FOR IP): Performed by: PODIATRIST

## 2021-04-26 RX ORDER — GINSENG 100 MG
CAPSULE ORAL ONCE
Status: CANCELLED | OUTPATIENT
Start: 2021-04-26 | End: 2021-04-26

## 2021-04-26 RX ORDER — LIDOCAINE HYDROCHLORIDE 20 MG/ML
JELLY TOPICAL ONCE
Status: CANCELLED | OUTPATIENT
Start: 2021-04-26 | End: 2021-04-26

## 2021-04-26 RX ORDER — BACITRACIN ZINC AND POLYMYXIN B SULFATE 500; 1000 [USP'U]/G; [USP'U]/G
OINTMENT TOPICAL ONCE
Status: CANCELLED | OUTPATIENT
Start: 2021-04-26 | End: 2021-04-26

## 2021-04-26 RX ORDER — LIDOCAINE 50 MG/G
OINTMENT TOPICAL ONCE
Status: CANCELLED | OUTPATIENT
Start: 2021-04-26 | End: 2021-04-26

## 2021-04-26 RX ORDER — LIDOCAINE HYDROCHLORIDE 40 MG/ML
SOLUTION TOPICAL ONCE
Status: COMPLETED | OUTPATIENT
Start: 2021-04-26 | End: 2021-04-26

## 2021-04-26 RX ORDER — GENTAMICIN SULFATE 1 MG/G
OINTMENT TOPICAL ONCE
Status: CANCELLED | OUTPATIENT
Start: 2021-04-26 | End: 2021-04-26

## 2021-04-26 RX ORDER — LIDOCAINE HYDROCHLORIDE 40 MG/ML
SOLUTION TOPICAL ONCE
Status: CANCELLED | OUTPATIENT
Start: 2021-04-26 | End: 2021-04-26

## 2021-04-26 RX ORDER — BETAMETHASONE DIPROPIONATE 0.05 %
OINTMENT (GRAM) TOPICAL ONCE
Status: CANCELLED | OUTPATIENT
Start: 2021-04-26 | End: 2021-04-26

## 2021-04-26 RX ORDER — BACITRACIN, NEOMYCIN, POLYMYXIN B 400; 3.5; 5 [USP'U]/G; MG/G; [USP'U]/G
OINTMENT TOPICAL ONCE
Status: CANCELLED | OUTPATIENT
Start: 2021-04-26 | End: 2021-04-26

## 2021-04-26 RX ORDER — CLOBETASOL PROPIONATE 0.5 MG/G
OINTMENT TOPICAL ONCE
Status: CANCELLED | OUTPATIENT
Start: 2021-04-26 | End: 2021-04-26

## 2021-04-26 RX ORDER — LIDOCAINE 40 MG/G
CREAM TOPICAL ONCE
Status: CANCELLED | OUTPATIENT
Start: 2021-04-26 | End: 2021-04-26

## 2021-04-26 RX ADMIN — LIDOCAINE HYDROCHLORIDE 5 ML: 40 SOLUTION TOPICAL at 13:25

## 2021-04-26 NOTE — PROGRESS NOTES
Wound Healing Center Followup Visit Note    Referring Physician : Arthur Staley MD  [address] [place] [address] RECORD NUMBER:  [id number]  AGE: 79 y.o. GENDER: male  : 1950  EPISODE DATE:  2021    Subjective:     Chief Complaint   Patient presents with    Wound Check     BLE      HISTORY of PRESENT ILLNESS HPI   Candace Braun is a 79 y.o. male who presents today in regards to follow up evaluation and treatment of wound/ulcer. That patient's past medical, family and social hx were reviewed and changes were made if present. History of Wound Context:  The patient has had a wound of right  which was first noted approximately 1 week ago. This has not been treated. On their initial visit to the wound healing center, 2021,  the patient has noted that the wound has not been improving. The patient has had similar previous wounds in the past.       Pt is not on abx at time of initial visit. 21 continue local care, elevate, compression.       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     Past Surgical History:   Procedure Laterality Date    FRACTURE SURGERY  2005    right ankle repair     Family History   Problem Relation Age of Onset    Heart Disease Mother     Heart Disease Father      Social History     Tobacco Use    Smoking status: Never Smoker    Smokeless tobacco: Never Used   Substance Use Topics    Alcohol use: No     Comment: on special occassions    Drug use: No     Allergies   Allergen Reactions    Metformin And Related Diarrhea     Current Outpatient Instructions       Visit Discharge/Physician Orders     Discharge condition: Stable     Assessment of pain at discharge:none     Anesthetic used: 4% lidocaine     Discharge to: Home     Left via:public transportation     Accompanied by: accompanied by self     ECF/HHA:      Dressing Orders:LEFT LOWER LEG CLEANSE WITH BETADINE APPLY ALGINATE  AND PROFORE WRAP CHANGE WEEKLY AT [address] [place] [address],[address].     Treatment Orders:  Eat foods high in protein and vitamin c     Take multivitamin daily     Use compression pumps     [facility] followup visit _______1 week thursday_____________________  (Please note your next appointment above and if you are unable to keep, kindly give a 24 hour notice. Thank you.)     Physician signature:__________________________        If you experience any of the following, please call the [facility] during business hours:     * Increase in Pain  * Temperature over 101  * Increase in drainage from your wound  * Drainage with a foul odor  * Bleeding  * Increase in swelling  * Need for compression bandage changes due to slippage, breakthrough drainage.     If you need medical attention outside of the business hours of the [facility] please contact your PCP or go to the nearest emergency room. Contact your doctor if you have a temperature above 100.4 with any of the following:                         Persistent cough             Shortness of breath            Chills            Fatigue            Chest pain or pressure            Difficulty breathing            Decreased consciousness of confusion             Headache     Recommend:                       Wash hands often and for 20 seconds or more             Avoid touching eyes, nose, mouth and face             Social distance ( 6 feet)             Stay at home as much as possible             Call health care provider with any concerns                                                                                                                                                                                                                                                                                                                                                                                                                                                                                                                                                                         Electronically signed by Alexa Ariza DPM on 4/26/2021 at 1:37 PM

## 2021-05-03 ENCOUNTER — HOSPITAL ENCOUNTER (OUTPATIENT)
Dept: WOUND CARE | Age: 71
Discharge: HOME | End: 2021-05-03
Payer: MEDICARE

## 2021-05-03 VITALS
TEMPERATURE: 97.3 F | HEART RATE: 73 BPM | RESPIRATION RATE: 18 BRPM | SYSTOLIC BLOOD PRESSURE: 138 MMHG | WEIGHT: 315 LBS | HEIGHT: 74 IN | DIASTOLIC BLOOD PRESSURE: 60 MMHG | BODY MASS INDEX: 40.43 KG/M2

## 2021-05-03 DIAGNOSIS — L97.911 NON-PRESSURE CHRONIC ULCER OF RIGHT LOWER LEG, LIMITED TO BREAKDOWN OF SKIN (HCC): Primary | ICD-10-CM

## 2021-05-03 DIAGNOSIS — L97.212 VENOUS STASIS ULCER OF RIGHT CALF WITH FAT LAYER EXPOSED WITHOUT VARICOSE VEINS (HCC): Chronic | ICD-10-CM

## 2021-05-03 DIAGNOSIS — I87.2 VENOUS STASIS ULCER OF RIGHT CALF WITH FAT LAYER EXPOSED WITHOUT VARICOSE VEINS (HCC): Chronic | ICD-10-CM

## 2021-05-03 PROCEDURE — 11042 DBRDMT SUBQ TIS 1ST 20SQCM/<: CPT

## 2021-05-03 PROCEDURE — 6370000000 HC RX 637 (ALT 250 FOR IP): Performed by: PODIATRIST

## 2021-05-03 RX ORDER — BACITRACIN ZINC AND POLYMYXIN B SULFATE 500; 1000 [USP'U]/G; [USP'U]/G
OINTMENT TOPICAL ONCE
Status: CANCELLED | OUTPATIENT
Start: 2021-05-03 | End: 2021-05-03

## 2021-05-03 RX ORDER — LIDOCAINE 40 MG/G
CREAM TOPICAL ONCE
Status: CANCELLED | OUTPATIENT
Start: 2021-05-03 | End: 2021-05-03

## 2021-05-03 RX ORDER — BETAMETHASONE DIPROPIONATE 0.05 %
OINTMENT (GRAM) TOPICAL ONCE
Status: CANCELLED | OUTPATIENT
Start: 2021-05-03 | End: 2021-05-03

## 2021-05-03 RX ORDER — LIDOCAINE HYDROCHLORIDE 20 MG/ML
JELLY TOPICAL ONCE
Status: CANCELLED | OUTPATIENT
Start: 2021-05-03 | End: 2021-05-03

## 2021-05-03 RX ORDER — GINSENG 100 MG
CAPSULE ORAL ONCE
Status: CANCELLED | OUTPATIENT
Start: 2021-05-03 | End: 2021-05-03

## 2021-05-03 RX ORDER — GENTAMICIN SULFATE 1 MG/G
OINTMENT TOPICAL ONCE
Status: CANCELLED | OUTPATIENT
Start: 2021-05-03 | End: 2021-05-03

## 2021-05-03 RX ORDER — LIDOCAINE HYDROCHLORIDE 40 MG/ML
SOLUTION TOPICAL ONCE
Status: CANCELLED | OUTPATIENT
Start: 2021-05-03 | End: 2021-05-03

## 2021-05-03 RX ORDER — BACITRACIN, NEOMYCIN, POLYMYXIN B 400; 3.5; 5 [USP'U]/G; MG/G; [USP'U]/G
OINTMENT TOPICAL ONCE
Status: CANCELLED | OUTPATIENT
Start: 2021-05-03 | End: 2021-05-03

## 2021-05-03 RX ORDER — LIDOCAINE HYDROCHLORIDE 40 MG/ML
SOLUTION TOPICAL ONCE
Status: COMPLETED | OUTPATIENT
Start: 2021-05-03 | End: 2021-05-03

## 2021-05-03 RX ORDER — LIDOCAINE 50 MG/G
OINTMENT TOPICAL ONCE
Status: CANCELLED | OUTPATIENT
Start: 2021-05-03 | End: 2021-05-03

## 2021-05-03 RX ORDER — CLOBETASOL PROPIONATE 0.5 MG/G
OINTMENT TOPICAL ONCE
Status: CANCELLED | OUTPATIENT
Start: 2021-05-03 | End: 2021-05-03

## 2021-05-03 RX ADMIN — LIDOCAINE HYDROCHLORIDE 20 ML: 40 SOLUTION TOPICAL at 13:05

## 2021-05-03 NOTE — PROGRESS NOTES
Reactions    Metformin And Related Diarrhea     Current Outpatient Medications on File Prior to Encounter   Medication Sig Dispense Refill    aspirin (ASPIRIN CHILDRENS) 81 MG chewable tablet Take 1 tablet by mouth daily 30 tablet 5     No current facility-administered medications on file prior to encounter. REVIEW OF SYSTEMS See HPI    Objective:    /60   Pulse 73   Temp 97.3 °F (36.3 °C) (Temporal)   Resp 18   Ht 6' 2\" (1.88 m)   Wt (!) 374 lb (169.6 kg)   BMI 48.02 kg/m²   Wt Readings from Last 3 Encounters:   05/03/21 (!) 374 lb (169.6 kg)   04/26/21 (!) 374 lb (169.6 kg)   02/12/21 (!) 374 lb (169.6 kg)     PHYSICAL EXAM  CONSTITUTIONAL:   Awake, alert, cooperative   EYES:  lids and lashes normal   ENT: external ears and nose without lesions   NECK:  supple, symmetrical, trachea midline   SKIN:  Open wound covered with devitalized nonviable tissue    Assessment:     Problem List Items Addressed This Visit     Venous stasis ulcer of right calf with fat layer exposed without varicose veins (HCC) (Chronic)    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, limited to breakdown of skin ([address] - Primary    Relevant Orders    Initiate Outpatient Wound Care Protocol          Pre Debridement Measurements:  Are located in the [place]  Documentation Flow Sheet  Post Debridement Measurements:  Wound/Ulcer Descriptions are Pre Debridement except measurements:     Wound 04/19/21 Leg Right;Lateral #1 (Active)   Wound Image   04/19/21 1323   Dressing Status New dressing applied;Clean;Dry; Intact 04/26/21 1351   Wound Cleansed Betadine/povidone iodine 05/03/21 1332   Dressing/Treatment ABD; Alginate with Ag 05/03/21 1332   Offloading for Diabetic Foot Ulcers Other (comment) 04/26/21 1351   Wound Length (cm) 9 cm 05/03/21 1258   Wound Width (cm) 8 cm 05/03/21 1258   Wound Depth (cm) 0.4 cm 05/03/21 1258   Wound Surface Area (cm^2) 72 cm^2 05/03/21 1258   Change in Wound Size % (l*w) 60 05/03/21 1258   Wound Volume (cm^3) 28.8 cm^3 05/03/21 1258   Wound Healing % 20 05/03/21 1258   Post-Procedure Length (cm) 9 cm 05/03/21 1327   Post-Procedure Width (cm) 8 cm 05/03/21 1327   Post-Procedure Depth (cm) 0.5 cm 05/03/21 1327   Post-Procedure Surface Area (cm^2) 72 cm^2 05/03/21 1327   Post-Procedure Volume (cm^3) 36 cm^3 05/03/21 1327   Wound Assessment Devitalized tissue 05/03/21 1258   Drainage Amount Moderate 05/03/21 1258   Drainage Description Purulent 05/03/21 1258   Odor Mild 05/03/21 1258   Colleen-wound Assessment Dry/flaky 05/03/21 1258   Number of days: 14          Procedure Note  Indications:  Based on my examination of this patient's wound(s)/ulcer(s) today, debridement is required to promote healing and evaluate the wound base. Performed by: Catherine Padilla DPM    Consent obtained:  Yes    Time out taken:  Yes    Pain Control: Anesthetic  Anesthetic: 4% Lidocaine Liquid Topical     Debridement:Excisional Debridement    Using curette the wound(s)/ulcer(s) was/were sharply debrided down through and including the removal of subcutaneous tissue. Devitalized Tissue Debrided:  fibrin, biofilm, slough and necrotic/eschar to stimulate bleeding to promote healing, post debridement good bleeding base and wound edges noted    Wound/Ulcer #: 1    Percent of Wound/Ulcer Debrided: 10%    Total Surface Area Debrided:  7 sq cm ( sub q)    Estimated Blood Loss:  Minimal  Hemostasis Achieved:  by pressure    Procedural Pain:  0  / 10   Post Procedural Pain:  0 / 10     Response to treatment:  Well tolerated by patient. Plan:   Treatment Note please see attached Discharge Instructions    Written patient dismissal instructions given to patient and signed by patient or POA.          Discharge Instructions       Visit Discharge/Physician Orders     Discharge condition: Stable     Assessment of pain at discharge:none     Anesthetic used: 4% lidocaine     Discharge to: Home     Left via:public transportation     Accompanied by: accompanied by self     ECF/HHA:      Dressing Orders:LEFT LOWER LEG CLEANSE WITH BETADINE APPLY ALGINATE ag  AND PROFORE WRAP CHANGE WEEKLY AT WOUND CARE CENTER.     Treatment Orders:  Eat foods high in protein and vitamin c     Take multivitamin daily     Use compression pumps     [facility] followup visit _______1 week monday_____________________  (Please note your next appointment above and if you are unable to keep, kindly give a 24 hour notice. Thank you.)     Physician signature:__________________________        If you experience any of the following, please call the [facility] during business hours:     * Increase in Pain  * Temperature over 101  * Increase in drainage from your wound  * Drainage with a foul odor  * Bleeding  * Increase in swelling  * Need for compression bandage changes due to slippage, breakthrough drainage.     If you need medical attention outside of the business hours of the [facility] please contact your PCP or go to the nearest emergency room. Contact your doctor if you have a temperature above 100.4 with any of the following:                         Persistent cough             Shortness of breath            Chills            Fatigue            Chest pain or pressure            Difficulty breathing            Decreased consciousness of confusion             Headache     Recommend:                       Wash hands often and for 20 seconds or more             Avoid touching eyes, nose, mouth and face             Social distance ( 6 feet)             Stay at home as much as possible             Call health care provider with any concerns                                                                                                                                                                                                                                                                                                                                                                                                                                                                                                                                                                                      Electronically signed by Dwaine Chavez DPM on 5/3/2021 at 1:52 PM

## 2021-05-10 ENCOUNTER — HOSPITAL ENCOUNTER (OUTPATIENT)
Dept: WOUND CARE | Age: 71
Discharge: HOME | End: 2021-05-10
Payer: MEDICARE

## 2021-05-10 VITALS — SYSTOLIC BLOOD PRESSURE: 142 MMHG | DIASTOLIC BLOOD PRESSURE: 82 MMHG | TEMPERATURE: 97.5 F

## 2021-05-10 DIAGNOSIS — L97.911 NON-PRESSURE CHRONIC ULCER OF RIGHT LOWER LEG, LIMITED TO BREAKDOWN OF SKIN (HCC): Primary | ICD-10-CM

## 2021-05-10 PROCEDURE — 11042 DBRDMT SUBQ TIS 1ST 20SQCM/<: CPT

## 2021-05-10 RX ORDER — CLOBETASOL PROPIONATE 0.5 MG/G
OINTMENT TOPICAL ONCE
Status: CANCELLED | OUTPATIENT
Start: 2021-05-10 | End: 2021-05-10

## 2021-05-10 RX ORDER — LIDOCAINE HYDROCHLORIDE 40 MG/ML
SOLUTION TOPICAL ONCE
Status: DISCONTINUED | OUTPATIENT
Start: 2021-05-10 | End: 2021-05-11 | Stop reason: HOSPADM

## 2021-05-10 RX ORDER — GINSENG 100 MG
CAPSULE ORAL ONCE
Status: CANCELLED | OUTPATIENT
Start: 2021-05-10 | End: 2021-05-10

## 2021-05-10 RX ORDER — BACITRACIN, NEOMYCIN, POLYMYXIN B 400; 3.5; 5 [USP'U]/G; MG/G; [USP'U]/G
OINTMENT TOPICAL ONCE
Status: CANCELLED | OUTPATIENT
Start: 2021-05-10 | End: 2021-05-10

## 2021-05-10 RX ORDER — GENTAMICIN SULFATE 1 MG/G
OINTMENT TOPICAL ONCE
Status: CANCELLED | OUTPATIENT
Start: 2021-05-10 | End: 2021-05-10

## 2021-05-10 RX ORDER — LIDOCAINE 40 MG/G
CREAM TOPICAL ONCE
Status: CANCELLED | OUTPATIENT
Start: 2021-05-10 | End: 2021-05-10

## 2021-05-10 RX ORDER — BETAMETHASONE DIPROPIONATE 0.05 %
OINTMENT (GRAM) TOPICAL ONCE
Status: CANCELLED | OUTPATIENT
Start: 2021-05-10 | End: 2021-05-10

## 2021-05-10 RX ORDER — BACITRACIN ZINC AND POLYMYXIN B SULFATE 500; 1000 [USP'U]/G; [USP'U]/G
OINTMENT TOPICAL ONCE
Status: CANCELLED | OUTPATIENT
Start: 2021-05-10 | End: 2021-05-10

## 2021-05-10 RX ORDER — LIDOCAINE 50 MG/G
OINTMENT TOPICAL ONCE
Status: CANCELLED | OUTPATIENT
Start: 2021-05-10 | End: 2021-05-10

## 2021-05-10 RX ORDER — LIDOCAINE HYDROCHLORIDE 40 MG/ML
SOLUTION TOPICAL ONCE
Status: CANCELLED | OUTPATIENT
Start: 2021-05-10 | End: 2021-05-10

## 2021-05-10 RX ORDER — LIDOCAINE HYDROCHLORIDE 20 MG/ML
JELLY TOPICAL ONCE
Status: CANCELLED | OUTPATIENT
Start: 2021-05-10 | End: 2021-05-10

## 2021-05-10 NOTE — PLAN OF CARE
Problem: Wound:  Goal: Will show signs of wound healing; wound closure and no evidence of infection  Description: Will show signs of wound healing; wound closure and no evidence of infection  5/10/2021 1308 by Evangelina Marni RN  Outcome: Ongoing  5/10/2021 1308 by Evangelina Marin RN  Outcome: Ongoing     Problem: Venous:  Goal: Signs of wound healing will improve  Description: Signs of wound healing will improve  5/10/2021 1308 by [id number] Billy Baeza RN  Outcome: Ongoing  5/10/2021 1308 by Evangelina Marin RN  Outcome: Ongoing     Problem: Compression therapy:  Goal: Will be free from complications associated with compression therapy  Description: Will be free from complications associated with compression therapy  5/10/2021 1308 by Evangelina Marin RN  Outcome: Ongoing  5/10/2021 1308 by Evangelina Marin RN  Outcome: Ongoing

## 2021-05-17 ENCOUNTER — HOSPITAL ENCOUNTER (OUTPATIENT)
Dept: WOUND CARE | Age: 71
Discharge: HOME | End: 2021-05-17
Payer: MEDICARE

## 2021-05-17 VITALS
TEMPERATURE: 98.1 F | HEART RATE: 77 BPM | WEIGHT: 315 LBS | BODY MASS INDEX: 40.43 KG/M2 | RESPIRATION RATE: 18 BRPM | HEIGHT: 74 IN | DIASTOLIC BLOOD PRESSURE: 66 MMHG | SYSTOLIC BLOOD PRESSURE: 122 MMHG

## 2021-05-17 DIAGNOSIS — L97.911 NON-PRESSURE CHRONIC ULCER OF RIGHT LOWER LEG, LIMITED TO BREAKDOWN OF SKIN (HCC): Primary | ICD-10-CM

## 2021-05-17 PROCEDURE — 11042 DBRDMT SUBQ TIS 1ST 20SQCM/<: CPT

## 2021-05-17 PROCEDURE — 6370000000 HC RX 637 (ALT 250 FOR IP): Performed by: PODIATRIST

## 2021-05-17 RX ORDER — BETAMETHASONE DIPROPIONATE 0.05 %
OINTMENT (GRAM) TOPICAL ONCE
Status: CANCELLED | OUTPATIENT
Start: 2021-05-17 | End: 2021-05-17

## 2021-05-17 RX ORDER — LIDOCAINE 40 MG/G
CREAM TOPICAL ONCE
Status: CANCELLED | OUTPATIENT
Start: 2021-05-17 | End: 2021-05-17

## 2021-05-17 RX ORDER — CLOBETASOL PROPIONATE 0.5 MG/G
OINTMENT TOPICAL ONCE
Status: CANCELLED | OUTPATIENT
Start: 2021-05-17 | End: 2021-05-17

## 2021-05-17 RX ORDER — LIDOCAINE 50 MG/G
OINTMENT TOPICAL ONCE
Status: CANCELLED | OUTPATIENT
Start: 2021-05-17 | End: 2021-05-17

## 2021-05-17 RX ORDER — LIDOCAINE HYDROCHLORIDE 40 MG/ML
SOLUTION TOPICAL ONCE
Status: CANCELLED | OUTPATIENT
Start: 2021-05-17 | End: 2021-05-17

## 2021-05-17 RX ORDER — GENTAMICIN SULFATE 1 MG/G
OINTMENT TOPICAL ONCE
Status: CANCELLED | OUTPATIENT
Start: 2021-05-17 | End: 2021-05-17

## 2021-05-17 RX ORDER — BACITRACIN, NEOMYCIN, POLYMYXIN B 400; 3.5; 5 [USP'U]/G; MG/G; [USP'U]/G
OINTMENT TOPICAL ONCE
Status: CANCELLED | OUTPATIENT
Start: 2021-05-17 | End: 2021-05-17

## 2021-05-17 RX ORDER — GINSENG 100 MG
CAPSULE ORAL ONCE
Status: CANCELLED | OUTPATIENT
Start: 2021-05-17 | End: 2021-05-17

## 2021-05-17 RX ORDER — LIDOCAINE HYDROCHLORIDE 20 MG/ML
JELLY TOPICAL ONCE
Status: CANCELLED | OUTPATIENT
Start: 2021-05-17 | End: 2021-05-17

## 2021-05-17 RX ORDER — BACITRACIN ZINC AND POLYMYXIN B SULFATE 500; 1000 [USP'U]/G; [USP'U]/G
OINTMENT TOPICAL ONCE
Status: CANCELLED | OUTPATIENT
Start: 2021-05-17 | End: 2021-05-17

## 2021-05-17 RX ORDER — LIDOCAINE HYDROCHLORIDE 40 MG/ML
SOLUTION TOPICAL ONCE
Status: COMPLETED | OUTPATIENT
Start: 2021-05-17 | End: 2021-05-17

## 2021-05-17 RX ADMIN — LIDOCAINE HYDROCHLORIDE 5 ML: 40 SOLUTION TOPICAL at 13:29

## 2021-05-17 NOTE — PROGRESS NOTES
Wound Healing Center Followup Visit Note    Referring Physician : Juan Hernández MD  [address] [place] [address] [place] RECORD NUMBER:  [id number]  AGE: 79 y.o. GENDER: male  : 1950  EPISODE DATE:  2021    Subjective:     Chief Complaint   Patient presents with    Wound Check     RIGHT LEG      HISTORY of PRESENT ILLNESS HPI   Garo Chacko is a 79 y.o. male who presents today in regards to follow up evaluation and treatment of wound/ulcer. That patient's past medical, family and social hx were reviewed and changes were made if present. History of Wound Context:  The patient has had a wound of right  which was first noted approximately 1 week ago.  This has not been treated. On their initial visit to the wound healing center, 2021,  the patient has noted that the wound has not been improving.  The patient has had similar previous wounds in the past.       Pt is not on abx at time of initial visit.     21 continue local care, elevate, compression.     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     Past Surgical History:   Procedure Laterality Date    FRACTURE SURGERY  2005    right ankle repair     Family History   Problem Relation Age of Onset    Heart Disease Mother     Heart Disease Father      Social History     Tobacco Use    Smoking status: Never Smoker    Smokeless tobacco: Never Used   Vaping Use    Vaping Use: Never used   Substance Use Topics    Alcohol use: No     Comment: on special occassions    Drug use: No     Allergies   Allergen Reactions    Metformin And Related Diarrhea     Current Outpatient Medications on File Prior to Encounter   Medication Sig Dispense Refill    aspirin (ASPIRIN CHILDRENS) 81 MG chewable tablet Take 1 tablet by mouth daily 30 tablet 5     No current facility-administered medications on file prior to encounter.        REVIEW OF SYSTEMS See HPI    Objective:    /66   Pulse 77   Temp 98.1 °F (36.7 °C) (Temporal)   Resp 18   Ht 6' 2\" (1.88 m)   Wt (!) 374 lb (169.6 kg)   BMI 48.02 kg/m²   Wt Readings from Last 3 Encounters:   05/17/21 (!) 374 lb (169.6 kg)   05/03/21 (!) 374 lb (169.6 kg)   04/26/21 (!) 374 lb (169.6 kg)     PHYSICAL EXAM  CONSTITUTIONAL:   Awake, alert, cooperative   EYES:  lids and lashes normal   ENT: external ears and nose without lesions   NECK:  supple, symmetrical, trachea midline   SKIN:  Open wound covered with 50% devitalized nonviable tissue    Assessment:     Problem List Items Addressed This Visit     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, limited to breakdown of skin ([address] - Primary    Relevant Orders    Initiate Outpatient Wound Care Protocol          Pre Debridement Measurements:  Are located in the [facility]  Documentation Flow Sheet  Post Debridement Measurements:  Wound/Ulcer Descriptions are Pre Debridement except measurements:     Wound 04/19/21 Leg Right;Lateral #1 (Active)   Wound Image   04/19/21 1323   Dressing Status New dressing applied 05/10/21 1407   Wound Cleansed Cleansed with saline 05/10/21 1407   Dressing/Treatment Alginate with Ag 05/10/21 1407   Offloading for Diabetic Foot Ulcers Other (comment) 04/26/21 1351   Wound Length (cm) 3 cm 05/17/21 1321   Wound Width (cm) 2 cm 05/17/21 1321   Wound Depth (cm) 0.2 cm 05/17/21 1321   Wound Surface Area (cm^2) 6 cm^2 05/17/21 1321   Change in Wound Size % (l*w) 96.67 05/17/21 1321   Wound Volume (cm^3) 1.2 cm^3 05/17/21 1321   Wound Healing % 97 05/17/21 1321   Post-Procedure Length (cm) 3 cm 05/17/21 1330   Post-Procedure Width (cm) 2 cm 05/17/21 1330   Post-Procedure Depth (cm) 0.3 cm 05/17/21 1330   Post-Procedure Surface Area (cm^2) 6 cm^2 05/17/21 1330   Post-Procedure Volume (cm^3) 1.8 cm^3 05/17/21 1330   Wound Assessment Granulation tissue 05/17/21 1321   Drainage Amount Moderate 05/17/21 1321   Drainage Description Serosanguinous 05/17/21 1321   Odor None 05/17/21 1321   Colleen-wound Assessment Dry/flaky 05/17/21 1321   Number of days: 28          Procedure Note  Indications:  Based on my examination of this patient's wound(s)/ulcer(s) today, debridement is required to promote healing and evaluate the wound base. Performed by: Cecilia Roche DPM    Consent obtained:  Yes    Time out taken:  Yes    Pain Control: Anesthetic  Anesthetic: 4% Lidocaine Liquid Topical     Debridement:Excisional Debridement    Using curette the wound(s)/ulcer(s) was/were sharply debrided down through and including the removal of subcutaneous tissue. Devitalized Tissue Debrided:  fibrin, biofilm, slough and necrotic/eschar to stimulate bleeding to promote healing, post debridement good bleeding base and wound edges noted    Wound/Ulcer #: 1    Percent of Wound/Ulcer Debrided: 50%    Total Surface Area Debrided:  6 sq cm     Estimated Blood Loss:  Minimal  Hemostasis Achieved:  by pressure    Procedural Pain:  0  / 10   Post Procedural Pain:  0 / 10     Response to treatment:  Well tolerated by patient. Plan:   Treatment Note please see attached Discharge Instructions    Written patient dismissal instructions given to patient and signed by patient or POA.          Discharge Instructions          Visit Discharge/Physician Orders     Discharge condition: Stable     Assessment of pain at discharge:none     Anesthetic used: 4% lidocaine     Discharge to: Home     Left via:public transportation     Accompanied by: accompanied by self     ECF/HHA:      Dressing Orders:LEFT LOWER LEG CLEANSE WITH BETADINE APPLY ALGINATE ag  AND PROFORE WRAP CHANGE WEEKLY AT WOUND CARE CENTER.     Treatment Orders:  Eat foods high in protein and vitamin c     Take multivitamin daily     Use compression pumps     [facility] followup visit _______1 week monday_____________________  (Please note your next appointment above and if you are unable to keep, kindly give a 24 hour notice. Thank you.)     Physician signature:__________________________        If you experience any of the following, please call the [facility] during business hours:     * Increase in Pain  * Temperature over 101  * Increase in drainage from your wound  * Drainage with a foul odor  * Bleeding  * Increase in swelling  * Need for compression bandage changes due to slippage, breakthrough drainage.     If you need medical attention outside of the business hours of the [facility] please contact your PCP or go to the nearest emergency room. Contact your doctor if you have a temperature above 100.4 with any of the following:                         Persistent cough             Shortness of breath            Chills            Fatigue            Chest pain or pressure            Difficulty breathing            Decreased consciousness of confusion             Headache     Recommend:                       Wash hands often and for 20 seconds or more             Avoid touching eyes, nose, mouth and face             Social distance ( 6 feet)             Stay at home as much as possible             Call health care provider with any concerns                                                                                                                                                                                                                                                                                                                                           Electronically signed by Imtiaz Nascimento DPM on 5/17/2021 at 1:35 PM

## 2021-05-24 ENCOUNTER — HOSPITAL ENCOUNTER (OUTPATIENT)
Dept: WOUND CARE | Age: 71
Discharge: HOME | End: 2021-05-24
Payer: MEDICARE

## 2021-05-24 VITALS
BODY MASS INDEX: 40.43 KG/M2 | SYSTOLIC BLOOD PRESSURE: 124 MMHG | RESPIRATION RATE: 18 BRPM | HEART RATE: 78 BPM | HEIGHT: 74 IN | TEMPERATURE: 96.9 F | DIASTOLIC BLOOD PRESSURE: 70 MMHG | WEIGHT: 315 LBS

## 2021-05-24 DIAGNOSIS — L97.911 NON-PRESSURE CHRONIC ULCER OF RIGHT LOWER LEG, LIMITED TO BREAKDOWN OF SKIN (HCC): Primary | ICD-10-CM

## 2021-05-24 PROCEDURE — 11045 DBRDMT SUBQ TISS EACH ADDL: CPT

## 2021-05-24 PROCEDURE — 6370000000 HC RX 637 (ALT 250 FOR IP): Performed by: PODIATRIST

## 2021-05-24 PROCEDURE — 11042 DBRDMT SUBQ TIS 1ST 20SQCM/<: CPT

## 2021-05-24 RX ORDER — LIDOCAINE HYDROCHLORIDE 40 MG/ML
SOLUTION TOPICAL ONCE
Status: CANCELLED | OUTPATIENT
Start: 2021-05-24 | End: 2021-05-24

## 2021-05-24 RX ORDER — BACITRACIN ZINC AND POLYMYXIN B SULFATE 500; 1000 [USP'U]/G; [USP'U]/G
OINTMENT TOPICAL ONCE
Status: CANCELLED | OUTPATIENT
Start: 2021-05-24 | End: 2021-05-24

## 2021-05-24 RX ORDER — LIDOCAINE HYDROCHLORIDE 40 MG/ML
SOLUTION TOPICAL ONCE
Status: COMPLETED | OUTPATIENT
Start: 2021-05-24 | End: 2021-05-24

## 2021-05-24 RX ORDER — GINSENG 100 MG
CAPSULE ORAL ONCE
Status: CANCELLED | OUTPATIENT
Start: 2021-05-24 | End: 2021-05-24

## 2021-05-24 RX ORDER — CLOBETASOL PROPIONATE 0.5 MG/G
OINTMENT TOPICAL ONCE
Status: CANCELLED | OUTPATIENT
Start: 2021-05-24 | End: 2021-05-24

## 2021-05-24 RX ORDER — BACITRACIN, NEOMYCIN, POLYMYXIN B 400; 3.5; 5 [USP'U]/G; MG/G; [USP'U]/G
OINTMENT TOPICAL ONCE
Status: CANCELLED | OUTPATIENT
Start: 2021-05-24 | End: 2021-05-24

## 2021-05-24 RX ORDER — LIDOCAINE 50 MG/G
OINTMENT TOPICAL ONCE
Status: CANCELLED | OUTPATIENT
Start: 2021-05-24 | End: 2021-05-24

## 2021-05-24 RX ORDER — BETAMETHASONE DIPROPIONATE 0.05 %
OINTMENT (GRAM) TOPICAL ONCE
Status: CANCELLED | OUTPATIENT
Start: 2021-05-24 | End: 2021-05-24

## 2021-05-24 RX ORDER — LIDOCAINE HYDROCHLORIDE 20 MG/ML
JELLY TOPICAL ONCE
Status: CANCELLED | OUTPATIENT
Start: 2021-05-24 | End: 2021-05-24

## 2021-05-24 RX ORDER — LIDOCAINE 40 MG/G
CREAM TOPICAL ONCE
Status: CANCELLED | OUTPATIENT
Start: 2021-05-24 | End: 2021-05-24

## 2021-05-24 RX ORDER — GENTAMICIN SULFATE 1 MG/G
OINTMENT TOPICAL ONCE
Status: CANCELLED | OUTPATIENT
Start: 2021-05-24 | End: 2021-05-24

## 2021-05-24 RX ADMIN — LIDOCAINE HYDROCHLORIDE 10 ML: 40 SOLUTION TOPICAL at 13:07

## 2021-05-24 NOTE — PROGRESS NOTES
 Heart Disease Mother     Heart Disease Father      Social History     Tobacco Use    Smoking status: Never Smoker    Smokeless tobacco: Never Used   Vaping Use    Vaping Use: Never used   Substance Use Topics    Alcohol use: No     Comment: on special occassions    Drug use: No     Allergies   Allergen Reactions    Metformin And Related Diarrhea     Current Outpatient Medications on File Prior to Encounter   Medication Sig Dispense Refill    aspirin (ASPIRIN CHILDRENS) 81 MG chewable tablet Take 1 tablet by mouth daily 30 tablet 5     No current facility-administered medications on file prior to encounter.        REVIEW OF SYSTEMS See HPI    Objective:    /70   Pulse 78   Temp 96.9 °F (36.1 °C) (Temporal)   Resp 18   Ht 6' 2\" (1.88 m)   Wt (!) 374 lb (169.6 kg)   BMI 48.02 kg/m²   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, limited to breakdown of skin ([address] - Primary    Relevant Orders    Initiate Outpatient Wound Care Protocol          Pre Debridement Measurements:  Are located in the [facility]  Documentation Flow Sheet  Post Debridement Measurements:  Wound/Ulcer Descriptions are Pre Debridement except measurements:     Wound 04/19/21 Leg Right;Lateral #1 (Active)   Wound Image   04/19/21 1323   Dressing Status New dressing applied 05/17/21 1330   Wound Cleansed Cleansed with saline 05/17/21 1330   Dressing/Treatment Alginate with Ag;ABD 05/17/21 1330   Offloading for Diabetic Foot Ulcers No offloading required 05/17/21 1330   Wound Length (cm)                                                                                                                                                                                                                                Electronically signed by Paola Hickman DPM on 5/24/2021 at 1:29 PM

## 2021-06-01 ENCOUNTER — HOSPITAL ENCOUNTER (OUTPATIENT)
Dept: WOUND CARE | Age: 71
Discharge: HOME | End: 2021-06-01
Payer: MEDICARE

## 2021-06-01 DIAGNOSIS — L97.911 NON-PRESSURE CHRONIC ULCER OF RIGHT LOWER LEG, LIMITED TO BREAKDOWN OF SKIN (HCC): Primary | ICD-10-CM

## 2021-06-01 PROCEDURE — 11042 DBRDMT SUBQ TIS 1ST 20SQCM/<: CPT | Performed by: SURGERY

## 2021-06-01 PROCEDURE — 11042 DBRDMT SUBQ TIS 1ST 20SQCM/<: CPT

## 2021-06-01 RX ORDER — BETAMETHASONE DIPROPIONATE 0.05 %
OINTMENT (GRAM) TOPICAL ONCE
Status: CANCELLED | OUTPATIENT
Start: 2021-06-01 | End: 2021-06-01

## 2021-06-01 RX ORDER — GINSENG 100 MG
CAPSULE ORAL ONCE
Status: CANCELLED | OUTPATIENT
Start: 2021-06-01 | End: 2021-06-01

## 2021-06-01 RX ORDER — BACITRACIN, NEOMYCIN, POLYMYXIN B 400; 3.5; 5 [USP'U]/G; MG/G; [USP'U]/G
OINTMENT TOPICAL ONCE
Status: CANCELLED | OUTPATIENT
Start: 2021-06-01 | End: 2021-06-01

## 2021-06-01 RX ORDER — CLOBETASOL PROPIONATE 0.5 MG/G
OINTMENT TOPICAL ONCE
Status: CANCELLED | OUTPATIENT
Start: 2021-06-01 | End: 2021-06-01

## 2021-06-01 RX ORDER — LIDOCAINE 40 MG/G
CREAM TOPICAL ONCE
Status: CANCELLED | OUTPATIENT
Start: 2021-06-01 | End: 2021-06-01

## 2021-06-01 RX ORDER — LIDOCAINE 50 MG/G
OINTMENT TOPICAL ONCE
Status: CANCELLED | OUTPATIENT
Start: 2021-06-01 | End: 2021-06-01

## 2021-06-01 RX ORDER — BACITRACIN ZINC AND POLYMYXIN B SULFATE 500; 1000 [USP'U]/G; [USP'U]/G
OINTMENT TOPICAL ONCE
Status: CANCELLED | OUTPATIENT
Start: 2021-06-01 | End: 2021-06-01

## 2021-06-01 RX ORDER — LIDOCAINE HYDROCHLORIDE 40 MG/ML
SOLUTION TOPICAL ONCE
Status: CANCELLED | OUTPATIENT
Start: 2021-06-01 | End: 2021-06-01

## 2021-06-01 RX ORDER — LIDOCAINE HYDROCHLORIDE 40 MG/ML
SOLUTION TOPICAL ONCE
Status: COMPLETED | OUTPATIENT
Start: 2021-06-01 | End: 2021-06-01

## 2021-06-01 RX ORDER — LIDOCAINE HYDROCHLORIDE 20 MG/ML
JELLY TOPICAL ONCE
Status: CANCELLED | OUTPATIENT
Start: 2021-06-01 | End: 2021-06-01

## 2021-06-01 RX ORDER — GENTAMICIN SULFATE 1 MG/G
OINTMENT TOPICAL ONCE
Status: CANCELLED | OUTPATIENT
Start: 2021-06-01 | End: 2021-06-01

## 2021-06-01 RX ADMIN — LIDOCAINE HYDROCHLORIDE: 40 SOLUTION TOPICAL at 11:28

## 2021-06-01 NOTE — PROGRESS NOTES
Wound Healing Center Followup Visit Note    Referring Physician : Disha Baca MD  [address] [place] [address] RECORD NUMBER:  [id number]  AGE: 79 y.o. GENDER: male  : 1950  EPISODE DATE:  2021    Subjective:     Chief Complaint   Patient presents with    Wound Check     RIGHT LOWER LEG      HISTORY of PRESENT ILLNESS HPI   Jered Mcneil is a 79 y.o. male who presents today in regards to follow up evaluation and treatment of wound/ulcer. That patient's past medical, family and social hx were reviewed and changes were made if present. History of Wound Context:  The patient has had a wound of right  which was first noted approximately 1 week ago.  This has not been treated. On their initial visit to the wound healing center, 2021,  the patient has noted that the wound has not been improving.  The patient has had similar previous wounds in the past.       Pt is not on abx at time of initial visit.     21 continue local care, elevate, compression. 5-3-21 wound covered with devitalized nonviable tissue, through sub q    21 wound improved, leg looks better, decreased edema    21 additional breakdown appreciated with increased in wound size along the posterior aspect right calf, through subcutaneous tissue covered with devitalized nonviable tissue. Patient advised no pressure.   2021  · Ulcer, right calf improving      Wound/Ulcer Pain Timing/Severity: none  Quality of pain:   Severity:   / 10   Modifying Factors:   Associated Signs/Symptoms:      Ulcer Identification:  Ulcer Type: venous  Contributing Factors: edema and venous stasis        PAST MEDICAL HISTORY      Diagnosis Date    Cellulitis of right lower leg     Diabetes mellitus (HCC)     Lymphedema     Obesity, Class III, BMI 40-49.9 (morbid obesity) [facility]     Popliteal aneurysm ([facility] 2017    Left     Past Surgical History:   Procedure Laterality Date    FRACTURE SURGERY  2005    right ankle repair Family History   Problem Relation Age of Onset    Heart Disease Mother     Heart Disease Father      Social History     Tobacco Use    Smoking status: Never Smoker    Smokeless tobacco: Never Used   Vaping Use    Vaping Use: Never used   Substance Use Topics    Alcohol use: No     Comment: on special occassions    Drug use: No     Allergies   Allergen Reactions    Metformin And Related Diarrhea     Current Outpatient Medications on File Prior to Encounter   Medication Sig Dispense Refill    aspirin (ASPIRIN CHILDRENS) 81 MG chewable tablet Take 1 tablet by mouth daily 30 tablet 5     No current facility-administered medications on file prior to encounter. REVIEW OF SYSTEMS See HPI    Objective: There were no vitals taken for this visit.   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, limited to breakdown of skin ([address] - Primary    Relevant Orders    Initiate Outpatient Wound Care Protocol          Pre Debridement Measurements:  Are located in the [place]  Documentation Flow Sheet  Post Debridement Measurements:  Wound/Ulcer Descriptions are Pre Debridement except measurements:     Wound 04/19/21 Leg Right;Lateral #1 (Active)   Wound Image   06/01/21 1122   Dressing Status New dressing applied 05/24/21 1425   Wound Cleansed Cleansed with saline 05/24/21 1425   Dressing/Treatment Alginate with Ag 05/24/21 1425   Offloading for Diabetic Foot Ulcers No offloading required 05/17/21 1330   Wound Length (cm) 8 cm 06/01/21 1122   Wound Width (cm) 8.1 cm 06/01/21 1122   Wound Depth (cm) 0.3 cm 06/01/21 1122   Wound Surface Area (cm^2) 64.8 cm^2 06/01/21 1122   Change in Wound Size % (l*w) 64 06/01/21 1122   Wound Volume (cm^3) 19.44 cm^3 06/01/21 1122   Wound Healing % 46 06/01/21 1122   Post-Procedure Length (cm) 8 cm 06/01/21 1141   Post-Procedure Width (cm) 8.1 cm 06/01/21 1141   Post-Procedure Depth (cm) 0.3 cm 06/01/21 1141   Post-Procedure Surface Area (cm^2) 64.8 cm^2 06/01/21 1141   Post-Procedure Volume (cm^3) 19.44 cm^3 06/01/21 1141   Wound Assessment Pale granulation tissue;Fibrin 06/01/21 1122   Drainage Amount Moderate 06/01/21 1122   Drainage Description Yellow;Serous 06/01/21 1122   Odor None 06/01/21 1122   Colleen-wound Assessment Maceration 06/01/21 1122   Number of days: 42          Procedure Note  Indications:  Based on my examination of this patient's wound(s)/ulcer(s) today, debridement is required to promote healing and evaluate the wound base. Performed by: Roger Carr MD    Consent obtained:  Yes    Time out taken:  Yes    Pain Control: Anesthetic  Anesthetic: 4% Lidocaine Liquid Topical     Debridement:Excisional Debridement    Using curette the wound(s)/ulcer(s) was/were sharply debrided down through and including the removal of subcutaneous tissue. Devitalized Tissue Debrided:  fibrin, biofilm, slough and necrotic/eschar to stimulate bleeding to promote healing, post debridement good bleeding base and wound edges noted    Wound/Ulcer #: 1    Percent of Wound/Ulcer Debrided: 30%    Total Surface Area Debrided:  20 sq cm     Estimated Blood Loss:  Minimal  Hemostasis Achieved:  by pressure    Procedural Pain:  0  / 10   Post Procedural Pain:  0 / 10     Response to treatment:  Well tolerated by patient. Plan:   Treatment Note please see attached Discharge Instructions    Written patient dismissal instructions given to patient and signed by patient or POA.          Discharge Instructions       Visit Discharge/Physician Orders     Discharge condition: Stable     Assessment of pain at discharge:none     Anesthetic used: 4% lidocaine     Discharge to: Home     Left via:public transportation     Accompanied by: accompanied by self     ECF/HHA:      Dressing Orders:LEFT LOWER LEG CLEANSE WITH BETADINE APPLY ALGINATE ag  AND PROFORE WRAP CHANGE WEEKLY AT WOUND CARE CENTER.     Treatment Orders:  Eat foods high in protein and vitamin c     Take multivitamin daily     Use compression pumps     [facility] followup visit _______1 week ___________________  (Please note your next appointment above and if you are unable to keep, kindly give a 24 hour notice. Thank you.)     Physician signature:__________________________        If you experience any of the following, please call the [facility] during business hours:     * Increase in Pain  * Temperature over 101  * Increase in drainage from your wound  * Drainage with a foul odor  * Bleeding  * Increase in swelling  * Need for compression bandage changes due to slippage, breakthrough drainage.     If you need medical attention outside of the business hours of the [facility] please contact your PCP or go to the nearest emergency room. Contact your doctor if you have a temperature above 100.4 with any of the following:                         Persistent cough             Shortness of breath            Chills            Fatigue            Chest pain or pressure            Difficulty breathing            Decreased consciousness of confusion             Headache     Recommend:                       Wash hands often and for 20 seconds or more             Avoid touching eyes, nose, mouth and face             Social distance ( 6 feet)             Stay at home as much as possible             Call health care provider with any concerns                                                                                                                                                                                                                                                                                                                                                                           Electronically signed by Marco Goldberg MD on 6/1/2021 at 11:45 AM

## 2021-06-07 ENCOUNTER — HOSPITAL ENCOUNTER (OUTPATIENT)
Dept: WOUND CARE | Age: 71
Discharge: HOME | End: 2021-06-07
Payer: MEDICARE

## 2021-06-07 VITALS
RESPIRATION RATE: 18 BRPM | SYSTOLIC BLOOD PRESSURE: 126 MMHG | HEIGHT: 74 IN | HEART RATE: 72 BPM | TEMPERATURE: 97.5 F | DIASTOLIC BLOOD PRESSURE: 80 MMHG | BODY MASS INDEX: 40.43 KG/M2 | WEIGHT: 315 LBS

## 2021-06-07 DIAGNOSIS — L97.911 NON-PRESSURE CHRONIC ULCER OF RIGHT LOWER LEG, LIMITED TO BREAKDOWN OF SKIN (HCC): Primary | ICD-10-CM

## 2021-06-07 PROCEDURE — 11042 DBRDMT SUBQ TIS 1ST 20SQCM/<: CPT

## 2021-06-07 RX ORDER — BACITRACIN ZINC AND POLYMYXIN B SULFATE 500; 1000 [USP'U]/G; [USP'U]/G
OINTMENT TOPICAL ONCE
Status: CANCELLED | OUTPATIENT
Start: 2021-06-07 | End: 2021-06-07

## 2021-06-07 RX ORDER — GINSENG 100 MG
CAPSULE ORAL ONCE
Status: CANCELLED | OUTPATIENT
Start: 2021-06-07 | End: 2021-06-07

## 2021-06-07 RX ORDER — LIDOCAINE HYDROCHLORIDE 40 MG/ML
SOLUTION TOPICAL ONCE
Status: CANCELLED | OUTPATIENT
Start: 2021-06-07 | End: 2021-06-07

## 2021-06-07 RX ORDER — LIDOCAINE 40 MG/G
CREAM TOPICAL ONCE
Status: CANCELLED | OUTPATIENT
Start: 2021-06-07 | End: 2021-06-07

## 2021-06-07 RX ORDER — LIDOCAINE HYDROCHLORIDE 20 MG/ML
JELLY TOPICAL ONCE
Status: CANCELLED | OUTPATIENT
Start: 2021-06-07 | End: 2021-06-07

## 2021-06-07 RX ORDER — BACITRACIN, NEOMYCIN, POLYMYXIN B 400; 3.5; 5 [USP'U]/G; MG/G; [USP'U]/G
OINTMENT TOPICAL ONCE
Status: CANCELLED | OUTPATIENT
Start: 2021-06-07 | End: 2021-06-07

## 2021-06-07 RX ORDER — GENTAMICIN SULFATE 1 MG/G
OINTMENT TOPICAL ONCE
Status: CANCELLED | OUTPATIENT
Start: 2021-06-07 | End: 2021-06-07

## 2021-06-07 RX ORDER — CLOBETASOL PROPIONATE 0.5 MG/G
OINTMENT TOPICAL ONCE
Status: CANCELLED | OUTPATIENT
Start: 2021-06-07 | End: 2021-06-07

## 2021-06-07 RX ORDER — LIDOCAINE HYDROCHLORIDE 40 MG/ML
SOLUTION TOPICAL ONCE
Status: COMPLETED | OUTPATIENT
Start: 2021-06-07 | End: 2021-06-07

## 2021-06-07 RX ORDER — BETAMETHASONE DIPROPIONATE 0.05 %
OINTMENT (GRAM) TOPICAL ONCE
Status: CANCELLED | OUTPATIENT
Start: 2021-06-07 | End: 2021-06-07

## 2021-06-07 RX ORDER — LIDOCAINE 50 MG/G
OINTMENT TOPICAL ONCE
Status: CANCELLED | OUTPATIENT
Start: 2021-06-07 | End: 2021-06-07

## 2021-06-07 RX ADMIN — LIDOCAINE HYDROCHLORIDE: 40 SOLUTION TOPICAL at 13:13

## 2021-06-07 NOTE — PROGRESS NOTES
Wound Healing Center Followup Visit Note    Referring Physician : Caro Brown MD  [address] [place] [address] RECORD NUMBER:  [id number]  AGE: 79 y.o. GENDER: male  : 1950  EPISODE DATE:  2021    Subjective:     Chief Complaint   Patient presents with    Wound Check     right leg      HISTORY of PRESENT ILLNESS HPI   Kady Hart is a 79 y.o. male who presents today in regards to follow up evaluation and treatment of wound/ulcer. That patient's past medical, family and social hx were reviewed and changes were made if present. History of Wound Context:  The patient has had a wound of right  which was first noted approximately 1 week ago.  This has not been treated. On their initial visit to the wound healing center, 2021,  the patient has noted that the wound has not been improving.  The patient has had similar previous wounds in the past.       Pt is not on abx at time of initial visit.     21 continue local care, elevate, compression. 5-3-21 wound covered with devitalized nonviable tissue, through sub q    21 wound improved, leg looks better, decreased edema    21 additional breakdown appreciated with increased in wound size along the posterior aspect right calf, through subcutaneous tissue covered with devitalized nonviable tissue. Patient advised no pressure.     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     Past Surgical History:   Procedure Laterality Date    FRACTURE SURGERY   Diabetic Foot Ulcers No offloading required 05/17/21 1330   Wound Length (cm) 2.8 cm 06/07/21 1315   Wound Width (cm) 1.9 cm 06/07/21 1315   Wound Depth (cm) 0.3 cm 06/07/21 1315   Wound Surface Area (cm^2) 5.32 cm^2 06/07/21 1315   Change in Wound Size % (l*w) 97.04 06/07/21 1315   Wound Volume (cm^3) 1.6 cm^3 06/07/21 1315   Wound Healing % 96 06/07/21 1315   Post-Procedure Length (cm) 2.8 cm 06/07/21 1327   Post-Procedure Width (cm) 1.9 cm 06/07/21 1327   Post-Procedure Depth (cm) 0.3 cm 06/07/21 1327   Post-Procedure Surface Area (cm^2) 5.32 cm^2 06/07/21 1327   Post-Procedure Volume (cm^3) 1.6 cm^3 06/07/21 1327   Wound Assessment Pale granulation tissue;Fibrin 06/07/21 1315   Drainage Amount Moderate 06/07/21 1315   Drainage Description Yellow;Serosanguinous 06/07/21 1315   Odor None 06/07/21 1315   Colleen-wound Assessment Dry/flaky; Intact 06/07/21 1315   Number of days: 49          Procedure Note  Indications:  Based on my examination of this patient's wound(s)/ulcer(s) today, debridement is required to promote healing and evaluate the wound base. Performed by: Fili Burroughs DPM    Consent obtained:  Yes    Time out taken:  Yes    Pain Control: Anesthetic  Anesthetic: 4% Lidocaine Liquid Topical     Debridement:Excisional Debridement    Using curette the wound(s)/ulcer(s) was/were sharply debrided down through and including the removal of subcutaneous tissue. Devitalized Tissue Debrided:  fibrin, biofilm, slough and necrotic/eschar to stimulate bleeding to promote healing, post debridement good bleeding base and wound edges noted    Wound/Ulcer #: 1    Percent of Wound/Ulcer Debrided: 100%    Total Surface Area Debrided:  5.32 sq cm     Estimated Blood Loss:  Minimal  Hemostasis Achieved:  by pressure    Procedural Pain:  0  / 10   Post Procedural Pain:  0 / 10     Response to treatment:  Well tolerated by patient.      Plan:   Treatment Note please see attached Discharge Instructions    Written patient dismissal instructions given to patient and signed by patient or POA. Discharge Instructions         Visit Discharge/Physician Orders     Discharge condition: Stable     Assessment of pain at discharge:none     Anesthetic used: 4% lidocaine     Discharge to: Home     Left via:public transportation     Accompanied by: accompanied by self     ECF/HHA:      Dressing Orders:LEFT LOWER LEG CLEANSE WITH BETADINE APPLY ALGINATE ag  AND PROFORE WRAP CHANGE WEEKLY AT WOUND CARE CENTER.     Treatment Orders:  Eat foods high in protein and vitamin c     Take multivitamin daily     Use compression pumps     [facility] followup visit _______1 week ___________________  (Please note your next appointment above and if you are unable to keep, kindly give a 24 hour notice. Thank you.)     Physician signature:__________________________        If you experience any of the following, please call the [facility] during business hours:     * Increase in Pain  * Temperature over 101  * Increase in drainage from your wound  * Drainage with a foul odor  * Bleeding  * Increase in swelling  * Need for compression bandage changes due to slippage, breakthrough drainage.     If you need medical attention outside of the business hours of the [address] please contact your PCP or go to the nearest emergency room. Contact your doctor if you have a temperature above 100.4 with any of the following:                         Persistent cough             Shortness of breath            Chills            Fatigue            Chest pain or pressure            Difficulty breathing            Decreased consciousness of confusion             Headache     Recommend:                       Wash hands often and for 20 seconds or more             Avoid touching eyes, nose, mouth and face             Social distance ( 6 feet)             Stay at home as much as possible             Call health care provider with any concerns                                                                                                                                                                                                                  Electronically signed by Lyudmila Ahuja DPM on 6/7/2021 at 1:31 PM

## 2021-06-14 ENCOUNTER — HOSPITAL ENCOUNTER (OUTPATIENT)
Dept: WOUND CARE | Age: 71
Discharge: HOME | End: 2021-06-14
Payer: MEDICARE

## 2021-06-14 VITALS
RESPIRATION RATE: 18 BRPM | WEIGHT: 315 LBS | HEART RATE: 76 BPM | BODY MASS INDEX: 40.43 KG/M2 | TEMPERATURE: 97.3 F | HEIGHT: 74 IN

## 2021-06-14 DIAGNOSIS — L97.911 NON-PRESSURE CHRONIC ULCER OF RIGHT LOWER LEG, LIMITED TO BREAKDOWN OF SKIN (HCC): Primary | ICD-10-CM

## 2021-06-14 PROCEDURE — 6370000000 HC RX 637 (ALT 250 FOR IP): Performed by: PODIATRIST

## 2021-06-14 PROCEDURE — 11042 DBRDMT SUBQ TIS 1ST 20SQCM/<: CPT

## 2021-06-14 RX ORDER — LIDOCAINE HYDROCHLORIDE 40 MG/ML
SOLUTION TOPICAL ONCE
Status: CANCELLED | OUTPATIENT
Start: 2021-06-14 | End: 2021-06-14

## 2021-06-14 RX ORDER — LIDOCAINE HYDROCHLORIDE 20 MG/ML
JELLY TOPICAL ONCE
Status: CANCELLED | OUTPATIENT
Start: 2021-06-14 | End: 2021-06-14

## 2021-06-14 RX ORDER — LIDOCAINE 40 MG/G
CREAM TOPICAL ONCE
Status: CANCELLED | OUTPATIENT
Start: 2021-06-14 | End: 2021-06-14

## 2021-06-14 RX ORDER — GINSENG 100 MG
CAPSULE ORAL ONCE
Status: CANCELLED | OUTPATIENT
Start: 2021-06-14 | End: 2021-06-14

## 2021-06-14 RX ORDER — LIDOCAINE 50 MG/G
OINTMENT TOPICAL ONCE
Status: CANCELLED | OUTPATIENT
Start: 2021-06-14 | End: 2021-06-14

## 2021-06-14 RX ORDER — CLOBETASOL PROPIONATE 0.5 MG/G
OINTMENT TOPICAL ONCE
Status: CANCELLED | OUTPATIENT
Start: 2021-06-14 | End: 2021-06-14

## 2021-06-14 RX ORDER — BETAMETHASONE DIPROPIONATE 0.05 %
OINTMENT (GRAM) TOPICAL ONCE
Status: CANCELLED | OUTPATIENT
Start: 2021-06-14 | End: 2021-06-14

## 2021-06-14 RX ORDER — LIDOCAINE HYDROCHLORIDE 40 MG/ML
SOLUTION TOPICAL ONCE
Status: COMPLETED | OUTPATIENT
Start: 2021-06-14 | End: 2021-06-14

## 2021-06-14 RX ORDER — BACITRACIN, NEOMYCIN, POLYMYXIN B 400; 3.5; 5 [USP'U]/G; MG/G; [USP'U]/G
OINTMENT TOPICAL ONCE
Status: CANCELLED | OUTPATIENT
Start: 2021-06-14 | End: 2021-06-14

## 2021-06-14 RX ORDER — GENTAMICIN SULFATE 1 MG/G
OINTMENT TOPICAL ONCE
Status: CANCELLED | OUTPATIENT
Start: 2021-06-14 | End: 2021-06-14

## 2021-06-14 RX ORDER — BACITRACIN ZINC AND POLYMYXIN B SULFATE 500; 1000 [USP'U]/G; [USP'U]/G
OINTMENT TOPICAL ONCE
Status: CANCELLED | OUTPATIENT
Start: 2021-06-14 | End: 2021-06-14

## 2021-06-14 RX ADMIN — LIDOCAINE HYDROCHLORIDE 2 ML: 40 SOLUTION TOPICAL at 13:31

## 2021-06-14 ASSESSMENT — PAIN SCALES - GENERAL: PAINLEVEL_OUTOF10: 0

## 2021-06-14 NOTE — PROGRESS NOTES
Laterality Date    FRACTURE SURGERY  2005    right ankle repair     Family History   Problem Relation Age of Onset    Heart Disease Mother     Heart Disease Father      Social History     Tobacco Use    Smoking status: Never Smoker    Smokeless tobacco: Never Used   Vaping Use    Vaping Use: Never used   Substance Use Topics    Alcohol use: No     Comment: on special occassions    Drug use: No     Allergies   Allergen Reactions    Metformin And Related Diarrhea     Current Outpatient Medications on File Prior to Encounter   Medication Sig Dispense Refill    aspirin (ASPIRIN CHILDRENS) 81 MG chewable tablet Take 1 tablet by mouth daily 30 tablet 5     No current facility-administered medications on file prior to encounter.        REVIEW OF SYSTEMS See HPI    Objective:    Pulse 76   Temp 97.3 °F (36.3 °C) (Temporal)   Resp 18   Ht 6' 2\" (1.88 m)   Wt (!) 374 lb (169.6 kg)   BMI 48.02 kg/m²   Wt Readings from Last 3 Encounters:   06/14/21 (!) 374 lb (169.6 kg)   06/07/21 (!) 374 lb (169.6 kg)   05/24/21 (!) 374 lb (169.6 kg)     PHYSICAL EXAM  CONSTITUTIONAL:   Awake, alert, cooperative   EYES:  lids and lashes normal   ENT: external ears and nose without lesions   NECK:  supple, symmetrical, trachea midline   SKIN:  Open wound covered with 50% devitalized nonviable tissue    Assessment:     Venous ulcer right calf    Pre Debridement Measurements:  Are located in the [place]  Documentation Flow Sheet  Post Debridement Measurements:  Wound/Ulcer Descriptions are Pre Debridement except measurements:     Wound 04/19/21 Leg Right;Lateral #1 (Active)   Wound Image   06/01/21 1122   Dressing Status New dressing applied 06/07/21 1416   Wound Cleansed Betadine/povidone iodine 06/07/21 1416   Dressing/Treatment Alginate with Ag;ABD 06/07/21 1416   Offloading for Diabetic Foot Ulcers Walker 06/07/21 1416   Wound Length (cm) 1.4 cm 06/14/21 1329   Wound Width (cm) 0.3 cm 06/14/21 1329   Wound Depth (cm) 0.3 cm 06/14/21 1329   Wound Surface Area (cm^2) 0.42 cm^2 06/14/21 1329   Change in Wound Size % (l*w) 99.77 06/14/21 1329   Wound Volume (cm^3) 0.13 cm^3 06/14/21 1329   Wound Healing % 100 06/14/21 1329   Post-Procedure Length (cm) 2 cm 06/14/21 1341   Post-Procedure Width (cm) 0.5 cm 06/14/21 1341   Post-Procedure Depth (cm) 0.3 cm 06/14/21 1341   Post-Procedure Surface Area (cm^2) 1 cm^2 06/14/21 1341   Post-Procedure Volume (cm^3) 0.3 cm^3 06/14/21 1341   Wound Assessment Pale granulation tissue 06/14/21 1329   Drainage Amount Moderate 06/14/21 1329   Drainage Description Yellow 06/14/21 1329   Odor None 06/14/21 1329   Colleen-wound Assessment Fragile 06/14/21 1329   Number of days: 56          Procedure Note  Indications:  Based on my examination of this patient's wound(s)/ulcer(s) today, debridement is required to promote healing and evaluate the wound base. Performed by: Heath Teran DPM    Consent obtained:  Yes    Time out taken:  Yes    Pain Control: Anesthetic  Anesthetic: 4% Lidocaine Liquid Topical     Debridement:Excisional Debridement    Using curette the wound(s)/ulcer(s) was/were sharply debrided down through and including the removal of subcutaneous tissue. Devitalized Tissue Debrided:  fibrin, biofilm, slough and necrotic/eschar to stimulate bleeding to promote healing, post debridement good bleeding base and wound edges noted    Wound/Ulcer #: 1    Percent of Wound/Ulcer Debrided: 100%    Total Surface Area Debrided:  0.42 sq cm     Estimated Blood Loss:  Minimal  Hemostasis Achieved:  by pressure    Procedural Pain:  0  / 10   Post Procedural Pain:  0 / 10     Response to treatment:  Well tolerated by patient. Plan:   Treatment Note please see attached Discharge Instructions    Written patient dismissal instructions given to patient and signed by patient or POA.          Discharge Instructions           Visit Discharge/Physician Orders     Discharge condition: Stable     Assessment of pain at discharge:none     Anesthetic used: 4% lidocaine     Discharge to: Home     Left via:public transportation     Accompanied by: accompanied by self     ECF/HHA:      Dressing Orders:LEFT LOWER LEG CLEANSE WITH BETADINE APPLY ALGINATE ag  AND PROFORE WRAP CHANGE WEEKLY AT WOUND CARE CENTER.     Treatment Orders:  Eat foods high in protein and vitamin c     Take multivitamin daily     Use compression pumps     [facility] followup visit _______1 week ___________________  (Please note your next appointment above and if you are unable to keep, kindly give a 24 hour notice. Thank you.)     Physician signature:__________________________        If you experience any of the following, please call the [facility] during business hours:     * Increase in Pain  * Temperature over 101  * Increase in drainage from your wound  * Drainage with a foul odor  * Bleeding  * Increase in swelling  * Need for compression bandage changes due to slippage, breakthrough drainage.     If you need medical attention outside of the business hours of the [facility] please contact your PCP or go to the nearest emergency room. Contact your doctor if you have a temperature above 100.4 with any of the following:                         Persistent cough             Shortness of breath            Chills            Fatigue            Chest pain or pressure            Difficulty breathing            Decreased consciousness of confusion             Headache     Recommend:                       Wash hands often and for 20 seconds or more             Avoid touching eyes, nose, mouth and face             Social distance ( 6 feet)             Stay at home as much as possible             Call health care provider with any concerns                                                                                                                                                                                           Electronically signed by Neville Swift Lana Salinas on 6/14/2021 at 1:43 PM

## 2021-06-21 ENCOUNTER — HOSPITAL ENCOUNTER (OUTPATIENT)
Dept: WOUND CARE | Age: 71
Discharge: HOME | End: 2021-06-21
Payer: MEDICARE

## 2021-06-21 VITALS
TEMPERATURE: 98.1 F | RESPIRATION RATE: 18 BRPM | DIASTOLIC BLOOD PRESSURE: 72 MMHG | WEIGHT: 315 LBS | BODY MASS INDEX: 40.43 KG/M2 | HEART RATE: 74 BPM | SYSTOLIC BLOOD PRESSURE: 142 MMHG | HEIGHT: 74 IN

## 2021-06-21 DIAGNOSIS — L97.911 NON-PRESSURE CHRONIC ULCER OF RIGHT LOWER LEG, LIMITED TO BREAKDOWN OF SKIN (HCC): Primary | ICD-10-CM

## 2021-06-21 PROCEDURE — 11042 DBRDMT SUBQ TIS 1ST 20SQCM/<: CPT

## 2021-06-21 PROCEDURE — 6370000000 HC RX 637 (ALT 250 FOR IP): Performed by: PODIATRIST

## 2021-06-21 RX ORDER — BACITRACIN ZINC AND POLYMYXIN B SULFATE 500; 1000 [USP'U]/G; [USP'U]/G
OINTMENT TOPICAL ONCE
Status: CANCELLED | OUTPATIENT
Start: 2021-06-21 | End: 2021-06-21

## 2021-06-21 RX ORDER — CLOBETASOL PROPIONATE 0.5 MG/G
OINTMENT TOPICAL ONCE
Status: CANCELLED | OUTPATIENT
Start: 2021-06-21 | End: 2021-06-21

## 2021-06-21 RX ORDER — LIDOCAINE 50 MG/G
OINTMENT TOPICAL ONCE
Status: CANCELLED | OUTPATIENT
Start: 2021-06-21 | End: 2021-06-21

## 2021-06-21 RX ORDER — GINSENG 100 MG
CAPSULE ORAL ONCE
Status: CANCELLED | OUTPATIENT
Start: 2021-06-21 | End: 2021-06-21

## 2021-06-21 RX ORDER — BETAMETHASONE DIPROPIONATE 0.05 %
OINTMENT (GRAM) TOPICAL ONCE
Status: CANCELLED | OUTPATIENT
Start: 2021-06-21 | End: 2021-06-21

## 2021-06-21 RX ORDER — BACITRACIN, NEOMYCIN, POLYMYXIN B 400; 3.5; 5 [USP'U]/G; MG/G; [USP'U]/G
OINTMENT TOPICAL ONCE
Status: CANCELLED | OUTPATIENT
Start: 2021-06-21 | End: 2021-06-21

## 2021-06-21 RX ORDER — GENTAMICIN SULFATE 1 MG/G
OINTMENT TOPICAL ONCE
Status: CANCELLED | OUTPATIENT
Start: 2021-06-21 | End: 2021-06-21

## 2021-06-21 RX ORDER — LIDOCAINE HYDROCHLORIDE 40 MG/ML
SOLUTION TOPICAL ONCE
Status: CANCELLED | OUTPATIENT
Start: 2021-06-21 | End: 2021-06-21

## 2021-06-21 RX ORDER — LIDOCAINE 40 MG/G
CREAM TOPICAL ONCE
Status: CANCELLED | OUTPATIENT
Start: 2021-06-21 | End: 2021-06-21

## 2021-06-21 RX ORDER — LIDOCAINE HYDROCHLORIDE 20 MG/ML
JELLY TOPICAL ONCE
Status: CANCELLED | OUTPATIENT
Start: 2021-06-21 | End: 2021-06-21

## 2021-06-21 RX ORDER — LIDOCAINE HYDROCHLORIDE 40 MG/ML
SOLUTION TOPICAL ONCE
Status: COMPLETED | OUTPATIENT
Start: 2021-06-21 | End: 2021-06-21

## 2021-06-21 RX ADMIN — LIDOCAINE HYDROCHLORIDE 10 ML: 40 SOLUTION TOPICAL at 13:49

## 2021-06-21 ASSESSMENT — PAIN SCALES - GENERAL: PAINLEVEL_OUTOF10: 0

## 2021-06-21 NOTE — PROGRESS NOTES
aneurysm ([facility] [address] 8/23/2017    Left     Past Surgical History:   Procedure Laterality Date    FRACTURE SURGERY  2005    right ankle repair     Family History   Problem Relation Age of Onset    Heart Disease Mother     Heart Disease Father      Social History     Tobacco Use    Smoking status: Never Smoker    Smokeless tobacco: Never Used   Vaping Use    Vaping Use: Never used   Substance Use Topics    Alcohol use: No     Comment: on special occassions    Drug use: No     Allergies   Allergen Reactions    Metformin And Related Diarrhea     Current Outpatient Medications on File Prior to Encounter   Medication Sig Dispense Refill    aspirin (ASPIRIN CHILDRENS) 81 MG chewable tablet Take 1 tablet by mouth daily 30 tablet 5     No current facility-administered medications on file prior to encounter.        REVIEW OF SYSTEMS See HPI    Objective:    BP (!) 142/72   Pulse 74   Temp 98.1 °F (36.7 °C) (Temporal)   Resp 18   Ht 6' 2\" (1.88 m)   Wt (!) 374 lb (169.6 kg)   BMI 48.02 kg/m²   Wt Readings from Last 3 Encounters:   06/21/21 (!) 374 lb (169.6 kg)   06/14/21 (!) 374 lb (169.6 kg)   06/07/21 (!) 374 lb (169.6 kg)     PHYSICAL EXAM  CONSTITUTIONAL:   Awake, alert, cooperative   EYES:  lids and lashes normal   ENT: external ears and nose without lesions   NECK:  supple, symmetrical, trachea midline   SKIN:  Open wound     Assessment:     Venous ulcer left calf    Pre Debridement Measurements:  Are located in the [place]  Documentation Flow Sheet  Post Debridement Measurements:  Wound/Ulcer Descriptions are Pre Debridement except measurements:     Wound 04/19/21 Leg Right;Lateral #1 (Active)   Wound Image   06/21/21 1331   Dressing Status New dressing applied 06/14/21 1432   Wound Cleansed Betadine/povidone iodine 06/14/21 1432   Dressing/Treatment Alginate with Ag 06/14/21 1432   Offloading for Diabetic Foot Ulcers Walker 06/07/21 1416   Wound Length (cm) 0 cm 06/21/21 1331   Wound Width (cm) 0 cm including the removal of subcutaneous tissue. Devitalized Tissue Debrided:  fibrin, biofilm, slough and necrotic/eschar to stimulate bleeding to promote healing, post debridement good bleeding base and wound edges noted    Wound/Ulcer #: 2    Percent of Wound/Ulcer Debrided: 50%    Total Surface Area Debrided:  16.62 sq cm     Estimated Blood Loss:  Minimal  Hemostasis Achieved:  by pressure    Procedural Pain:  0  / 10   Post Procedural Pain:  0 / 10     Response to treatment:  Well tolerated by patient. Plan:   Treatment Note please see attached Discharge Instructions    Written patient dismissal instructions given to patient and signed by patient or POA. Discharge Instructions       Visit Discharge/Physician Orders     Discharge condition: Stable     Assessment of pain at discharge:none     Anesthetic used: 4% lidocaine     Discharge to: Home     Left via:public transportation     Accompanied by: accompanied by self     ECF/HHA:      Dressing Orders:LEFT/RIGHT LOWER LEG CLEANSE WITH BETADINE APPLY ALGINATE ag  AND PROFORE WRAP CHANGE WEEKLY AT WOUND CARE CENTER.     Treatment Orders:  Eat foods high in protein and vitamin c     Take multivitamin daily     Use compression pumps     [facility] followup visit _______1 week ___________________  (Please note your next appointment above and if you are unable to keep, kindly give a 24 hour notice. Thank you.)     Physician signature:__________________________        If you experience any of the following, please call the [facility] during business hours:     * Increase in Pain  * Temperature over 101  * Increase in drainage from your wound  * Drainage with a foul odor  * Bleeding  * Increase in swelling  * Need for compression bandage changes due to slippage, breakthrough drainage.     If you need medical attention outside of the business hours of the [facility] please contact your PCP or go to the nearest emergency room. Contact your doctor if

## 2021-07-01 ENCOUNTER — HOSPITAL ENCOUNTER (OUTPATIENT)
Dept: WOUND CARE | Age: 71
Discharge: HOME | End: 2021-07-01
Payer: MEDICARE

## 2021-07-01 VITALS
DIASTOLIC BLOOD PRESSURE: 80 MMHG | BODY MASS INDEX: 40.43 KG/M2 | TEMPERATURE: 98.6 F | SYSTOLIC BLOOD PRESSURE: 144 MMHG | HEIGHT: 74 IN | HEART RATE: 74 BPM | RESPIRATION RATE: 18 BRPM | WEIGHT: 315 LBS

## 2021-07-01 DIAGNOSIS — L97.911 NON-PRESSURE CHRONIC ULCER OF RIGHT LOWER LEG, LIMITED TO BREAKDOWN OF SKIN (HCC): Primary | ICD-10-CM

## 2021-07-01 PROCEDURE — 11042 DBRDMT SUBQ TIS 1ST 20SQCM/<: CPT | Performed by: NURSE PRACTITIONER

## 2021-07-01 PROCEDURE — 6370000000 HC RX 637 (ALT 250 FOR IP): Performed by: PODIATRIST

## 2021-07-01 RX ORDER — LIDOCAINE HYDROCHLORIDE 20 MG/ML
JELLY TOPICAL ONCE
Status: CANCELLED | OUTPATIENT
Start: 2021-07-01 | End: 2021-07-01

## 2021-07-01 RX ORDER — GINSENG 100 MG
CAPSULE ORAL ONCE
Status: CANCELLED | OUTPATIENT
Start: 2021-07-01 | End: 2021-07-01

## 2021-07-01 RX ORDER — CLOBETASOL PROPIONATE 0.5 MG/G
OINTMENT TOPICAL ONCE
Status: CANCELLED | OUTPATIENT
Start: 2021-07-01 | End: 2021-07-01

## 2021-07-01 RX ORDER — LIDOCAINE 50 MG/G
OINTMENT TOPICAL ONCE
Status: CANCELLED | OUTPATIENT
Start: 2021-07-01 | End: 2021-07-01

## 2021-07-01 RX ORDER — LIDOCAINE HYDROCHLORIDE 40 MG/ML
SOLUTION TOPICAL ONCE
Status: COMPLETED | OUTPATIENT
Start: 2021-07-01 | End: 2021-07-01

## 2021-07-01 RX ORDER — BETAMETHASONE DIPROPIONATE 0.05 %
OINTMENT (GRAM) TOPICAL ONCE
Status: CANCELLED | OUTPATIENT
Start: 2021-07-01 | End: 2021-07-01

## 2021-07-01 RX ORDER — LIDOCAINE 40 MG/G
CREAM TOPICAL ONCE
Status: CANCELLED | OUTPATIENT
Start: 2021-07-01 | End: 2021-07-01

## 2021-07-01 RX ORDER — GENTAMICIN SULFATE 1 MG/G
OINTMENT TOPICAL ONCE
Status: CANCELLED | OUTPATIENT
Start: 2021-07-01 | End: 2021-07-01

## 2021-07-01 RX ORDER — LIDOCAINE HYDROCHLORIDE 40 MG/ML
SOLUTION TOPICAL ONCE
Status: CANCELLED | OUTPATIENT
Start: 2021-07-01 | End: 2021-07-01

## 2021-07-01 RX ORDER — BACITRACIN, NEOMYCIN, POLYMYXIN B 400; 3.5; 5 [USP'U]/G; MG/G; [USP'U]/G
OINTMENT TOPICAL ONCE
Status: CANCELLED | OUTPATIENT
Start: 2021-07-01 | End: 2021-07-01

## 2021-07-01 RX ORDER — BACITRACIN ZINC AND POLYMYXIN B SULFATE 500; 1000 [USP'U]/G; [USP'U]/G
OINTMENT TOPICAL ONCE
Status: CANCELLED | OUTPATIENT
Start: 2021-07-01 | End: 2021-07-01

## 2021-07-01 RX ADMIN — LIDOCAINE HYDROCHLORIDE 10 ML: 40 SOLUTION TOPICAL at 10:49

## 2021-07-01 ASSESSMENT — PAIN SCALES - GENERAL: PAINLEVEL_OUTOF10: 0

## 2021-07-01 NOTE — PROGRESS NOTES
Wound Healing Center Followup Visit Note    Referring Physician : Viktoriya Reza MD  [address] [place] [address] [place] RECORD NUMBER:  [id number]  AGE: 79 y.o. GENDER: male  : 1950  EPISODE DATE:  2021    Subjective:     Chief Complaint   Patient presents with    Wound Check     bilateral lower legs      HISTORY of PRESENT ILLNESS HPI   Armani Garibay is a 79 y.o. male who presents today in regards to follow up evaluation and treatment of wound/ulcer. That patient's past medical, family and social hx were reviewed and changes were made if present. History of Wound Context:  The patient has had a wound of right  which was first noted approximately 1 week ago.  This has not been treated. On their initial visit to the wound healing center, 2021,  the patient has noted that the wound has not been improving.  The patient has had similar previous wounds in the past.       Pt is not on abx at time of initial visit.     21 continue local care, elevate, compression. 5-3-21 wound covered with devitalized nonviable tissue, through sub q    21 wound improved, leg looks better, decreased edema    21 additional breakdown appreciated with increased in wound size along the posterior aspect right calf, through subcutaneous tissue covered with devitalized nonviable tissue. Patient advised no pressure.     2021  · Ulcer, right calf improving    21 wound improved    21 wound continues to improve    21 right healed, new area left calf with 50% devitalized nonviable tissue    21 left calf with 30% devitalized nonviable tissue      Wound/Ulcer Pain Timing/Severity: none  Quality of pain:   Severity:   / 10   Modifying Factors:   Associated Signs/Symptoms:      Ulcer Identification:  Ulcer Type: venous  Contributing Factors: edema and venous stasis        PAST MEDICAL HISTORY      Diagnosis Date    Cellulitis of right lower leg     Diabetes mellitus ([address]     Lymphedema     Obesity, Class III, BMI 40-49.9 (morbid obesity) 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     Past Surgical History:   Procedure Laterality Date    FRACTURE SURGERY  2005    right ankle repair     Family History   Problem Relation Age of Onset    Heart Disease Mother     Heart Disease Father      Social History     Tobacco Use    Smoking status: Never Smoker    Smokeless tobacco: Never Used   Vaping Use    Vaping Use: Never used   Substance Use Topics    Alcohol use: No     Comment: on special occassions    Drug use: No     Allergies   Allergen Reactions    Metformin And Related Diarrhea     Current Outpatient Medications on File Prior to Encounter   Medication Sig Dispense Refill    aspirin (ASPIRIN CHILDRENS) 81 MG chewable tablet Take 1 tablet by mouth daily 30 tablet 5     No current facility-administered medications on file prior to encounter.        REVIEW OF SYSTEMS See HPI    Objective:    BP (!) 144/80   Pulse 74   Temp 98.6 °F (37 °C) (Temporal)   Resp 18   Ht 6' 2\" (1.88 m)   Wt (!) 374 lb (169.6 kg)   BMI 48.02 kg/m²   Wt Readings from Last 3 Encounters:   07/01/21 (!) 374 lb (169.6 kg)   06/21/21 (!) 374 lb (169.6 kg)   06/14/21 (!) 374 lb (169.6 kg)     PHYSICAL EXAM  CONSTITUTIONAL:   Awake, alert, cooperative   EYES:  lids and lashes normal   ENT: external ears and nose without lesions   NECK:  supple, symmetrical, trachea midline   SKIN:  Open wound     Assessment:     Venous ulcer left calf    Pre Debridement Measurements:  Are located in the [place]  Documentation Flow Sheet  Post Debridement Measurements:  Wound/Ulcer Descriptions are Pre Debridement except measurements:     Wound 04/19/21 Leg Right;Lateral #1 (Active)   Wound Image   06/21/21 1331   Dressing Status New dressing applied 06/14/21 1432   Wound Cleansed Betadine/povidone iodine 06/14/21 1432   Dressing/Treatment Alginate with Ag 06/14/21 1432   Offloading for Diabetic Foot Ulcers Walker 06/07/21 1416 Wound Length (cm) 0 cm 07/01/21 1037   Wound Width (cm) 0 cm 07/01/21 1037   Wound Depth (cm) 0 cm 07/01/21 1037   Wound Surface Area (cm^2) 0 cm^2 07/01/21 1037   Change in Wound Size % (l*w) 100 07/01/21 1037   Wound Volume (cm^3) 0 cm^3 07/01/21 1037   Wound Healing % 100 07/01/21 1037   Post-Procedure Length (cm) 2 cm 06/14/21 1341   Post-Procedure Width (cm) 0.5 cm 06/14/21 1341   Post-Procedure Depth (cm) 0.3 cm 06/14/21 1341   Post-Procedure Surface Area (cm^2) 1 cm^2 06/14/21 1341   Post-Procedure Volume (cm^3) 0.3 cm^3 06/14/21 1341   Wound Assessment Epithelialization;Dry 07/01/21 1037   Drainage Amount None 07/01/21 1037   Drainage Description Yellow 06/14/21 1329   Odor None 07/01/21 1037   Colleen-wound Assessment Intact;Dry/flaky 07/01/21 1037   Number of days: 72       Wound 06/21/21 Leg Left;Lateral #2 (Active)   Wound Image   06/21/21 1331   Dressing Status New dressing applied 06/21/21 1555   Wound Cleansed Betadine/povidone iodine 06/21/21 1555   Dressing/Treatment ABD; Alginate with Ag 06/21/21 1555   Offloading for Diabetic Foot Ulcers Walker 06/21/21 1555   Wound Length (cm) 4.2 cm 07/01/21 1037   Wound Width (cm) 4.5 cm 07/01/21 1037   Wound Depth (cm) 0.1 cm 07/01/21 1037   Wound Surface Area (cm^2) 18.9 cm^2 07/01/21 1037   Change in Wound Size % (l*w) 43.16 07/01/21 1037   Wound Volume (cm^3) 1.89 cm^3 07/01/21 1037   Wound Healing % 43 07/01/21 1037   Post-Procedure Length (cm) 4.2 cm 07/01/21 1059   Post-Procedure Width (cm) 4.5 cm 07/01/21 1059   Post-Procedure Depth (cm) 0.2 cm 07/01/21 1059   Post-Procedure Surface Area (cm^2) 18.9 cm^2 07/01/21 1059   Post-Procedure Volume (cm^3) 3.78 cm^3 07/01/21 1059   Wound Assessment Fibrin;Pink/red 07/01/21 1037   Drainage Amount Small 07/01/21 1037   Drainage Description Yellow 07/01/21 1037   Odor None 07/01/21 1037   Colleen-wound Assessment Dry/flaky 07/01/21 1037   Number of days: 9          Procedure Note  Indications:  Based on my examination Increase in Pain  * Temperature over 101  * Increase in drainage from your wound  * Drainage with a foul odor  * Bleeding  * Increase in swelling  * Need for compression bandage changes due to slippage, breakthrough drainage.     If you need medical attention outside of the business hours of the [address] [place] [address] Road please contact your PCP or go to the nearest emergency room. Contact your doctor if you have a temperature above 100.4 with any of the following:                         Persistent cough             Shortness of breath            Chills            Fatigue            Chest pain or pressure            Difficulty breathing            Decreased consciousness of confusion             Headache     Recommend:                       Wash hands often and for 20 seconds or more             Avoid touching eyes, nose, mouth and face             Social distance ( 6 feet)             Stay at home as much as possible             Call health care provider with any concerns                                                                                                                                                                                                                           Electronically signed by Milla Mcdaniel DPM on 7/1/2021 at 11:42 AM

## 2021-07-08 ENCOUNTER — HOSPITAL ENCOUNTER (OUTPATIENT)
Dept: WOUND CARE | Age: 71
Discharge: HOME | End: 2021-07-08
Payer: MEDICARE

## 2021-07-08 VITALS
HEIGHT: 74 IN | SYSTOLIC BLOOD PRESSURE: 136 MMHG | TEMPERATURE: 97.3 F | BODY MASS INDEX: 40.43 KG/M2 | RESPIRATION RATE: 18 BRPM | WEIGHT: 315 LBS | HEART RATE: 68 BPM | DIASTOLIC BLOOD PRESSURE: 76 MMHG

## 2021-07-08 DIAGNOSIS — L97.911 NON-PRESSURE CHRONIC ULCER OF RIGHT LOWER LEG, LIMITED TO BREAKDOWN OF SKIN (HCC): Primary | ICD-10-CM

## 2021-07-08 PROCEDURE — 11042 DBRDMT SUBQ TIS 1ST 20SQCM/<: CPT

## 2021-07-08 RX ORDER — LIDOCAINE HYDROCHLORIDE 20 MG/ML
JELLY TOPICAL ONCE
Status: CANCELLED | OUTPATIENT
Start: 2021-07-08 | End: 2021-07-08

## 2021-07-08 RX ORDER — BACITRACIN ZINC AND POLYMYXIN B SULFATE 500; 1000 [USP'U]/G; [USP'U]/G
OINTMENT TOPICAL ONCE
Status: CANCELLED | OUTPATIENT
Start: 2021-07-08 | End: 2021-07-08

## 2021-07-08 RX ORDER — BACITRACIN, NEOMYCIN, POLYMYXIN B 400; 3.5; 5 [USP'U]/G; MG/G; [USP'U]/G
OINTMENT TOPICAL ONCE
Status: CANCELLED | OUTPATIENT
Start: 2021-07-08 | End: 2021-07-08

## 2021-07-08 RX ORDER — LIDOCAINE HYDROCHLORIDE 40 MG/ML
SOLUTION TOPICAL ONCE
Status: COMPLETED | OUTPATIENT
Start: 2021-07-08 | End: 2021-07-08

## 2021-07-08 RX ORDER — CLOBETASOL PROPIONATE 0.5 MG/G
OINTMENT TOPICAL ONCE
Status: CANCELLED | OUTPATIENT
Start: 2021-07-08 | End: 2021-07-08

## 2021-07-08 RX ORDER — LIDOCAINE 50 MG/G
OINTMENT TOPICAL ONCE
Status: CANCELLED | OUTPATIENT
Start: 2021-07-08 | End: 2021-07-08

## 2021-07-08 RX ORDER — GENTAMICIN SULFATE 1 MG/G
OINTMENT TOPICAL ONCE
Status: CANCELLED | OUTPATIENT
Start: 2021-07-08 | End: 2021-07-08

## 2021-07-08 RX ORDER — LIDOCAINE 40 MG/G
CREAM TOPICAL ONCE
Status: CANCELLED | OUTPATIENT
Start: 2021-07-08 | End: 2021-07-08

## 2021-07-08 RX ORDER — LIDOCAINE HYDROCHLORIDE 40 MG/ML
SOLUTION TOPICAL ONCE
Status: CANCELLED | OUTPATIENT
Start: 2021-07-08 | End: 2021-07-08

## 2021-07-08 RX ORDER — GINSENG 100 MG
CAPSULE ORAL ONCE
Status: CANCELLED | OUTPATIENT
Start: 2021-07-08 | End: 2021-07-08

## 2021-07-08 RX ORDER — BETAMETHASONE DIPROPIONATE 0.05 %
OINTMENT (GRAM) TOPICAL ONCE
Status: CANCELLED | OUTPATIENT
Start: 2021-07-08 | End: 2021-07-08

## 2021-07-08 RX ADMIN — LIDOCAINE HYDROCHLORIDE: 40 SOLUTION TOPICAL at 11:58

## 2021-07-08 NOTE — PLAN OF CARE
Problem: Wound:  Goal: Will show signs of wound healing; wound closure and no evidence of infection  Description: Will show signs of wound healing; wound closure and no evidence of infection  Outcome: Met This Shift     Problem: Venous:  Goal: Signs of wound healing will improve  Description: Signs of wound healing will improve  Outcome: Met This Shift     Problem: Compression therapy:  Goal: Will be free from complications associated with compression therapy  Description: Will be free from complications associated with compression therapy  Outcome: Met This Shift

## 2021-07-12 ENCOUNTER — HOSPITAL ENCOUNTER (OUTPATIENT)
Dept: WOUND CARE | Age: 71
Discharge: HOME | End: 2021-07-12
Payer: MEDICARE

## 2021-07-12 VITALS
DIASTOLIC BLOOD PRESSURE: 58 MMHG | WEIGHT: 315 LBS | HEART RATE: 66 BPM | BODY MASS INDEX: 40.43 KG/M2 | HEIGHT: 74 IN | RESPIRATION RATE: 20 BRPM | TEMPERATURE: 97.7 F | SYSTOLIC BLOOD PRESSURE: 120 MMHG

## 2021-07-12 DIAGNOSIS — L97.911 NON-PRESSURE CHRONIC ULCER OF RIGHT LOWER LEG, LIMITED TO BREAKDOWN OF SKIN (HCC): Primary | ICD-10-CM

## 2021-07-12 PROCEDURE — 6370000000 HC RX 637 (ALT 250 FOR IP): Performed by: PODIATRIST

## 2021-07-12 PROCEDURE — 11042 DBRDMT SUBQ TIS 1ST 20SQCM/<: CPT

## 2021-07-12 RX ORDER — GENTAMICIN SULFATE 1 MG/G
OINTMENT TOPICAL ONCE
Status: CANCELLED | OUTPATIENT
Start: 2021-07-12 | End: 2021-07-12

## 2021-07-12 RX ORDER — LIDOCAINE HYDROCHLORIDE 40 MG/ML
SOLUTION TOPICAL ONCE
Status: COMPLETED | OUTPATIENT
Start: 2021-07-12 | End: 2021-07-12

## 2021-07-12 RX ORDER — LIDOCAINE HYDROCHLORIDE 20 MG/ML
JELLY TOPICAL ONCE
Status: CANCELLED | OUTPATIENT
Start: 2021-07-12 | End: 2021-07-12

## 2021-07-12 RX ORDER — BACITRACIN ZINC AND POLYMYXIN B SULFATE 500; 1000 [USP'U]/G; [USP'U]/G
OINTMENT TOPICAL ONCE
Status: CANCELLED | OUTPATIENT
Start: 2021-07-12 | End: 2021-07-12

## 2021-07-12 RX ORDER — LIDOCAINE 40 MG/G
CREAM TOPICAL ONCE
Status: CANCELLED | OUTPATIENT
Start: 2021-07-12 | End: 2021-07-12

## 2021-07-12 RX ORDER — LIDOCAINE 50 MG/G
OINTMENT TOPICAL ONCE
Status: CANCELLED | OUTPATIENT
Start: 2021-07-12 | End: 2021-07-12

## 2021-07-12 RX ORDER — LIDOCAINE HYDROCHLORIDE 40 MG/ML
SOLUTION TOPICAL ONCE
Status: CANCELLED | OUTPATIENT
Start: 2021-07-12 | End: 2021-07-12

## 2021-07-12 RX ORDER — BETAMETHASONE DIPROPIONATE 0.05 %
OINTMENT (GRAM) TOPICAL ONCE
Status: CANCELLED | OUTPATIENT
Start: 2021-07-12 | End: 2021-07-12

## 2021-07-12 RX ORDER — CLOBETASOL PROPIONATE 0.5 MG/G
OINTMENT TOPICAL ONCE
Status: CANCELLED | OUTPATIENT
Start: 2021-07-12 | End: 2021-07-12

## 2021-07-12 RX ORDER — GINSENG 100 MG
CAPSULE ORAL ONCE
Status: CANCELLED | OUTPATIENT
Start: 2021-07-12 | End: 2021-07-12

## 2021-07-12 RX ORDER — BACITRACIN, NEOMYCIN, POLYMYXIN B 400; 3.5; 5 [USP'U]/G; MG/G; [USP'U]/G
OINTMENT TOPICAL ONCE
Status: CANCELLED | OUTPATIENT
Start: 2021-07-12 | End: 2021-07-12

## 2021-07-12 RX ADMIN — LIDOCAINE HYDROCHLORIDE 10 ML: 40 SOLUTION TOPICAL at 13:16

## 2021-07-12 ASSESSMENT — PAIN SCALES - GENERAL: PAINLEVEL_OUTOF10: 0

## 2021-07-12 NOTE — PROGRESS NOTES
Wound Healing Center Followup Visit Note    Referring Physician : Nkechi Jordan MD  [address] [place] [address] [place] RECORD NUMBER:  [id number]  AGE: 70 y.o. GENDER: male  : 1950  EPISODE DATE:  2021    Subjective:     Chief Complaint   Patient presents with    Wound Check     left lower leg       HISTORY of PRESENT ILLNESS HPI   Mary Gaytan is a 70 y.o. male who presents today in regards to follow up evaluation and treatment of wound/ulcer. That patient's past medical, family and social hx were reviewed and changes were made if present. History of Wound Context:  The patient has had a wound of right  which was first noted approximately 1 week ago.  This has not been treated. On their initial visit to the wound healing center, 2021,  the patient has noted that the wound has not been improving.  The patient has had similar previous wounds in the past.       Pt is not on abx at time of initial visit.     21 continue local care, elevate, compression. 5-3-21 wound covered with devitalized nonviable tissue, through sub q    21 wound improved, leg looks better, decreased edema    21 additional breakdown appreciated with increased in wound size along the posterior aspect right calf, through subcutaneous tissue covered with devitalized nonviable tissue. Patient advised no pressure.     2021  · Ulcer, right calf improving    21 wound improved    21 wound continues to improve    21 right healed, new area left calf with 50% devitalized nonviable tissue    21 left calf with 30% devitalized nonviable tissue    21  left calf with 50% devitalized nonviable tissue    21  left calf with 30% devitalized nonviable tissue      Wound/Ulcer Pain Timing/Severity: none  Quality of pain:   Severity:    10   Modifying Factors:   Associated Signs/Symptoms:      Ulcer Identification:  Ulcer Type: venous  Contributing Factors: edema and venous stasis        PAST MEDICAL HISTORY      Diagnosis Date    Cellulitis of right lower leg     Diabetes mellitus (HCC)     Lymphedema     Obesity, Class III, BMI 40-49.9 (morbid obesity) ([facility] [address]     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     Past Surgical History:   Procedure Laterality Date    FRACTURE SURGERY  2005    right ankle repair     Family History   Problem Relation Age of Onset    Heart Disease Mother     Heart Disease Father      Social History     Tobacco Use    Smoking status: Never Smoker    Smokeless tobacco: Never Used   Vaping Use    Vaping Use: Never used   Substance Use Topics    Alcohol use: No     Comment: on special occassions    Drug use: No     Allergies   Allergen Reactions    Metformin And Related Diarrhea     Current Outpatient Medications on File Prior to Encounter   Medication Sig Dispense Refill    aspirin (ASPIRIN CHILDRENS) 81 MG chewable tablet Take 1 tablet by mouth daily 30 tablet 5     No current facility-administered medications on file prior to encounter. REVIEW OF SYSTEMS See HPI    Objective:    BP (!) 120/58   Pulse 66   Temp 97.7 °F (36.5 °C) (Temporal)   Resp 20   Ht 6' 2\" (1.88 m)   Wt (!) 374 lb (169.6 kg)   BMI 48.02 kg/m²   Wt Readings from Last 3 Encounters:   07/12/21 (!) 374 lb (169.6 kg)   07/08/21 (!) 374 lb (169.6 kg)   07/01/21 (!) 374 lb (169.6 kg)     PHYSICAL EXAM  CONSTITUTIONAL:   Awake, alert, cooperative   EYES:  lids and lashes normal   ENT: external ears and nose without lesions   NECK:  supple, symmetrical, trachea midline   SKIN:  Open wound     Assessment:     Venous ulcer left calf    Pre Debridement Measurements:  Are located in the [place]  Documentation Flow Sheet  Post Debridement Measurements:  Wound/Ulcer Descriptions are Pre Debridement except measurements:     Wound 06/21/21 Leg Left;Lateral #2 (Active)   Wound Image   06/21/21 1331   Dressing Status New dressing applied;Dry;Clean; Intact 07/08/21 1245   Wound Cleansed Cleansed with saline;Betadine/povidone iodine 07/08/21 1245   Dressing/Treatment Alginate;ABD;Dry dressing 07/08/21 1245   Offloading for Diabetic Foot Ulcers Walker 07/08/21 1245   Wound Length (cm) 1.9 cm 07/12/21 1311   Wound Width (cm) 0.5 cm 07/12/21 1311   Wound Depth (cm) 0 cm 07/12/21 1311   Wound Surface Area (cm^2) 0.95 cm^2 07/12/21 1311   Change in Wound Size % (l*w) 97.14 07/12/21 1311   Wound Volume (cm^3) 0 cm^3 07/12/21 1311   Wound Healing % 100 07/12/21 1311   Post-Procedure Length (cm) 1.9 cm 07/12/21 1319   Post-Procedure Width (cm) 0.5 cm 07/12/21 1319   Post-Procedure Depth (cm) 0.2 cm 07/12/21 1319   Post-Procedure Surface Area (cm^2) 0.95 cm^2 07/12/21 1319   Post-Procedure Volume (cm^3) 0.19 cm^3 07/12/21 1319   Wound Assessment Fibrin;Pink/red 07/12/21 1311   Drainage Amount Small 07/12/21 1311   Drainage Description Yellow 07/12/21 1311   Odor None 07/12/21 1311   Colleen-wound Assessment Hemosiderin staining (brown yellow) 07/12/21 1311   Number of days: 20          Procedure Note  Indications:  Based on my examination of this patient's wound(s)/ulcer(s) today, debridement is required to promote healing and evaluate the wound base. Performed by: Adonis Gerard DPM    Consent obtained:  Yes    Time out taken:  Yes    Pain Control: Anesthetic  Anesthetic: 4% Lidocaine Liquid Topical     Debridement:Excisional Debridement    Using curette the wound(s)/ulcer(s) was/were sharply debrided down through and including the removal of subcutaneous tissue.         Devitalized Tissue Debrided:  fibrin, biofilm, slough and necrotic/eschar to stimulate bleeding to promote healing, post debridement good bleeding base and wound edges noted    Wound/Ulcer #: 2    Percent of Wound/Ulcer Debrided: 100%    Total Surface Area Debrided:  0.95 sq cm     Estimated Blood Loss:  Minimal  Hemostasis Achieved:  by pressure    Procedural Pain:  0  / 10   Post Procedural Pain:  0 / 10     Response to treatment:  Well tolerated by patient. Plan:   Treatment Note please see attached Discharge Instructions    Written patient dismissal instructions given to patient and signed by patient or POA. Discharge Instructions       Visit Discharge/Physician Orders     Discharge condition: Stable     Assessment of pain at discharge:none     Anesthetic used: 4% lidocaine     Discharge to: Home     Left via:public transportation     Accompanied by: accompanied by self     ECF/HHA:      Dressing Orders:LEFT/RIGHT LOWER LEG CLEANSE WITH BETADINE APPLY ALGINATE ag  AND PROFORE WRAP CHANGE WEEKLY AT WOUND CARE CENTER.     Treatment Orders:  Eat foods high in protein and vitamin c     Take multivitamin daily     Use compression pumps     [facility] followup visit _______1 week ___________________  (Please note your next appointment above and if you are unable to keep, kindly give a 24 hour notice. Thank you.)     Physician signature:__________________________        If you experience any of the following, please call the [email or web address] during business hours:     * Increase in Pain  * Temperature over 101  * Increase in drainage from your wound  * Drainage with a foul odor  * Bleeding  * Increase in swelling  * Need for compression bandage changes due to slippage, breakthrough drainage.     If you need medical attention outside of the business hours of the [facility] please contact your PCP or go to the nearest emergency room. Contact your doctor if you have a temperature above 100.4 with any of the following:                         Persistent cough             Shortness of breath            Chills            Fatigue            Chest pain or pressure            Difficulty breathing            Decreased consciousness of confusion             Headache     Recommend:                       Wash hands often and for 20 seconds or more             Avoid touching eyes, nose, mouth and face             Social distance ( 6 feet)             Stay at home as much as possible             Call health care provider with any concerns        Electronically signed by Kendra Malhotra DPM on 7/12/2021 at 1:33 PM

## 2021-07-19 ENCOUNTER — HOSPITAL ENCOUNTER (OUTPATIENT)
Dept: WOUND CARE | Age: 71
Discharge: HOME | End: 2021-07-19
Payer: MEDICARE

## 2021-07-19 VITALS
HEIGHT: 74 IN | TEMPERATURE: 97.3 F | DIASTOLIC BLOOD PRESSURE: 80 MMHG | WEIGHT: 315 LBS | SYSTOLIC BLOOD PRESSURE: 120 MMHG | RESPIRATION RATE: 16 BRPM | HEART RATE: 68 BPM | BODY MASS INDEX: 40.43 KG/M2

## 2021-07-19 DIAGNOSIS — L97.911 NON-PRESSURE CHRONIC ULCER OF RIGHT LOWER LEG, LIMITED TO BREAKDOWN OF SKIN (HCC): Primary | ICD-10-CM

## 2021-07-19 PROCEDURE — 99213 OFFICE O/P EST LOW 20 MIN: CPT

## 2021-07-19 PROCEDURE — 6370000000 HC RX 637 (ALT 250 FOR IP): Performed by: PODIATRIST

## 2021-07-19 RX ORDER — LIDOCAINE 40 MG/G
CREAM TOPICAL ONCE
Status: CANCELLED | OUTPATIENT
Start: 2021-07-19 | End: 2021-07-19

## 2021-07-19 RX ORDER — BACITRACIN, NEOMYCIN, POLYMYXIN B 400; 3.5; 5 [USP'U]/G; MG/G; [USP'U]/G
OINTMENT TOPICAL ONCE
Status: CANCELLED | OUTPATIENT
Start: 2021-07-19 | End: 2021-07-19

## 2021-07-19 RX ORDER — LIDOCAINE HYDROCHLORIDE 20 MG/ML
JELLY TOPICAL ONCE
Status: CANCELLED | OUTPATIENT
Start: 2021-07-19 | End: 2021-07-19

## 2021-07-19 RX ORDER — BACITRACIN ZINC AND POLYMYXIN B SULFATE 500; 1000 [USP'U]/G; [USP'U]/G
OINTMENT TOPICAL ONCE
Status: CANCELLED | OUTPATIENT
Start: 2021-07-19 | End: 2021-07-19

## 2021-07-19 RX ORDER — GINSENG 100 MG
CAPSULE ORAL ONCE
Status: CANCELLED | OUTPATIENT
Start: 2021-07-19 | End: 2021-07-19

## 2021-07-19 RX ORDER — LIDOCAINE HYDROCHLORIDE 40 MG/ML
SOLUTION TOPICAL ONCE
Status: CANCELLED | OUTPATIENT
Start: 2021-07-19 | End: 2021-07-19

## 2021-07-19 RX ORDER — CLOBETASOL PROPIONATE 0.5 MG/G
OINTMENT TOPICAL ONCE
Status: CANCELLED | OUTPATIENT
Start: 2021-07-19 | End: 2021-07-19

## 2021-07-19 RX ORDER — GENTAMICIN SULFATE 1 MG/G
OINTMENT TOPICAL ONCE
Status: CANCELLED | OUTPATIENT
Start: 2021-07-19 | End: 2021-07-19

## 2021-07-19 RX ORDER — LIDOCAINE HYDROCHLORIDE 40 MG/ML
SOLUTION TOPICAL ONCE
Status: COMPLETED | OUTPATIENT
Start: 2021-07-19 | End: 2021-07-19

## 2021-07-19 RX ORDER — BETAMETHASONE DIPROPIONATE 0.05 %
OINTMENT (GRAM) TOPICAL ONCE
Status: CANCELLED | OUTPATIENT
Start: 2021-07-19 | End: 2021-07-19

## 2021-07-19 RX ORDER — LIDOCAINE 50 MG/G
OINTMENT TOPICAL ONCE
Status: CANCELLED | OUTPATIENT
Start: 2021-07-19 | End: 2021-07-19

## 2021-07-19 RX ADMIN — LIDOCAINE HYDROCHLORIDE 10 ML: 40 SOLUTION TOPICAL at 13:40

## 2021-07-19 ASSESSMENT — PAIN SCALES - GENERAL: PAINLEVEL_OUTOF10: 0

## 2021-07-19 NOTE — PROGRESS NOTES
Alginate;ABD;Dry dressing 07/12/21 1334   Offloading for Diabetic Foot Ulcers Walker 07/12/21 1334   Wound Length (cm) 1.6 cm 07/19/21 1324   Wound Width (cm) 0.4 cm 07/19/21 1324   Wound Depth (cm) 0.1 cm 07/19/21 1324   Wound Surface Area (cm^2) 0.64 cm^2 07/19/21 1324   Change in Wound Size % (l*w) 98.08 07/19/21 1324   Wound Volume (cm^3) 0.064 cm^3 07/19/21 1324   Wound Healing % 98 07/19/21 1324   Post-Procedure Length (cm) 1.9 cm 07/12/21 1319   Post-Procedure Width (cm) 0.5 cm 07/12/21 1319   Post-Procedure Depth (cm) 0.2 cm 07/12/21 1319   Post-Procedure Surface Area (cm^2) 0.95 cm^2 07/12/21 1319   Post-Procedure Volume (cm^3) 0.19 cm^3 07/12/21 1319   Wound Assessment Fibrin;Pink/red 07/19/21 1324   Drainage Amount Small 07/19/21 1324   Drainage Description Serosanguinous 07/19/21 1324   Odor None 07/19/21 1324   Colleen-wound Assessment Hemosiderin staining (brown yellow) 07/19/21 1324   Number of days: 28       Wound 07/19/21 Leg Lateral;Right #5 NEW (Active)   Wound Image   07/19/21 1330   Wound Length (cm) 0.7 cm 07/19/21 1330   Wound Width (cm) 3 cm 07/19/21 1330   Wound Depth (cm) 0.1 cm 07/19/21 1330   Wound Surface Area (cm^2) 2.1 cm^2 07/19/21 1330   Wound Volume (cm^3) 0.21 cm^3 07/19/21 1330   Wound Assessment Pink/red 07/19/21 1330   Drainage Amount Moderate 07/19/21 1330   Drainage Description Serosanguinous 07/19/21 1330   Odor None 07/19/21 1330   Colleen-wound Assessment Dry/flaky 07/19/21 1330   Number of days: 0              Plan:   Treatment Note please see attached Discharge Instructions    Written patient dismissal instructions given to patient and signed by patient or POA.          Discharge Instructions       Visit Discharge/Physician Orders     Discharge condition: Stable     Assessment of pain at discharge:none     Anesthetic used: 4% lidocaine     Discharge to: Home     Left via:public transportation     Accompanied by: accompanied by self     ECF/HHA:      Dressing Orders:LEFT/RIGHT LOWER LEG CLEANSE WITH BETADINE APPLY ALGINATE ag  AND PROFORE WRAP CHANGE WEEKLY AT [facility].     Treatment Orders:  Eat foods high in protein and vitamin c     Take multivitamin daily     Use compression pumps     [facility] followup visit _______1 week ___________________  (Please note your next appointment above and if you are unable to keep, kindly give a 24 hour notice. Thank you.)     Physician signature:__________________________        If you experience any of the following, please call the [facility] during business hours:     * Increase in Pain  * Temperature over 101  * Increase in drainage from your wound  * Drainage with a foul odor  * Bleeding  * Increase in swelling  * Need for compression bandage changes due to slippage, breakthrough drainage.     If you need medical attention outside of the business hours of the [facility] please contact your PCP or go to the nearest emergency room. Contact your doctor if you have a temperature above 100.4 with any of the following:                         Persistent cough             Shortness of breath            Chills            Fatigue            Chest pain or pressure            Difficulty breathing            Decreased consciousness of confusion             Headache     Recommend:                       Wash hands often and for 20 seconds or more             Avoid touching eyes, nose, mouth and face             Social distance ( 6 feet)             Stay at home as much as possible             Call health care provider with any concerns                 Electronically signed by Jacqui Beltrán DPM on 7/19/2021 at 1:55 PM

## 2021-07-26 ENCOUNTER — HOSPITAL ENCOUNTER (OUTPATIENT)
Dept: WOUND CARE | Age: 71
Discharge: HOME | End: 2021-07-26
Payer: MEDICARE

## 2021-07-26 VITALS
DIASTOLIC BLOOD PRESSURE: 80 MMHG | HEART RATE: 84 BPM | RESPIRATION RATE: 20 BRPM | TEMPERATURE: 96.9 F | BODY MASS INDEX: 40.43 KG/M2 | HEIGHT: 74 IN | WEIGHT: 315 LBS | SYSTOLIC BLOOD PRESSURE: 140 MMHG

## 2021-07-26 DIAGNOSIS — L97.911 NON-PRESSURE CHRONIC ULCER OF RIGHT LOWER LEG, LIMITED TO BREAKDOWN OF SKIN (HCC): Primary | ICD-10-CM

## 2021-07-26 PROCEDURE — 99213 OFFICE O/P EST LOW 20 MIN: CPT

## 2021-07-26 RX ORDER — BACITRACIN ZINC AND POLYMYXIN B SULFATE 500; 1000 [USP'U]/G; [USP'U]/G
OINTMENT TOPICAL ONCE
Status: CANCELLED | OUTPATIENT
Start: 2021-07-26 | End: 2021-07-26

## 2021-07-26 RX ORDER — LIDOCAINE HYDROCHLORIDE 20 MG/ML
JELLY TOPICAL ONCE
Status: CANCELLED | OUTPATIENT
Start: 2021-07-26 | End: 2021-07-26

## 2021-07-26 RX ORDER — LIDOCAINE HYDROCHLORIDE 40 MG/ML
SOLUTION TOPICAL ONCE
Status: CANCELLED | OUTPATIENT
Start: 2021-07-26 | End: 2021-07-26

## 2021-07-26 RX ORDER — BACITRACIN, NEOMYCIN, POLYMYXIN B 400; 3.5; 5 [USP'U]/G; MG/G; [USP'U]/G
OINTMENT TOPICAL ONCE
Status: CANCELLED | OUTPATIENT
Start: 2021-07-26 | End: 2021-07-26

## 2021-07-26 RX ORDER — LIDOCAINE 40 MG/G
CREAM TOPICAL ONCE
Status: CANCELLED | OUTPATIENT
Start: 2021-07-26 | End: 2021-07-26

## 2021-07-26 RX ORDER — CLOBETASOL PROPIONATE 0.5 MG/G
OINTMENT TOPICAL ONCE
Status: CANCELLED | OUTPATIENT
Start: 2021-07-26 | End: 2021-07-26

## 2021-07-26 RX ORDER — LIDOCAINE HYDROCHLORIDE 40 MG/ML
SOLUTION TOPICAL ONCE
Status: COMPLETED | OUTPATIENT
Start: 2021-07-26 | End: 2021-07-26

## 2021-07-26 RX ORDER — BETAMETHASONE DIPROPIONATE 0.05 %
OINTMENT (GRAM) TOPICAL ONCE
Status: CANCELLED | OUTPATIENT
Start: 2021-07-26 | End: 2021-07-26

## 2021-07-26 RX ORDER — GENTAMICIN SULFATE 1 MG/G
OINTMENT TOPICAL ONCE
Status: CANCELLED | OUTPATIENT
Start: 2021-07-26 | End: 2021-07-26

## 2021-07-26 RX ORDER — GINSENG 100 MG
CAPSULE ORAL ONCE
Status: CANCELLED | OUTPATIENT
Start: 2021-07-26 | End: 2021-07-26

## 2021-07-26 RX ORDER — LIDOCAINE 50 MG/G
OINTMENT TOPICAL ONCE
Status: CANCELLED | OUTPATIENT
Start: 2021-07-26 | End: 2021-07-26

## 2021-07-26 RX ADMIN — LIDOCAINE HYDROCHLORIDE: 40 SOLUTION TOPICAL at 13:18

## 2021-07-26 NOTE — PROGRESS NOTES
Wound Healing Center Followup Visit Note    Referring Physician : Colette Tinajero MD  [address] [place] [address] [place] RECORD NUMBER:  [id number]  AGE: 70 y.o. GENDER: male  : 1950  EPISODE DATE:  2021    Subjective:     Chief Complaint   Patient presents with    Wound Check     bilateral lower leg ulcers      HISTORY of PRESENT ILLNESS HPI   Lester Ramirez is a 70 y.o. male who presents today in regards to follow up evaluation and treatment of wound/ulcer. That patient's past medical, family and social hx were reviewed and changes were made if present. History of Wound Context:  The patient has had a wound of right  which was first noted approximately 1 week ago.  This has not been treated. On their initial visit to the wound healing center, 2021,  the patient has noted that the wound has not been improving.  The patient has had similar previous wounds in the past.       Pt is not on abx at time of initial visit.     21 continue local care, elevate, compression. 5-3-21 wound covered with devitalized nonviable tissue, through sub q    21 wound improved, leg looks better, decreased edema    21 additional breakdown appreciated with increased in wound size along the posterior aspect right calf, through subcutaneous tissue covered with devitalized nonviable tissue. Patient advised no pressure. 2021  · Ulcer, right calf improving    21 wound improved    21 wound continues to improve    21 right healed, new area left calf with 50% devitalized nonviable tissue    21 left calf with 30% devitalized nonviable tissue    21  left calf with 50% devitalized nonviable tissue    21  left calf with 30% devitalized nonviable tissue    21 wound improved LLE, superficial, clean, stasis changes, decreased edema. Local care, compression, elevate    21 wound improved LLE, superficial, clean, stasis changes, decreased edema.   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     Past Surgical History:   Procedure Laterality Date    FRACTURE SURGERY  2005    right ankle repair     Family History   Problem Relation Age of Onset    Heart Disease Mother     Heart Disease Father      Social History     Tobacco Use    Smoking status: Never Smoker    Smokeless tobacco: Never Used   Vaping Use    Vaping Use: Never used   Substance Use Topics    Alcohol use: No     Comment: on special occassions    Drug use: No     Allergies   Allergen Reactions    Metformin And Related Diarrhea     Current Outpatient Medications on File Prior to Encounter   Medication Sig Dispense Refill    aspirin (ASPIRIN CHILDRENS) 81 MG chewable tablet Take 1 tablet by mouth daily 30 tablet 5     No current facility-administered medications on file prior to encounter.        REVIEW OF SYSTEMS See HPI    Objective:    BP (!) 140/80   Pulse 84   Temp 96.9 °F (36.1 °C) (Temporal)   Resp 20   Ht 6' 2\" (1.88 m)   Wt (!) 374 lb (169.6 kg)   BMI 48.02 kg/m²   Wt Readings from Last 3 Encounters:   07/26/21 (!) 374 lb (169.6 kg)   07/19/21 (!) 374 lb (169.6 kg)   07/12/21 (!) 374 lb (169.6 kg)     PHYSICAL EXAM  CONSTITUTIONAL:   Awake, alert, cooperative   EYES:  lids and lashes normal   ENT: external ears and nose without lesions   NECK:  supple, symmetrical, trachea midline   SKIN:  Open wound     Assessment:     Venous ulcer left calf       Wound 06/21/21 Leg Left;Lateral #2 (Active)   Wound Image   07/19/21 1324   Dressing Status New dressing applied;Clean; Intact;Dry 07/19/21 1431   Wound Cleansed Cleansed with saline 07/19/21 1431   Dressing/Treatment Alginate;ABD;Dry dressing 07/19/21 1431   Offloading for Diabetic Foot Ulcers Walker 07/19/21 1431   Wound Length (cm) 1 cm 07/26/21 1312   Wound Width (cm) 0.4 cm 07/26/21 1312   Wound Depth (cm) 0.1 cm 07/26/21 1312   Wound Surface Area (cm^2) 0.4 cm^2 07/26/21 1312   Change in Wound Size % (l*w) 98.8 07/26/21 1312   Wound Volume (cm^3) 0.04 cm^3 07/26/21 1312   Wound Healing % 99 07/26/21 1312   Post-Procedure Length (cm) 1 cm 07/26/21 1331   Post-Procedure Width (cm) 0.4 cm 07/26/21 1331   Post-Procedure Depth (cm) 0.2 cm 07/26/21 1331   Post-Procedure Surface Area (cm^2) 0.4 cm^2 07/26/21 1331   Post-Procedure Volume (cm^3) 0.08 cm^3 07/26/21 1331   Wound Assessment Fibrin;Pink/red 07/26/21 1312   Drainage Amount Scant 07/26/21 1312   Drainage Description Yellow 07/26/21 1312   Odor None 07/26/21 1312   Colleen-wound Assessment Hemosiderin staining (brown yellow) 07/26/21 1312   Number of days: 35       Wound 07/19/21 Leg Lateral;Right #5 NEW (Active)   Wound Image   07/26/21 1312   Dressing Status New dressing applied;Clean;Dry; Intact 07/19/21 1431   Wound Cleansed Cleansed with saline 07/19/21 1431   Dressing/Treatment Alginate;ABD;Dry dressing 07/19/21 1431   Offloading for Diabetic Foot Ulcers Walker 07/19/21 1431   Wound Length (cm) 0 cm 07/26/21 1312   Wound Width (cm) 0 cm 07/26/21 1312   Wound Depth (cm) 0 cm 07/26/21 1312   Wound Surface Area (cm^2) 0 cm^2 07/26/21 1312   Change in Wound Size % (l*w) 100 07/26/21 1312   Wound Volume (cm^3) 0 cm^3 07/26/21 1312   Wound Healing % 100 07/26/21 1312   Wound Assessment Pink/red 07/19/21 1330   Drainage Amount Moderate 07/19/21 1330   Drainage Description Serosanguinous 07/19/21 1330   Odor None 07/19/21 1330   Colleen-wound Assessment Dry/flaky 07/19/21 1330   Number of days: 7              Plan:   Treatment Note please see attached Discharge Instructions    Written patient dismissal instructions given to patient and signed by patient or POA. Discharge Instructions       Visit Discharge/Physician Orders     Discharge condition: Stable     Assessment of pain at discharge:none     Anesthetic used: 4% lidocaine     Discharge to: Home     Left via:public transportation     Accompanied by: accompanied by self     ECF/HHA:      Dressing Orders:LEFT/RIGHT LOWER LEG CLEANSE WITH BETADINE APPLY ALGINATE ag  AND PROFORE WRAP CHANGE WEEKLY AT WOUND CARE CENTER.     Treatment Orders:  Eat foods high in protein and vitamin c     Take multivitamin daily     Use compression pumps     [facility] followup visit _______1 week ___________________  (Please note your next appointment above and if you are unable to keep, kindly give a 24 hour notice. Thank you.)     Physician signature:__________________________        If you experience any of the following, please call the [facility] during business hours:     * Increase in Pain  * Temperature over 101  * Increase in drainage from your wound  * Drainage with a foul odor  * Bleeding  * Increase in swelling  * Need for compression bandage changes due to slippage, breakthrough drainage.     If you need medical attention outside of the business hours of the [facility] please contact your PCP or go to the nearest emergency room. Contact your doctor if you have a temperature above 100.4 with any of the following:                         Persistent cough             Shortness of breath            Chills            Fatigue            Chest pain or pressure            Difficulty breathing            Decreased consciousness of confusion             Headache     Recommend:                       Wash hands often and for 20 seconds or more             Avoid touching eyes, nose, mouth and face             Social distance ( 6 feet)             Stay at home as much as possible             Call health care provider with any concerns                              Electronically signed by Zonia Giron DPM on 7/26/2021 at 2:05 PM

## 2021-08-02 ENCOUNTER — HOSPITAL ENCOUNTER (OUTPATIENT)
Dept: WOUND CARE | Age: 71
Discharge: HOME | End: 2021-08-02
Payer: MEDICARE

## 2021-08-02 VITALS
DIASTOLIC BLOOD PRESSURE: 78 MMHG | RESPIRATION RATE: 20 BRPM | HEIGHT: 74 IN | BODY MASS INDEX: 40.43 KG/M2 | TEMPERATURE: 97.6 F | HEART RATE: 84 BPM | WEIGHT: 315 LBS | SYSTOLIC BLOOD PRESSURE: 134 MMHG

## 2021-08-02 DIAGNOSIS — L97.911 NON-PRESSURE CHRONIC ULCER OF RIGHT LOWER LEG, LIMITED TO BREAKDOWN OF SKIN (HCC): Primary | ICD-10-CM

## 2021-08-02 PROCEDURE — 99213 OFFICE O/P EST LOW 20 MIN: CPT

## 2021-08-02 RX ORDER — LIDOCAINE HYDROCHLORIDE 20 MG/ML
JELLY TOPICAL ONCE
Status: CANCELLED | OUTPATIENT
Start: 2021-08-02 | End: 2021-08-02

## 2021-08-02 RX ORDER — LIDOCAINE HYDROCHLORIDE 40 MG/ML
SOLUTION TOPICAL ONCE
Status: COMPLETED | OUTPATIENT
Start: 2021-08-02 | End: 2021-08-02

## 2021-08-02 RX ORDER — BACITRACIN, NEOMYCIN, POLYMYXIN B 400; 3.5; 5 [USP'U]/G; MG/G; [USP'U]/G
OINTMENT TOPICAL ONCE
Status: CANCELLED | OUTPATIENT
Start: 2021-08-02 | End: 2021-08-02

## 2021-08-02 RX ORDER — GENTAMICIN SULFATE 1 MG/G
OINTMENT TOPICAL ONCE
Status: CANCELLED | OUTPATIENT
Start: 2021-08-02 | End: 2021-08-02

## 2021-08-02 RX ORDER — BETAMETHASONE DIPROPIONATE 0.05 %
OINTMENT (GRAM) TOPICAL ONCE
Status: CANCELLED | OUTPATIENT
Start: 2021-08-02 | End: 2021-08-02

## 2021-08-02 RX ORDER — LIDOCAINE HYDROCHLORIDE 40 MG/ML
SOLUTION TOPICAL ONCE
Status: CANCELLED | OUTPATIENT
Start: 2021-08-02 | End: 2021-08-02

## 2021-08-02 RX ORDER — CLOBETASOL PROPIONATE 0.5 MG/G
OINTMENT TOPICAL ONCE
Status: CANCELLED | OUTPATIENT
Start: 2021-08-02 | End: 2021-08-02

## 2021-08-02 RX ORDER — GINSENG 100 MG
CAPSULE ORAL ONCE
Status: CANCELLED | OUTPATIENT
Start: 2021-08-02 | End: 2021-08-02

## 2021-08-02 RX ORDER — LIDOCAINE 50 MG/G
OINTMENT TOPICAL ONCE
Status: CANCELLED | OUTPATIENT
Start: 2021-08-02 | End: 2021-08-02

## 2021-08-02 RX ORDER — BACITRACIN ZINC AND POLYMYXIN B SULFATE 500; 1000 [USP'U]/G; [USP'U]/G
OINTMENT TOPICAL ONCE
Status: CANCELLED | OUTPATIENT
Start: 2021-08-02 | End: 2021-08-02

## 2021-08-02 RX ORDER — LIDOCAINE 40 MG/G
CREAM TOPICAL ONCE
Status: CANCELLED | OUTPATIENT
Start: 2021-08-02 | End: 2021-08-02

## 2021-08-02 RX ADMIN — LIDOCAINE HYDROCHLORIDE: 40 SOLUTION TOPICAL at 13:51

## 2021-08-02 NOTE — PROGRESS NOTES
Wound Healing Center Followup Visit Note    Referring Physician : Alanna Colon MD  [address] [place] [address] [place] RECORD NUMBER:  [id number]  AGE: 70 y.o. GENDER: male  : 1950  EPISODE DATE:  2021    Subjective:     Chief Complaint   Patient presents with    Wound Check     right lower leg      HISTORY of PRESENT ILLNESS HPI   Melissa Razo is a 70 y.o. male who presents today in regards to follow up evaluation and treatment of wound/ulcer. That patient's past medical, family and social hx were reviewed and changes were made if present. History of Wound Context:  The patient has had a wound of right  which was first noted approximately 1 week ago.  This has not been treated. On their initial visit to the wound healing center, 2021,  the patient has noted that the wound has not been improving.  The patient has had similar previous wounds in the past.       Pt is not on abx at time of initial visit.     21 continue local care, elevate, compression. 5-3-21 wound covered with devitalized nonviable tissue, through sub q    21 wound improved, leg looks better, decreased edema    21 additional breakdown appreciated with increased in wound size along the posterior aspect right calf, through subcutaneous tissue covered with devitalized nonviable tissue. Patient advised no pressure. 2021  · Ulcer, right calf improving    21 wound improved    21 wound continues to improve    21 right healed, new area left calf with 50% devitalized nonviable tissue    21 left calf with 30% devitalized nonviable tissue    21  left calf with 50% devitalized nonviable tissue    21  left calf with 30% devitalized nonviable tissue    21 wound improved LLE, superficial, clean, stasis changes, decreased edema. Local care, compression, elevate    21 wound improved LLE, superficial, clean, stasis changes, decreased edema.   Local care, compression, elevate    21 doing well, tolerating dressings/compression, wound improved LLE, superficial, clean, stasis changes, decreased edema. 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     Past Surgical History:   Procedure Laterality Date    FRACTURE SURGERY  2005    right ankle repair     Family History   Problem Relation Age of Onset    Heart Disease Mother     Heart Disease Father      Social History     Tobacco Use    Smoking status: Never Smoker    Smokeless tobacco: Never Used   Vaping Use    Vaping Use: Never used   Substance Use Topics    Alcohol use: No     Comment: on special occassions    Drug use: No     Allergies   Allergen Reactions    Metformin And Related Diarrhea     Current Outpatient Medications on File Prior to Encounter   Medication Sig Dispense Refill    aspirin (ASPIRIN CHILDRENS) 81 MG chewable tablet Take 1 tablet by mouth daily 30 tablet 5     No current facility-administered medications on file prior to encounter.        REVIEW OF SYSTEMS See HPI    Objective:    /78   Pulse 84   Temp 97.6 °F (36.4 °C) (Temporal)   Resp 20   Ht 6' 2\" (1.88 m)   Wt (!) 374 lb (169.6 kg)   BMI 48.02 kg/m²   Wt Readings from Last 3 Encounters:   08/02/21 (!) 374 lb (169.6 kg)   07/26/21 (!) 374 lb (169.6 kg)   07/19/21 (!) 374 lb (169.6 kg)     PHYSICAL EXAM  CONSTITUTIONAL:   Awake, alert, cooperative   EYES:  lids and lashes normal   ENT: external ears and nose without lesions   NECK:  supple, symmetrical, trachea midline   SKIN:  Open wound     Assessment:     Venous ulcer left calf       Wound 06/21/21 Leg Left;Lateral #2 (Active)   Wound Image   07/19/21 1324   Dressing Status New dressing applied;Clean;Dry; Intact 07/26/21 1406   Wound Cleansed Betadine/povidone iodine 07/26/21 1406   Dressing/Treatment ABD; Alginate 07/26/21 1406   Offloading for Diabetic Foot Ulcers Other (comment) 07/26/21 1406   Wound Length (cm) 0.8 cm 08/02/21 1340   Wound Width (cm) 0.6 cm 08/02/21 1340   Wound Depth (cm) 0.2 cm 08/02/21 1340   Wound Surface Area (cm^2) 0.48 cm^2 08/02/21 1340   Change in Wound Size % (l*w) 98.56 08/02/21 1340   Wound Volume (cm^3) 0.096 cm^3 08/02/21 1340   Wound Healing % 97 08/02/21 1340   Post-Procedure Length (cm) 1 cm 07/26/21 1331   Post-Procedure Width (cm) 0.4 cm 07/26/21 1331   Post-Procedure Depth (cm) 0.2 cm 07/26/21 1331   Post-Procedure Surface Area (cm^2) 0.4 cm^2 07/26/21 1331   Post-Procedure Volume (cm^3) 0.08 cm^3 07/26/21 1331   Wound Assessment Pale granulation tissue;Fibrin 08/02/21 1340   Drainage Amount Scant 08/02/21 1340   Drainage Description Serous 08/02/21 1340   Odor None 08/02/21 1340   Colleen-wound Assessment Hemosiderin staining (brown yellow); Dry/flaky 08/02/21 1340   Number of days: 42              Plan:   Treatment Note please see attached Discharge Instructions    Written patient dismissal instructions given to patient and signed by patient or POA.          Discharge Instructions        Visit Discharge/Physician Orders     Discharge condition: Stable     Assessment of pain at discharge:none     Anesthetic used: 4% lidocaine     Discharge to: Home     Left via:public transportation     Accompanied by: accompanied by self     ECF/HHA:      Dressing Orders:LEFT/RIGHT LOWER LEG CLEANSE WITH BETADINE APPLY ALGINATE ag  AND PROFORE WRAP CHANGE WEEKLY AT WOUND CARE CENTER.     Treatment Orders:  Eat foods high in protein and vitamin c     Take multivitamin daily     Use compression pumps     [facility] followup visit _______1 week ___________________  (Please note your next appointment above and if you are unable to keep, kindly give a 24 hour notice. Thank you.)     Physician signature:__________________________        If you experience any of the following, please call the [address] [place] [address] during business hours:     * Increase in Pain  * Temperature over 101  * Increase in drainage from your wound  * Drainage with a foul odor  * Bleeding  * Increase in swelling  * Need for compression bandage changes due to slippage, breakthrough drainage.     If you need medical attention outside of the business hours of the [address] [place] [address] please contact your PCP or go to the nearest emergency room. Contact your doctor if you have a temperature above 100.4 with any of the following:                         Persistent cough             Shortness of breath            Chills            Fatigue            Chest pain or pressure            Difficulty breathing            Decreased consciousness of confusion             Headache     Recommend:                       Wash hands often and for 20 seconds or more             Avoid touching eyes, nose, mouth and face             Social distance ( 6 feet)             Stay at home as much as possible             Call health care provider with any concerns                                                   Electronically signed by Adonis Gerard DPM on 8/2/2021 at 2:34 PM

## 2021-08-05 ENCOUNTER — TELEPHONE (OUTPATIENT)
Dept: INTERNAL MEDICINE | Age: 71
End: 2021-08-05

## 2021-08-05 NOTE — TELEPHONE ENCOUNTER
Patient called [facility] to request Deward Scripture for appt on 9-1-21. Tried calling patient; phone kept ringing. Asim zamora requested.

## 2021-08-05 NOTE — TELEPHONE ENCOUNTER
----- Message from Joanna Limon sent at 8/5/2021  9:39 AM EDT -----  Subject: Message to Provider    QUESTIONS  Information for Provider? Shira Jose has an appt on 9/1 @ 1:30 and would like   you to contact the provider noah Potter for her, please. Please confirm with her this has been done.  ---------------------------------------------------------------------------  --------------  CALL BACK INFO  What is the best way for the office to contact you? Do not leave any   message, patient will call back for answer  Preferred Call Back Phone Number? [phone]  ---------------------------------------------------------------------------  --------------  SCRIPT ANSWERS  Relationship to Patient?  Self

## 2021-08-09 ENCOUNTER — HOSPITAL ENCOUNTER (OUTPATIENT)
Dept: WOUND CARE | Age: 71
Discharge: HOME | End: 2021-08-09
Payer: MEDICARE

## 2021-08-09 VITALS — HEIGHT: 74 IN | BODY MASS INDEX: 40.43 KG/M2 | WEIGHT: 315 LBS

## 2021-08-09 DIAGNOSIS — L97.911 NON-PRESSURE CHRONIC ULCER OF RIGHT LOWER LEG, LIMITED TO BREAKDOWN OF SKIN (HCC): Primary | ICD-10-CM

## 2021-08-09 PROCEDURE — 99212 OFFICE O/P EST SF 10 MIN: CPT

## 2021-08-09 RX ORDER — CLOBETASOL PROPIONATE 0.5 MG/G
OINTMENT TOPICAL ONCE
Status: CANCELLED | OUTPATIENT
Start: 2021-08-09 | End: 2021-08-09

## 2021-08-09 RX ORDER — LIDOCAINE HYDROCHLORIDE 20 MG/ML
JELLY TOPICAL ONCE
Status: CANCELLED | OUTPATIENT
Start: 2021-08-09 | End: 2021-08-09

## 2021-08-09 RX ORDER — GENTAMICIN SULFATE 1 MG/G
OINTMENT TOPICAL ONCE
Status: CANCELLED | OUTPATIENT
Start: 2021-08-09 | End: 2021-08-09

## 2021-08-09 RX ORDER — LIDOCAINE HYDROCHLORIDE 40 MG/ML
SOLUTION TOPICAL ONCE
Status: CANCELLED | OUTPATIENT
Start: 2021-08-09 | End: 2021-08-09

## 2021-08-09 RX ORDER — BACITRACIN, NEOMYCIN, POLYMYXIN B 400; 3.5; 5 [USP'U]/G; MG/G; [USP'U]/G
OINTMENT TOPICAL ONCE
Status: CANCELLED | OUTPATIENT
Start: 2021-08-09 | End: 2021-08-09

## 2021-08-09 RX ORDER — BACITRACIN ZINC AND POLYMYXIN B SULFATE 500; 1000 [USP'U]/G; [USP'U]/G
OINTMENT TOPICAL ONCE
Status: CANCELLED | OUTPATIENT
Start: 2021-08-09 | End: 2021-08-09

## 2021-08-09 RX ORDER — LIDOCAINE 40 MG/G
CREAM TOPICAL ONCE
Status: CANCELLED | OUTPATIENT
Start: 2021-08-09 | End: 2021-08-09

## 2021-08-09 RX ORDER — GINSENG 100 MG
CAPSULE ORAL ONCE
Status: CANCELLED | OUTPATIENT
Start: 2021-08-09 | End: 2021-08-09

## 2021-08-09 RX ORDER — LIDOCAINE 50 MG/G
OINTMENT TOPICAL ONCE
Status: CANCELLED | OUTPATIENT
Start: 2021-08-09 | End: 2021-08-09

## 2021-08-09 RX ORDER — BETAMETHASONE DIPROPIONATE 0.05 %
OINTMENT (GRAM) TOPICAL ONCE
Status: CANCELLED | OUTPATIENT
Start: 2021-08-09 | End: 2021-08-09

## 2021-08-09 ASSESSMENT — PAIN SCALES - GENERAL: PAINLEVEL_OUTOF10: 0

## 2021-08-09 NOTE — PROGRESS NOTES
Wound Healing Center Followup Visit Note    Referring Physician : Gerardine Klinefelter, MD  [address] [place] [address] [place] RECORD NUMBER:  [id number]  AGE: 70 y.o. GENDER: male  : 1950  EPISODE DATE:  2021    Subjective:     Chief Complaint   Patient presents with    Wound Check     LEFT LATERAL LEG      HISTORY of PRESENT ILLNESS HPI   Rhonda Reyna is a 70 y.o. male who presents today in regards to follow up evaluation and treatment of wound/ulcer. That patient's past medical, family and social hx were reviewed and changes were made if present. History of Wound Context:  The patient has had a wound of right  which was first noted approximately 1 week ago.  This has not been treated. On their initial visit to the wound healing center, 2021,  the patient has noted that the wound has not been improving.  The patient has had similar previous wounds in the past.       Pt is not on abx at time of initial visit.     21 continue local care, elevate, compression. 5-3-21 wound covered with devitalized nonviable tissue, through sub q    21 wound improved, leg looks better, decreased edema    21 additional breakdown appreciated with increased in wound size along the posterior aspect right calf, through subcutaneous tissue covered with devitalized nonviable tissue. Patient advised no pressure. 2021  · Ulcer, right calf improving    21 wound improved    21 wound continues to improve    21 right healed, new area left calf with 50% devitalized nonviable tissue    21 left calf with 30% devitalized nonviable tissue    21  left calf with 50% devitalized nonviable tissue    21  left calf with 30% devitalized nonviable tissue    21 wound improved LLE, superficial, clean, stasis changes, decreased edema. Local care, compression, elevate    21 wound improved LLE, superficial, clean, stasis changes, decreased edema.   Local care, compression, elevate    21 doing well, tolerating dressings/compression, wound improved LLE, superficial, clean, stasis changes, decreased edema. Local care, compression, elevate, will need remeasured for compression stockings    8-9-21 patient presents in follow-up, doing well. Tolerated dressings and compression. He did contact supplier as far as the try to get the right size for his compression pumps. He is also complaining of very painful toenails with ambulation and pressure from shoe that severely limits activity. At present he specifically points 1 through 5 on the right as well as 1, 5 left. Left lower extremity wound healed. Toenails 1 through 5 right, 1, 5 left are very thick, elongated, yellow, dystrophic, crumbly with subungual debris and very painful to palpation. Toenails as above were debrided in length and thickness to decrease discomfort as well as prevent such hazards as secondary infection. Recommend topical antifungal daily. Spandi  for compression until he gets the appropriate size for his pumps.   Patient discharged for the above advised to contact wound care center if any questions or concerns.      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     Past Surgical History:   Procedure Laterality Date    FRACTURE SURGERY  2005    right ankle repair     Family History   Problem Relation Age of Onset    Heart Disease Mother     Heart Disease Father      Social History     Tobacco Use    Smoking status: Never Smoker    Smokeless tobacco: Never Used   Vaping Use    Vaping Use: Never used   Substance Use Topics    Alcohol use: No     Comment: on special occassions    Drug use: No     Allergies   Allergen Reactions    Metformin And Related Diarrhea     Current Outpatient Medications on File Prior to Encounter   Medication Sig Dispense Refill    aspirin (ASPIRIN CHILDRENS) 81 MG chewable tablet Take 1 tablet by mouth daily 30 tablet 5     No current facility-administered medications on file prior to encounter. REVIEW OF SYSTEMS See HPI    Objective:    Ht 6' 2\" (1.88 m)   Wt (!) 374 lb (169.6 kg)   BMI 48.02 kg/m²   Wt Readings from Last 3 Encounters:   08/09/21 (!) 374 lb (169.6 kg)   08/02/21 (!) 374 lb (169.6 kg)   07/26/21 (!) 374 lb (169.6 kg)     PHYSICAL EXAM  CONSTITUTIONAL:   Awake, alert, cooperative   EYES:  lids and lashes normal   ENT: external ears and nose without lesions   NECK:  supple, symmetrical, trachea midline   SKIN:  Open wound     Assessment:     Venous ulcer left calf, healed. Onychomycosis associated with pain. Wound 06/21/21 Leg Left;Lateral #2 (Active)   Wound Image   08/09/21 1337   Dressing Status New dressing applied;Clean;Dry; Intact 08/02/21 1441   Wound Cleansed Cleansed with saline 08/02/21 1441   Dressing/Treatment Alginate with Ag;Dry dressing 08/02/21 1441   Offloading for Diabetic Foot Ulcers Other (comment) 07/26/21 1406   Wound Length (cm) 0 cm 08/09/21 1337   Wound Width (cm) 0 cm 08/09/21 1337   Wound Depth (cm) 0 cm 08/09/21 1337   Wound Surface Area (cm^2) 0 cm^2 08/09/21 1337   Change in Wound Size % (l*w) 100 08/09/21 1337   Wound Volume (cm^3) 0 cm^3 08/09/21 1337   Wound Healing % 100 08/09/21 1337   Post-Procedure Length (cm) 1 cm 07/26/21 1331   Post-Procedure Width (cm) 0.4 cm 07/26/21 1331   Post-Procedure Depth (cm) 0.2 cm 07/26/21 1331   Post-Procedure Surface Area (cm^2) 0.4 cm^2 07/26/21 1331   Post-Procedure Volume (cm^3) 0.08 cm^3 07/26/21 1331   Wound Assessment Pale granulation tissue;Fibrin 08/02/21 1340   Drainage Amount None 08/09/21 1335   Drainage Description Serous 08/02/21 1340   Odor None 08/09/21 1335   Colleen-wound Assessment Hemosiderin staining (brown yellow); Dry/flaky 08/02/21 1340   Number of days: 49              Plan:   Treatment Note please see attached Discharge Instructions    Written patient dismissal instructions given to patient and signed by patient or POA. Discharge Instructions       Visit Discharge/Physician Orders    Discharge condition: Stable    Assessment of pain at discharge:  none    Anesthetic used: none    Discharge to: Home    Left via:public transportation    Accompanied by: accompanied by self    ECF/HHA:     Dressing Orders:  Left lower leg ulcers are healed. Keep skin moisturized at home and clean. Make certain to continue pumps as instructed. Wear double layer and extra long spandagrip to wear during the day. Treatment Orders:    [facility] followup visit _________discharge from wound care____________________  (Please note your next appointment above and if you are unable to keep, kindly give a 24 hour notice. Thank you.)    Physician signature:__________________________      If you experience any of the following, please call the [facility] during [place] hours:    * Increase in Pain  * Temperature over 101  * Increase in drainage from your wound  * Drainage with a foul odor  * Bleeding  * Increase in swelling  * Need for compression bandage changes due to slippage, breakthrough drainage. If you need medical attention outside of the business hours of the [facility] please contact your PCP or go to the nearest emergency room.         Electronically signed by Dav Lopez DPM on 8/9/2021 at 2:12 PM

## 2021-09-01 ENCOUNTER — TELEPHONE (OUTPATIENT)
Dept: INTERNAL MEDICINE | Age: 71
End: 2021-09-01

## 2021-09-01 NOTE — TELEPHONE ENCOUNTER
Received call pre service states pt cancelled appt today with Dr. Maverick Almeida and would like to reschedule

## 2021-09-13 ENCOUNTER — HOSPITAL ENCOUNTER (OUTPATIENT)
Dept: WOUND CARE | Age: 71
Discharge: HOME | End: 2021-09-13
Payer: MEDICARE

## 2021-09-13 VITALS
SYSTOLIC BLOOD PRESSURE: 148 MMHG | HEIGHT: 74 IN | BODY MASS INDEX: 40.43 KG/M2 | TEMPERATURE: 98.8 F | DIASTOLIC BLOOD PRESSURE: 82 MMHG | RESPIRATION RATE: 20 BRPM | WEIGHT: 315 LBS | HEART RATE: 78 BPM

## 2021-09-13 DIAGNOSIS — L97.211 NON-PRESSURE CHRONIC ULCER OF RIGHT CALF, LIMITED TO BREAKDOWN OF SKIN (HCC): ICD-10-CM

## 2021-09-13 DIAGNOSIS — L97.911 NON-PRESSURE CHRONIC ULCER OF RIGHT LOWER LEG, LIMITED TO BREAKDOWN OF SKIN (HCC): ICD-10-CM

## 2021-09-13 PROCEDURE — 11042 DBRDMT SUBQ TIS 1ST 20SQCM/<: CPT

## 2021-09-13 RX ORDER — LIDOCAINE HYDROCHLORIDE 20 MG/ML
JELLY TOPICAL ONCE
Status: CANCELLED | OUTPATIENT
Start: 2021-09-13 | End: 2021-09-13

## 2021-09-13 RX ORDER — LIDOCAINE HYDROCHLORIDE 40 MG/ML
SOLUTION TOPICAL ONCE
Status: CANCELLED | OUTPATIENT
Start: 2021-09-13 | End: 2021-09-13

## 2021-09-13 RX ORDER — BACITRACIN, NEOMYCIN, POLYMYXIN B 400; 3.5; 5 [USP'U]/G; MG/G; [USP'U]/G
OINTMENT TOPICAL ONCE
Status: CANCELLED | OUTPATIENT
Start: 2021-09-13 | End: 2021-09-13

## 2021-09-13 RX ORDER — BETAMETHASONE DIPROPIONATE 0.05 %
OINTMENT (GRAM) TOPICAL ONCE
Status: CANCELLED | OUTPATIENT
Start: 2021-09-13 | End: 2021-09-13

## 2021-09-13 RX ORDER — LIDOCAINE 40 MG/G
CREAM TOPICAL ONCE
Status: CANCELLED | OUTPATIENT
Start: 2021-09-13 | End: 2021-09-13

## 2021-09-13 RX ORDER — GINSENG 100 MG
CAPSULE ORAL ONCE
Status: CANCELLED | OUTPATIENT
Start: 2021-09-13 | End: 2021-09-13

## 2021-09-13 RX ORDER — GENTAMICIN SULFATE 1 MG/G
OINTMENT TOPICAL ONCE
Status: CANCELLED | OUTPATIENT
Start: 2021-09-13 | End: 2021-09-13

## 2021-09-13 RX ORDER — LIDOCAINE HYDROCHLORIDE 40 MG/ML
SOLUTION TOPICAL ONCE
Status: DISCONTINUED | OUTPATIENT
Start: 2021-09-13 | End: 2021-09-14 | Stop reason: HOSPADM

## 2021-09-13 RX ORDER — LIDOCAINE 50 MG/G
OINTMENT TOPICAL ONCE
Status: CANCELLED | OUTPATIENT
Start: 2021-09-13 | End: 2021-09-13

## 2021-09-13 RX ORDER — CLOBETASOL PROPIONATE 0.5 MG/G
OINTMENT TOPICAL ONCE
Status: CANCELLED | OUTPATIENT
Start: 2021-09-13 | End: 2021-09-13

## 2021-09-13 RX ORDER — BACITRACIN ZINC AND POLYMYXIN B SULFATE 500; 1000 [USP'U]/G; [USP'U]/G
OINTMENT TOPICAL ONCE
Status: CANCELLED | OUTPATIENT
Start: 2021-09-13 | End: 2021-09-13

## 2021-09-13 NOTE — PROGRESS NOTES
[facility]  History and Physical/Consultation  Podiatry    Referring Physician : Hernán Roberts MD  [address] [place] [address] [place] RECORD NUMBER:  [id number]  AGE: 70 y.o. GENDER: male  : 1950  EPISODE DATE:  2021  Subjective:     Chief Complaint   Patient presents with    Wound Check     left leg ulcer         HISTORY of PRESENT ILLNESS HPI     Nicole Jennings is a 70 y.o. male who presents today for wound/ulcer evaluation. History of Wound Context: Patient relates wounds bilateral lower extremities for about 1 week in duration. Patient states initially he was waiting for his lymphedema pumps, he did receive these recently however in the interim did have recurrence as noted above wounds. Patient denies any purulence. No nausea, vomiting, fever or chills. The patient has had similar previous wounds in the past.      Pt is not on abx at time of initial visit. 21 start local care, compression, elevate. Once they heal we can start his compression pumps at home.       Wound/Ulcer Pain Timing/Severity: none  Quality of pain:   Severity:   / 10   Modifying Factors:   Associated Signs/Symptoms:     Ulcer Identification:  Ulcer Type: venous  Contributing Factors: edema, venous stasis and lymphedema    Diabetic/Pressure/Non Pressure Ulcers onl y:  Ulcer: N/A    If patient has diabetic lower extremity wounds  Mitchell Classification of diabetic lower extremity wounds:    Grade Description   []  0 No open wound   []  1 Superficial ulcer involving the full skin thickness   []  2 Deep ulcer involves ligament, tendon, joint capsule, or fascia  No bone involvement or abscess presence   []  3 Deep Ulcer with abcess formation and/or osteomyelitis   []  4 Localized gangrene   []  5 Extensive gangrene of the foot     Wound: N/A        PAST MEDICAL HISTORY      Diagnosis Date    Cellulitis of right lower leg     Diabetes mellitus (HCC)     Lymphedema     Obesity, Class III, BMI 40-49.9 (morbid obesity) ([facility]     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     Past Surgical History:   Procedure Laterality Date    FRACTURE SURGERY  2005    right ankle repair     Family History   Problem Relation Age of Onset    Heart Disease Mother     Heart Disease Father      Social History     Tobacco Use    Smoking status: Never Smoker    Smokeless tobacco: Never Used   Vaping Use    Vaping Use: Never used   Substance Use Topics    Alcohol use: No     Comment: on special occassions    Drug use: No     Allergies   Allergen Reactions    Metformin And Related Diarrhea     Current Outpatient Medications on File Prior to Encounter   Medication Sig Dispense Refill    aspirin (ASPIRIN CHILDRENS) 81 MG chewable tablet Take 1 tablet by mouth daily 30 tablet 5     No current facility-administered medications on file prior to encounter. REVIEW OF SYSTEMS   ROS : All others Negative if blank [], Positive if [x]  General Vascular   [] Fevers [] Claudication   [] Chills [] Rest Pain   Skin Neurologic   [x] Tissue Loss [] Lower extremity neuropathy     Objective:    BP (!) 148/82   Pulse 78   Temp 98.8 °F (37.1 °C)   Resp 20   Ht 6' 2\" (1.88 m)   Wt (!) 374 lb (169.6 kg)   BMI 48.02 kg/m²   Wt Readings from Last 3 Encounters:   09/13/21 (!) 374 lb (169.6 kg)   08/09/21 (!) 374 lb (169.6 kg)   08/02/21 (!) 374 lb (169.6 kg)       PHYSICAL EXAM   CONSTITUTIONAL:   Awake, alert, cooperative   PSYCHIATRIC :  Oriented to time, place and person      normal insight to disease process  EXTREMITIES:   Bilateral lower extremity demonstrates chronic edema, stasis changes. Left lower calf lateral aspect area covered with devitalized nonviable tissue. With debridement through subcutaneous tissue. Right lower leg superficial with minimal fibrous type tissue with a clean base. Both areas are without any purulence or odor. No fluctuance or crepitus. Vascular intact. Assessment:     Venous stasis ulcers. Edema. Lymphedema. Pre Debridement Measurements:  Are located in the [place]  Documentation Flow Sheet  Post Debridement Measurements:  Wound/Ulcer Descriptions are Pre Debridement except measurements:     Wound 09/13/21 Leg Left;Lateral #6 venous (Active)   Wound Image   09/13/21 1316   Wound Length (cm) 9.5 cm 09/13/21 1316   Wound Width (cm) 5 cm 09/13/21 1316   Wound Depth (cm) 0.1 cm 09/13/21 1316   Wound Surface Area (cm^2) 47.5 cm^2 09/13/21 1316   Wound Volume (cm^3) 4.75 cm^3 09/13/21 1316   Post-Procedure Length (cm) 9.5 cm 09/13/21 1332   Post-Procedure Width (cm) 5 cm 09/13/21 1332   Post-Procedure Depth (cm) 0.2 cm 09/13/21 1332   Post-Procedure Surface Area (cm^2) 47.5 cm^2 09/13/21 1332   Post-Procedure Volume (cm^3) 9.5 cm^3 09/13/21 1332   Wound Assessment Granulation tissue;Fibrin 09/13/21 1316   Drainage Amount Moderate 09/13/21 1316   Drainage Description Yellow 09/13/21 1316   Odor None 09/13/21 1316   Colleen-wound Assessment Maceration; Intact 09/13/21 1316   Wound Thickness Description not for Pressure Injury Full thickness 09/13/21 1316   Number of days: 0       Wound 09/13/21 Leg Right;Lateral #7 venous (Active)   Wound Image   09/13/21 1316   Wound Length (cm) 5 cm 09/13/21 1316   Wound Width (cm) 8.8 cm 09/13/21 1316   Wound Depth (cm) 0.1 cm 09/13/21 1316   Wound Surface Area (cm^2) 44 cm^2 09/13/21 1316   Wound Volume (cm^3) 4.4 cm^3 09/13/21 1316   Post-Procedure Length (cm) 5 cm 09/13/21 1332   Post-Procedure Width (cm) 8.8 cm 09/13/21 1332   Post-Procedure Depth (cm) 0.2 cm 09/13/21 1332   Post-Procedure Surface Area (cm^2) 44 cm^2 09/13/21 1332   Post-Procedure Volume (cm^3) 8.8 cm^3 09/13/21 1332   Wound Assessment Granulation tissue;Fibrin 09/13/21 1316   Drainage Amount Moderate 09/13/21 1316   Drainage Description Yellow 09/13/21 1316   Odor None 09/13/21 1316   Colleen-wound Assessment Maceration; Intact 09/13/21 1316   Wound Thickness Description not for Pressure Injury Full thickness 09/13/21 1316   Number of days: 0          Procedure Note  Indications:  Based on my examination of this patient's wound(s)/ulcer(s) today, debridement is required to promote healing and evaluate the wound base. Performed by: Rj Marti DPM    Consent obtained:  Yes    Time out taken:  Yes    Pain Control: Anesthetic  Anesthetic: 4% Lidocaine Liquid Topical     Debridement:Excisional Debridement    Using curette the wound(s)/ulcer(s) was/were sharply debrided down through and including the removal of subcutaneous tissue. Devitalized Tissue Debrided:  fibrin, biofilm, slough and necrotic/eschar to stimulate bleeding to promote healing, post debridement good bleeding base and wound edges noted    Wound/Ulcer #: 6    Percent of Wound/Ulcer Debrided: 20%    Total Surface Area Debrided:  9.6 sq cm (throught sub q). Estimated Blood Loss:  Minimal  Hemostasis Achieved:  by pressure    Procedural Pain:  0  / 10   Post Procedural Pain:  0 / 10     Response to treatment:  Well tolerated by patient. A culture was not done. Plan:     Treatment Note please see attached Discharge Instructions    Written patient dismissal instructions given to patient and signed by patient or POA. Discharge Instructions         Visit Discharge/Physician Orders     Discharge condition: Stable     Assessment of pain at discharge:none     Anesthetic used: 4% lidocaine     Discharge to: Home     Left via:public transportation     Accompanied by: accompanied by self     ECF/HHA:      Dressing Orders:LEFT/RIGHT LOWER LEG CLEANSE WITH BETADINE APPLY ALGINATE ag  AND PROFORE WRAP CHANGE WEEKLY AT WOUND CARE CENTER.     Treatment Orders:  Eat foods high in protein and vitamin c     Take multivitamin daily     Use compression pumps     [facility] followup visit _______1 week ___________________  (Please note your next appointment above and if you are unable to keep, kindly give a 24 hour notice.  Thank you.)     Physician signature:__________________________        If you experience any of the following, please call the [address] during business hours:     * Increase in Pain  * Temperature over 101  * Increase in drainage from your wound  * Drainage with a foul odor  * Bleeding  * Increase in swelling  * Need for compression bandage changes due to slippage, breakthrough drainage.     If you need medical attention outside of the business hours of the [facility] [address] please contact your PCP or go to the nearest emergency room. Contact your doctor if you have a temperature above 100.4 with any of the following:                         Persistent cough             Shortness of breath            Chills            Fatigue            Chest pain or pressure            Difficulty breathing            Decreased consciousness of confusion             Headache     Recommend:                       Wash hands often and for 20 seconds or more             Avoid touching eyes, nose, mouth and face             Social distance ( 6 feet)             Stay at home as much as possible             Call health care provider with any concerns                                                                        Electronically signed by Rj Marti DPM on 9/13/2021 at 1:40 PM

## 2021-09-20 ENCOUNTER — HOSPITAL ENCOUNTER (OUTPATIENT)
Dept: WOUND CARE | Age: 71
Discharge: HOME | End: 2021-09-20
Payer: MEDICARE

## 2021-09-20 VITALS
TEMPERATURE: 0.7 F | RESPIRATION RATE: 18 BRPM | SYSTOLIC BLOOD PRESSURE: 142 MMHG | WEIGHT: 315 LBS | HEART RATE: 88 BPM | DIASTOLIC BLOOD PRESSURE: 72 MMHG | BODY MASS INDEX: 40.43 KG/M2 | HEIGHT: 74 IN

## 2021-09-20 DIAGNOSIS — L97.211 NON-PRESSURE CHRONIC ULCER OF RIGHT CALF, LIMITED TO BREAKDOWN OF SKIN (HCC): Primary | ICD-10-CM

## 2021-09-20 DIAGNOSIS — L97.222 VENOUS STASIS ULCER OF LEFT CALF WITH FAT LAYER EXPOSED WITHOUT VARICOSE VEINS (HCC): ICD-10-CM

## 2021-09-20 DIAGNOSIS — I87.2 VENOUS STASIS ULCER OF LEFT CALF WITH FAT LAYER EXPOSED WITHOUT VARICOSE VEINS (HCC): ICD-10-CM

## 2021-09-20 PROCEDURE — 11042 DBRDMT SUBQ TIS 1ST 20SQCM/<: CPT | Performed by: NURSE PRACTITIONER

## 2021-09-20 PROCEDURE — 6370000000 HC RX 637 (ALT 250 FOR IP): Performed by: PODIATRIST

## 2021-09-20 RX ORDER — GENTAMICIN SULFATE 1 MG/G
OINTMENT TOPICAL ONCE
Status: CANCELLED | OUTPATIENT
Start: 2021-09-20 | End: 2021-09-20

## 2021-09-20 RX ORDER — GINSENG 100 MG
CAPSULE ORAL ONCE
Status: CANCELLED | OUTPATIENT
Start: 2021-09-20 | End: 2021-09-20

## 2021-09-20 RX ORDER — LIDOCAINE HYDROCHLORIDE 20 MG/ML
JELLY TOPICAL ONCE
Status: CANCELLED | OUTPATIENT
Start: 2021-09-20 | End: 2021-09-20

## 2021-09-20 RX ORDER — LIDOCAINE HYDROCHLORIDE 40 MG/ML
SOLUTION TOPICAL ONCE
Status: CANCELLED | OUTPATIENT
Start: 2021-09-20 | End: 2021-09-20

## 2021-09-20 RX ORDER — BACITRACIN ZINC AND POLYMYXIN B SULFATE 500; 1000 [USP'U]/G; [USP'U]/G
OINTMENT TOPICAL ONCE
Status: CANCELLED | OUTPATIENT
Start: 2021-09-20 | End: 2021-09-20

## 2021-09-20 RX ORDER — BACITRACIN, NEOMYCIN, POLYMYXIN B 400; 3.5; 5 [USP'U]/G; MG/G; [USP'U]/G
OINTMENT TOPICAL ONCE
Status: CANCELLED | OUTPATIENT
Start: 2021-09-20 | End: 2021-09-20

## 2021-09-20 RX ORDER — BETAMETHASONE DIPROPIONATE 0.05 %
OINTMENT (GRAM) TOPICAL ONCE
Status: CANCELLED | OUTPATIENT
Start: 2021-09-20 | End: 2021-09-20

## 2021-09-20 RX ORDER — CLOBETASOL PROPIONATE 0.5 MG/G
OINTMENT TOPICAL ONCE
Status: CANCELLED | OUTPATIENT
Start: 2021-09-20 | End: 2021-09-20

## 2021-09-20 RX ORDER — LIDOCAINE 50 MG/G
OINTMENT TOPICAL ONCE
Status: CANCELLED | OUTPATIENT
Start: 2021-09-20 | End: 2021-09-20

## 2021-09-20 RX ORDER — LIDOCAINE 40 MG/G
CREAM TOPICAL ONCE
Status: CANCELLED | OUTPATIENT
Start: 2021-09-20 | End: 2021-09-20

## 2021-09-20 RX ORDER — LIDOCAINE 50 MG/G
OINTMENT TOPICAL ONCE
Status: COMPLETED | OUTPATIENT
Start: 2021-09-20 | End: 2021-09-20

## 2021-09-20 RX ADMIN — LIDOCAINE 10 ML: 50 OINTMENT TOPICAL at 13:22

## 2021-09-20 ASSESSMENT — PAIN SCALES - GENERAL: PAINLEVEL_OUTOF10: 0

## 2021-09-20 NOTE — PROGRESS NOTES
Wound Healing Center Followup Visit Note    Referring Physician : Katarzyna Brennan MD  [address] [place] [address] [place] RECORD NUMBER:  [id number]  AGE: 70 y.o. GENDER: male  : 1950  EPISODE DATE:  2021    Subjective:     Chief Complaint   Patient presents with    Wound Check     right and left leg wounds      HISTORY of PRESENT ILLNESS HPI   Sara Hardin is a 70 y.o. male who presents today in regards to follow up evaluation and treatment of wound/ulcer. That patient's past medical, family and social hx were reviewed and changes were made if present. History of Wound Context: Patient relates wounds bilateral lower extremities for about 1 week in duration. Patient states initially he was waiting for his lymphedema pumps, he did receive these recently however in the interim did have recurrence as noted above wounds. Patient denies any purulence. No nausea, vomiting, fever or chills. The patient has had similar previous wounds in the past.       Pt is not on abx at time of initial visit.     21 start local care, compression, elevate. Once they heal we can start his compression pumps at home. 21 Doing well, tolerating dressings/compression. Bilateral lower extremity demonstrates chronic edema, stasis changes. Left lower calf lateral aspect area with 50% devitalized nonviable tissue. With debridement through subcutaneous tissue. Right lower leg superficial with minimal fibrous type tissue with a clean base. Both areas are without any purulence or odor. No fluctuance or crepitus. Vascular intact.       Wound/Ulcer Pain Timing/Severity: none  Quality of pain:   Severity:   / 10   Modifying Factors:   Associated Signs/Symptoms:      Ulcer Identification:  Ulcer Type: venous  Contributing Factors: edema, venous stasis and lymphedema           PAST MEDICAL HISTORY      Diagnosis Date    Cellulitis of right lower leg     Diabetes mellitus (HCC)     Lymphedema     Obesity, Class III, BMI 40-49.9 (morbid obesity) ([facility] [address]     [facility] 8/23/2017    Left     Past Surgical History:   Procedure Laterality Date    FRACTURE SURGERY  2005    right ankle repair     Family History   Problem Relation Age of Onset    Heart Disease Mother     Heart Disease Father      Social History     Tobacco Use    Smoking status: Never Smoker    Smokeless tobacco: Never Used   Vaping Use    Vaping Use: Never used   Substance Use Topics    Alcohol use: No     Comment: on special occassions    Drug use: No     Allergies   Allergen Reactions    Metformin And Related Diarrhea     Current Outpatient Medications on File Prior to Encounter   Medication Sig Dispense Refill    aspirin (ASPIRIN CHILDRENS) 81 MG chewable tablet Take 1 tablet by mouth daily 30 tablet 5     No current facility-administered medications on file prior to encounter.        REVIEW OF SYSTEMS See HPI    Objective:    BP (!) 142/72   Pulse 88   Temp (!) 0.7 °F (-17.4 °C) (Temporal)   Resp 18   Ht 6' 2\" (1.88 m)   Wt (!) 374 lb (169.6 kg)   BMI 48.02 kg/m²   Wt Readings from Last 3 Encounters:   09/20/21 (!) 374 lb (169.6 kg)   09/13/21 (!) 374 lb (169.6 kg)   08/09/21 (!) 374 lb (169.6 kg)     PHYSICAL EXAM  CONSTITUTIONAL:   Awake, alert, cooperative   EYES:  lids and lashes normal   ENT: external ears and nose without lesions   NECK:  supple, symmetrical, trachea midline   SKIN:  Open wound     Assessment:     Problem List Items Addressed This Visit     Venous stasis ulcer of left calf with fat layer exposed without varicose veins (HCC) (Chronic)    Relevant Orders    Initiate Outpatient Wound Care Protocol    Non-pressure chronic ulcer of right calf, limited to breakdown of skin ([facility] [address] - Primary    Relevant Orders    Initiate Outpatient Wound Care Protocol          Pre Debridement Measurements:  Are located in the [facility]  Documentation Flow Sheet  Post Debridement Measurements:  Wound/Ulcer Descriptions are Pre Debridement except measurements:     Wound 09/13/21 Leg Left;Lateral #6 venous (Active)   Wound Image   09/13/21 1316   Dressing Status New dressing applied;Clean;Dry; Intact 09/13/21 1420   Wound Cleansed Cleansed with saline 09/13/21 1420   Dressing/Treatment Alginate;ABD;Dry dressing 09/13/21 1420   Wound Length (cm) 9.7 cm 09/20/21 1317   Wound Width (cm) 3 cm 09/20/21 1317   Wound Depth (cm) 0.1 cm 09/20/21 1317   Wound Surface Area (cm^2) 29.1 cm^2 09/20/21 1317   Change in Wound Size % (l*w) 38.74 09/20/21 1317   Wound Volume (cm^3) 2.91 cm^3 09/20/21 1317   Wound Healing % 39 09/20/21 1317   Post-Procedure Length (cm) 9.7 cm 09/20/21 1336   Post-Procedure Width (cm) 3 cm 09/20/21 1336   Post-Procedure Depth (cm) 0.2 cm 09/20/21 1336   Post-Procedure Surface Area (cm^2) 29.1 cm^2 09/20/21 1336   Post-Procedure Volume (cm^3) 5.82 cm^3 09/20/21 1336   Wound Assessment Pale granulation tissue 09/20/21 1317   Drainage Amount Moderate 09/20/21 1317   Drainage Description Yellow 09/20/21 1317   Odor None 09/20/21 1317   Colleen-wound Assessment Maceration; Intact 09/20/21 1317   Wound Thickness Description not for Pressure Injury Full thickness 09/13/21 1316   Number of days: 7       Wound 09/13/21 Leg Right;Lateral #7 venous (Active)   Wound Image   09/13/21 1316   Dressing Status New dressing applied;Clean;Dry; Intact 09/13/21 1420   Wound Cleansed Cleansed with saline 09/13/21 1420   Dressing/Treatment Alginate;ABD;Dry dressing 09/13/21 1420   Wound Length (cm) 4.4 cm 09/20/21 1317   Wound Width (cm) 7.4 cm 09/20/21 1317   Wound Depth (cm) 0.1 cm 09/20/21 1317   Wound Surface Area (cm^2) 32.56 cm^2 09/20/21 1317   Change in Wound Size % (l*w) 26 09/20/21 1317   Wound Volume (cm^3) 3. 256 cm^3 09/20/21 1317   Wound Healing % 26 09/20/21 1317   Post-Procedure Length (cm) 4.4 cm 09/20/21 1336   Post-Procedure Width (cm) 7.4 cm 09/20/21 1336   Post-Procedure Depth (cm) 0.2 cm 09/20/21 1336   Post-Procedure Surface Area (cm^2) 32.56 cm^2 09/20/21 1336   Post-Procedure Volume (cm^3) 6.512 cm^3 09/20/21 1336   Wound Assessment Pink/red 09/20/21 1317   Drainage Amount Moderate 09/20/21 1317   Drainage Description Yellow 09/20/21 1317   Odor None 09/20/21 1317   Colleen-wound Assessment Maceration; Intact 09/20/21 1317   Wound Thickness Description not for Pressure Injury Full thickness 09/13/21 1316   Number of days: 7       Procedure Note  Indications:  Based on my examination of this patient's wound(s)/ulcer(s) today, debridement is required to promote healing and evaluate the wound base.     Performed by: Deepika Nava DPM     Consent obtained: Yes     Time out taken: Yes     Pain Control: Anesthetic  Anesthetic: 4% Lidocaine Liquid Topical      Debridement:Excisional Debridement     Using curette the wound(s)/ulcer(s) was/were sharply debrided down through and including the removal of subcutaneous tissue.         Devitalized Tissue Debrided:  fibrin, biofilm, slough and necrotic/eschar to stimulate bleeding to promote healing, post debridement good bleeding base and wound edges noted     Wound/Ulcer #: 6     Percent of Wound/Ulcer Debrided: 50%     Total Surface Area Debrided:  14.5 sq cm (throught sub q).    Estimated Blood Loss:  Minimal  Hemostasis Achieved:  by pressure     Procedural Pain:  0  / 10   Post Procedural Pain:  0 / 10      Response to treatment:  Well tolerated by patient. Plan:   Treatment Note please see attached Discharge Instructions    Written patient dismissal instructions given to patient and signed by patient or POA.          Discharge Instructions       Visit Discharge/Physician Orders     Discharge condition: Stable     Assessment of pain at discharge:none     Anesthetic used: 4% lidocaine     Discharge to: Home     Left via:public transportation     Accompanied by: accompanied by self     ECF/HHA:      Dressing Orders:LEFT/RIGHT LOWER LEG CLEANSE WITH BETADINE APPLY ALGINATE ag  AND PROFORE WRAP CHANGE WEEKLY AT [facility].     Treatment Orders:  Eat foods high in protein and vitamin c     Take multivitamin daily     Use compression pumps     [facility] followup visit _______1 week ___________________  (Please note your next appointment above and if you are unable to keep, kindly give a 24 hour notice. Thank you.)     Physician signature:__________________________        If you experience any of the following, please call the [facility] during business hours:     * Increase in Pain  * Temperature over 101  * Increase in drainage from your wound  * Drainage with a foul odor  * Bleeding  * Increase in swelling  * Need for compression bandage changes due to slippage, breakthrough drainage.     If you need medical attention outside of the business hours of the [facility] please contact your PCP or go to the nearest emergency room. Contact your doctor if you have a temperature above 100.4 with any of the following:                         Persistent cough             Shortness of breath            Chills            Fatigue            Chest pain or pressure            Difficulty breathing            Decreased consciousness of confusion             Headache     Recommend:                       Wash hands often and for 20 seconds or more             Avoid touching eyes, nose, mouth and face             Social distance ( 6 feet)             Stay at home as much as possible             Call health care provider with any concerns                                                                                             Electronically signed by Violette Castro DPM on 9/20/2021 at 1:40 PM

## 2021-09-27 ENCOUNTER — HOSPITAL ENCOUNTER (OUTPATIENT)
Dept: WOUND CARE | Age: 71
Discharge: HOME | End: 2021-09-27
Payer: MEDICARE

## 2021-09-27 VITALS
HEART RATE: 81 BPM | HEIGHT: 74 IN | SYSTOLIC BLOOD PRESSURE: 144 MMHG | TEMPERATURE: 96.8 F | BODY MASS INDEX: 40.43 KG/M2 | DIASTOLIC BLOOD PRESSURE: 88 MMHG | WEIGHT: 315 LBS | RESPIRATION RATE: 18 BRPM

## 2021-09-27 DIAGNOSIS — L97.211 NON-PRESSURE CHRONIC ULCER OF RIGHT CALF, LIMITED TO BREAKDOWN OF SKIN (HCC): Primary | ICD-10-CM

## 2021-09-27 DIAGNOSIS — I87.2 VENOUS STASIS ULCER OF LEFT CALF WITH FAT LAYER EXPOSED WITHOUT VARICOSE VEINS (HCC): ICD-10-CM

## 2021-09-27 DIAGNOSIS — L97.222 VENOUS STASIS ULCER OF LEFT CALF WITH FAT LAYER EXPOSED WITHOUT VARICOSE VEINS (HCC): ICD-10-CM

## 2021-09-27 PROCEDURE — 11042 DBRDMT SUBQ TIS 1ST 20SQCM/<: CPT

## 2021-09-27 RX ORDER — BACITRACIN, NEOMYCIN, POLYMYXIN B 400; 3.5; 5 [USP'U]/G; MG/G; [USP'U]/G
OINTMENT TOPICAL ONCE
Status: CANCELLED | OUTPATIENT
Start: 2021-09-27 | End: 2021-09-27

## 2021-09-27 RX ORDER — GINSENG 100 MG
CAPSULE ORAL ONCE
Status: CANCELLED | OUTPATIENT
Start: 2021-09-27 | End: 2021-09-27

## 2021-09-27 RX ORDER — LIDOCAINE 40 MG/G
CREAM TOPICAL ONCE
Status: CANCELLED | OUTPATIENT
Start: 2021-09-27 | End: 2021-09-27

## 2021-09-27 RX ORDER — LIDOCAINE HYDROCHLORIDE 40 MG/ML
SOLUTION TOPICAL ONCE
Status: COMPLETED | OUTPATIENT
Start: 2021-09-27 | End: 2021-09-27

## 2021-09-27 RX ORDER — CLOBETASOL PROPIONATE 0.5 MG/G
OINTMENT TOPICAL ONCE
Status: CANCELLED | OUTPATIENT
Start: 2021-09-27 | End: 2021-09-27

## 2021-09-27 RX ORDER — BETAMETHASONE DIPROPIONATE 0.05 %
OINTMENT (GRAM) TOPICAL ONCE
Status: CANCELLED | OUTPATIENT
Start: 2021-09-27 | End: 2021-09-27

## 2021-09-27 RX ORDER — LIDOCAINE HYDROCHLORIDE 20 MG/ML
JELLY TOPICAL ONCE
Status: CANCELLED | OUTPATIENT
Start: 2021-09-27 | End: 2021-09-27

## 2021-09-27 RX ORDER — BACITRACIN ZINC AND POLYMYXIN B SULFATE 500; 1000 [USP'U]/G; [USP'U]/G
OINTMENT TOPICAL ONCE
Status: CANCELLED | OUTPATIENT
Start: 2021-09-27 | End: 2021-09-27

## 2021-09-27 RX ORDER — LIDOCAINE 50 MG/G
OINTMENT TOPICAL ONCE
Status: CANCELLED | OUTPATIENT
Start: 2021-09-27 | End: 2021-09-27

## 2021-09-27 RX ORDER — GENTAMICIN SULFATE 1 MG/G
OINTMENT TOPICAL ONCE
Status: CANCELLED | OUTPATIENT
Start: 2021-09-27 | End: 2021-09-27

## 2021-09-27 RX ORDER — LIDOCAINE HYDROCHLORIDE 40 MG/ML
SOLUTION TOPICAL ONCE
Status: CANCELLED | OUTPATIENT
Start: 2021-09-27 | End: 2021-09-27

## 2021-09-27 RX ADMIN — LIDOCAINE HYDROCHLORIDE: 40 SOLUTION TOPICAL at 13:38

## 2021-09-27 NOTE — PROGRESS NOTES
Wound Healing Center Followup Visit Note    Referring Physician : Katarzyna Brennan MD  [address] [place] [address] [place] RECORD NUMBER:  [id number]  AGE: 70 y.o. GENDER: male  : 1950  EPISODE DATE:  2021    Subjective:     Chief Complaint   Patient presents with    Wound Check     left foot       HISTORY of PRESENT ILLNESS HPI   Sara Hardin is a 70 y.o. male who presents today in regards to follow up evaluation and treatment of wound/ulcer. That patient's past medical, family and social hx were reviewed and changes were made if present. History of Wound Context: Patient relates wounds bilateral lower extremities for about 1 week in duration. Patient states initially he was waiting for his lymphedema pumps, he did receive these recently however in the interim did have recurrence as noted above wounds. Patient denies any purulence. No nausea, vomiting, fever or chills. The patient has had similar previous wounds in the past.       Pt is not on abx at time of initial visit.     21 start local care, compression, elevate. Once they heal we can start his compression pumps at home. 21 Doing well, tolerating dressings/compression. Bilateral lower extremity demonstrates chronic edema, stasis changes. Left lower calf lateral aspect area with 50% devitalized nonviable tissue. With debridement through subcutaneous tissue. Right lower leg superficial with minimal fibrous type tissue with a clean base. Both areas are without any purulence or odor. No fluctuance or crepitus. Vascular intact. 21 Doing well, tolerating dressings/compression. Bilateral lower extremity demonstrates chronic edema, stasis changes. Left lower calf lateral aspect area with 50% devitalized nonviable tissue. With debridement through subcutaneous tissue. Right lower leg with a clean base. Both areas are without any purulence or odor. No fluctuance or crepitus. Vascular intact.  Areas of scratches, advised against.    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     Past Surgical History:   Procedure Laterality Date    FRACTURE SURGERY  2005    right ankle repair     Family History   Problem Relation Age of Onset    Heart Disease Mother     Heart Disease Father      Social History     Tobacco Use    Smoking status: Never Smoker    Smokeless tobacco: Never Used   Vaping Use    Vaping Use: Never used   Substance Use Topics    Alcohol use: No     Comment: on special occassions    Drug use: No     Allergies   Allergen Reactions    Metformin And Related Diarrhea     Current Outpatient Medications on File Prior to Encounter   Medication Sig Dispense Refill    aspirin (ASPIRIN CHILDRENS) 81 MG chewable tablet Take 1 tablet by mouth daily 30 tablet 5     No current facility-administered medications on file prior to encounter.        REVIEW OF SYSTEMS See HPI    Objective:    BP (!) 144/88   Pulse 81   Temp 96.8 °F (36 °C) (Temporal)   Resp 18   Ht 6' 2\" (1.88 m)   Wt (!) 374 lb (169.6 kg)   BMI 48.02 kg/m²   Wt Readings from Last 3 Encounters:   09/27/21 (!) 374 lb (169.6 kg)   09/20/21 (!) 374 lb (169.6 kg)   09/13/21 (!) 374 lb (169.6 kg)     PHYSICAL EXAM  CONSTITUTIONAL:   Awake, alert, cooperative   EYES:  lids and lashes normal   ENT: external ears and nose without lesions   NECK:  supple, symmetrical, trachea midline   SKIN:  Open wound     Assessment:     Problem List Items Addressed This Visit     Venous stasis ulcer of left calf with fat layer exposed without varicose veins ([facility] (Chronic)    Relevant Orders    Initiate Outpatient Wound Care Protocol    Non-pressure chronic ulcer of right calf, limited to breakdown of skin [facility]) - Primary    Relevant Orders    Initiate Outpatient Wound Care Protocol          Pre Debridement Measurements:  Are located in the [place]  Documentation Flow Sheet  Post Debridement Measurements:  Wound/Ulcer Descriptions are Pre Debridement except measurements:     Wound 09/13/21 Leg Left;Lateral #6 venous (Active)   Wound Image   09/13/21 1316   Dressing Status New dressing applied;Clean;Dry; Intact 09/13/21 1420   Wound Cleansed Cleansed with saline 09/13/21 1420   Dressing/Treatment Alginate;ABD;Dry dressing 09/13/21 1420   Wound Length (cm) 7 cm 09/27/21 1324   Wound Width (cm) 1.9 cm 09/27/21 1324   Wound Depth (cm) 0.1 cm 09/27/21 1324   Wound Surface Area (cm^2) 13.3 cm^2 09/27/21 1324   Change in Wound Size % (l*w) 72 09/27/21 1324   Wound Volume (cm^3) 1.33 cm^3 09/27/21 1324   Wound Healing % 72 09/27/21 1324   Post-Procedure Length (cm) 7 cm 09/27/21 1404   Post-Procedure Width (cm) 1.9 cm 09/27/21 1404   Post-Procedure Depth (cm) 0.2 cm 09/27/21 1404   Post-Procedure Surface Area (cm^2) 13.3 cm^2 09/27/21 1404   Post-Procedure Volume (cm^3) 2.66 cm^3 09/27/21 1404   Wound Assessment Pink/red;Fibrin 09/27/21 1324   Drainage Amount Moderate 09/27/21 1324   Drainage Description Yellow 09/27/21 1324   Odor None 09/27/21 1324   Colleen-wound Assessment Intact;Dry/flaky 09/27/21 1324   Wound Thickness Description not for Pressure Injury Full thickness 09/13/21 1316   Number of days: 14       Wound 09/13/21 Leg Right;Lateral #7 venous (Active)   Wound Image   09/13/21 1316   Dressing Status New dressing applied;Clean;Dry; Intact 09/13/21 1420   Wound Cleansed Cleansed with saline 09/13/21 1420   Dressing/Treatment Alginate;ABD;Dry dressing 09/13/21 1420   Wound Length (cm) 1 cm 09/27/21 1324   Wound Width (cm) 1 cm 09/27/21 1324   Wound Depth (cm) 0.1 cm 09/27/21 1324   Wound Surface Area (cm^2) 1 cm^2 09/27/21 1324   Change in Wound Size % (l*w) 97.73 09/27/21 1324   Wound Volume (cm^3) 0.1 cm^3 09/27/21 1324   Wound Healing % 98 09/27/21 1324   Post-Procedure Length (cm) 4.4 cm 09/20/21 1336   Post-Procedure Width (cm) 7.4 cm 09/20/21 1336   Post-Procedure Depth (cm) 0.2 cm 09/20/21 1336   Post-Procedure Surface Area (cm^2) 32.56 cm^2 09/20/21 1336   Post-Procedure Volume (cm^3) 6.512 cm^3 09/20/21 1336   Wound Assessment Pink/red 09/27/21 1324   Drainage Amount Small 09/27/21 1324   Drainage Description Yellow 09/27/21 1324   Odor None 09/27/21 1324   Colleen-wound Assessment Dry/flaky 09/27/21 1324   Wound Thickness Description not for Pressure Injury Full thickness 09/13/21 1316   Number of days: 14       Procedure Note  Indications:  Based on my examination of this patient's wound(s)/ulcer(s) today, debridement is required to promote healing and evaluate the wound base.     Performed by: Bhaskar Lee DPM     Consent obtained: Yes     Time out taken: Yes     Pain Control: Anesthetic  Anesthetic: 4% Lidocaine Liquid Topical      Debridement:Excisional Debridement     Using curette the wound(s)/ulcer(s) was/were sharply debrided down through and including the removal of subcutaneous tissue.         Devitalized Tissue Debrided:  fibrin, biofilm, slough and necrotic/eschar to stimulate bleeding to promote healing, post debridement good bleeding base and wound edges noted     Wound/Ulcer #: 6     Percent of Wound/Ulcer Debrided: 50%     Total Surface Area Debrided:  13.3 sq cm (throught sub q).    Estimated Blood Loss:  Minimal  Hemostasis Achieved:  by pressure     Procedural Pain:  0  / 10   Post Procedural Pain:  0 / 10      Response to treatment:  Well tolerated by patient. Plan:   Treatment Note please see attached Discharge Instructions    Written patient dismissal instructions given to patient and signed by patient or POA.          Discharge Instructions       Visit Discharge/Physician Orders     Discharge condition: Stable     Assessment of pain at discharge:none     Anesthetic used: 4% lidocaine     Discharge to: Home     Left via:public transportation     Accompanied by: accompanied by self     ECF/HHA:      Dressing Orders:LEFT/RIGHT LOWER LEG CLEANSE WITH BETADINE APPLY ALGINATE ag  AND PROFORE WRAP CHANGE WEEKLY AT WOUND CARE CENTER.     Treatment Orders:  Eat foods high in protein and vitamin c     Take multivitamin daily     Use compression pumps     [facility] followup visit _______1 week ___________________  (Please note your next appointment above and if you are unable to keep, kindly give a 24 hour notice. Thank you.)     Physician signature:__________________________        If you experience any of the following, please call the [facility] during business hours:     * Increase in Pain  * Temperature over 101  * Increase in drainage from your wound  * Drainage with a foul odor  * Bleeding  * Increase in swelling  * Need for compression bandage changes due to slippage, breakthrough drainage.     If you need medical attention outside of the business hours of the [facility] please contact your PCP or go to the nearest emergency room. Contact your doctor if you have a temperature above 100.4 with any of the following:                         Persistent cough             Shortness of breath            Chills            Fatigue            Chest pain or pressure            Difficulty breathing            Decreased consciousness of confusion             Headache     Recommend:                       Wash hands often and for 20 seconds or more             Avoid touching eyes, nose, mouth and face             Social distance ( 6 feet)             Stay at home as much as possible             Call health care provider with any concerns                                                                                                        Electronically signed by Izabel Romero DPM on 9/27/2021 at 2:09 PM

## 2021-10-04 ENCOUNTER — HOSPITAL ENCOUNTER (OUTPATIENT)
Dept: WOUND CARE | Age: 71
Discharge: HOME | End: 2021-10-04
Payer: MEDICARE

## 2021-10-04 VITALS
SYSTOLIC BLOOD PRESSURE: 140 MMHG | DIASTOLIC BLOOD PRESSURE: 80 MMHG | TEMPERATURE: 98.6 F | RESPIRATION RATE: 18 BRPM | BODY MASS INDEX: 40.43 KG/M2 | WEIGHT: 315 LBS | HEIGHT: 74 IN | HEART RATE: 80 BPM

## 2021-10-04 DIAGNOSIS — L97.222 VENOUS STASIS ULCER OF LEFT CALF WITH FAT LAYER EXPOSED WITHOUT VARICOSE VEINS (HCC): ICD-10-CM

## 2021-10-04 DIAGNOSIS — I87.2 VENOUS STASIS ULCER OF LEFT CALF WITH FAT LAYER EXPOSED WITHOUT VARICOSE VEINS (HCC): ICD-10-CM

## 2021-10-04 DIAGNOSIS — L97.211 NON-PRESSURE CHRONIC ULCER OF RIGHT CALF, LIMITED TO BREAKDOWN OF SKIN (HCC): Primary | ICD-10-CM

## 2021-10-04 PROCEDURE — 6370000000 HC RX 637 (ALT 250 FOR IP): Performed by: PODIATRIST

## 2021-10-04 PROCEDURE — 11042 DBRDMT SUBQ TIS 1ST 20SQCM/<: CPT

## 2021-10-04 RX ORDER — LIDOCAINE 50 MG/G
OINTMENT TOPICAL ONCE
Status: CANCELLED | OUTPATIENT
Start: 2021-10-04 | End: 2021-10-04

## 2021-10-04 RX ORDER — LIDOCAINE HYDROCHLORIDE 20 MG/ML
JELLY TOPICAL ONCE
Status: CANCELLED | OUTPATIENT
Start: 2021-10-04 | End: 2021-10-04

## 2021-10-04 RX ORDER — LIDOCAINE 40 MG/G
CREAM TOPICAL ONCE
Status: CANCELLED | OUTPATIENT
Start: 2021-10-04 | End: 2021-10-04

## 2021-10-04 RX ORDER — GENTAMICIN SULFATE 1 MG/G
OINTMENT TOPICAL ONCE
Status: CANCELLED | OUTPATIENT
Start: 2021-10-04 | End: 2021-10-04

## 2021-10-04 RX ORDER — CLOBETASOL PROPIONATE 0.5 MG/G
OINTMENT TOPICAL ONCE
Status: CANCELLED | OUTPATIENT
Start: 2021-10-04 | End: 2021-10-04

## 2021-10-04 RX ORDER — LIDOCAINE HYDROCHLORIDE 40 MG/ML
SOLUTION TOPICAL ONCE
Status: CANCELLED | OUTPATIENT
Start: 2021-10-04 | End: 2021-10-04

## 2021-10-04 RX ORDER — BACITRACIN ZINC AND POLYMYXIN B SULFATE 500; 1000 [USP'U]/G; [USP'U]/G
OINTMENT TOPICAL ONCE
Status: CANCELLED | OUTPATIENT
Start: 2021-10-04 | End: 2021-10-04

## 2021-10-04 RX ORDER — LIDOCAINE HYDROCHLORIDE 40 MG/ML
SOLUTION TOPICAL ONCE
Status: COMPLETED | OUTPATIENT
Start: 2021-10-04 | End: 2021-10-04

## 2021-10-04 RX ORDER — GINSENG 100 MG
CAPSULE ORAL ONCE
Status: CANCELLED | OUTPATIENT
Start: 2021-10-04 | End: 2021-10-04

## 2021-10-04 RX ORDER — BETAMETHASONE DIPROPIONATE 0.05 %
OINTMENT (GRAM) TOPICAL ONCE
Status: CANCELLED | OUTPATIENT
Start: 2021-10-04 | End: 2021-10-04

## 2021-10-04 RX ORDER — BACITRACIN, NEOMYCIN, POLYMYXIN B 400; 3.5; 5 [USP'U]/G; MG/G; [USP'U]/G
OINTMENT TOPICAL ONCE
Status: CANCELLED | OUTPATIENT
Start: 2021-10-04 | End: 2021-10-04

## 2021-10-04 RX ADMIN — LIDOCAINE HYDROCHLORIDE 10 ML: 40 SOLUTION TOPICAL at 13:37

## 2021-10-04 ASSESSMENT — PAIN SCALES - GENERAL: PAINLEVEL_OUTOF10: 0

## 2021-10-04 NOTE — PROGRESS NOTES
Wound Healing Center Followup Visit Note    Referring Physician : Amy Muniz MD  [address] [place] [address] [place] RECORD NUMBER:  [id number]  AGE: 70 y.o. GENDER: male  : 1950  EPISODE DATE:  10/4/2021    Subjective:     Chief Complaint   Patient presents with    Wound Check      HISTORY of PRESENT ILLNESS HPI   Yohana Cardenas is a 70 y.o. male who presents today in regards to follow up evaluation and treatment of wound/ulcer. That patient's past medical, family and social hx were reviewed and changes were made if present. History of Wound Context: Patient relates wounds bilateral lower extremities for about 1 week in duration. Patient states initially he was waiting for his lymphedema pumps, he did receive these recently however in the interim did have recurrence as noted above wounds. Patient denies any purulence. No nausea, vomiting, fever or chills. The patient has had similar previous wounds in the past.       Pt is not on abx at time of initial visit.     21 start local care, compression, elevate. Once they heal we can start his compression pumps at home. 21 Doing well, tolerating dressings/compression. Bilateral lower extremity demonstrates chronic edema, stasis changes. Left lower calf lateral aspect area with 50% devitalized nonviable tissue. With debridement through subcutaneous tissue. Right lower leg superficial with minimal fibrous type tissue with a clean base. Both areas are without any purulence or odor. No fluctuance or crepitus. Vascular intact. 21 Doing well, tolerating dressings/compression. Bilateral lower extremity demonstrates chronic edema, stasis changes. Left lower calf lateral aspect area with 50% devitalized nonviable tissue. With debridement through subcutaneous tissue. Right lower leg with a clean base. Both areas are without any purulence or odor. No fluctuance or crepitus. Vascular intact.  Areas of scratches, advised against.    104-21 Doing well, tolerating dressings/compression. Bilateral lower extremity demonstrates chronic edema, stasis changes. Left lower calf lateral aspect area with 25% devitalized nonviable tissue. With debridement through subcutaneous tissue. Right lower leg with a clean base. Both areas are without any purulence or odor. No fluctuance or crepitus. Vascular intact. Wound/Ulcer Pain Timing/Severity: none  Quality of pain:   Severity:   / 10   Modifying Factors:   Associated Signs/Symptoms:      Ulcer Identification:  Ulcer Type: venous  Contributing Factors: edema, venous stasis and lymphedema           PAST MEDICAL HISTORY      Diagnosis Date    Cellulitis of right lower leg     Diabetes mellitus (HCC)     Lymphedema     Obesity, Class III, BMI 40-49.9 (morbid obesity) (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     Past Surgical History:   Procedure Laterality Date    FRACTURE SURGERY  2005    right ankle repair     Family History   Problem Relation Age of Onset    Heart Disease Mother     Heart Disease Father      Social History     Tobacco Use    Smoking status: Never Smoker    Smokeless tobacco: Never Used   Vaping Use    Vaping Use: Never used   Substance Use Topics    Alcohol use: No     Comment: on special occassions    Drug use: No     Allergies   Allergen Reactions    Metformin And Related Diarrhea     Current Outpatient Medications on File Prior to Encounter   Medication Sig Dispense Refill    aspirin (ASPIRIN CHILDRENS) 81 MG chewable tablet Take 1 tablet by mouth daily 30 tablet 5     No current facility-administered medications on file prior to encounter.        REVIEW OF SYSTEMS See HPI    Objective:    BP (!) 140/80   Pulse 80   Temp 98.6 °F (37 °C) (Temporal)   Resp 18   Ht 6' 2\" (1.88 m)   Wt (!) 374 lb (169.6 kg)   BMI 48.02 kg/m²   Wt Readings from Last 3 Encounters:   10/04/21 (!) 374 lb (169.6 kg)   09/27/21 (!) 374 lb (169.6 kg)   09/20/21 (!) 374 lb (169.6 kg)     PHYSICAL EXAM  CONSTITUTIONAL:   Awake, alert, cooperative   EYES:  lids and lashes normal   ENT: external ears and nose without lesions   NECK:  supple, symmetrical, trachea midline   SKIN:  Open wound     Assessment:     Problem List Items Addressed This Visit     Venous stasis ulcer of left calf with fat layer exposed without varicose veins (HCC) (Chronic)    Relevant Orders    Initiate Outpatient Wound Care Protocol    Non-pressure chronic ulcer of right calf, limited to breakdown of skin ([address] - Primary    Relevant Orders    Initiate Outpatient Wound Care Protocol          Pre Debridement Measurements:  Are located in the [place]  Documentation Flow Sheet  Post Debridement Measurements:  Wound/Ulcer Descriptions are Pre Debridement except measurements:     Wound 09/13/21 Leg Left;Lateral #6 venous (Active)   Wound Image   09/13/21 1316   Dressing Status New dressing applied;Clean;Dry; Intact 09/27/21 1444   Wound Cleansed Cleansed with saline;Betadine/povidone iodine 09/27/21 1444   Dressing/Treatment Alginate;ABD;Dry dressing 09/27/21 1444   Wound Length (cm) 7 cm 10/04/21 1316   Wound Width (cm) 1.5 cm 10/04/21 1316   Wound Depth (cm) 0.1 cm 10/04/21 1316   Wound Surface Area (cm^2) 10.5 cm^2 10/04/21 1316   Change in Wound Size % (l*w) 77.89 10/04/21 1316   Wound Volume (cm^3) 1.05 cm^3 10/04/21 1316   Wound Healing % 78 10/04/21 1316   Post-Procedure Length (cm) 7 cm 10/04/21 1348   Post-Procedure Width (cm) 1.5 cm 10/04/21 1348   Post-Procedure Depth (cm) 0.2 cm 10/04/21 1348   Post-Procedure Surface Area (cm^2) 10.5 cm^2 10/04/21 1348   Post-Procedure Volume (cm^3) 2.1 cm^3 10/04/21 1348   Wound Assessment Pink/red;Fibrin 10/04/21 1316   Drainage Amount Moderate 10/04/21 1316   Drainage Description Yellow 10/04/21 1316   Odor None 10/04/21 1316   Colleen-wound Assessment Intact;Dry/flaky 10/04/21 1316   Wound Thickness Description not for Pressure Injury Full thickness 09/13/21 Debrided:  10.5 sq cm (throught sub q).    Estimated Blood Loss:  Minimal  Hemostasis Achieved:  by pressure     Procedural Pain:  0  / 10   Post Procedural Pain:  0 / 10      Response to treatment:  Well tolerated by patient. Plan:   Treatment Note please see attached Discharge Instructions    Written patient dismissal instructions given to patient and signed by patient or POA. Discharge Instructions       Visit Discharge/Physician Orders     Discharge condition: Stable     Assessment of pain at discharge:none     Anesthetic used: 4% lidocaine     Discharge to: Home     Left via:public transportation     Accompanied by: accompanied by self     ECF/HHA:      Dressing Orders:LEFT/RIGHT LOWER LEG CLEANSE WITH BETADINE APPLY ALGINATE ag  AND PROFORE WRAP CHANGE WEEKLY AT WOUND CARE CENTER.     Treatment Orders:  Eat foods high in protein and vitamin c     Take multivitamin daily     Use compression pumps     [facility] followup visit _______1 week ___________________  (Please note your next appointment above and if you are unable to keep, kindly give a 24 hour notice. Thank you.)     Physician signature:__________________________        If you experience any of the following, please call the [facility] during business hours:     * Increase in Pain  * Temperature over 101  * Increase in drainage from your wound  * Drainage with a foul odor  * Bleeding  * Increase in swelling  * Need for compression bandage changes due to slippage, breakthrough drainage.     If you need medical attention outside of the business hours of the [facility] [address] [facility] [address] please contact your PCP or go to the nearest emergency room. Contact your doctor if you have a temperature above 100.4 with any of the following:                         Persistent cough             Shortness of breath            Chills            Fatigue            Chest pain or pressure            Difficulty breathing            Decreased consciousness of confusion           Headache     Recommend:                       Wash hands often and for 20 seconds or more             Avoid touching eyes, nose, mouth and face             Social distance ( 6 feet)             Stay at home as much as possible             Call health care provider with any concerns                                                                                                                                       Electronically signed by Wero Cornejo DPM on 10/4/2021 at 1:56 PM

## 2021-10-07 ENCOUNTER — TELEPHONE (OUTPATIENT)
Dept: INTERNAL MEDICINE | Age: 71
End: 2021-10-07

## 2021-10-07 NOTE — TELEPHONE ENCOUNTER
Spoke with patient who had an appt for 10/7/21. Patient needs Provider ride transportation. His appt was rescheduled for 11/8/21,. [id number] scheduling sheet was scanned over to Provider ride. Waiting for confirmation.

## 2021-10-07 NOTE — TELEPHONE ENCOUNTER
----- Message from Jakob Tony sent at 10/7/2021  1:41 PM EDT -----  Subject: Message to Provider    QUESTIONS  Information for Provider? Patient has a appointment on 11/08/2021 with Dr. Sheeba Cervantes for a follow up. He is requesting for the office to schedule him for   provider transportation to be able to make it to his appointment. He did   state that the office is who does this for him. Please contact the patient   to confirm. He does that not have a voicemail.   ---------------------------------------------------------------------------  --------------  CALL BACK INFO  What is the best way for the office to contact you? Do not leave any   message, patient will call back for answer  Preferred Call Back Phone Number? [phone]  ---------------------------------------------------------------------------  --------------  SCRIPT ANSWERS  Relationship to Patient?  Self

## 2021-10-11 ENCOUNTER — HOSPITAL ENCOUNTER (OUTPATIENT)
Dept: WOUND CARE | Age: 71
Discharge: HOME | End: 2021-10-11
Payer: MEDICARE

## 2021-10-11 VITALS
RESPIRATION RATE: 18 BRPM | TEMPERATURE: 97.4 F | BODY MASS INDEX: 40.43 KG/M2 | HEART RATE: 74 BPM | SYSTOLIC BLOOD PRESSURE: 156 MMHG | WEIGHT: 315 LBS | DIASTOLIC BLOOD PRESSURE: 84 MMHG | HEIGHT: 74 IN

## 2021-10-11 DIAGNOSIS — I87.2 VENOUS STASIS ULCER OF LEFT CALF WITH FAT LAYER EXPOSED WITHOUT VARICOSE VEINS (HCC): ICD-10-CM

## 2021-10-11 DIAGNOSIS — L97.222 VENOUS STASIS ULCER OF LEFT CALF WITH FAT LAYER EXPOSED WITHOUT VARICOSE VEINS (HCC): ICD-10-CM

## 2021-10-11 DIAGNOSIS — L97.211 NON-PRESSURE CHRONIC ULCER OF RIGHT CALF, LIMITED TO BREAKDOWN OF SKIN (HCC): Primary | ICD-10-CM

## 2021-10-11 PROCEDURE — 6370000000 HC RX 637 (ALT 250 FOR IP): Performed by: PODIATRIST

## 2021-10-11 PROCEDURE — 11042 DBRDMT SUBQ TIS 1ST 20SQCM/<: CPT

## 2021-10-11 RX ORDER — LIDOCAINE 50 MG/G
OINTMENT TOPICAL ONCE
Status: CANCELLED | OUTPATIENT
Start: 2021-10-11 | End: 2021-10-11

## 2021-10-11 RX ORDER — LIDOCAINE HYDROCHLORIDE 40 MG/ML
SOLUTION TOPICAL ONCE
Status: CANCELLED | OUTPATIENT
Start: 2021-10-11 | End: 2021-10-11

## 2021-10-11 RX ORDER — BETAMETHASONE DIPROPIONATE 0.05 %
OINTMENT (GRAM) TOPICAL ONCE
Status: CANCELLED | OUTPATIENT
Start: 2021-10-11 | End: 2021-10-11

## 2021-10-11 RX ORDER — GINSENG 100 MG
CAPSULE ORAL ONCE
Status: CANCELLED | OUTPATIENT
Start: 2021-10-11 | End: 2021-10-11

## 2021-10-11 RX ORDER — GENTAMICIN SULFATE 1 MG/G
OINTMENT TOPICAL ONCE
Status: CANCELLED | OUTPATIENT
Start: 2021-10-11 | End: 2021-10-11

## 2021-10-11 RX ORDER — CLOBETASOL PROPIONATE 0.5 MG/G
OINTMENT TOPICAL ONCE
Status: CANCELLED | OUTPATIENT
Start: 2021-10-11 | End: 2021-10-11

## 2021-10-11 RX ORDER — BACITRACIN, NEOMYCIN, POLYMYXIN B 400; 3.5; 5 [USP'U]/G; MG/G; [USP'U]/G
OINTMENT TOPICAL ONCE
Status: CANCELLED | OUTPATIENT
Start: 2021-10-11 | End: 2021-10-11

## 2021-10-11 RX ORDER — LIDOCAINE HYDROCHLORIDE 40 MG/ML
SOLUTION TOPICAL ONCE
Status: COMPLETED | OUTPATIENT
Start: 2021-10-11 | End: 2021-10-11

## 2021-10-11 RX ORDER — BACITRACIN ZINC AND POLYMYXIN B SULFATE 500; 1000 [USP'U]/G; [USP'U]/G
OINTMENT TOPICAL ONCE
Status: CANCELLED | OUTPATIENT
Start: 2021-10-11 | End: 2021-10-11

## 2021-10-11 RX ORDER — LIDOCAINE HYDROCHLORIDE 20 MG/ML
JELLY TOPICAL ONCE
Status: CANCELLED | OUTPATIENT
Start: 2021-10-11 | End: 2021-10-11

## 2021-10-11 RX ORDER — LIDOCAINE 40 MG/G
CREAM TOPICAL ONCE
Status: CANCELLED | OUTPATIENT
Start: 2021-10-11 | End: 2021-10-11

## 2021-10-11 RX ADMIN — LIDOCAINE HYDROCHLORIDE 5 ML: 40 SOLUTION TOPICAL at 14:50

## 2021-10-11 ASSESSMENT — PAIN SCALES - GENERAL: PAINLEVEL_OUTOF10: 0

## 2021-10-11 NOTE — PROGRESS NOTES
Wound Healing Center Followup Visit Note    Referring Physician : Akosua Ding MD  [address] [place] [address] [place] RECORD NUMBER:  [id number]  AGE: 70 y.o. GENDER: male  : 1950  EPISODE DATE:  10/11/2021    Subjective:     Chief Complaint   Patient presents with    Wound Check     BILATERAL LOWER LEGS      HISTORY of PRESENT ILLNESS HPI   Kelly Gutierrez is a 70 y.o. male who presents today in regards to follow up evaluation and treatment of wound/ulcer. That patient's past medical, family and social hx were reviewed and changes were made if present. History of Wound Context: Patient relates wounds bilateral lower extremities for about 1 week in duration. Patient states initially he was waiting for his lymphedema pumps, he did receive these recently however in the interim did have recurrence as noted above wounds. Patient denies any purulence. No nausea, vomiting, fever or chills. The patient has had similar previous wounds in the past.       Pt is not on abx at time of initial visit.     21 start local care, compression, elevate. Once they heal we can start his compression pumps at home. 21 Doing well, tolerating dressings/compression. Bilateral lower extremity demonstrates chronic edema, stasis changes. Left lower calf lateral aspect area with 50% devitalized nonviable tissue. With debridement through subcutaneous tissue. Right lower leg superficial with minimal fibrous type tissue with a clean base. Both areas are without any purulence or odor. No fluctuance or crepitus. Vascular intact. 21 Doing well, tolerating dressings/compression. Bilateral lower extremity demonstrates chronic edema, stasis changes. Left lower calf lateral aspect area with 50% devitalized nonviable tissue. With debridement through subcutaneous tissue. Right lower leg with a clean base. Both areas are without any purulence or odor. No fluctuance or crepitus. Vascular intact.  Areas of scratches, advised against.    10-4-21 Doing well, tolerating dressings/compression. Bilateral lower extremity demonstrates chronic edema, stasis changes. Left lower calf lateral aspect area with 25% devitalized nonviable tissue. With debridement through subcutaneous tissue. Right lower leg with a clean base. Both areas are without any purulence or odor. No fluctuance or crepitus. Vascular intact. 10-11-21  tolerating dressings/compression. Bilateral lower extremity demonstrates chronic edema, stasis changes. Left lower calf lateral aspect area with 50% devitalized nonviable tissue. With debridement through subcutaneous tissue. No fluctuance or crepitus. Vascular intact.        Wound/Ulcer Pain Timing/Severity: none  Quality of pain:   Severity:   / 10   Modifying Factors:   Associated Signs/Symptoms:      Ulcer Identification:  Ulcer Type: venous  Contributing Factors: edema, venous stasis and lymphedema           PAST MEDICAL HISTORY      Diagnosis Date    Cellulitis of right lower leg     Diabetes mellitus (HCC)     Lymphedema     Obesity, Class III, BMI 40-49.9 (morbid obesity) (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     Past Surgical History:   Procedure Laterality Date    FRACTURE SURGERY  2005    right ankle repair     Family History   Problem Relation Age of Onset    Heart Disease Mother     Heart Disease Father      Social History     Tobacco Use    Smoking status: Never Smoker    Smokeless tobacco: Never Used   Vaping Use    Vaping Use: Never used   Substance Use Topics    Alcohol use: No     Comment: on special occassions    Drug use: No     Allergies   Allergen Reactions    Metformin And Related Diarrhea     Current Outpatient Medications on File Prior to Encounter   Medication Sig Dispense Refill    aspirin (ASPIRIN CHILDRENS) 81 MG chewable tablet Take 1 tablet by mouth daily 30 tablet 5     No current facility-administered medications on file prior to encounter. REVIEW OF SYSTEMS See HPI    Objective:    BP (!) 156/84   Pulse 74   Temp 97.4 °F (36.3 °C) (Temporal)   Resp 18   Ht 6' 2\" (1.88 m)   Wt (!) 374 lb (169.6 kg)   BMI 48.02 kg/m²   Wt Readings from Last 3 Encounters:   10/11/21 (!) 374 lb (169.6 kg)   10/04/21 (!) 374 lb (169.6 kg)   09/27/21 (!) 374 lb (169.6 kg)     PHYSICAL EXAM  CONSTITUTIONAL:   Awake, alert, cooperative   EYES:  lids and lashes normal   ENT: external ears and nose without lesions   NECK:  supple, symmetrical, trachea midline   SKIN:  Open wound     Assessment:     Problem List Items Addressed This Visit     Venous stasis ulcer of left calf with fat layer exposed without varicose veins (HCC) (Chronic)    Relevant Orders    Initiate Outpatient Wound Care Protocol    Non-pressure chronic ulcer of right calf, limited to breakdown of skin ([address] - Primary    Relevant Orders    Initiate Outpatient Wound Care Protocol          Pre Debridement Measurements:  Are located in the [facility]  Documentation Flow Sheet  Post Debridement Measurements:  Wound/Ulcer Descriptions are Pre Debridement except measurements:     Wound 09/13/21 Leg Left;Lateral #6 venous (Active)   Wound Image   09/13/21 1316   Dressing Status New dressing applied;Clean;Dry; Intact 10/11/21 1550   Wound Cleansed Cleansed with saline 10/11/21 1550   Dressing/Treatment ABD;Dry dressing;Alginate with Ag 10/11/21 1550   Wound Length (cm) 0.5 cm 10/11/21 1432   Wound Width (cm) 0.5 cm 10/11/21 1432   Wound Depth (cm) 0.1 cm 10/11/21 1432   Wound Surface Area (cm^2) 0.25 cm^2 10/11/21 1432   Change in Wound Size % (l*w) 99.47 10/11/21 1432   Wound Volume (cm^3) 0.025 cm^3 10/11/21 1432   Wound Healing % 99 10/11/21 1432   Post-Procedure Length (cm) 0.6 cm 10/11/21 1505   Post-Procedure Width (cm) 0.8 cm 10/11/21 1505   Post-Procedure Depth (cm) 0.1 cm 10/11/21 1505   Post-Procedure Surface Area (cm^2) 0.48 cm^2 10/11/21 1505   Post-Procedure Volume (cm^3) 0.048 cm^3 10/11/21 1505   Wound Assessment Pink/red;Fibrin 10/11/21 1432   Drainage Amount Small 10/11/21 1432   Drainage Description Yellow 10/11/21 1432   Odor None 10/11/21 1432   Colleen-wound Assessment Intact;Dry/flaky 10/11/21 1432   Wound Thickness Description not for Pressure Injury Full thickness 09/13/21 1316   Number of days: 28       Wound 09/13/21 Leg Right;Lateral #7 venous (Active)   Wound Image   09/13/21 1316   Dressing Status New dressing applied;Clean;Dry; Intact 10/04/21 1427   Wound Cleansed Cleansed with saline 10/04/21 1427   Dressing/Treatment Alginate;ABD;Dry dressing 10/04/21 1427   Wound Length (cm) 0 cm 10/11/21 1432   Wound Width (cm) 0 cm 10/11/21 1432   Wound Depth (cm) 0 cm 10/11/21 1432   Wound Surface Area (cm^2) 0 cm^2 10/11/21 1432   Change in Wound Size % (l*w) 100 10/11/21 1432   Wound Volume (cm^3) 0 cm^3 10/11/21 1432   Wound Healing % 100 10/11/21 1432   Post-Procedure Length (cm) 0 cm 10/11/21 1505   Post-Procedure Width (cm) 0 cm 10/11/21 1505   Post-Procedure Depth (cm) 0 cm 10/11/21 1505   Post-Procedure Surface Area (cm^2) 0 cm^2 10/11/21 1505   Post-Procedure Volume (cm^3) 0 cm^3 10/11/21 1505   Wound Assessment Epithelialization 10/11/21 1432   Drainage Amount None 10/11/21 1432   Drainage Description Yellow 10/04/21 1316   Odor None 10/11/21 1432   Colleen-wound Assessment Dry/flaky 10/11/21 1432   Wound Thickness Description not for Pressure Injury Full thickness 09/13/21 1316   Number of days: 28       Procedure Note  Indications:  Based on my examination of this patient's wound(s)/ulcer(s) today, debridement is required to promote healing and evaluate the wound base.     Performed by: Zenobia Mejia DPM     Consent obtained: Yes     Time out taken:   Yes     Pain Control: Anesthetic  Anesthetic: 4% Lidocaine Liquid Topical      Debridement:Excisional Debridement     Using curette the wound(s)/ulcer(s) was/were sharply debrided down through and including the removal of temperature above 100.4 with any of the following:                         Persistent cough             Shortness of breath            Chills            Fatigue            Chest pain or pressure            Difficulty breathing            Decreased consciousness of confusion             Headache     Recommend:                       Wash hands often and for 20 seconds or more             Avoid touching eyes, nose, mouth and face             Social distance ( 6 feet)             Stay at home as much as possible             Call health care provider with any concerns            Electronically signed by Janae Wood DPM on 10/11/2021 at 4:26 PM

## 2021-10-11 NOTE — PLAN OF CARE
Problem: Wound:  Goal: Will show signs of wound healing; wound closure and no evidence of infection  Description: Will show signs of wound healing; wound closure and no evidence of infection  Outcome: Met This Shift     Problem: Compression therapy:  Goal: Will be free from complications associated with compression therapy  Description: Will be free from complications associated with compression therapy  Outcome: Met This Shift     Problem: Venous:  Goal: Signs of wound healing will improve  Description: Signs of wound healing will improve  Outcome: Ongoing

## 2021-10-18 ENCOUNTER — HOSPITAL ENCOUNTER (OUTPATIENT)
Dept: WOUND CARE | Age: 71
Discharge: HOME | End: 2021-10-18
Payer: MEDICARE

## 2021-10-18 VITALS
HEART RATE: 78 BPM | RESPIRATION RATE: 18 BRPM | DIASTOLIC BLOOD PRESSURE: 80 MMHG | WEIGHT: 315 LBS | TEMPERATURE: 97.8 F | BODY MASS INDEX: 40.43 KG/M2 | HEIGHT: 74 IN | SYSTOLIC BLOOD PRESSURE: 142 MMHG

## 2021-10-18 DIAGNOSIS — I87.2 VENOUS STASIS ULCER OF LEFT CALF WITH FAT LAYER EXPOSED WITHOUT VARICOSE VEINS (HCC): ICD-10-CM

## 2021-10-18 DIAGNOSIS — L97.211 NON-PRESSURE CHRONIC ULCER OF RIGHT CALF, LIMITED TO BREAKDOWN OF SKIN (HCC): Primary | ICD-10-CM

## 2021-10-18 DIAGNOSIS — L97.222 VENOUS STASIS ULCER OF LEFT CALF WITH FAT LAYER EXPOSED WITHOUT VARICOSE VEINS (HCC): ICD-10-CM

## 2021-10-18 DIAGNOSIS — L97.221 NON-PRESSURE CHRONIC ULCER OF LEFT CALF, LIMITED TO BREAKDOWN OF SKIN (HCC): ICD-10-CM

## 2021-10-18 PROCEDURE — 99213 OFFICE O/P EST LOW 20 MIN: CPT | Performed by: NURSE PRACTITIONER

## 2021-10-18 PROCEDURE — 6370000000 HC RX 637 (ALT 250 FOR IP): Performed by: PODIATRIST

## 2021-10-18 RX ORDER — LIDOCAINE HYDROCHLORIDE 40 MG/ML
SOLUTION TOPICAL ONCE
Status: CANCELLED | OUTPATIENT
Start: 2021-10-18 | End: 2021-10-18

## 2021-10-18 RX ORDER — GINSENG 100 MG
CAPSULE ORAL ONCE
Status: CANCELLED | OUTPATIENT
Start: 2021-10-18 | End: 2021-10-18

## 2021-10-18 RX ORDER — BACITRACIN, NEOMYCIN, POLYMYXIN B 400; 3.5; 5 [USP'U]/G; MG/G; [USP'U]/G
OINTMENT TOPICAL ONCE
Status: CANCELLED | OUTPATIENT
Start: 2021-10-18 | End: 2021-10-18

## 2021-10-18 RX ORDER — GENTAMICIN SULFATE 1 MG/G
OINTMENT TOPICAL ONCE
Status: CANCELLED | OUTPATIENT
Start: 2021-10-18 | End: 2021-10-18

## 2021-10-18 RX ORDER — LIDOCAINE HYDROCHLORIDE 20 MG/ML
JELLY TOPICAL ONCE
Status: CANCELLED | OUTPATIENT
Start: 2021-10-18 | End: 2021-10-18

## 2021-10-18 RX ORDER — BETAMETHASONE DIPROPIONATE 0.05 %
OINTMENT (GRAM) TOPICAL ONCE
Status: CANCELLED | OUTPATIENT
Start: 2021-10-18 | End: 2021-10-18

## 2021-10-18 RX ORDER — LIDOCAINE 40 MG/G
CREAM TOPICAL ONCE
Status: CANCELLED | OUTPATIENT
Start: 2021-10-18 | End: 2021-10-18

## 2021-10-18 RX ORDER — LIDOCAINE HYDROCHLORIDE 40 MG/ML
SOLUTION TOPICAL ONCE
Status: COMPLETED | OUTPATIENT
Start: 2021-10-18 | End: 2021-10-18

## 2021-10-18 RX ORDER — CLOBETASOL PROPIONATE 0.5 MG/G
OINTMENT TOPICAL ONCE
Status: CANCELLED | OUTPATIENT
Start: 2021-10-18 | End: 2021-10-18

## 2021-10-18 RX ORDER — BACITRACIN ZINC AND POLYMYXIN B SULFATE 500; 1000 [USP'U]/G; [USP'U]/G
OINTMENT TOPICAL ONCE
Status: CANCELLED | OUTPATIENT
Start: 2021-10-18 | End: 2021-10-18

## 2021-10-18 RX ORDER — LIDOCAINE 50 MG/G
OINTMENT TOPICAL ONCE
Status: CANCELLED | OUTPATIENT
Start: 2021-10-18 | End: 2021-10-18

## 2021-10-18 RX ADMIN — LIDOCAINE HYDROCHLORIDE 5 ML: 40 SOLUTION TOPICAL at 13:38

## 2021-10-18 ASSESSMENT — PAIN SCALES - GENERAL: PAINLEVEL_OUTOF10: 0

## 2021-10-18 NOTE — PROGRESS NOTES
Wound Healing Center Followup Visit Note    Referring Physician : Sonny Rhodes MD  [address] [place] [address] [place] RECORD NUMBER:  [id number]  AGE: 70 y.o. GENDER: male  : 1950  EPISODE DATE:  10/18/2021    Subjective:     Chief Complaint   Patient presents with    Wound Check      HISTORY of PRESENT ILLNESS HPI   Afshan Foley is a 70 y.o. male who presents today in regards to follow up evaluation and treatment of wound/ulcer. That patient's past medical, family and social hx were reviewed and changes were made if present. History of Wound Context: Patient relates wounds bilateral lower extremities for about 1 week in duration. Patient states initially he was waiting for his lymphedema pumps, he did receive these recently however in the interim did have recurrence as noted above wounds. Patient denies any purulence. No nausea, vomiting, fever or chills. The patient has had similar previous wounds in the past.       Pt is not on abx at time of initial visit.     21 start local care, compression, elevate. Once they heal we can start his compression pumps at home. 21 Doing well, tolerating dressings/compression. Bilateral lower extremity demonstrates chronic edema, stasis changes. Left lower calf lateral aspect area with 50% devitalized nonviable tissue. With debridement through subcutaneous tissue. Right lower leg superficial with minimal fibrous type tissue with a clean base. Both areas are without any purulence or odor. No fluctuance or crepitus. Vascular intact. 21 Doing well, tolerating dressings/compression. Bilateral lower extremity demonstrates chronic edema, stasis changes. Left lower calf lateral aspect area with 50% devitalized nonviable tissue. With debridement through subcutaneous tissue. Right lower leg with a clean base. Both areas are without any purulence or odor. No fluctuance or crepitus. Vascular intact.  Areas of scratches, advised against.    10-4-21 Doing well, tolerating dressings/compression. Bilateral lower extremity demonstrates chronic edema, stasis changes. Left lower calf lateral aspect area with 25% devitalized nonviable tissue. With debridement through subcutaneous tissue. Right lower leg with a clean base. Both areas are without any purulence or odor. No fluctuance or crepitus. Vascular intact. 10-11-21  tolerating dressings/compression. Bilateral lower extremity demonstrates chronic edema, stasis changes. Left lower calf lateral aspect area with 50% devitalized nonviable tissue. With debridement through subcutaneous tissue. No fluctuance or crepitus. Vascular intact. 10-18-21 tolerating dressings and compression. Bilateral lower extremity exam demonstrates chronic edema, stasis changes. Left lower calf lateral aspect wound superficial, clean. Healing. No fluctuance or crepitus. No pain. Vascular intact. Continue current treatment as patient is responding quite well. Once healed he has to use his lymphedema pumps.       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     Past Surgical History:   Procedure Laterality Date    FRACTURE SURGERY  2005    right ankle repair     Family History   Problem Relation Age of Onset    Heart Disease Mother     Heart Disease Father      Social History     Tobacco Use    Smoking status: Never Smoker    Smokeless tobacco: Never Used   Vaping Use    Vaping Use: Never used   Substance Use Topics    Alcohol use: No     Comment: on special occassions    Intact;Fragile;Dry/flaky 10/18/21 1319   Wound Thickness Description not for Pressure Injury Full thickness 09/13/21 1316   Number of days: 35           Plan:   Treatment Note please see attached Discharge Instructions    Written patient dismissal instructions given to patient and signed by patient or POA. Discharge Instructions       Visit Discharge/Physician Orders     Discharge condition: Stable     Assessment of pain at discharge:none     Anesthetic used: 4% lidocaine     Discharge to: Home     Left via:public transportation     Accompanied by: accompanied by self     ECF/HHA:      Dressing Orders:LEFT/RIGHT LOWER LEG CLEANSE WITH BETADINE APPLY ALGINATE ag  AND PROFORE WRAP CHANGE WEEKLY AT WOUND CARE CENTER.     Treatment Orders:  Eat foods high in protein and vitamin c     Take multivitamin daily     Use compression pumps     [facility] followup visit _______1 week ___________________  (Please note your next appointment above and if you are unable to keep, kindly give a 24 hour notice. Thank you.)     Physician signature:__________________________        If you experience any of the following, please call the [facility] during business hours:     * Increase in Pain  * Temperature over 101  * Increase in drainage from your wound  * Drainage with a foul odor  * Bleeding  * Increase in swelling  * Need for compression bandage changes due to slippage, breakthrough drainage.     If you need medical attention outside of the business hours of the [facility] please contact your PCP or go to the nearest emergency room. Contact your doctor if you have a temperature above 100.4 with any of the following:                         Persistent cough             Shortness of breath            Chills            Fatigue            Chest pain or pressure            Difficulty breathing            Decreased consciousness of confusion             Headache     Recommend:                       Wash hands often and for 20 seconds or more             Avoid touching eyes, nose, mouth and face             Social distance ( 6 feet)             Stay at home as much as possible             Call health care provider with any concerns                                                                                                                                                                                 Electronically signed by America Reardon DPM on 10/18/2021 at 1:46 PM

## 2021-10-25 ENCOUNTER — HOSPITAL ENCOUNTER (OUTPATIENT)
Dept: WOUND CARE | Age: 71
Discharge: HOME | End: 2021-10-25
Payer: MEDICARE

## 2021-10-25 VITALS
BODY MASS INDEX: 40.43 KG/M2 | DIASTOLIC BLOOD PRESSURE: 80 MMHG | TEMPERATURE: 96.9 F | HEART RATE: 98 BPM | HEIGHT: 74 IN | WEIGHT: 315 LBS | SYSTOLIC BLOOD PRESSURE: 150 MMHG | RESPIRATION RATE: 18 BRPM

## 2021-10-25 DIAGNOSIS — L97.211 NON-PRESSURE CHRONIC ULCER OF RIGHT CALF, LIMITED TO BREAKDOWN OF SKIN (HCC): Primary | ICD-10-CM

## 2021-10-25 DIAGNOSIS — I87.2 VENOUS STASIS ULCER OF LEFT CALF WITH FAT LAYER EXPOSED WITHOUT VARICOSE VEINS (HCC): ICD-10-CM

## 2021-10-25 DIAGNOSIS — L97.222 VENOUS STASIS ULCER OF LEFT CALF WITH FAT LAYER EXPOSED WITHOUT VARICOSE VEINS (HCC): ICD-10-CM

## 2021-10-25 PROCEDURE — 99212 OFFICE O/P EST SF 10 MIN: CPT

## 2021-10-25 RX ORDER — LIDOCAINE 50 MG/G
OINTMENT TOPICAL ONCE
Status: CANCELLED | OUTPATIENT
Start: 2021-10-25 | End: 2021-10-25

## 2021-10-25 RX ORDER — LIDOCAINE 40 MG/G
CREAM TOPICAL ONCE
Status: CANCELLED | OUTPATIENT
Start: 2021-10-25 | End: 2021-10-25

## 2021-10-25 RX ORDER — LIDOCAINE HYDROCHLORIDE 20 MG/ML
JELLY TOPICAL ONCE
Status: CANCELLED | OUTPATIENT
Start: 2021-10-25 | End: 2021-10-25

## 2021-10-25 RX ORDER — BACITRACIN ZINC AND POLYMYXIN B SULFATE 500; 1000 [USP'U]/G; [USP'U]/G
OINTMENT TOPICAL ONCE
Status: CANCELLED | OUTPATIENT
Start: 2021-10-25 | End: 2021-10-25

## 2021-10-25 RX ORDER — BETAMETHASONE DIPROPIONATE 0.05 %
OINTMENT (GRAM) TOPICAL ONCE
Status: CANCELLED | OUTPATIENT
Start: 2021-10-25 | End: 2021-10-25

## 2021-10-25 RX ORDER — LIDOCAINE HYDROCHLORIDE 40 MG/ML
SOLUTION TOPICAL ONCE
Status: COMPLETED | OUTPATIENT
Start: 2021-10-25 | End: 2021-10-25

## 2021-10-25 RX ORDER — GINSENG 100 MG
CAPSULE ORAL ONCE
Status: CANCELLED | OUTPATIENT
Start: 2021-10-25 | End: 2021-10-25

## 2021-10-25 RX ORDER — CLOBETASOL PROPIONATE 0.5 MG/G
OINTMENT TOPICAL ONCE
Status: CANCELLED | OUTPATIENT
Start: 2021-10-25 | End: 2021-10-25

## 2021-10-25 RX ORDER — GENTAMICIN SULFATE 1 MG/G
OINTMENT TOPICAL ONCE
Status: CANCELLED | OUTPATIENT
Start: 2021-10-25 | End: 2021-10-25

## 2021-10-25 RX ORDER — LIDOCAINE HYDROCHLORIDE 40 MG/ML
SOLUTION TOPICAL ONCE
Status: CANCELLED | OUTPATIENT
Start: 2021-10-25 | End: 2021-10-25

## 2021-10-25 RX ORDER — BACITRACIN, NEOMYCIN, POLYMYXIN B 400; 3.5; 5 [USP'U]/G; MG/G; [USP'U]/G
OINTMENT TOPICAL ONCE
Status: CANCELLED | OUTPATIENT
Start: 2021-10-25 | End: 2021-10-25

## 2021-10-25 RX ADMIN — LIDOCAINE HYDROCHLORIDE: 40 SOLUTION TOPICAL at 13:21

## 2021-10-25 NOTE — PROGRESS NOTES
Wound Healing Center Followup Visit Note    Referring Physician : Nate Ann MD  [address] [place] [address] [place] RECORD NUMBER:  [id number]  AGE: 70 y.o. GENDER: male  : 1950  EPISODE DATE:  10/25/2021    Subjective:     Chief Complaint   Patient presents with    Wound Check     bilateral leg ulcer      HISTORY of PRESENT ILLNESS HPI   Emir Cheatham is a 70 y.o. male who presents today in regards to follow up evaluation and treatment of wound/ulcer. That patient's past medical, family and social hx were reviewed and changes were made if present. History of Wound Context: Patient relates wounds bilateral lower extremities for about 1 week in duration. Patient states initially he was waiting for his lymphedema pumps, he did receive these recently however in the interim did have recurrence as noted above wounds. Patient denies any purulence. No nausea, vomiting, fever or chills. The patient has had similar previous wounds in the past.       Pt is not on abx at time of initial visit.     21 start local care, compression, elevate. Once they heal we can start his compression pumps at home. 21 Doing well, tolerating dressings/compression. Bilateral lower extremity demonstrates chronic edema, stasis changes. Left lower calf lateral aspect area with 50% devitalized nonviable tissue. With debridement through subcutaneous tissue. Right lower leg superficial with minimal fibrous type tissue with a clean base. Both areas are without any purulence or odor. No fluctuance or crepitus. Vascular intact. 21 Doing well, tolerating dressings/compression. Bilateral lower extremity demonstrates chronic edema, stasis changes. Left lower calf lateral aspect area with 50% devitalized nonviable tissue. With debridement through subcutaneous tissue. Right lower leg with a clean base. Both areas are without any purulence or odor. No fluctuance or crepitus. Vascular intact.  Areas of scratches, advised against.    10-4-21 Doing well, tolerating dressings/compression. Bilateral lower extremity demonstrates chronic edema, stasis changes. Left lower calf lateral aspect area with 25% devitalized nonviable tissue. With debridement through subcutaneous tissue. Right lower leg with a clean base. Both areas are without any purulence or odor. No fluctuance or crepitus. Vascular intact. 10-11-21  tolerating dressings/compression. Bilateral lower extremity demonstrates chronic edema, stasis changes. Left lower calf lateral aspect area with 50% devitalized nonviable tissue. With debridement through subcutaneous tissue. No fluctuance or crepitus. Vascular intact. 10-18-21 tolerating dressings and compression. Bilateral lower extremity exam demonstrates chronic edema, stasis changes. Left lower calf lateral aspect wound superficial, clean. Healing. No fluctuance or crepitus. No pain. Vascular intact. Continue current treatment as patient is responding quite well. Once healed he has to use his lymphedema pumps. 10-25-21 doing well, tolerating dressings/compression. At present he specifically points 1 through 5 on the right as well as 1, 5 left. Left lower extremity wound healed. Toenails 1 through 5 right, 1, 5 left are very thick, elongated, yellow, dystrophic, crumbly with subungual debris and very painful to palpation. Toenails as above were debrided in length and thickness to decrease discomfort as well as prevent such hazards as secondary infection. Recommend topical antifungal daily. Spandi  for compression until he gets the appropriate size for his pumps. Patient discharged for the above advised to contact wound care center if any questions or concerns.     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     Past Surgical History:   Procedure Laterality Date    FRACTURE SURGERY  2005    right ankle repair     Family History   Problem Relation Age of Onset    Heart Disease Mother     Heart Disease Father      Social History     Tobacco Use    Smoking status: Never Smoker    Smokeless tobacco: Never Used   Vaping Use    Vaping Use: Never used   Substance Use Topics    Alcohol use: No     Comment: on special occassions    Drug use: No     Allergies   Allergen Reactions    Metformin And Related Diarrhea     Current Outpatient Medications on File Prior to Encounter   Medication Sig Dispense Refill    aspirin (ASPIRIN CHILDRENS) 81 MG chewable tablet Take 1 tablet by mouth daily 30 tablet 5     No current facility-administered medications on file prior to encounter. REVIEW OF SYSTEMS See HPI    Objective:    BP (!) 150/80   Pulse 98   Temp 96.9 °F (36.1 °C) (Temporal)   Resp 18   Ht 6' 2\" (1.88 m)   Wt (!) 374 lb (169.6 kg)   BMI 48.02 kg/m²   Wt Readings from Last 3 Encounters:   10/25/21 (!) 374 lb (169.6 kg)   10/18/21 (!) 374 lb (169.6 kg)   10/11/21 (!) 374 lb (169.6 kg)     PHYSICAL EXAM  CONSTITUTIONAL:   Awake, alert, cooperative   EYES:  lids and lashes normal   ENT: external ears and nose without lesions   NECK:  supple, symmetrical, trachea midline   SKIN:      Assessment:     Venous ulcer left calf, healed. Onychomycosis associated with pain. Wound 09/13/21 Leg Left;Lateral #6 venous (Active)   Wound Image   10/25/21 1341   Dressing Status New dressing applied;Clean;Dry; Intact 10/11/21 1550   Wound Cleansed Cleansed with saline 10/11/21 1550   Dressing/Treatment ABD;Dry dressing;Alginate with Ag 10/11/21 1550   Wound Length (cm) your wound  * Drainage with a foul odor  * Bleeding  * Increase in swelling  * Need for compression bandage changes due to slippage, breakthrough drainage.     If you need medical attention outside of the business hours of the [address] [place] [address] Road please contact your PCP or go to the nearest emergency room. Contact your doctor if you have a temperature above 100.4 with any of the following:                         Persistent cough             Shortness of breath            Chills            Fatigue            Chest pain or pressure            Difficulty breathing            Decreased consciousness of confusion             Headache     Recommend:                       Wash hands often and for 20 seconds or more             Avoid touching eyes, nose, mouth and face             Social distance ( 6 feet)             Stay at home as much as possible             Call health care provider with any concerns                                                                                                                                                                                                      Electronically signed by Adolfo Russ DPM on 10/25/2021 at 2:16 PM

## 2022-09-12 NOTE — PROGRESS NOTES
Wound Healing Center Followup Visit Note    Referring Physician : Marli Perez MD  [address] [place] [address] RECORD NUMBER:  [id number]  AGE: 79 y.o. GENDER: male  : 1950  EPISODE DATE:  2021    Subjective:     Chief Complaint   Patient presents with    Wound Check     bilateral lower legs      HISTORY of PRESENT ILLNESS HPI   Princess Aguilar is a 79 y.o. male who presents today in regards to follow up evaluation and treatment of wound/ulcer. That patient's past medical, family and social hx were reviewed and changes were made if present. History of Wound Context:  The patient has had a wound of right  which was first noted approximately 1 week ago.  This has not been treated. On their initial visit to the wound healing center, 2021,  the patient has noted that the wound has not been improving.  The patient has had similar previous wounds in the past.       Pt is not on abx at time of initial visit.     21 continue local care, elevate, compression. 5-3-21 wound covered with devitalized nonviable tissue, through sub q    21 wound improved, leg looks better, decreased edema    21 additional breakdown appreciated with increased in wound size along the posterior aspect right calf, through subcutaneous tissue covered with devitalized nonviable tissue. Patient advised no pressure.     2021  · Ulcer, right calf improving    21 wound improved    21 wound continues to improve    21 right healed, new area left calf with 50% devitalized nonviable tissue    21 left calf with 30% devitalized nonviable tissue    21  left calf with 50% devitalized nonviable t      Wound/Ulcer Pain Timing/Severity: none  Quality of pain:   Severity:   / 10   Modifying Factors:   Associated Signs/Symptoms:      Ulcer Identification:  Ulcer Type: venous  Contributing Factors: edema and venous stasis        PAST MEDICAL HISTORY      Diagnosis Date    Cellulitis of right lower leg 63  Diabetes mellitus ([facility]     Lymphedema     Obesity, Class III, BMI 40-49.9 (morbid obesity) ([facility]     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     Past Surgical History:   Procedure Laterality Date    FRACTURE SURGERY  2005    right ankle repair     Family History   Problem Relation Age of Onset    Heart Disease Mother     Heart Disease Father      Social History     Tobacco Use    Smoking status: Never Smoker    Smokeless tobacco: Never Used   Vaping Use    Vaping Use: Never used   Substance Use Topics    Alcohol use: No     Comment: on special occassions    Drug use: No     Allergies   Allergen Reactions    Metformin And Related Diarrhea     Current Outpatient Medications on File Prior to Encounter   Medication Sig Dispense Refill    aspirin (ASPIRIN CHILDRENS) 81 MG chewable tablet Take 1 tablet by mouth daily 30 tablet 5     No current facility-administered medications on file prior to encounter. REVIEW OF SYSTEMS See HPI    Objective:    /76   Pulse 68   Temp 97.3 °F (36.3 °C) (Temporal)   Resp 18   Ht 6' 2\" (1.88 m)   Wt (!) 374 lb (169.6 kg)   BMI 48.02 kg/m²   Wt Readings from Last 3 Encounters:   07/08/21 (!) 374 lb (169.6 kg)   07/01/21 (!) 374 lb (169.6 kg)   06/21/21 (!) 374 lb (169.6 kg)     PHYSICAL EXAM  CONSTITUTIONAL:   Awake, alert, cooperative   EYES:  lids and lashes normal   ENT: external ears and nose without lesions   NECK:  supple, symmetrical, trachea midline   SKIN:  Open wound     Assessment:     Venous ulcer left calf    Pre Debridement Measurements:  Are located in the [facility]  Documentation Flow Sheet  Post Debridement Measurements:  Wound/Ulcer Descriptions are Pre Debridement except measurements:     Wound 06/21/21 Leg Left;Lateral #2 (Active)   Wound Image   06/21/21 1331   Dressing Status New dressing applied;Dry;Clean; Intact 07/08/21 1245   Wound Cleansed Cleansed with saline;Betadine/povidone iodine 07/08/21 1245   Dressing/Treatment Alginate;ABD;Dry dressing 07/08/21 1245   Offloading for Diabetic Foot Ulcers Walker 07/08/21 1245   Wound Length (cm) 3.6 cm 07/08/21 1145   Wound Width (cm) 0.5 cm 07/08/21 1145   Wound Depth (cm) 0.2 cm 07/08/21 1145   Wound Surface Area (cm^2) 1.8 cm^2 07/08/21 1145   Change in Wound Size % (l*w) 94.59 07/08/21 1145   Wound Volume (cm^3) 0.36 cm^3 07/08/21 1145   Wound Healing % 89 07/08/21 1145   Post-Procedure Length (cm) 3.8 cm 07/08/21 1207   Post-Procedure Width (cm) 0.6 cm 07/08/21 1207   Post-Procedure Depth (cm) 0.2 cm 07/08/21 1207   Post-Procedure Surface Area (cm^2) 2.28 cm^2 07/08/21 1207   Post-Procedure Volume (cm^3) 0.456 cm^3 07/08/21 1207   Wound Assessment Granulation tissue;Fibrin 07/08/21 1145   Drainage Amount Small 07/08/21 1145   Drainage Description Brown 07/08/21 1145   Odor None 07/08/21 1145   Colleen-wound Assessment Hemosiderin staining (brown yellow); Dry/flaky 07/08/21 1145   Number of days: 17          Procedure Note  Indications:  Based on my examination of this patient's wound(s)/ulcer(s) today, debridement is required to promote healing and evaluate the wound base. Performed by: Marsha Owens DPM    Consent obtained:  Yes    Time out taken:  Yes    Pain Control: Anesthetic  Anesthetic: 4% Lidocaine Liquid Topical     Debridement:Excisional Debridement    Using curette the wound(s)/ulcer(s) was/were sharply debrided down through and including the removal of subcutaneous tissue. Devitalized Tissue Debrided:  fibrin, biofilm, slough and necrotic/eschar to stimulate bleeding to promote healing, post debridement good bleeding base and wound edges noted    Wound/Ulcer #: 2    Percent of Wound/Ulcer Debrided: 100%    Total Surface Area Debrided:  1.8 sq cm     Estimated Blood Loss:  Minimal  Hemostasis Achieved:  by pressure    Procedural Pain:  0  / 10   Post Procedural Pain:  0 / 10     Response to treatment:  Well tolerated by patient.      Plan:   Treatment Note please see attached Discharge Instructions    Written patient dismissal instructions given to patient and signed by patient or POA. Discharge Instructions       Visit Discharge/Physician Orders     Discharge condition: Stable     Assessment of pain at discharge:none     Anesthetic used: 4% lidocaine     Discharge to: Home     Left via:public transportation     Accompanied by: accompanied by self     ECF/HHA:      Dressing Orders:LEFT LOWER LEG CLEANSE WITH BETADINE APPLY ALGINATE ag  AND PROFORE WRAP CHANGE WEEKLY AT [address] [facility]. RIGHT LEG SPANDAGRIP     Treatment Orders:  Eat foods high in protein and vitamin c     Take multivitamin daily     Use compression pumps     [facility] followup visit _______1 week ___________________  (Please note your next appointment above and if you are unable to keep, kindly give a 24 hour notice. Thank you.)     Physician signature:__________________________        If you experience any of the following, please call the [facility] [address] during business hours:     * Increase in Pain  * Temperature over 101  * Increase in drainage from your wound  * Drainage with a foul odor  * Bleeding  * Increase in swelling  * Need for compression bandage changes due to slippage, breakthrough drainage.     If you need medical attention outside of the business hours of the [email or web address] please contact your PCP or go to the nearest emergency room. Contact your doctor if you have a temperature above 100.4 with any of the following:                         Persistent cough             Shortness of breath            Chills            Fatigue            Chest pain or pressure            Difficulty breathing            Decreased consciousness of confusion             Headache     Recommend:                       Wash hands often and for 20 seconds or more             Avoid touching eyes, nose, mouth and face             Social distance ( 6 feet)             Stay at home as much as

## 2023-01-17 NOTE — DISCHARGE INSTRUCTIONS
Visit Discharge/Physician Orders    Discharge condition: Stable    Assessment of pain at discharge: None    Anesthetic used: Lidocaine 4%    Discharge to: Home    Left via:Private automobile    Accompanied by: accompanied by self    ECF/HHA:     Dressing Orders: Right leg wound: Cleanse with normal saline, apply alginate AG and ABD. Apply Coban II compression wrap. Keep in place for one week. Do not get wrap wet. If wrap falls more than 2 inches or becomes wet or soiled remove and apply clean dressing. Treatment Orders: FOLLOW NUTRITIOUS DIET. CHOOSE FOODS HIGH IN PROTEIN -CHICKEN- FISH-AND EGGS,  CHOOSE FOODS HIGH IN VITAMIN C.   MULTIVITAMIN DAILY. Keep leg elevated as much as possible. Culture taken in clinic today. [facility] followup visit ______1 week Dr. Gerald Zelaya Monday afternoon_______________________  (Please note your next appointment above and if you are unable to keep, kindly give a 24 hour notice. Thank you.)    Physician signature:__________________________      If you experience any of the following, please call the [address] during business hours:    * Increase in Pain  * Temperature over 101  * Increase in drainage from your wound  * Drainage with a foul odor  * Bleeding  * Increase in swelling  * Need for compression bandage changes due to slippage, breakthrough drainage. If you need medical attention outside of the business hours of the [address] please contact your PCP or go to the nearest emergency room.

## 2023-01-19 ENCOUNTER — HOSPITAL ENCOUNTER (OUTPATIENT)
Dept: WOUND CARE | Age: 73
Discharge: HOME | End: 2023-01-19
Payer: MEDICARE

## 2023-01-19 VITALS
WEIGHT: 315 LBS | TEMPERATURE: 97.8 F | HEART RATE: 85 BPM | RESPIRATION RATE: 20 BRPM | SYSTOLIC BLOOD PRESSURE: 157 MMHG | HEIGHT: 74 IN | BODY MASS INDEX: 40.43 KG/M2 | DIASTOLIC BLOOD PRESSURE: 92 MMHG

## 2023-01-19 PROBLEM — R60.0 VENOUS STASIS ULCER OF LEFT LOWER LEG WITH EDEMA OF LEFT LOWER LEG (HCC): Status: ACTIVE | Noted: 2023-01-19

## 2023-01-19 PROBLEM — R60.9 VENOUS STASIS ULCER OF LEFT LOWER LEG WITH EDEMA OF LEFT LOWER LEG (HCC): Status: ACTIVE | Noted: 2023-01-19

## 2023-01-19 PROBLEM — E66.01 CLASS 3 SEVERE OBESITY DUE TO EXCESS CALORIES WITH SERIOUS COMORBIDITY IN ADULT (HCC): Status: ACTIVE | Noted: 2023-01-19

## 2023-01-19 PROBLEM — L97.929 VENOUS STASIS ULCER OF LEFT LOWER LEG WITH EDEMA OF LEFT LOWER LEG (HCC): Status: ACTIVE | Noted: 2023-01-19

## 2023-01-19 PROBLEM — I83.029 VENOUS STASIS ULCER OF LEFT LOWER LEG WITH EDEMA OF LEFT LOWER LEG (HCC): Status: ACTIVE | Noted: 2023-01-19

## 2023-01-19 PROBLEM — I83.892 VENOUS STASIS ULCER OF LEFT LOWER LEG WITH EDEMA OF LEFT LOWER LEG (HCC): Status: ACTIVE | Noted: 2023-01-19

## 2023-01-19 PROBLEM — E66.813 CLASS 3 SEVERE OBESITY DUE TO EXCESS CALORIES WITH SERIOUS COMORBIDITY IN ADULT: Status: ACTIVE | Noted: 2023-01-19

## 2023-01-19 PROCEDURE — 87186 SC STD MICRODIL/AGAR DIL: CPT

## 2023-01-19 PROCEDURE — 87205 SMEAR GRAM STAIN: CPT

## 2023-01-19 PROCEDURE — 11042 DBRDMT SUBQ TIS 1ST 20SQCM/<: CPT

## 2023-01-19 PROCEDURE — 87075 CULTR BACTERIA EXCEPT BLOOD: CPT

## 2023-01-19 PROCEDURE — 11045 DBRDMT SUBQ TISS EACH ADDL: CPT

## 2023-01-19 PROCEDURE — 87070 CULTURE OTHR SPECIMN AEROBIC: CPT

## 2023-01-19 PROCEDURE — 87077 CULTURE AEROBIC IDENTIFY: CPT

## 2023-01-19 RX ORDER — LIDOCAINE 50 MG/G
OINTMENT TOPICAL ONCE
OUTPATIENT
Start: 2023-01-19 | End: 2023-01-19

## 2023-01-19 RX ORDER — BETAMETHASONE DIPROPIONATE 0.05 %
OINTMENT (GRAM) TOPICAL ONCE
OUTPATIENT
Start: 2023-01-19 | End: 2023-01-19

## 2023-01-19 RX ORDER — GINSENG 100 MG
CAPSULE ORAL ONCE
OUTPATIENT
Start: 2023-01-19 | End: 2023-01-19

## 2023-01-19 RX ORDER — BACITRACIN ZINC AND POLYMYXIN B SULFATE 500; 1000 [USP'U]/G; [USP'U]/G
OINTMENT TOPICAL ONCE
OUTPATIENT
Start: 2023-01-19 | End: 2023-01-19

## 2023-01-19 RX ORDER — LIDOCAINE 40 MG/G
CREAM TOPICAL ONCE
OUTPATIENT
Start: 2023-01-19 | End: 2023-01-19

## 2023-01-19 RX ORDER — LIDOCAINE HYDROCHLORIDE 40 MG/ML
SOLUTION TOPICAL ONCE
OUTPATIENT
Start: 2023-01-19 | End: 2023-01-19

## 2023-01-19 RX ORDER — LIDOCAINE HYDROCHLORIDE 20 MG/ML
JELLY TOPICAL ONCE
OUTPATIENT
Start: 2023-01-19 | End: 2023-01-19

## 2023-01-19 RX ORDER — CLOBETASOL PROPIONATE 0.5 MG/G
OINTMENT TOPICAL ONCE
OUTPATIENT
Start: 2023-01-19 | End: 2023-01-19

## 2023-01-19 RX ORDER — GENTAMICIN SULFATE 1 MG/G
OINTMENT TOPICAL ONCE
OUTPATIENT
Start: 2023-01-19 | End: 2023-01-19

## 2023-01-19 RX ORDER — BACITRACIN, NEOMYCIN, POLYMYXIN B 400; 3.5; 5 [USP'U]/G; MG/G; [USP'U]/G
OINTMENT TOPICAL ONCE
OUTPATIENT
Start: 2023-01-19 | End: 2023-01-19

## 2023-01-19 NOTE — PROGRESS NOTES
Wound Healing Center /Hyperbarics   History and Physical/Consultation  General Surgery/Medicine    Referring Physician : Jonas Stanley MD  [address] [place] [address] [place] RECORD NUMBER:  [id number]  AGE: 67 y.o. GENDER: male  : 1950  EPISODE DATE:  2023  Subjective:     Chief Complaint   Patient presents with    Wound Check     Right leg         HISTORY of PRESENT ILLNESS HPI     Mary Gaytan is a 67 y.o. male who presents today for wound/ulcer evaluation. History of Wound Context:  The patient has had a wound of right lower extremity which was first noted approximately 4 weeks. This has been treated with nothing. On their initial visit to the wound healing center, ,  the patient has noted that the wound has not been improving. The patient has had similar previous wounds in the past.  Patient with bilateral lower extremities venous stasis and obesity    Pt is not on abx at time of initial visit.       Wound/Ulcer Pain Timing/Severity: intermittent  Quality of pain: dull  Severity:  4 / 10   Modifying Factors: Pain is relieved/improved with rest  Associated Signs/Symptoms: edema, erythema, and drainage    Ulcer Identification:  Ulcer Type: venous  Contributing Factors: diabetes and obesity    Diabetic/Pressure/Non Pressure Ulcers only:  Ulcer: Non-Pressure ulcer,      If pWound:         PAST MEDICAL HISTORY      Diagnosis Date    Cellulitis of right lower leg     Diabetes mellitus (HCC)     Lymphedema     Obesity, Class III, BMI 40-49.9 (morbid obesity) ([facility])     Popl[facility]al [facility] ([facility] 2017    Left     Past Surgical History:   Procedure Laterality Date    FRACTURE SURGERY  2005    right ankle repair     Family History   Problem Relation Age of Onset    Heart Disease Mother     Heart Disease Father      Social History     Tobacco Use    Smoking status: Never    Smokeless tobacco: Never   Vaping Use    Vaping Use: Never used   Substance Use Topics    Alcohol use: No     Comment: on special occassions    Drug use: No     Allergies   Allergen Reactions    Metformin And Related Diarrhea     Current Outpatient Medications on File Prior to Encounter   Medication Sig Dispense Refill    aspirin (ASPIRIN CHILDRENS) 81 MG chewable tablet Take 1 tablet by mouth daily 30 tablet 5     No current facility-administered medications on file prior to encounter.        REVIEW OF SYSTEMS     ROS : All others Negative if blank [], Positive if [x]  General Urinary   [] Fevers [] Hematuria   [] Chills [] Dysuria   [] Weight Loss Neurolgoic   Skin [] Stroke/TIA   [] Tissue Loss [] Focal weakness   Eyes [] Slurred Speech   [] Wears Glasses/Contacts ENT   [] Vision Changes [] Difficulty swallowing   Respiratory Endocrine    [] Shortness of breath [] Increased Thirst   Cardiovascular Gastrointestinal   [] Chest Pain [] Abdominal Pain   [] Shortness of breath with exertion [] Melena    [] Hematochezia     Objective:    BP (!) 157/92   Pulse 85   Temp 97.8 °F (36.6 °C) (Temporal)   Resp 20   Ht 6' 2\" (1.88 m)   Wt (!) 350 lb (158.8 kg)   BMI 44.94 kg/m²   Wt Readings from Last 3 Encounters:   01/19/23 (!) 350 lb (158.8 kg)   10/25/21 (!) 374 lb (169.6 kg)   10/18/21 (!) 374 lb (169.6 kg)       PHYSICAL EXAM  CONSTITUTIONAL:   Awake, alert, cooperative     PSYCHIATRIC :  Oriented to time, place and person        normal insight to disease process  ENT:  External ears and nose without lesions      Hearing deficit is not noted  NECK: Supple, symmetrical, trachea midline      Thyroid goiter not appreciated     LUNGS:  No increased work of breathing                    Clear to auscultation bilaterally     CARDIOVASCULAR:  regular rate and rhythm     ABDOMEN:  soft, non-distended, non-tender      Hernias is not noted  Lymphatics : Cervical lymphadenopathy is not noted     Femoral lymphadenopathy is not noted  SKIN:   Skin color is normal   Texture and turgor is  normal   Induration is not noted  EXTREMITIES:   R UE Edema is not noted  L UE Edema is not noted  R LE Edema is not noted  L LE Edema is not noted  Assessment:     Problem List Items Addressed This Visit    None      Pre Debridement Measurements:  Are located in the [facility]  Documentation Flow Sheet  Post Debridement Measurements:  Wound/Ulcer Descriptions are Pre Debridement except measurements:     Wound 09/13/21 Leg Left;Lateral #6 venous (Active)   Number of days: 492       Wound 01/19/23 Pretibial Right;Lateral #1 (Active)   Wound Image   01/19/23 0939   Wound Etiology Venous 01/19/23 0939   Wound Length (cm) 16 cm 01/19/23 0939   Wound Width (cm) 14 cm 01/19/23 0939   Wound Depth (cm) 0.3 cm 01/19/23 0939   Wound Surface Area (cm^2) 224 cm^2 01/19/23 0939   Wound Volume (cm^3) 67.2 cm^3 01/19/23 0939   Post-Procedure Length (cm) 16.1 cm 01/19/23 1005   Post-Procedure Width (cm) 14.1 cm 01/19/23 1005   Post-Procedure Depth (cm) 0.4 cm 01/19/23 1005   Post-Procedure Surface Area (cm^2) 227.01 cm^2 01/19/23 1005   Post-Procedure Volume (cm^3) 90.804 cm^3 01/19/23 1005   Wound Assessment Fibrin;Pink/red 01/19/23 0939   Drainage Amount Large 01/19/23 0939   Drainage Description Serosanguinous; Yellow 01/19/23 0939   Odor Mild 01/19/23 0939   Colleen-wound Assessment Fragile;Dry/flaky; Maceration 01/19/23 0939   Number of days: 0          Procedure Note  Indications:  Based on my examination of this patient's wound(s)/ulcer(s) today, debridement is required to promote healing and evaluate the wound base. Performed by: Vashti Park MD    Consent obtained:  Yes    Time out taken:  Yes    Pain Control: Anesthetic  Anesthetic: 4% Lidocaine Liquid Topical     Debridement:Excisional Debridement    Using curette the wound(s)/ulcer(s) was/were sharply debrided down through and including the removal of subcutaneous tissue.         Devitalized Tissue Debrided:  slough, necrotic/eschar, and exudate to stimulate bleeding to promote healing, post debridement good bleeding base and wound edges noted    Wound/Ulcer #: 1    Percent of Wound/Ulcer Debrided: 100%    Total Surface Area Debrided:  <20 sq cm     Estimated Blood Loss:  Minimal  Hemostasis Achieved:  not needed    Procedural Pain:  4  / 10   Post Procedural Pain:  4 / 10     Response to treatment:  Well tolerated by patient. A culture was done. Plan:     Pt is not a smoker       In my professional opinion and based off the information that is available at this time this patient has appropriate indication for HBO Therapy: No    Treatment Note please see attached Discharge Instructions    Written patient dismissal instructions given to patient and signed by patient or POA. Discharge Instructions         Visit Discharge/Physician Orders    Discharge condition: Stable    Assessment of pain at discharge: None    Anesthetic used: Lidocaine 4%    Discharge to: Home    Left via:Private automobile    Accompanied by: accompanied by self    ECF/HHA:     Dressing Orders: Right leg wound: Cleanse with normal saline, apply alginate AG and ABD. Apply Coban II compression wrap. Keep in place for one week. Do not get wrap wet. If wrap falls more than 2 inches or becomes wet or soiled remove and apply clean dressing. Treatment Orders: FOLLOW NUTRITIOUS DIET. CHOOSE FOODS HIGH IN PROTEIN -CHICKEN- FISH-AND EGGS,  CHOOSE FOODS HIGH IN VITAMIN C.   MULTIVITAMIN DAILY. Keep leg elevated as much as possible. Culture taken in clinic today. [facility] [facility] followup visit ______1 week Dr. Miladys Mendez Monday afternoon_______________________  (Please note your next appointment above and if you are unable to keep, kindly give a 24 hour notice.  Thank you.)    Physician signature:__________________________      If you experience any of the following, please call the [address] [place] [address] Road during business hours:    * Increase in Pain  * Temperature over 101  * Increase in drainage from your wound  * Drainage with a foul odor  * Bleeding  * Increase in swelling  * Need for compression bandage changes due to slippage, breakthrough drainage. If you need medical attention outside of the business hours of the [address] [place] [address] Road please contact your PCP or go to the nearest emergency room.          Electronically signed by Igor Garay MD on 1/19/2023 at 10:25 AM

## 2023-01-19 NOTE — PLAN OF CARE
Problem: Cognitive:  Goal: Knowledge of wound care  Description: Knowledge of wound care  Outcome: Not Progressing  Goal: Understands risk factors for wounds  Description: Understands risk factors for wounds  Outcome: Not Progressing     Problem: Wound:  Goal: Will show signs of wound healing; wound closure and no evidence of infection  Description: Will show signs of wound healing; wound closure and no evidence of infection  Outcome: Not Progressing     Problem: Venous:  Goal: Signs of wound healing will improve  Description: Signs of wound healing will improve  Outcome: Not Progressing     Problem: Compression therapy:  Goal: Will be free from complications associated with compression therapy  Description: Will be free from complications associated with compression therapy  Outcome: Not Progressing

## 2023-01-22 LAB
ANAEROBIC CULTURE: ABNORMAL
GRAM STAIN RESULT: ABNORMAL
ORGANISM: ABNORMAL
ORGANISM: ABNORMAL
WOUND/ABSCESS: ABNORMAL
WOUND/ABSCESS: ABNORMAL

## 2023-01-23 ENCOUNTER — HOSPITAL ENCOUNTER (OUTPATIENT)
Dept: WOUND CARE | Age: 73
Discharge: HOME | End: 2023-01-23
Payer: MEDICARE

## 2023-01-23 ENCOUNTER — HOSPITAL ENCOUNTER (OUTPATIENT)
Age: 73
Discharge: HOME | End: 2023-01-23
Payer: MEDICARE

## 2023-01-23 VITALS
SYSTOLIC BLOOD PRESSURE: 138 MMHG | HEIGHT: 74 IN | HEART RATE: 80 BPM | DIASTOLIC BLOOD PRESSURE: 74 MMHG | RESPIRATION RATE: 20 BRPM | WEIGHT: 315 LBS | TEMPERATURE: 96.8 F | BODY MASS INDEX: 40.43 KG/M2

## 2023-01-23 DIAGNOSIS — L97.912 NON-PRESSURE CHRONIC ULCER OF RIGHT LOWER LEG WITH FAT LAYER EXPOSED (HCC): ICD-10-CM

## 2023-01-23 DIAGNOSIS — I89.0 LYMPHEDEMA OF BOTH LOWER EXTREMITIES: ICD-10-CM

## 2023-01-23 DIAGNOSIS — I83.892 VENOUS STASIS ULCER OF LEFT LOWER LEG WITH EDEMA OF LEFT LOWER LEG (HCC): Primary | ICD-10-CM

## 2023-01-23 DIAGNOSIS — I87.2 CHRONIC VENOUS INSUFFICIENCY: ICD-10-CM

## 2023-01-23 DIAGNOSIS — B35.9 DERMATOPHYTOSIS: ICD-10-CM

## 2023-01-23 DIAGNOSIS — B35.3 TINEA PEDIS OF BOTH FEET: ICD-10-CM

## 2023-01-23 DIAGNOSIS — I83.029 VENOUS STASIS ULCER OF LEFT LOWER LEG WITH EDEMA OF LEFT LOWER LEG (HCC): ICD-10-CM

## 2023-01-23 DIAGNOSIS — I87.2 VENOUS STASIS ULCER OF RIGHT CALF WITH FAT LAYER EXPOSED WITHOUT VARICOSE VEINS (HCC): Chronic | ICD-10-CM

## 2023-01-23 DIAGNOSIS — L97.222 VENOUS STASIS ULCER OF LEFT CALF WITH FAT LAYER EXPOSED WITHOUT VARICOSE VEINS (HCC): ICD-10-CM

## 2023-01-23 DIAGNOSIS — I87.2 VENOUS STASIS ULCER OF LEFT CALF WITH FAT LAYER EXPOSED WITHOUT VARICOSE VEINS (HCC): ICD-10-CM

## 2023-01-23 DIAGNOSIS — I83.029 VENOUS STASIS ULCER OF LEFT LOWER LEG WITH EDEMA OF LEFT LOWER LEG (HCC): Primary | ICD-10-CM

## 2023-01-23 DIAGNOSIS — L97.212 VENOUS STASIS ULCER OF RIGHT CALF WITH FAT LAYER EXPOSED WITHOUT VARICOSE VEINS (HCC): Chronic | ICD-10-CM

## 2023-01-23 DIAGNOSIS — B35.1 ONYCHOMYCOSIS: ICD-10-CM

## 2023-01-23 DIAGNOSIS — R60.9 VENOUS STASIS ULCER OF LEFT LOWER LEG WITH EDEMA OF LEFT LOWER LEG (HCC): ICD-10-CM

## 2023-01-23 DIAGNOSIS — L97.929 VENOUS STASIS ULCER OF LEFT LOWER LEG WITH EDEMA OF LEFT LOWER LEG (HCC): ICD-10-CM

## 2023-01-23 DIAGNOSIS — M79.89 LEG SWELLING: ICD-10-CM

## 2023-01-23 DIAGNOSIS — L97.922 NON-PRESSURE CHRONIC ULCER OF LEFT LOWER LEG WITH FAT LAYER EXPOSED (HCC): ICD-10-CM

## 2023-01-23 DIAGNOSIS — R60.9 VENOUS STASIS ULCER OF LEFT LOWER LEG WITH EDEMA OF LEFT LOWER LEG (HCC): Primary | ICD-10-CM

## 2023-01-23 DIAGNOSIS — E66.01 MORBID OBESITY WITH BMI OF 45.0-49.9, ADULT (HCC): ICD-10-CM

## 2023-01-23 DIAGNOSIS — L97.929 VENOUS STASIS ULCER OF LEFT LOWER LEG WITH EDEMA OF LEFT LOWER LEG (HCC): Primary | ICD-10-CM

## 2023-01-23 DIAGNOSIS — R09.89 DECREASED DORSALIS PEDIS PULSE: ICD-10-CM

## 2023-01-23 DIAGNOSIS — I83.892 VENOUS STASIS ULCER OF LEFT LOWER LEG WITH EDEMA OF LEFT LOWER LEG (HCC): ICD-10-CM

## 2023-01-23 PROBLEM — L97.921 NON-PRESSURE CHRONIC ULCER OF LEFT LOWER LEG, LIMITED TO BREAKDOWN OF SKIN (HCC): Status: RESOLVED | Noted: 2020-07-02 | Resolved: 2023-01-23

## 2023-01-23 PROBLEM — L97.221 NON-PRESSURE CHRONIC ULCER OF LEFT CALF, LIMITED TO BREAKDOWN OF SKIN (HCC): Status: RESOLVED | Noted: 2021-10-18 | Resolved: 2023-01-23

## 2023-01-23 PROBLEM — L97.911 NON-PRESSURE CHRONIC ULCER OF RIGHT LOWER LEG, LIMITED TO BREAKDOWN OF SKIN (HCC): Status: RESOLVED | Noted: 2020-08-06 | Resolved: 2023-01-23

## 2023-01-23 PROBLEM — M79.675 PAIN IN TOES OF BOTH FEET: Status: RESOLVED | Noted: 2020-07-02 | Resolved: 2023-01-23

## 2023-01-23 PROBLEM — L97.211 NON-PRESSURE CHRONIC ULCER OF RIGHT CALF, LIMITED TO BREAKDOWN OF SKIN (HCC): Status: RESOLVED | Noted: 2021-09-13 | Resolved: 2023-01-23

## 2023-01-23 PROBLEM — M79.674 PAIN IN TOES OF BOTH FEET: Status: RESOLVED | Noted: 2020-07-02 | Resolved: 2023-01-23

## 2023-01-23 LAB
ALBUMIN SERPL-MCNC: 3.5 G/DL (ref 3.5–5.2)
ALP BLD-CCNC: 61 U/L (ref 40–129)
ALT SERPL-CCNC: 10 U/L (ref 0–40)
ANION GAP SERPL CALCULATED.3IONS-SCNC: 11 MMOL/L (ref 7–16)
AST SERPL-CCNC: 15 U/L (ref 0–39)
BILIRUB SERPL-MCNC: 0.4 MG/DL (ref 0–1.2)
BUN BLDV-MCNC: 12 MG/DL (ref 6–23)
CALCIUM SERPL-MCNC: 9.2 MG/DL (ref 8.6–10.2)
CHLORIDE BLD-SCNC: 99 MMOL/L (ref 98–107)
CO2: 26 MMOL/L (ref 22–29)
CREAT SERPL-MCNC: 1 MG/DL (ref 0.7–1.2)
GFR SERPL CREATININE-BSD FRML MDRD: >60 ML/MIN/1.73
GLUCOSE BLD-MCNC: 102 MG/DL (ref 74–99)
HCT VFR BLD CALC: 41.2 % (ref 37–54)
HEMOGLOBIN: 12.9 G/DL (ref 12.5–16.5)
MCH RBC QN AUTO: 27 PG (ref 26–35)
MCHC RBC AUTO-ENTMCNC: 31.3 % (ref 32–34.5)
MCV RBC AUTO: 86.4 FL (ref 80–99.9)
PDW BLD-RTO: 14.6 FL (ref 11.5–15)
PLATELET # BLD: 268 E9/L (ref 130–450)
PMV BLD AUTO: 8.8 FL (ref 7–12)
POTASSIUM SERPL-SCNC: 3.9 MMOL/L (ref 3.5–5)
RBC # BLD: 4.77 E12/L (ref 3.8–5.8)
SODIUM BLD-SCNC: 136 MMOL/L (ref 132–146)
TOTAL PROTEIN: 9 G/DL (ref 6.4–8.3)
WBC # BLD: 8.3 E9/L (ref 4.5–11.5)

## 2023-01-23 PROCEDURE — 11045 DBRDMT SUBQ TISS EACH ADDL: CPT

## 2023-01-23 PROCEDURE — 87147 CULTURE TYPE IMMUNOLOGIC: CPT

## 2023-01-23 PROCEDURE — 87186 SC STD MICRODIL/AGAR DIL: CPT

## 2023-01-23 PROCEDURE — 87077 CULTURE AEROBIC IDENTIFY: CPT

## 2023-01-23 PROCEDURE — 87075 CULTR BACTERIA EXCEPT BLOOD: CPT

## 2023-01-23 PROCEDURE — 80053 COMPREHEN METABOLIC PANEL: CPT

## 2023-01-23 PROCEDURE — 87070 CULTURE OTHR SPECIMN AEROBIC: CPT

## 2023-01-23 PROCEDURE — 85027 COMPLETE CBC AUTOMATED: CPT

## 2023-01-23 PROCEDURE — 36415 COLL VENOUS BLD VENIPUNCTURE: CPT

## 2023-01-23 PROCEDURE — 87205 SMEAR GRAM STAIN: CPT

## 2023-01-23 PROCEDURE — 11042 DBRDMT SUBQ TIS 1ST 20SQCM/<: CPT

## 2023-01-23 RX ORDER — BETAMETHASONE DIPROPIONATE 0.05 %
OINTMENT (GRAM) TOPICAL ONCE
OUTPATIENT
Start: 2023-01-23 | End: 2023-01-23

## 2023-01-23 RX ORDER — LIDOCAINE 50 MG/G
OINTMENT TOPICAL ONCE
OUTPATIENT
Start: 2023-01-23 | End: 2023-01-23

## 2023-01-23 RX ORDER — GENTAMICIN SULFATE 1 MG/G
OINTMENT TOPICAL ONCE
OUTPATIENT
Start: 2023-01-23 | End: 2023-01-23

## 2023-01-23 RX ORDER — BACITRACIN, NEOMYCIN, POLYMYXIN B 400; 3.5; 5 [USP'U]/G; MG/G; [USP'U]/G
OINTMENT TOPICAL ONCE
OUTPATIENT
Start: 2023-01-23 | End: 2023-01-23

## 2023-01-23 RX ORDER — LIDOCAINE HYDROCHLORIDE 40 MG/ML
SOLUTION TOPICAL ONCE
Status: COMPLETED | OUTPATIENT
Start: 2023-01-23 | End: 2023-01-23

## 2023-01-23 RX ORDER — TERBINAFINE HYDROCHLORIDE 250 MG/1
250 TABLET ORAL DAILY
Qty: 20 TABLET | Refills: 0 | Status: SHIPPED | OUTPATIENT
Start: 2023-01-23 | End: 2023-02-12

## 2023-01-23 RX ORDER — CLOBETASOL PROPIONATE 0.5 MG/G
OINTMENT TOPICAL ONCE
OUTPATIENT
Start: 2023-01-23 | End: 2023-01-23

## 2023-01-23 RX ORDER — LIDOCAINE HYDROCHLORIDE 40 MG/ML
SOLUTION TOPICAL ONCE
OUTPATIENT
Start: 2023-01-23 | End: 2023-01-23

## 2023-01-23 RX ORDER — LIDOCAINE HYDROCHLORIDE 20 MG/ML
JELLY TOPICAL ONCE
OUTPATIENT
Start: 2023-01-23 | End: 2023-01-23

## 2023-01-23 RX ORDER — LIDOCAINE 40 MG/G
CREAM TOPICAL ONCE
OUTPATIENT
Start: 2023-01-23 | End: 2023-01-23

## 2023-01-23 RX ORDER — BACITRACIN ZINC AND POLYMYXIN B SULFATE 500; 1000 [USP'U]/G; [USP'U]/G
OINTMENT TOPICAL ONCE
OUTPATIENT
Start: 2023-01-23 | End: 2023-01-23

## 2023-01-23 RX ORDER — GINSENG 100 MG
CAPSULE ORAL ONCE
OUTPATIENT
Start: 2023-01-23 | End: 2023-01-23

## 2023-01-23 RX ORDER — CEPHALEXIN 500 MG/1
500 CAPSULE ORAL 4 TIMES DAILY
Qty: 40 CAPSULE | Refills: 0 | Status: SHIPPED | OUTPATIENT
Start: 2023-01-23 | End: 2023-02-02

## 2023-01-23 RX ADMIN — LIDOCAINE HYDROCHLORIDE 30 ML: 40 SOLUTION TOPICAL at 14:48

## 2023-01-23 NOTE — DISCHARGE INSTRUCTIONS
Visit Discharge/Physician Orders     Discharge condition: Stable     Assessment of pain at discharge: None     Anesthetic used: Lidocaine 4%     Discharge to: Home     Left via:Private automobile     Accompanied by: accompanied by self     ECF/HHA: Home Health to evaluate and treat     Dressing Orders: Right leg wound: Cleanse with normal saline, apply alginate AG and ABD. Change daily. Treatment Orders: FOLLOW NUTRITIOUS DIET. CHOOSE FOODS HIGH IN PROTEIN -CHICKEN- FISH-AND EGGS,  CHOOSE FOODS HIGH IN VITAMIN C.   MULTIVITAMIN DAILY. Keep leg elevated as much as possible. Culture taken in clinic today 1/23/23  Antifungal cream and pill sent to pharmacy, please pickup and take as prescribed  Blood work ordered  Vascular studies ordered and scheduled        [facility] followup visit ______1 week _______________________  (Please note your next appointment above and if you are unable to keep, kindly give a 24 hour notice. Thank you.)     Physician signature:__________________________        If you experience any of the following, please call the [facility] during business hours:     * Increase in Pain  * Temperature over 101  * Increase in drainage from your wound  * Drainage with a foul odor  * Bleeding  * Increase in swelling  * Need for compression bandage changes due to slippage, breakthrough drainage. If you need medical attention outside of the business hours of the [facility] please contact your PCP or go to the nearest emergency room.

## 2023-01-23 NOTE — PROGRESS NOTES
Spoke with patient regarding dressing changes and patient states he is unable to change the dressings himself and has no help at home. Patient is unable to do it himself because he \"cannot see or reach the wound. \" Spoke with nurse manager regarding a nursing visit if needed due to home health being unable to evaluate and treat in time. Patient has large amounts of drainage and needs to have daily dressing changes.

## 2023-01-23 NOTE — PROGRESS NOTES
Wound Healing Center /Hyperbarics   History and Physical/Consultation  Vascular    Referring Physician : Mariano Leon MD  [address] [place] [address] [place] RECORD NUMBER:  [id number]  AGE: 67 y.o. GENDER: male  : 1950  EPISODE DATE:  2023  Subjective:     Chief Complaint   Patient presents with    Wound Check     Right leg         HISTORY of PRESENT ILLNESS HPI     Bassem Thompson is a 67 y.o. male who presents today for wound/ulcer evaluation. History of Wound Context:  The patient has had a wound of right leg which was first noted approximately several weeks ago, although patient tells me he thinks only 2 weeks but looking at the status of the wound, probably much longer duration. This has been treated by the patient during his dressing changes, recently also seen at the wound care center last week, and the patient was referred to vascular service because of past relationship . On their initial visit to the wound healing center, 23, the patient has noted that the wound has not been improving. The patient has had similar previous wounds in the past.        Patient had longstanding history of leg swelling, skin breakdown and ulcerations, due to venous insufficiency as well as lymphedema has undergone lymphedema therapy in the past but not recently, underwent extensive vascular work-up in the past including venous ultrasound studies, arterial Doppler studies, recommended medical therapy      Pt is currently not on abx.       Wound/Ulcer Pain Timing/Severity: constant  Quality of pain: dull, aching  Severity:  3 / 10   Modifying Factors: Pain worsens with walking, walks with help of a walker  Associated Signs/Symptoms: edema and drainage    Ulcer Identification:  Ulcer Type: venous and non-healing/non-surgical  Contributing Factors: edema, venous stasis, lymphedema, and poor hygiene    Diabetic/Pressure/Non Pressure Ulcers only:  Ulcer: N/A    If patient has diabetic lower extremity wounds  Mitchell Classification of diabetic lower extremity wounds:    Grade Description   []  0 No open wound   []  1 Superficial ulcer involving the full skin thickness   []  2 Deep ulcer involves ligament, tendon, joint capsule, or fascia  No bone involvement or abscess presence   []  3 Deep Ulcer with abcess formation and/or osteomyelitis   []  4 Localized gangrene   []  5 Extensive gangrene of the foot     Wound: Patient does have extensive onychomycosis, dermatophytosis, tinea pedis, ulceration of the right Buttock over the lateral aspect right calf with drainage    Other pertinent information:    1. Recent wound cultures revealed evidence of MSSA    2. No recent lab work was done patient recommended to have a CBC and CMP done today    3. CTA of the aorta with runoff that was done in August 2017, no evidence of left popliteal artery and ism that was suspected    4. Past ankle-brachial index revealed adequate arterial flow to both feet, in 2017    5.   Bilateral venous ultrasound studies the reflux, done in 2017 revealed no evidence of deep vein thrombosis, the right saphenofemoral junction could not be evaluated by the right mid and distal saphenous vein was competent and on the left side, left saphenofemoral junction was reported to be competent      1/23/2023    Discussed the patient, options, risks benefits and alternatives were explained to the patient, patient was recommended to see his podiatrist regarding onychomycosis of the toenails and also trimming of the toenails  Patient recommended complete work-up including CBC, CMP, vascular testing including lower extremity arterial Doppler study as well as venous ultrasound with reflux  Patient recommended topical antifungal cream, miconazole along with short-term oral Lamisil therapy and also based on the wound cultures, Keflex 500 mg p.o. 4 times daily for 10 days pending repeat wound cultures that were done today  The patient recommended, to keep the legs elevated to decrease edema also importance of nutrition, multivitamin supplementation and protein supplementation was discussed  All his questions were answered    PAST MEDICAL HISTORY      Diagnosis Date    Cellulitis of right lower leg     Decreased dorsalis pedis pulse 1/23/2023    Dermatophytosis 1/23/2023    Diabetes mellitus ([facility]     Leg swelling 1/23/2023    Lymphedema     Lymphedema of both lower extremities 1/23/2023    Obesity, Class III, BMI 40-49.9 (morbid obesity) (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    Tinea pedis of both feet 1/23/2023     Past Surgical History:   Procedure Laterality Date    FRACTURE SURGERY  2005    right ankle repair     Family History   Problem Relation Age of Onset    Heart Disease Mother     Heart Disease Father      Social History     Tobacco Use    Smoking status: Never    Smokeless tobacco: Never   Vaping Use    Vaping Use: Never used   Substance Use Topics    Alcohol use: No     Comment: on special occassions    Drug use: No     Allergies   Allergen Reactions    Metformin And Related Diarrhea     Current Outpatient Medications on File Prior to Encounter   Medication Sig Dispense Refill    aspirin (ASPIRIN CHILDRENS) 81 MG chewable tablet Take 1 tablet by mouth daily 30 tablet 5     No current facility-administered medications on file prior to encounter.        REVIEW OF SYSTEMS   ROS : All others Negative if blank [], Positive if [x]  General Urinary   [] Fevers [] Hematuria   [] Chills [] Dysuria   [] Weight Loss Vascular   Skin [] Claudication   [] Tissue Loss [] Rest Pain   Eyes Neurologic   [] Wears Glasses/Contacts [] Stroke/TIA   [] Vision Changes [] Focal weakness   Respiratory [] Slurred Speech    [] Shortness of breath ENT   Cardiovascular [] Difficulty swallowing   [] Chest Pain Gastrointestinal   [] Shortness of breath with exertion [] Abdominal Pain   Patient is significant lymphedema bilaterally right leg more than left leg with underlying venous stasis pigmentation, no major varicose veins, underlying extensive dermatophytosis and tinea pedis, onychomycosis of the toenails, obesity, history of hypertension, renal insufficiency, hyperlipidemia [] Melena       [] Hematochezia               Objective:    /74   Pulse 80   Temp 96.8 °F (36 °C) (Temporal)   Resp 20   Ht 6' 2\" (1.88 m)   Wt (!) 350 lb (158.8 kg)   BMI 44.94 kg/m²   Wt Readings from Last 3 Encounters:   01/23/23 (!) 350 lb (158.8 kg)   01/19/23 (!) 350 lb (158.8 kg)   10/25/21 (!) 374 lb (169.6 kg)       PHYSICAL EXAM  CONSTITUTIONAL:   Awake, alert, cooperative  PSYCHIATRIC :  Oriented to time, place and person      normal insight to disease process  ENT:  External ears and nose without lesions    Hearing deficits is not noted  NECK: Supple, symmetrical, trachea midline    Thyroid goiter not appreciated    Carotid bruit is not noted bilaterally  LUNGS:  No increased work of breathing                  Clear to auscultation bilaterally   CARDIOVASCULAR:  regular rate and rhythm   ABDOMEN:  soft, non-distended, non-tender    Hernias is not noted   Aorta is not palpable   Lymphatics : Cervical lymphadenopathy is not noted     Femoral lymphadenopathy is not noted  SKIN:   Skin color is abnormal with with stasis pigmentation dermatitis and dermatophytosis   Texture and turgor is not normal   Induration is  noted  EXTREMITIES:   R UE Edema is not noted  L UE Edema is not noted  R LE Edema is  noted  L LE Edema is  noted  R femoral 2 L femoral 2   R dorsalis pedis 1 L dorsalis pedis 2   R posterior tibial 1 L posterior tibial 1     Assessment:     Problem List Items Addressed This Visit          High    Venous stasis ulcer of right calf with fat layer exposed without varicose veins (HCC) (Chronic)    Relevant Orders    CBC    Comprehensive Metabolic Panel       Medium    Decreased dorsalis pedis pulse    Relevant Orders    VL EDWARD BILATERAL LIMITED 1-2 LEVELS    CBC Comprehensive Metabolic Panel    Dermatophytosis    Relevant Medications    terbinafine (LAMISIL) 250 MG tablet    Other Relevant Orders    CBC    Comprehensive Metabolic Panel    Leg swelling    Relevant Orders    US LOWER EXTREMITY BILATERAL VEIN MAPPING W DVT    Lymphedema of both lower extremities    Relevant Orders    US LOWER EXTREMITY BILATERAL VEIN MAPPING W DVT    CBC    Comprehensive Metabolic Panel    Onychomycosis    Relevant Orders    CBC    Comprehensive Metabolic Panel    Tinea pedis of both feet    Relevant Medications    terbinafine (LAMISIL) 250 MG tablet    Other Relevant Orders    CBC    Comprehensive Metabolic Panel       Low    Venous stasis ulcer of left lower leg with edema of left lower leg (HCC) - Primary    Relevant Orders    Initiate Outpatient Wound Care Protocol    US LOWER EXTREMITY BILATERAL VEIN MAPPING W DVT    CBC    Comprehensive Metabolic Panel       Unprioritized    Venous stasis ulcer of left calf with fat layer exposed without varicose veins (HCC) (Chronic)    Relevant Orders    US LOWER EXTREMITY BILATERAL VEIN MAPPING W DVT    CBC    Comprehensive Metabolic Panel    Morbid obesity with BMI of 45.0-49.9, adult (HCC)    Relevant Orders    Initiate Outpatient Wound Care Protocol    Non-pressure chronic ulcer of left lower leg with fat layer exposed ([address]    Relevant Orders    CBC    Comprehensive Metabolic Panel    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 with fat layer exposed ([address]    Relevant Orders    CBC    Comprehensive Metabolic Panel     Procedure Note  Indications:  Based on my examination of this patient's wound(s)/ulcer(s) today, debridement is required to promote healing and evaluate the wound base.     Performed by: Jhony Dowell MD    Consent obtained:  Yes    Time out taken:  Yes    Pain Control: Anesthetic  Anesthetic: 4% Lidocaine Liquid Topical     Debridement:Excisional Debridement    Using curette the wound(s)/ulcer(s) was/were sharply debrided down through and including the removal of epidermis, dermis, and subcutaneous tissue. Devitalized Tissue Debrided:  fibrin, slough, and exudate    Pre Debridement Measurements:  Are located in the [place]  Documentation Flow Sheet    Wound/Ulcer #: 1    Post Debridement Measurements:  Wound/Ulcer Descriptions are Pre Debridement except measurements:    Wound 09/13/21 Leg Left;Lateral #6 venous (Active)   Number of days: 497       Wound 01/19/23 Pretibial Right;Lateral #1 (Active)   Wound Image   01/19/23 0939   Wound Etiology Venous 01/19/23 0939   Dressing Status New dressing applied;Clean;Dry; Intact 01/19/23 1042   Wound Cleansed Cleansed with saline 01/19/23 1042   Dressing/Treatment Alginate with Ag;ABD 01/19/23 1042   Wound Length (cm) 16.8 cm 01/23/23 1431   Wound Width (cm) 18.4 cm 01/23/23 1431   Wound Depth (cm) 0.3 cm 01/23/23 1431   Wound Surface Area (cm^2) 309.12 cm^2 01/23/23 1431   Change in Wound Size % (l*w) -38 01/23/23 1431   Wound Volume (cm^3) 92.736 cm^3 01/23/23 1431   Wound Healing % -38 01/23/23 1431   Post-Procedure Length (cm) 16.1 cm 01/19/23 1005   Post-Procedure Width (cm) 14.1 cm 01/19/23 1005   Post-Procedure Depth (cm) 0.4 cm 01/19/23 1005   Post-Procedure Surface Area (cm^2) 227.01 cm^2 01/19/23 1005   Post-Procedure Volume (cm^3) 90.804 cm^3 01/19/23 1005   Wound Assessment Pale granulation tissue;Fibrin 01/23/23 1431   Drainage Amount Copious 01/23/23 1431   Drainage Description Brown;Yellow; Thick 01/23/23 1431   Odor Moderate 01/23/23 1431   Colleen-wound Assessment Maceration;Fragile 01/23/23 1431   Number of days: 4       Percent of Wound/Ulcer Debrided: 30%    Total Surface Area Debrided:  60 sq cm     Estimated Blood Loss:  Minimal    Hemostasis Achieved:  by pressure    Procedural Pain:  4  / 10     Post Procedural Pain:  3 / 10     Response to treatment:  Well tolerated by patient. A culture was done.       Plan:     Pt is not currently a smoker     In my professional opinion and based off the information that is available at this time this patient has appropriate indication for HBO Therapy: No,     Treatment Note please see attached Discharge Instructions    Written patient dismissal instructions given to patient and signed by patient or POA. Discharge Instructions         Visit Discharge/Physician Orders     Discharge condition: Stable     Assessment of pain at discharge: None     Anesthetic used: Lidocaine 4%     Discharge to: Home     Left via:Private automobile     Accompanied by: accompanied by self     ECF/HHA: Home Health to evaluate and treat     Dressing Orders: Right leg wound: Cleanse with normal saline, apply alginate AG and ABD. Change daily. Treatment Orders: FOLLOW NUTRITIOUS DIET. CHOOSE FOODS HIGH IN PROTEIN -CHICKEN- FISH-AND EGGS,  CHOOSE FOODS HIGH IN VITAMIN C.   MULTIVITAMIN DAILY. Keep leg elevated as much as possible. Culture taken in clinic today 1/23/23  Antifungal cream and pill sent to pharmacy, please pickup and take as prescribed  Blood work ordered  Vascular studies ordered and scheduled        [address] [place] [address],[address] followup visit ______1 week _______________________  (Please note your next appointment above and if you are unable to keep, kindly give a 24 hour notice. Thank you.)     Physician signature:__________________________        If you experience any of the following, please call the [facility] [address] during business hours:     * Increase in Pain  * Temperature over 101  * Increase in drainage from your wound  * Drainage with a foul odor  * Bleeding  * Increase in swelling  * Need for compression bandage changes due to slippage, breakthrough drainage. If you need medical attention outside of the business hours of the [facility] [address] please contact your PCP or go to the nearest emergency room.                Electronically signed by Karen Rocha MD on 1/23/2023 at 3:29 PM

## 2023-01-23 NOTE — PLAN OF CARE
Problem: Chronic Conditions and Co-morbidities  Goal: Patient's chronic conditions and co-morbidity symptoms are monitored and maintained or improved  Outcome: Progressing     Problem: Cognitive:  Goal: Knowledge of wound care  Description: Knowledge of wound care  Outcome: Completed  Goal: Understands risk factors for wounds  Description: Understands risk factors for wounds  Outcome: Completed     Problem: Wound:  Goal: Will show signs of wound healing; wound closure and no evidence of infection  Description: Will show signs of wound healing; wound closure and no evidence of infection  Outcome: Progressing     Problem: Venous:  Goal: Signs of wound healing will improve  Description: Signs of wound healing will improve  Outcome: Progressing     Problem: Compression therapy:  Goal: Will be free from complications associated with compression therapy  Description: Will be free from complications associated with compression therapy  Outcome: Progressing

## 2023-01-23 NOTE — PROGRESS NOTES
[address]:     [facility] [id number] [address] p: [phone] f: [phone]     Ordering Center:     [address]  [place] [address]  [phone]  WOUND CARE Dept: [place] [id number] [facility] [phone]    Patient Information:      Emily Peers  [address] [place] [address] [phone]   : 1950  AGE: 67 y.o. GENDER: male   EPISODE DATE: 2023    Insurance:      PRIMARY INSURANCE:  Plan: MEDICARE PART A AND B  Coverage: MEDICARE  Effective Date: 3/1/2008  Group Number: [unfilled]  Subscriber Number: [id number] - (Medicare)    Payer/Plan Subscr  Sex Relation Sub. Ins. ID Effective Group Num   1.  [id number] 1950 Male Self [id number] 3/1/08                                    [address] [address]       Patient Wound Information:      Problem List Items Addressed This Visit          Circulatory    Decreased dorsalis pedis pulse    Relevant Orders    VL EDWARD BILATERAL LIMITED 1-2 LEVELS    CBC    Comprehensive Metabolic Panel       Other    Venous stasis ulcer of right calf with fat layer exposed without varicose veins (HCC) (Chronic)    Relevant Orders    CBC    Comprehensive Metabolic Panel    Venous stasis ulcer of left calf with fat layer exposed without varicose veins (HCC) (Chronic)    Relevant Orders    US LOWER EXTREMITY BILATERAL VEIN MAPPING W DVT    CBC    Comprehensive Metabolic Panel    Venous stasis ulcer of left lower leg with edema of left lower leg (HCC) - Primary    Relevant Orders    Initiate Outpatient Wound Care Protocol    US LOWER EXTREMITY BILATERAL VEIN MAPPING W DVT    CBC    Comprehensive Metabolic Panel    Leg swelling    Relevant Orders    US LOWER EXTREMITY BILATERAL VEIN MAPPING W DVT    Lymphedema of both lower extremities    Relevant Orders    US LOWER EXTREMITY BILATERAL VEIN MAPPING W DVT    CBC    Comprehensive Metabolic Panel Dermatophytosis    Relevant Medications    terbinafine (LAMISIL) 250 MG tablet    Other Relevant Orders    CBC    Comprehensive Metabolic Panel    Onychomycosis    Relevant Orders    CBC    Comprehensive Metabolic Panel    Tinea pedis of both feet    Relevant Medications    terbinafine (LAMISIL) 250 MG tablet    Other Relevant Orders    CBC    Comprehensive Metabolic Panel    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 with fat layer exposed ([facility]    Relevant Orders    CBC    Comprehensive Metabolic Panel    Non-pressure chronic ulcer of left lower leg with fat layer exposed ([facility]    Relevant Orders    CBC    Comprehensive Metabolic Panel    Morbid obesity with BMI of 45.0-49.9, adult [facility])    Relevant Orders    Initiate Outpatient Wound Care Protocol       WOUNDS REQUIRING DRESSING SUPPLIES:     Wound 09/13/21 Leg Left;Lateral #6 venous (Active)   Number of days: 497       Wound 01/19/23 Pretibial Right;Lateral #1 (Active)   Wound Image   01/19/23 0939   Wound Etiology Venous 01/19/23 0939   Dressing Status New dressing applied;Clean;Dry; Intact 01/19/23 1042   Wound Cleansed Cleansed with saline 01/19/23 1042   Dressing/Treatment Alginate with Ag;ABD 01/19/23 1042   Wound Length (cm) 16.8 cm 01/23/23 1431   Wound Width (cm) 18.4 cm 01/23/23 1431   Wound Depth (cm) 0.3 cm 01/23/23 1431   Wound Surface Area (cm^2) 309.12 cm^2 01/23/23 1431   Change in Wound Size % (l*w) -38 01/23/23 1431   Wound Volume (cm^3) 92.736 cm^3 01/23/23 1431   Wound Healing % -38 01/23/23 1431   Post-Procedure Length (cm) 16.1 cm 01/19/23 1005   Post-Procedure Width (cm) 14.1 cm 01/19/23 1005   Post-Procedure Depth (cm) 0.4 cm 01/19/23 1005   Post-Procedure Surface Area (cm^2) 227.01 cm^2 01/19/23 1005   Post-Procedure Volume (cm^3) 90.804 cm^3 01/19/23 1005   Wound Assessment Pale granulation tissue;Fibrin 01/23/23 1431   Drainage Amount Copious 01/23/23 1431   Drainage Description Brown;Yellow; Thick 01/23/23 1431   Odor Moderate 01/23/23 1431   Colleen-wound Assessment Maceration;Fragile 01/23/23 1431   Number of days: 4          Supplies Requested :      WOUND #: 1   PRIMARY DRESSING:  Alginate with silver pad   Cover and Secure with: 4X4 gauze pad  ABD pad  Bulky roll gauze     FREQUENCY OF DRESSING CHANGES:  Daily         ADDITIONAL ITEMS:  [] Gloves Small  [x] Gloves Medium [] Gloves Large [] Gloves XLarge  [] Tape 1\" [x] Tape 2\" [] Tape 3\"  [] Medipore Tape  [x] Saline  [] Skin Prep   [] Adhesive Remover   [] Cotton Tip Applicators   [] Other:    Patient Wound(s) Debrided: [x] Yes if yes please add date 1/23/23   [] No    Debribement Type: Excisional/Sharp    Patient currently being seen by Home Health: [] Yes   [x] No    Duration for needed supplies:  []15  []30  []60  [x]90 Days    Electronically signed by Rustam Cedillo RN on 1/23/2023 at 4:28 PM     Provider Information:      PROVIDER'S NAME: Dr. Tara Mackey    NPI: [id number]

## 2023-01-25 NOTE — DISCHARGE INSTRUCTIONS
Visit Discharge/Physician Orders     Discharge condition: Stable     Assessment of pain at discharge: None     Anesthetic used: Lidocaine 4%     Discharge to: Home     Left via:Private automobile     Accompanied by: accompanied by self     ECF/HHA: Home Health to evaluate and treat     Dressing Orders: Right leg wound: Cleanse with normal saline, apply alginate AG and ABD. Change daily. Spandigrip, on in AM off in PM     Treatment Orders: FOLLOW NUTRITIOUS DIET. CHOOSE FOODS HIGH IN PROTEIN -CHICKEN- FISH-AND EGGS,  CHOOSE FOODS HIGH IN VITAMIN C.   MULTIVITAMIN DAILY. Keep leg elevated as much as possible. Culture taken in clinic today 1/23/23--reviewed, continue taking antibiotic  Blood work- reviewed  *VASCULAR EDWARD AND DOPPLER SCHEDULED FOR 2/9/23 @10 AND 11*    Antifungal cream and pill sent to pharmacy, please pickup and take as prescribed        [facility] followup visit ______1 week _______________________  (Please note your next appointment above and if you are unable to keep, kindly give a 24 hour notice. Thank you.)     Physician signature:__________________________        If you experience any of the following, please call the [address] during business hours:     * Increase in Pain  * Temperature over 101  * Increase in drainage from your wound  * Drainage with a foul odor  * Bleeding  * Increase in swelling  * Need for compression bandage changes due to slippage, breakthrough drainage. If you need medical attention outside of the business hours of the [address] please contact your PCP or go to the nearest emergency room.

## 2023-01-26 LAB — ANAEROBIC CULTURE: NORMAL

## 2023-01-27 LAB
GRAM STAIN RESULT: ABNORMAL
ORGANISM: ABNORMAL
WOUND/ABSCESS: ABNORMAL

## 2023-01-30 ENCOUNTER — HOSPITAL ENCOUNTER (OUTPATIENT)
Dept: WOUND CARE | Age: 73
Discharge: HOME | End: 2023-01-30
Payer: MEDICARE

## 2023-01-30 VITALS
BODY MASS INDEX: 40.43 KG/M2 | WEIGHT: 315 LBS | TEMPERATURE: 97.8 F | DIASTOLIC BLOOD PRESSURE: 84 MMHG | HEART RATE: 81 BPM | RESPIRATION RATE: 20 BRPM | SYSTOLIC BLOOD PRESSURE: 154 MMHG | HEIGHT: 74 IN

## 2023-01-30 DIAGNOSIS — L97.929 VENOUS STASIS ULCER OF LEFT LOWER LEG WITH EDEMA OF LEFT LOWER LEG (HCC): Primary | ICD-10-CM

## 2023-01-30 DIAGNOSIS — I83.029 VENOUS STASIS ULCER OF LEFT LOWER LEG WITH EDEMA OF LEFT LOWER LEG (HCC): Primary | ICD-10-CM

## 2023-01-30 DIAGNOSIS — R60.9 VENOUS STASIS ULCER OF LEFT LOWER LEG WITH EDEMA OF LEFT LOWER LEG (HCC): Primary | ICD-10-CM

## 2023-01-30 DIAGNOSIS — L97.912 NON-PRESSURE CHRONIC ULCER OF RIGHT LOWER LEG WITH FAT LAYER EXPOSED (HCC): ICD-10-CM

## 2023-01-30 DIAGNOSIS — I87.2 VENOUS STASIS ULCER OF RIGHT CALF WITH FAT LAYER EXPOSED WITHOUT VARICOSE VEINS (HCC): Chronic | ICD-10-CM

## 2023-01-30 DIAGNOSIS — E66.01 MORBID OBESITY WITH BMI OF 45.0-49.9, ADULT (HCC): ICD-10-CM

## 2023-01-30 DIAGNOSIS — I83.892 VENOUS STASIS ULCER OF LEFT LOWER LEG WITH EDEMA OF LEFT LOWER LEG (HCC): Primary | ICD-10-CM

## 2023-01-30 DIAGNOSIS — L97.212 VENOUS STASIS ULCER OF RIGHT CALF WITH FAT LAYER EXPOSED WITHOUT VARICOSE VEINS (HCC): Chronic | ICD-10-CM

## 2023-01-30 PROCEDURE — 11045 DBRDMT SUBQ TISS EACH ADDL: CPT

## 2023-01-30 PROCEDURE — 11045 DBRDMT SUBQ TISS EACH ADDL: CPT | Performed by: SURGERY

## 2023-01-30 PROCEDURE — 11042 DBRDMT SUBQ TIS 1ST 20SQCM/<: CPT | Performed by: SURGERY

## 2023-01-30 PROCEDURE — 11042 DBRDMT SUBQ TIS 1ST 20SQCM/<: CPT

## 2023-01-30 RX ORDER — LIDOCAINE HYDROCHLORIDE 40 MG/ML
SOLUTION TOPICAL ONCE
OUTPATIENT
Start: 2023-01-30 | End: 2023-01-30

## 2023-01-30 RX ORDER — LIDOCAINE HYDROCHLORIDE 40 MG/ML
SOLUTION TOPICAL ONCE
Status: COMPLETED | OUTPATIENT
Start: 2023-01-30 | End: 2023-01-30

## 2023-01-30 RX ORDER — BACITRACIN ZINC AND POLYMYXIN B SULFATE 500; 1000 [USP'U]/G; [USP'U]/G
OINTMENT TOPICAL ONCE
OUTPATIENT
Start: 2023-01-30 | End: 2023-01-30

## 2023-01-30 RX ORDER — GINSENG 100 MG
CAPSULE ORAL ONCE
OUTPATIENT
Start: 2023-01-30 | End: 2023-01-30

## 2023-01-30 RX ORDER — LIDOCAINE HYDROCHLORIDE 20 MG/ML
JELLY TOPICAL ONCE
OUTPATIENT
Start: 2023-01-30 | End: 2023-01-30

## 2023-01-30 RX ORDER — LIDOCAINE 40 MG/G
CREAM TOPICAL ONCE
OUTPATIENT
Start: 2023-01-30 | End: 2023-01-30

## 2023-01-30 RX ORDER — LIDOCAINE 50 MG/G
OINTMENT TOPICAL ONCE
OUTPATIENT
Start: 2023-01-30 | End: 2023-01-30

## 2023-01-30 RX ORDER — GENTAMICIN SULFATE 1 MG/G
OINTMENT TOPICAL ONCE
OUTPATIENT
Start: 2023-01-30 | End: 2023-01-30

## 2023-01-30 RX ORDER — BACITRACIN, NEOMYCIN, POLYMYXIN B 400; 3.5; 5 [USP'U]/G; MG/G; [USP'U]/G
OINTMENT TOPICAL ONCE
OUTPATIENT
Start: 2023-01-30 | End: 2023-01-30

## 2023-01-30 RX ORDER — BETAMETHASONE DIPROPIONATE 0.05 %
OINTMENT (GRAM) TOPICAL ONCE
OUTPATIENT
Start: 2023-01-30 | End: 2023-01-30

## 2023-01-30 RX ORDER — CLOBETASOL PROPIONATE 0.5 MG/G
OINTMENT TOPICAL ONCE
OUTPATIENT
Start: 2023-01-30 | End: 2023-01-30

## 2023-01-30 RX ADMIN — LIDOCAINE HYDROCHLORIDE 10 ML: 40 SOLUTION TOPICAL at 14:55

## 2023-01-30 NOTE — PLAN OF CARE
Problem: Chronic Conditions and Co-morbidities  Goal: Patient's chronic conditions and co-morbidity symptoms are monitored and maintained or improved  Outcome: Progressing     Problem: Wound:  Goal: Will show signs of wound healing; wound closure and no evidence of infection  Description: Will show signs of wound healing; wound closure and no evidence of infection  Outcome: Progressing     Problem: Venous:  Goal: Signs of wound healing will improve  Description: Signs of wound healing will improve  Outcome: Progressing     Problem: Compression therapy:  Goal: Will be free from complications associated with compression therapy  Description: Will be free from complications associated with compression therapy  Outcome: Progressing

## 2023-01-30 NOTE — PROGRESS NOTES
W[facility]aling Center Followup Visit Note    Referring Physician : Wilfred Gaston MD  [address] [place] [address] RECORD NUMBER:  [id number]  AGE: 67 y.o. GENDER: male  : 1950  EPISODE DATE:  2023    Subjective:     Chief Complaint   Patient presents with    Wound Check     Right leg wound      HISTORY of PRESENT ILLNESS HPI   Ema Louis is a 67 y.o. male who presents today in regards to follow up evaluation and treatment of wound/ulcer. That patient's past medical, family and social hx were reviewed and changes were made if present. History of Wound Context:  The patient has had a wound of right leg which was first noted approximately several weeks ago, although patient tells me he thinks only 2 weeks but looking at the status of the wound, probably much longer duration. This has been treated by the patient during his dressing changes, recently also seen at the wound care center last week, and the patient was referred to vascular service because of past relationship . On their initial visit to the wound healing center, 23, the patient has noted that the wound has not been improving.   The patient has had similar previous wounds in the past.          Patient had longstanding history of leg swelling, skin breakdown and ulcerations, due to venous insufficiency as well as lymphedema has undergone lymphedema therapy in the past but not recently, underwent extensive vascular work-up in the past including venous ultrasound studies, arterial Doppler studies, recommended medical therapy        Pt is currently not on abx.       2023  Discussed with nursing staff, was informed, the home health care is not seeing the patient due to his home situation  Patient tells me that his neighbor across the street is doing the dressing changes  Patient was advised, when he takes a shower, to scrub the legs of all the hyperkeratotic and dead epithelium with a washcloth and then do the dressing changes and keep the legs elevated to decrease the swelling  Wounds look slightly better, patient did not  some of his prescriptions, was advised to call his pharmacy  Lab work reviewed, CBC, CMP, no major abnormal findings           Wound/Ulcer Pain Timing/Severity: constant  Quality of pain: dull, aching  Severity:  3 / 10   Modifying Factors: Pain worsens with walking, walks with help of a walker  Associated Signs/Symptoms: edema and drainage     Ulcer Identification:  Ulcer Type: venous and non-healing/non-surgical  Contributing Factors: edema, venous stasis, lymphedema, and poor hygiene     Diabetic/Pressure/Non Pressure Ulcers only:  Ulcer: N/A     If patient has diabetic lower extremity wounds  Mitchell Classification of diabetic lower extremity wounds:     Grade Description   []  0 No open wound   []  1 Superficial ulcer involving the full skin thickness   []  2 Deep ulcer involves ligament, tendon, joint capsule, or fascia  No bone involvement or abscess presence   []  3 Deep Ulcer with abcess formation and/or osteomyelitis   []  4 Localized gangrene   []  5 Extensive gangrene of the foot      Wound: Patient does have extensive onychomycosis, dermatophytosis, tinea pedis, ulceration of the right Buttock over the lateral aspect right calf with drainage     Other pertinent information:     1. Recent wound cultures revealed evidence of MSSA     2. No recent lab work was done patient recommended to have a CBC and CMP done today     3. CTA of the aorta with runoff that was done in August 2017, no evidence of left popliteal artery and ism that was suspected     4. Past ankle-brachial index revealed adequate arterial flow to both feet, in 2017     5.   Bilateral venous ultrasound studies the reflux, done in 2017 revealed no evidence of deep vein thrombosis, the right saphenofemoral junction could not be evaluated by the right mid and distal saphenous vein was competent and on the left side, left saphenofemoral junction was reported to be competent        1/23/2023     Discussed the patient, options, risks benefits and alternatives were explained to the patient, patient was recommended to see his podiatrist regarding onychomycosis of the toenails and also trimming of the toenails  Patient recommended complete work-up including CBC, CMP, vascular testing including lower extremity arterial Doppler study as well as venous ultrasound with reflux  Patient recommended topical antifungal cream, miconazole along with short-term oral Lamisil therapy and also based on the wound cultures, Keflex 500 mg p.o. 4 times daily for 10 days pending repeat wound cultures that were done today  The patient recommended, to keep the legs elevated to decrease edema also importance of nutrition, multivitamin supplementation and protein supplementation was discussed  All his questions were answered               PAST MEDICAL HISTORY      Diagnosis Date    Cellulitis of right lower leg     Chronic venous insufficiency 1/23/2023    Decreased dorsalis pedis pulse 1/23/2023    Dermatophytosis 1/23/2023    Diabetes mellitus ([address]     Leg swelling 1/23/2023    Lymphedema     Lymphedema of both lower extremities 1/23/2023    Obesity, Class III, BMI 40-49.9 (morbid obesity) ([address]     Onychomycosis 1/23/2023    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    Tinea pedis of both feet 1/23/2023     Past Surgical History:   Procedure Laterality Date    FRACTURE SURGERY  2005    right ankle repair     Family History   Problem Relation Age of Onset    Heart Disease Mother     Heart Disease Father      Social History     Tobacco Use    Smoking status: Never    Smokeless tobacco: Never   Vaping Use    Vaping Use: Never used   Substance Use Topics    Alcohol use: No     Comment: on special occassions    Drug use: No     Allergies   Allergen Reactions    Metformin And Related Diarrhea     Current Outpatient Medications on File Prior to Encounter   Medication Sig Dispense Refill    miconazole nitrate 2 % OINT Apply topically 2 times daily Please apply them from the toes, in between the toes, both feet and both calfs up to the knee twice a day for 1 month 1 each 10    terbinafine (LAMISIL) 250 MG tablet Take 1 tablet by mouth daily for 20 days 20 tablet 0    cephALEXin (KEFLEX) 500 MG capsule Take 1 capsule by mouth 4 times daily for 10 days 40 capsule 0    aspirin (ASPIRIN CHILDRENS) 81 MG chewable tablet Take 1 tablet by mouth daily 30 tablet 5     No current facility-administered medications on file prior to encounter.       REVIEW OF SYSTEMS See HPI    Objective:    BP (!) 154/84   Pulse 81   Temp 97.8 °F (36.6 °C) (Temporal)   Resp 20   Ht 6' 2\" (1.88 m)   Wt (!) 350 lb (158.8 kg)   BMI 44.94 kg/m²   Wt Readings from Last 3 Encounters:   01/30/23 (!) 350 lb (158.8 kg)   01/23/23 (!) 350 lb (158.8 kg)   01/19/23 (!) 350 lb (158.8 kg)     PHYSICAL EXAM  CONSTITUTIONAL:   Awake, alert, cooperative   EYES:  lids and lashes normal   ENT: external ears and nose without lesions   NECK:  supple, symmetrical, trachea midline   SKIN:  Open wound Present    Assessment:     Problem List Items Addressed This Visit          High    Venous stasis ulcer of right calf with fat layer exposed without varicose veins (HCC) (Chronic)    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 with fat layer exposed (HCC)       Low    Venous stasis ulcer of left lower leg with edema of left lower leg (HCC) - Primary    Relevant Orders    Initiate Outpatient Wound Care Protocol       Unprioritized    Morbid obesity with BMI of 45.0-49.9, adult (HCC)    Relevant Orders    Initiate Outpatient Wound Care Protocol       Pre Debridement Measurements:  Are located in the Wound/Ulcer Documentation Flow Sheet  Post Debridement Measurements:  Wound/Ulcer Descriptions are Pre Debridement except measurements:    Wound 09/13/21 Leg Left;Lateral #6 venous (Active)   Number of days: 504      Wound 01/19/23 Pretibial Right;Lateral #1 (Active)   Wound Image   01/19/23 0939   Wound Etiology Venous 01/19/23 0939   Dressing Status New dressing applied;Clean;Dry; Intact 01/19/23 1042   Wound Cleansed Cleansed with saline 01/19/23 1042   Dressing/Treatment Alginate with Ag;ABD 01/19/23 1042   Wound Length (cm) 15 cm 01/30/23 1449   Wound Width (cm) 15.5 cm 01/30/23 1449   Wound Depth (cm) 0.1 cm 01/30/23 1449   Wound Surface Area (cm^2) 232.5 cm^2 01/30/23 1449   Change in Wound Size % (l*w) -3.79 01/30/23 1449   Wound Volume (cm^3) 23.25 cm^3 01/30/23 1449   Wound Healing % 65 01/30/23 1449   Post-Procedure Length (cm) 15.1 cm 01/30/23 1513   Post-Procedure Width (cm) 15.6 cm 01/30/23 1513   Post-Procedure Depth (cm) 0.2 cm 01/30/23 1513   Post-Procedure Surface Area (cm^2) 235.56 cm^2 01/30/23 1513   Post-Procedure Volume (cm^3) 47.112 cm^3 01/30/23 1513   Wound Assessment Pale granulation tissue 01/30/23 1449   Drainage Amount Moderate 01/30/23 1449   Drainage Description Yellow 01/30/23 1449   Odor Mild 01/30/23 1449   Colleen-wound Assessment Maceration;Fragile 01/30/23 1449   Number of days: 11          Procedure Note  Indications:  Based on my examination of this patient's wound(s)/ulcer(s) today, debridement is required to promote healing and evaluate the wound base. Performed by: Robert Dubon MD    Consent obtained:  Yes    Time out taken:  Yes    Pain Control: Anesthetic  Anesthetic: 4% Lidocaine Liquid Topical     Debridement:Excisional Debridement    Using curette the wound(s)/ulcer(s) was/were sharply debrided down through and including the removal of epidermis, dermis, and subcutaneous tissue.         Devitalized Tissue Debrided:  fibrin and slough to stimulate bleeding to promote healing, post debridement good bleeding base and wound edges noted    Wound/Ulcer #: 1    Percent of Wound/Ulcer Debrided: 40%    Total Surface Area Debrided:  60 sq cm     Estimated Blood Loss: Minimal  Hemostasis Achieved:  by pressure    Procedural Pain:  4  / 10   Post Procedural Pain:  3 / 10     Response to treatment:  Well tolerated by patient. Plan:   Treatment Note please see attached Discharge Instructions    Written patient dismissal instructions given to patient and signed by patient or POA. Discharge Instructions         Visit Discharge/Physician Orders     Discharge condition: Stable     Assessment of pain at discharge: None     Anesthetic used: Lidocaine 4%     Discharge to: Home     Left via:Private automobile     Accompanied by: accompanied by self     ECF/HHA: Home Health to evaluate and treat     Dressing Orders: Right leg wound: Cleanse with normal saline, apply alginate AG and ABD. Change daily. Treatment Orders: FOLLOW NUTRITIOUS DIET. CHOOSE FOODS HIGH IN PROTEIN -CHICKEN- FISH-AND EGGS,  CHOOSE FOODS HIGH IN VITAMIN C.   MULTIVITAMIN DAILY. Keep leg elevated as much as possible. Culture taken in clinic today 1/23/23  Blood work- reviewed  *VASCULAR [facility]WARD AND DOPPLER SCHEDULED FOR 2/9/23 @10 AND 11*    Antifungal cream and pill sent to pharmacy, please pickup and take as prescribed        [facility],[address] followup visit ______1 week _______________________  (Please note your next appointment above and if you are unable to keep, kindly give a 24 hour notice. Thank you.)     Physician signature:__________________________        If you experience any of the following, please call the "[facility] during business hours:     * Increase in Pain  * Temperature over 101  * Increase in drainage from your wound  * Drainage with a foul odor  * Bleeding  * Increase in swelling  * Need for compression bandage changes due to slippage, breakthrough drainage. If you need medical attention outside of the business hours of the [facility] please contact your PCP or go to the nearest emergency room.                Electronically signed by Renita Townsend MD on 1/30/2023 at 3:15 PM

## 2023-02-03 NOTE — DISCHARGE INSTRUCTIONS
Visit Discharge/Physician Orders     Discharge condition: Stable     Assessment of pain at discharge: None     Anesthetic used: Lidocaine 4%     Discharge to: Home     Left via:Private automobile     Accompanied by: accompanied by self     ECF/HHA: Home Health to evaluate and treat     Dressing Orders: Right leg wound: Cleanse with normal saline, apply alginate AG and ABD. Apply profore wrap. Keep wrap dry at all times. If wrap becomes wet or falls 2 inches call [address] [place] [address],[address] ([phone])and may remove wrap and apply double tubigrip. Treatment Orders: FOLLOW NUTRITIOUS DIET. CHOOSE FOODS HIGH IN PROTEIN -CHICKEN- FISH-AND EGGS,  CHOOSE FOODS HIGH IN VITAMIN C.   MULTIVITAMIN DAILY. Keep leg elevated as much as possible. Culture taken in clinic 1/23/23- reviewed, take antibiotics as prescribed  Blood work- reviewed  Continue using antifungal pill and cream as prescribed    *VASCULAR EDWARD AND DOPPLER SCHEDULED FOR 2/9/23 @10 AND 11*        [address] [place] [address],[address] followup visit ______1 week _______________________  (Please note your next appointment above and if you are unable to keep, kindly give a 24 hour notice. Thank you.)     Physician signature:__________________________        If you experience any of the following, please call the [facility] during business hours:     * Increase in Pain  * Temperature over 101  * Increase in drainage from your wound  * Drainage with a foul odor  * Bleeding  * Increase in swelling  * Need for compression bandage changes due to slippage, breakthrough drainage. If you need medical attention outside of the business hours of the [facility] please contact your PCP or go to the nearest emergency room.

## 2023-02-06 ENCOUNTER — HOSPITAL ENCOUNTER (OUTPATIENT)
Dept: WOUND CARE | Age: 73
Discharge: HOME | End: 2023-02-06
Payer: MEDICARE

## 2023-02-06 VITALS
BODY MASS INDEX: 40.43 KG/M2 | HEART RATE: 96 BPM | DIASTOLIC BLOOD PRESSURE: 72 MMHG | RESPIRATION RATE: 18 BRPM | WEIGHT: 315 LBS | TEMPERATURE: 97.9 F | SYSTOLIC BLOOD PRESSURE: 130 MMHG | HEIGHT: 74 IN

## 2023-02-06 DIAGNOSIS — I83.892 VENOUS STASIS ULCER OF LEFT LOWER LEG WITH EDEMA OF LEFT LOWER LEG (HCC): Primary | ICD-10-CM

## 2023-02-06 DIAGNOSIS — I83.029 VENOUS STASIS ULCER OF LEFT LOWER LEG WITH EDEMA OF LEFT LOWER LEG (HCC): Primary | ICD-10-CM

## 2023-02-06 DIAGNOSIS — I87.2 VENOUS STASIS ULCER OF RIGHT CALF WITH FAT LAYER EXPOSED WITHOUT VARICOSE VEINS (HCC): Chronic | ICD-10-CM

## 2023-02-06 DIAGNOSIS — L97.222 VENOUS STASIS ULCER OF LEFT CALF WITH FAT LAYER EXPOSED WITHOUT VARICOSE VEINS (HCC): Chronic | ICD-10-CM

## 2023-02-06 DIAGNOSIS — E66.01 MORBID OBESITY WITH BMI OF 45.0-49.9, ADULT (HCC): ICD-10-CM

## 2023-02-06 DIAGNOSIS — I87.2 VENOUS STASIS ULCER OF LEFT CALF WITH FAT LAYER EXPOSED WITHOUT VARICOSE VEINS (HCC): Chronic | ICD-10-CM

## 2023-02-06 DIAGNOSIS — L97.929 VENOUS STASIS ULCER OF LEFT LOWER LEG WITH EDEMA OF LEFT LOWER LEG (HCC): Primary | ICD-10-CM

## 2023-02-06 DIAGNOSIS — L97.212 VENOUS STASIS ULCER OF RIGHT CALF WITH FAT LAYER EXPOSED WITHOUT VARICOSE VEINS (HCC): Chronic | ICD-10-CM

## 2023-02-06 DIAGNOSIS — L97.912 NON-PRESSURE CHRONIC ULCER OF RIGHT LOWER LEG WITH FAT LAYER EXPOSED (HCC): ICD-10-CM

## 2023-02-06 DIAGNOSIS — R60.9 VENOUS STASIS ULCER OF LEFT LOWER LEG WITH EDEMA OF LEFT LOWER LEG (HCC): Primary | ICD-10-CM

## 2023-02-06 PROCEDURE — 11045 DBRDMT SUBQ TISS EACH ADDL: CPT

## 2023-02-06 PROCEDURE — 11042 DBRDMT SUBQ TIS 1ST 20SQCM/<: CPT | Performed by: SURGERY

## 2023-02-06 PROCEDURE — 11042 DBRDMT SUBQ TIS 1ST 20SQCM/<: CPT

## 2023-02-06 PROCEDURE — 11045 DBRDMT SUBQ TISS EACH ADDL: CPT | Performed by: SURGERY

## 2023-02-06 RX ORDER — GINSENG 100 MG
CAPSULE ORAL ONCE
OUTPATIENT
Start: 2023-02-06 | End: 2023-02-06

## 2023-02-06 RX ORDER — LIDOCAINE HYDROCHLORIDE 20 MG/ML
JELLY TOPICAL ONCE
OUTPATIENT
Start: 2023-02-06 | End: 2023-02-06

## 2023-02-06 RX ORDER — LIDOCAINE 50 MG/G
OINTMENT TOPICAL ONCE
OUTPATIENT
Start: 2023-02-06 | End: 2023-02-06

## 2023-02-06 RX ORDER — LIDOCAINE HYDROCHLORIDE 40 MG/ML
SOLUTION TOPICAL ONCE
OUTPATIENT
Start: 2023-02-06 | End: 2023-02-06

## 2023-02-06 RX ORDER — LIDOCAINE 40 MG/G
CREAM TOPICAL ONCE
OUTPATIENT
Start: 2023-02-06 | End: 2023-02-06

## 2023-02-06 RX ORDER — BACITRACIN, NEOMYCIN, POLYMYXIN B 400; 3.5; 5 [USP'U]/G; MG/G; [USP'U]/G
OINTMENT TOPICAL ONCE
OUTPATIENT
Start: 2023-02-06 | End: 2023-02-06

## 2023-02-06 RX ORDER — GENTAMICIN SULFATE 1 MG/G
OINTMENT TOPICAL ONCE
OUTPATIENT
Start: 2023-02-06 | End: 2023-02-06

## 2023-02-06 RX ORDER — CLOBETASOL PROPIONATE 0.5 MG/G
OINTMENT TOPICAL ONCE
OUTPATIENT
Start: 2023-02-06 | End: 2023-02-06

## 2023-02-06 RX ORDER — BETAMETHASONE DIPROPIONATE 0.05 %
OINTMENT (GRAM) TOPICAL ONCE
OUTPATIENT
Start: 2023-02-06 | End: 2023-02-06

## 2023-02-06 RX ORDER — LIDOCAINE HYDROCHLORIDE 40 MG/ML
SOLUTION TOPICAL ONCE
Status: COMPLETED | OUTPATIENT
Start: 2023-02-06 | End: 2023-02-06

## 2023-02-06 RX ORDER — BACITRACIN ZINC AND POLYMYXIN B SULFATE 500; 1000 [USP'U]/G; [USP'U]/G
OINTMENT TOPICAL ONCE
OUTPATIENT
Start: 2023-02-06 | End: 2023-02-06

## 2023-02-06 RX ADMIN — LIDOCAINE HYDROCHLORIDE 10 ML: 40 SOLUTION TOPICAL at 15:11

## 2023-02-06 NOTE — PROGRESS NOTES
[facility]aling Center Followup Visit Note    Referring Physician : Talha Mayer MD  [address] [place] [address] [place] RECORD NUMBER:  [id number]  AGE: 67 y.o. GENDER: male  : 1950  EPISODE DATE:  2023    Subjective:     Chief Complaint   Patient presents with    Wound Check     Right leg      HISTORY of PRESENT ILLNESS HPI   Anika Segovia is a 67 y.o. male who presents today in regards to follow up evaluation and treatment of wound/ulcer. That patient's past medical, family and social hx were reviewed and changes were made if present. History of Wound Context:  The patient has had a wound of right leg which was first noted approximately several weeks ago, although patient tells me he thinks only 2 weeks but looking at the status of the wound, probably much longer duration. This has been treated by the patient during his dressing changes, recently also seen at the wound care center last week, and the patient was referred to vascular service because of past relationship . On their initial visit to the wound healing center, 23, the patient has noted that the wound has not been improving.   The patient has had similar previous wounds in the past.          Patient had longstanding history of leg swelling, skin breakdown and ulcerations, due to venous insufficiency as well as lymphedema has undergone lymphedema therapy in the past but not recently, underwent extensive vascular work-up in the past including venous ultrasound studies, arterial Doppler studies, recommended medical therapy        Pt is currently not on abx.       2023  Discussed with nursing staff, was informed, the home health care is not seeing the patient due to his home situation  Patient tells me that his neighbor across the street is doing the dressing changes  Patient was advised, when he takes a shower, to scrub the legs of all the hyperkeratotic and dead epithelium with a washcloth and then do the dressing changes and keep the legs elevated to decrease the swelling  Wounds look slightly better, patient did not  some of his prescriptions, was advised to call his pharmacy  Lab work reviewed, CBC, CMP, no major abnormal findings    2/6/2023  Right leg wound looks better  Discussed with patient and the nursing staff regarding referral of the patient to podiatry service for onychomycotic toenails  Inform the patient, that we will make additional recommendation once the vascular lab testing is completed       Wound/Ulcer Pain Timing/Severity: constant  Quality of pain: dull, aching  Severity:  3 / 10   Modifying Factors: Pain worsens with walking, walks with help of a walker  Associated Signs/Symptoms: edema and drainage     Ulcer Identification:  Ulcer Type: venous and non-healing/non-surgical  Contributing Factors: edema, venous stasis, lymphedema, and poor hygiene     Diabetic/Pressure/Non Pressure Ulcers only:  Ulcer: N/A     If patient has diabetic lower extremity wounds  Mitchell Classification of diabetic lower extremity wounds:     Grade Description   []  0 No open wound   []  1 Superficial ulcer involving the full skin thickness   []  2 Deep ulcer involves ligament, tendon, joint capsule, or fascia  No bone involvement or abscess presence   []  3 Deep Ulcer with abcess formation and/or osteomyelitis   []  4 Localized gangrene   []  5 Extensive gangrene of the foot      Wound: Patient does have extensive onychomycosis, dermatophytosis, tinea pedis, ulceration of the right Buttock over the lateral aspect right calf with drainage     Other pertinent information:     1. Recent wound cultures revealed evidence of MSSA     2. No recent lab work was done patient recommended to have a CBC and CMP done today     3. CTA of the aorta with runoff that was done in August 2017, no evidence of left popliteal artery occlusive disease that was suspected     4.   Past ankle-brachial index revealed adequate arterial flow to both feet, in 2017     5.   Bilateral venous ultrasound studies the reflux, done in 2017 revealed no evidence of deep vein thrombosis, the right saphenofemoral junction could not be evaluated by the right mid and distal saphenous vein was competent and on the left side, left saphenofemoral junction was reported to be competent        1/23/2023     Discussed the patient, options, risks benefits and alternatives were explained to the patient, patient was recommended to see his podiatrist regarding onychomycosis of the toenails and also trimming of the toenails  Patient recommended complete work-up including CBC, CMP, vascular testing including lower extremity arterial Doppler study as well as venous ultrasound with reflux  Patient recommended topical antifungal cream, miconazole along with short-term oral Lamisil therapy and also based on the wound cultures, Keflex 500 mg p.o. 4 times daily for 10 days pending repeat wound cultures that were done today  The patient recommended, to keep the legs elevated to decrease edema also importance of nutrition, multivitamin supplementation and protein supplementation was discussed  All his questions were answered               PAST MEDICAL HISTORY      Diagnosis Date    Cellulitis of right lower leg     Chronic venous insufficiency 1/23/2023    Decreased dorsalis pedis pulse 1/23/2023    Dermatophytosis 1/23/2023    Diabetes mellitus ([address]     Leg swelling 1/23/2023    Lymphedema     Lymphedema of both lower extremities 1/23/2023    Obesity, Class III, BMI 40-49.9 (morbid obesity) ([address]     Onychomycosis 1/23/2023    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    Tinea pedis of both feet 1/23/2023     Past Surgical History:   Procedure Laterality Date    FRACTURE SURGERY  2005    right ankle repair     Family History   Problem Relation Age of Onset    Heart Disease Mother     Heart Disease Father      Social History     Tobacco Use    Smoking status: Never    Smokeless tobacco: Never   Vaping Use    Vaping Use: Never used   Substance Use Topics    Alcohol use: No     Comment: on special occassions    Drug use: No     Allergies   Allergen Reactions    Metformin And Related Diarrhea     Current Outpatient Medications on File Prior to Encounter   Medication Sig Dispense Refill    miconazole nitrate 2 % OINT Apply topically 2 times daily Please apply them from the toes, in between the toes, both feet and both calfs up to the knee twice a day for 1 month 1 each 10    terbinafine (LAMISIL) 250 MG tablet Take 1 tablet by mouth daily for 20 days 20 tablet 0    aspirin (ASPIRIN CHILDRENS) 81 MG chewable tablet Take 1 tablet by mouth daily 30 tablet 5     No current facility-administered medications on file prior to encounter.        REVIEW OF SYSTEMS See HPI    Objective:    /72   Pulse 96   Temp 97.9 °F (36.6 °C) (Temporal)   Resp 18   Ht 6' 2\" (1.88 m)   Wt (!) 350 lb (158.8 kg)   BMI 44.94 kg/m²   Wt Readings from Last 3 Encounters:   02/06/23 (!) 350 lb (158.8 kg)   01/30/23 (!) 350 lb (158.8 kg)   01/23/23 (!) 350 lb (158.8 kg)     PHYSICAL EXAM  CONSTITUTIONAL:   Awake, alert, cooperative   EYES:  lids and lashes normal   ENT: external ears and nose without lesions   NECK:  supple, symmetrical, trachea midline   SKIN:  Open wound Present    Assessment:     Problem List Items Addressed This Visit          High    Venous stasis ulcer of right calf with fat layer exposed without varicose veins (HCC) (Chronic)    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 with fat layer exposed ([address]       Low    Venous stasis ulcer of left lower leg with edema of left lower leg (HCC) - Primary    Relevant Orders    Initiate Outpatient Wound Care Protocol       Unprioritized    Morbid obesity with BMI of 45.0-49.9, adult [facility])    Relevant Orders    Initiate Outpatient Wound Care Protocol       Pre Debridement Measurements:  Are located in the [facility]  Documentation Flow Sheet  Post Debridement Measurements:  Wound/Ulcer Descriptions are Pre Debridement except measurements:    Wound 09/13/21 Leg Left;Lateral #6 venous (Active)   Number of days: 511       Wound 01/19/23 Pretibial Right;Lateral #1 (Active)   Wound Image   01/19/23 0939   Wound Etiology Venous 01/19/23 0939   Dressing Status New dressing applied;Clean;Dry; Intact 01/19/23 1042   Wound Cleansed Cleansed with saline 01/19/23 1042   Dressing/Treatment Alginate with Ag;ABD 01/19/23 1042   Wound Length (cm) 12.2 cm 02/06/23 1507   Wound Width (cm) 10.2 cm 02/06/23 1507   Wound Depth (cm) 0.1 cm 02/06/23 1507   Wound Surface Area (cm^2) 124.44 cm^2 02/06/23 1507   Change in Wound Size % (l*w) 44.45 02/06/23 1507   Wound Volume (cm^3) 12.444 cm^3 02/06/23 1507   Wound Healing % 81 02/06/23 1507   Post-Procedure Length (cm) 15.1 cm 01/30/23 1513   Post-Procedure Width (cm) 15.6 cm 01/30/23 1513   Post-Procedure Depth (cm) 0.2 cm 01/30/23 1513   Post-Procedure Surface Area (cm^2) 235.56 cm^2 01/30/23 1513   Post-Procedure Volume (cm^3) 47.112 cm^3 01/30/23 1513   Wound Assessment Pale granulation tissue;Fibrin 02/06/23 1507   Drainage Amount Moderate 02/06/23 1507   Drainage Description Yellow 02/06/23 1507   Odor None 02/06/23 1507   Colleen-wound Assessment Maceration;Fragile 02/06/23 1507   Number of days: 18          Procedure Note  Indications:  Based on my examination of this patient's wound(s)/ulcer(s) today, debridement is required to promote healing and evaluate the wound base. Performed by: Jasper Cherry MD    Consent obtained:  Yes    Time out taken:  Yes    Pain Control: Anesthetic  Anesthetic: 4% Lidocaine Liquid Topical     Debridement:Excisional Debridement    Using curette the wound(s)/ulcer(s) was/were sharply debrided down through and including the removal of epidermis, dermis, and subcutaneous tissue.         Devitalized Tissue Debrided:  fibrin and slough to stimulate bleeding to promote healing, post debridement good bleeding base and wound edges noted    Wound/Ulcer #: 1    Percent of Wound/Ulcer Debrided: 40%    Total Surface Area Debrided:  30 sq cm     Estimated Blood Loss:  Minimal  Hemostasis Achieved:  by pressure    Procedural Pain:  4  / 10   Post Procedural Pain:  3 / 10     Response to treatment:  Well tolerated by patient. Plan:   Treatment Note please see attached Discharge Instructions    Written patient dismissal instructions given to patient and signed by patient or POA. Discharge Instructions         Visit Discharge/Physician Orders     Discharge condition: Stable     Assessment of pain at discharge: None     Anesthetic used: Lidocaine 4%     Discharge to: Home     Left via:Private automobile     Accompanied by: accompanied by self     ECF/HHA: Home Health to evaluate and treat     Dressing Orders: Right leg wound: Cleanse with normal saline, apply alginate AG and ABD. Apply profore wrap. Keep wrap dry at all times. If wrap becomes wet or falls 2 inches call [address] [place] [address],[address] ([phone])and may remove wrap and apply double tubigrip. Treatment Orders: FOLLOW NUTRITIOUS DIET. CHOOSE FOODS HIGH IN PROTEIN -CHICKEN- FISH-AND EGGS,  CHOOSE FOODS HIGH IN VITAMIN C.   MULTIVITAMIN DAILY. Keep leg elevated as much as possible. Culture taken in clinic 1/23/23- reviewed, take antibiotics as prescribed  Blood work- reviewed  Continue using antifungal pill and cream as prescribed    *VASCULAR EDWARD AND DOPPLER SCHEDULED FOR 2/9/23 @10 AND 11*        [address] [place] [address],[address] followup visit ______1 week _______________________  (Please note your next appointment above and if you are unable to keep, kindly give a 24 hour notice.  Thank you.)     Physician signature:__________________________        If you experience any of the following, please call the [address] during business hours:     * Increase in Pain  * Temperature over 101  * Increase in drainage from your wound  * Drainage with a foul odor  * Bleeding  * Increase in swelling  * Need for compression bandage changes due to slippage, breakthrough drainage. If you need medical attention outside of the business hours of the [address] [place] [address] Road please contact your PCP or go to the nearest emergency room.        Electronically signed by Maximilian Peterson MD on 2/6/2023 at 4:01 PM

## 2023-02-09 ENCOUNTER — HOSPITAL ENCOUNTER (OUTPATIENT)
Dept: ULTRASOUND IMAGING | Age: 73
Discharge: HOME | End: 2023-02-11
Payer: MEDICARE

## 2023-02-09 ENCOUNTER — HOSPITAL ENCOUNTER (OUTPATIENT)
Dept: INTERVENTIONAL RADIOLOGY/VASCULAR | Age: 73
Discharge: HOME | End: 2023-02-11
Payer: MEDICARE

## 2023-02-09 DIAGNOSIS — I87.2 VENOUS STASIS ULCER OF LEFT CALF WITH FAT LAYER EXPOSED WITHOUT VARICOSE VEINS (HCC): ICD-10-CM

## 2023-02-09 DIAGNOSIS — I89.0 LYMPHEDEMA OF BOTH LOWER EXTREMITIES: ICD-10-CM

## 2023-02-09 DIAGNOSIS — M79.89 LEG SWELLING: ICD-10-CM

## 2023-02-09 DIAGNOSIS — L97.222 VENOUS STASIS ULCER OF LEFT CALF WITH FAT LAYER EXPOSED WITHOUT VARICOSE VEINS (HCC): ICD-10-CM

## 2023-02-09 DIAGNOSIS — I83.892 VENOUS STASIS ULCER OF LEFT LOWER LEG WITH EDEMA OF LEFT LOWER LEG (HCC): ICD-10-CM

## 2023-02-09 DIAGNOSIS — R09.89 DECREASED DORSALIS PEDIS PULSE: ICD-10-CM

## 2023-02-09 DIAGNOSIS — R60.9 VENOUS STASIS ULCER OF LEFT LOWER LEG WITH EDEMA OF LEFT LOWER LEG (HCC): ICD-10-CM

## 2023-02-09 DIAGNOSIS — I83.029 VENOUS STASIS ULCER OF LEFT LOWER LEG WITH EDEMA OF LEFT LOWER LEG (HCC): ICD-10-CM

## 2023-02-09 DIAGNOSIS — L97.929 VENOUS STASIS ULCER OF LEFT LOWER LEG WITH EDEMA OF LEFT LOWER LEG (HCC): ICD-10-CM

## 2023-02-09 PROCEDURE — 93970 EXTREMITY STUDY: CPT | Performed by: RADIOLOGY

## 2023-02-09 PROCEDURE — 93970 EXTREMITY STUDY: CPT

## 2023-02-09 PROCEDURE — 93922 UPR/L XTREMITY ART 2 LEVELS: CPT

## 2023-02-10 ENCOUNTER — CLINICAL DOCUMENTATION (OUTPATIENT)
Dept: VASCULAR SURGERY | Age: 73
End: 2023-02-10

## 2023-02-10 NOTE — DISCHARGE INSTRUCTIONS
Visit Discharge/Physician Orders     Discharge condition: Stable     Assessment of pain at discharge: None     Anesthetic used: Lidocaine 4%     Discharge to: Home     Left via:Private automobile     Accompanied by: accompanied by self     ECF/HHA: Home Health to evaluate and treat     Dressing Orders: Right leg wound: Cleanse with normal saline, apply alginate AG and ABD. Apply profore wrap. Keep wrap dry at all times. If wrap becomes wet or falls 2 inches call [facility] ([phone])and may remove wrap and apply double tubigrip. Treatment Orders: FOLLOW NUTRITIOUS DIET. CHOOSE FOODS HIGH IN PROTEIN -CHICKEN- FISH-AND EGGS,  CHOOSE FOODS HIGH IN VITAMIN C.   MULTIVITAMIN DAILY. Keep leg elevated as much as possible. Culture taken in clinic 1/23/23- reviewed, take antibiotics as prescribed  Blood work- reviewed  Continue using antifungal pill and cream as prescribed    Vasculars      [facility] followup visit ______1 week _______________________  (Please note your next appointment above and if you are unable to keep, kindly give a 24 hour notice. Thank you.)     Physician signature:__________________________        If you experience any of the following, please call the [address] [place] [address] during business hours:     * Increase in Pain  * Temperature over 101  * Increase in drainage from your wound  * Drainage with a foul odor  * Bleeding  * Increase in swelling  * Need for compression bandage changes due to slippage, breakthrough drainage. If you need medical attention outside of the business hours of the [address] [place] [address] Road please contact your PCP or go to the nearest emergency room.

## 2023-02-10 NOTE — PROGRESS NOTES
The vascular lab testings were reviewed    1. Ankle-brachial index, normal with good arterial flow    2.   Bilateral venous ultrasound studies, no DVT, and no evidence of any significant reflux at the saphenofemoral junction noted on the worksheet, the report revealed only left side

## 2023-02-13 ENCOUNTER — HOSPITAL ENCOUNTER (OUTPATIENT)
Dept: WOUND CARE | Age: 73
Discharge: HOME | End: 2023-02-13
Payer: MEDICARE

## 2023-02-13 VITALS
HEART RATE: 76 BPM | RESPIRATION RATE: 18 BRPM | TEMPERATURE: 98.3 F | DIASTOLIC BLOOD PRESSURE: 70 MMHG | HEIGHT: 74 IN | BODY MASS INDEX: 40.43 KG/M2 | SYSTOLIC BLOOD PRESSURE: 124 MMHG | WEIGHT: 315 LBS

## 2023-02-13 DIAGNOSIS — I83.029 VENOUS STASIS ULCER OF LEFT LOWER LEG WITH EDEMA OF LEFT LOWER LEG (HCC): Primary | ICD-10-CM

## 2023-02-13 DIAGNOSIS — L97.929 VENOUS STASIS ULCER OF LEFT LOWER LEG WITH EDEMA OF LEFT LOWER LEG (HCC): Primary | ICD-10-CM

## 2023-02-13 DIAGNOSIS — R60.9 VENOUS STASIS ULCER OF LEFT LOWER LEG WITH EDEMA OF LEFT LOWER LEG (HCC): Primary | ICD-10-CM

## 2023-02-13 DIAGNOSIS — E66.01 MORBID OBESITY WITH BMI OF 45.0-49.9, ADULT (HCC): ICD-10-CM

## 2023-02-13 DIAGNOSIS — I83.892 VENOUS STASIS ULCER OF LEFT LOWER LEG WITH EDEMA OF LEFT LOWER LEG (HCC): Primary | ICD-10-CM

## 2023-02-13 PROCEDURE — 11042 DBRDMT SUBQ TIS 1ST 20SQCM/<: CPT | Performed by: SURGERY

## 2023-02-13 PROCEDURE — 11042 DBRDMT SUBQ TIS 1ST 20SQCM/<: CPT

## 2023-02-13 RX ORDER — LIDOCAINE HYDROCHLORIDE 20 MG/ML
JELLY TOPICAL ONCE
OUTPATIENT
Start: 2023-02-13 | End: 2023-02-13

## 2023-02-13 RX ORDER — LIDOCAINE 50 MG/G
OINTMENT TOPICAL ONCE
OUTPATIENT
Start: 2023-02-13 | End: 2023-02-13

## 2023-02-13 RX ORDER — GENTAMICIN SULFATE 1 MG/G
OINTMENT TOPICAL ONCE
OUTPATIENT
Start: 2023-02-13 | End: 2023-02-13

## 2023-02-13 RX ORDER — LIDOCAINE HYDROCHLORIDE 40 MG/ML
SOLUTION TOPICAL ONCE
OUTPATIENT
Start: 2023-02-13 | End: 2023-02-13

## 2023-02-13 RX ORDER — LIDOCAINE HYDROCHLORIDE 40 MG/ML
SOLUTION TOPICAL ONCE
Status: COMPLETED | OUTPATIENT
Start: 2023-02-13 | End: 2023-02-13

## 2023-02-13 RX ORDER — LIDOCAINE 40 MG/G
CREAM TOPICAL ONCE
OUTPATIENT
Start: 2023-02-13 | End: 2023-02-13

## 2023-02-13 RX ORDER — BACITRACIN ZINC AND POLYMYXIN B SULFATE 500; 1000 [USP'U]/G; [USP'U]/G
OINTMENT TOPICAL ONCE
OUTPATIENT
Start: 2023-02-13 | End: 2023-02-13

## 2023-02-13 RX ORDER — BACITRACIN, NEOMYCIN, POLYMYXIN B 400; 3.5; 5 [USP'U]/G; MG/G; [USP'U]/G
OINTMENT TOPICAL ONCE
OUTPATIENT
Start: 2023-02-13 | End: 2023-02-13

## 2023-02-13 RX ORDER — GINSENG 100 MG
CAPSULE ORAL ONCE
OUTPATIENT
Start: 2023-02-13 | End: 2023-02-13

## 2023-02-13 RX ORDER — CLOBETASOL PROPIONATE 0.5 MG/G
OINTMENT TOPICAL ONCE
OUTPATIENT
Start: 2023-02-13 | End: 2023-02-13

## 2023-02-13 RX ORDER — BETAMETHASONE DIPROPIONATE 0.05 %
OINTMENT (GRAM) TOPICAL ONCE
OUTPATIENT
Start: 2023-02-13 | End: 2023-02-13

## 2023-02-13 RX ADMIN — LIDOCAINE HYDROCHLORIDE 10 ML: 40 SOLUTION TOPICAL at 14:37

## 2023-02-13 NOTE — PROGRESS NOTES
Wound Healing Center Followup Visit Note    Referring Physician : Jennie Garcia MD  [address] [place] [address] RECORD NUMBER:  [id number]  AGE: 67 y.o. GENDER: male  : 1950  EPISODE DATE:  2023    Subjective:     Chief Complaint   Patient presents with    Wound Check     Right pretib      HISTORY of PRESENT ILLNESS HPI   Batool Simpson is a 67 y.o. male who presents today in regards to follow up evaluation and treatment of wound/ulcer. That patient's past medical, family and social hx were reviewed and changes were made if present. History of Wound Context:  The patient has had a wound of right leg which was first noted approximately several weeks ago, although patient tells me he thinks only 2 weeks but looking at the status of the wound, probably much longer duration. This has been treated by the patient during his dressing changes, recently also seen at the wound care center last week, and the patient was referred to vascular service because of past relationship . On their initial visit to the wound healing center, 23, the patient has noted that the wound has not been improving.   The patient has had similar previous wounds in the past.          Patient had longstanding history of leg swelling, skin breakdown and ulcerations, due to venous insufficiency as well as lymphedema has undergone lymphedema therapy in the past but not recently, underwent extensive vascular work-up in the past including venous ultrasound studies, arterial Doppler studies, recommended medical therapy        Pt is currently not on abx.       2023  Discussed with nursing staff, was informed, the home health care is not seeing the patient due to his home situation  Patient tells me that his neighbor across the street is doing the dressing changes  Patient was advised, when he takes a shower, to scrub the legs of all the hyperkeratotic and dead epithelium with a washcloth and then do the dressing changes and keep the legs elevated to decrease the swelling  Wounds look slightly better, patient did not  some of his prescriptions, was advised to call his pharmacy  Lab work reviewed, CBC, CMP, no major abnormal findings    2/6/2023  Right leg wound looks better  Discussed with patient and the nursing staff regarding referral of the patient to podiatry service for onychomycotic toenails  Inform the patient, that we will make additional recommendation once the vascular lab testing is completed    2/13/2023  Discussed the patient regarding results of the ankle-brachial index on the venous ultrasound as outlined below, overall satisfactory    The vascular lab testings were reviewed    1. Ankle-brachial index, normal with good arterial flow    2.   Bilateral venous ultrasound studies, no DVT, and no evidence of any significant reflux at the saphenofemoral junction noted on the worksheet, the report revealed only left side    Right calf wound looks better  Some of the onychomycotic toenails were debrided, patient was recommended, to make sure that he does follow-up with his podiatrist regarding the toenail care  Continue the compression bandage        Wound/Ulcer Pain Timing/Severity: constant  Quality of pain: dull, aching  Severity:  3 / 10   Modifying Factors: Pain worsens with walking, walks with help of a walker  Associated Signs/Symptoms: edema and drainage     Ulcer Identification:  Ulcer Type: venous and non-healing/non-surgical  Contributing Factors: edema, venous stasis, lymphedema, and poor hygiene     Diabetic/Pressure/Non Pressure Ulcers only:  Ulcer: N/A     If patient has diabetic lower extremity wounds  Mitchell Classification of diabetic lower extremity wounds:     Grade Description   []  0 No open wound   []  1 Superficial ulcer involving the full skin thickness   []  2 Deep ulcer involves ligament, tendon, joint capsule, or fascia  No bone involvement or abscess presence   []  3 Deep Ulcer with abcess formation and/or osteomyelitis   []  4 Localized gangrene   []  5 Extensive gangrene of the foot      Wound: Patient does have extensive onychomycosis, dermatophytosis, tinea pedis, ulceration of the right Buttock over the lateral aspect right calf with drainage     Other pertinent information:     1. Recent wound cultures revealed evidence of MSSA     2. No recent lab work was done patient recommended to have a CBC and CMP done today     3. CTA of the aorta with runoff that was done in August 2017, no evidence of left popliteal artery occlusive disease that was suspected     4. Past ankle-brachial index revealed adequate arterial flow to both feet, in 2017     5.   Bilateral venous ultrasound studies the reflux, done in 2017 revealed no evidence of deep vein thrombosis, the right saphenofemoral junction could not be evaluated by the right mid and distal saphenous vein was competent and on the left side, left saphenofemoral junction was reported to be competent        1/23/2023     Discussed the patient, options, risks benefits and alternatives were explained to the patient, patient was recommended to see his podiatrist regarding onychomycosis of the toenails and also trimming of the toenails  Patient recommended complete work-up including CBC, CMP, vascular testing including lower extremity arterial Doppler study as well as venous ultrasound with reflux  Patient recommended topical antifungal cream, miconazole along with short-term oral Lamisil therapy and also based on the wound cultures, Keflex 500 mg p.o. 4 times daily for 10 days pending repeat wound cultures that were done today  The patient recommended, to keep the legs elevated to decrease edema also importance of nutrition, multivitamin supplementation and protein supplementation was discussed  All his questions were answered               PAST MEDICAL HISTORY      Diagnosis Date    Cellulitis of right lower leg     Chronic venous insufficiency 1/23/2023 Decreased dorsalis pedis pulse 1/23/2023    Dermatophytosis 1/23/2023    Diabetes mellitus ([facility] [address]     Leg swelling 1/23/2023    Lymphedema     Lymphedema of both lower extremities 1/23/2023    Obesity, Class III, BMI 40-49.9 (morbid obesity) ([facility] [address]     Onychomycosis 1/23/2023    [facility] 8/23/2017    Left    Tinea pedis of both feet 1/23/2023     Past Surgical History:   Procedure Laterality Date    FRACTURE SURGERY  2005    right ankle repair     Family History   Problem Relation Age of Onset    Heart Disease Mother     Heart Disease Father      Social History     Tobacco Use    Smoking status: Never    Smokeless tobacco: Never   Vaping Use    Vaping Use: Never used   Substance Use Topics    Alcohol use: No     Comment: on special occassions    Drug use: No     Allergies   Allergen Reactions    Metformin And Related Diarrhea     Current Outpatient Medications on File Prior to Encounter   Medication Sig Dispense Refill    miconazole nitrate 2 % OINT Apply topically 2 times daily Please apply them from the toes, in between the toes, both feet and both calfs up to the knee twice a day for 1 month 1 each 10    aspirin (ASPIRIN CHILDRENS) 81 MG chewable tablet Take 1 tablet by mouth daily 30 tablet 5     No current facility-administered medications on file prior to encounter.        REVIEW OF SYSTEMS See HPI    Objective:    /70   Pulse 76   Temp 98.3 °F (36.8 °C) (Temporal)   Resp 18   Ht 6' 2\" (1.88 m)   Wt (!) 350 lb (158.8 kg)   BMI 44.94 kg/m²   Wt Readings from Last 3 Encounters:   02/13/23 (!) 350 lb (158.8 kg)   02/06/23 (!) 350 lb (158.8 kg)   01/30/23 (!) 350 lb (158.8 kg)     PHYSICAL EXAM  CONSTITUTIONAL:   Awake, alert, cooperative   EYES:  lids and lashes normal   ENT: external ears and nose without lesions   NECK:  supple, symmetrical, trachea midline   SKIN:  Open wound Present    Assessment:     Problem List Items Addressed This Visit          Low    Venous stasis ulcer of left lower leg with edema of left lower leg (HCC) - Primary    Relevant Orders    Initiate Outpatient Wound Care Protocol       Unprioritized    Morbid obesity with BMI of 45.0-49.9, adult [facility])    Relevant Orders    Initiate Outpatient Wound Care Protocol       Pre Debridement Measurements:  Are located in the [place]  Documentation Flow Sheet  Post Debridement Measurements:  Wound/Ulcer Descriptions are Pre Debridement except measurements:    Wound 09/13/21 Leg Left;Lateral #6 venous (Active)   Number of days: 518       Wound 01/19/23 Pretibial Right;Lateral #1 (Active)   Wound Image   01/19/23 0939   Wound Etiology Venous 01/19/23 0939   Dressing Status New dressing applied;Clean;Dry; Intact 01/19/23 1042   Wound Cleansed Cleansed with saline 01/19/23 1042   Dressing/Treatment Alginate with Ag;ABD 01/19/23 1042   Wound Length (cm) 1.1 cm 02/13/23 1426   Wound Width (cm) 2.9 cm 02/13/23 1426   Wound Depth (cm) 0.2 cm 02/13/23 1426   Wound Surface Area (cm^2) 3.19 cm^2 02/13/23 1426   Change in Wound Size % (l*w) 98.58 02/13/23 1426   Wound Volume (cm^3) 0.638 cm^3 02/13/23 1426   Wound Healing % 99 02/13/23 1426   Post-Procedure Length (cm) 1.2 cm 02/13/23 1532   Post-Procedure Width (cm) 3 cm 02/13/23 1532   Post-Procedure Depth (cm) 0.3 cm 02/13/23 1532   Post-Procedure Surface Area (cm^2) 3.6 cm^2 02/13/23 1532   Post-Procedure Volume (cm^3) 1.08 cm^3 02/13/23 1532   Wound Assessment Pink/red;Fibrin 02/13/23 1426   Drainage Amount Small 02/13/23 1426   Drainage Description Serosanguinous; Serous 02/13/23 1426   Odor None 02/13/23 1426   Colleen-wound Assessment Dry/flaky 02/13/23 1426   Number of days: 25          Procedure Note  Indications:  Based on my examination of this patient's wound(s)/ulcer(s) today, debridement is required to promote healing and evaluate the wound base.     Performed by: Jay Lan MD    Consent obtained:  Yes    Time out taken:  Yes    Pain Control: Anesthetic  Anesthetic: 4% Lidocaine Liquid Topical     Debridement:Excisional Debridement    Using curette the wound(s)/ulcer(s) was/were sharply debrided down through and including the removal of epidermis, dermis, and subcutaneous tissue. Devitalized Tissue Debrided:  fibrin and slough to stimulate bleeding to promote healing, post debridement good bleeding base and wound edges noted    Wound/Ulcer #: 1    Percent of Wound/Ulcer Debrided: 40%    Total Surface Area Debrided:  3 sq cm     Estimated Blood Loss:  Minimal  Hemostasis Achieved:  by pressure    Procedural Pain:  4 / 10   Post Procedural Pain:  3 / 10     Response to treatment:  Well tolerated by patient. Plan:   Treatment Note please see attached Discharge Instructions    Written patient dismissal instructions given to patient and signed by patient or POA. Discharge Instructions         Visit Discharge/Physician Orders     Discharge condition: Stable     Assessment of pain at discharge: None     Anesthetic used: Lidocaine 4%     Discharge to: Home     Left via:Private automobile     Accompanied by: accompanied by self     ECF/HHA: Home Health to evaluate and treat     Dressing Orders: Right leg wound: Cleanse with normal saline, apply alginate AG and ABD. Apply profore wrap. Keep wrap dry at all times. If wrap becomes wet or falls 2 inches call [address] [place] [address],[address] ([phone])and may remove wrap and apply double tubigrip. Treatment Orders: FOLLOW NUTRITIOUS DIET. CHOOSE FOODS HIGH IN PROTEIN -CHICKEN- FISH-AND EGGS,  CHOOSE FOODS HIGH IN VITAMIN C.   MULTIVITAMIN DAILY. Keep leg elevated as much as possible. Culture taken in clinic 1/23/23- reviewed, take antibiotics as prescribed  Blood work- reviewed  Continue using antifungal pill and cream as prescribed    [facility]      [address] [place] [address],[address] followup visit ______1 week _______________________  (Please note your next appointment above and if you are unable to keep, kindly give a 24 hour notice.  Thank you.) Physician signature:__________________________        If you experience any of the following, please call the [facility] [address] during business hours:     * Increase in Pain  * Temperature over 101  * Increase in drainage from your wound  * Drainage with a foul odor  * Bleeding  * Increase in swelling  * Need for compression bandage changes due to slippage, breakthrough drainage. If you need medical attention outside of the business hours of the [facility] [address] please contact your PCP or go to the nearest emergency room.           Electronically signed by Emily Rai MD on 2/13/2023 at 3:37 PM

## 2023-02-15 NOTE — DISCHARGE INSTRUCTIONS
Visit Discharge/Physician Orders     Discharge condition: Stable     Assessment of pain at discharge: None     Anesthetic used: Lidocaine 4%     Discharge to: Home     Left via:Private automobile     Accompanied by: accompanied by self     ECF/HHA: Home Health to evaluate and treat     Dressing Orders: Right leg wound: Cleanse with normal saline, apply alginate AG and ABD. Apply profore wrap. Okay to apply aquaphor to dry areas. Keep wrap dry at all times. If wrap becomes wet or falls 2 inches call [address] [place] [address],[address] ([phone])and may remove wrap and apply double tubigrip. Treatment Orders: FOLLOW NUTRITIOUS DIET. CHOOSE FOODS HIGH IN PROTEIN -CHICKEN- FISH-AND EGGS,  CHOOSE FOODS HIGH IN VITAMIN C.   MULTIVITAMIN DAILY. Keep leg elevated as much as possible. Culture taken in clinic 1/23/23- reviewed, take antibiotics as prescribed  Blood work- reviewed  Continue using antifungal pill and cream as prescribed     [facility]      [address] [place] [address],[address] followup visit ______1 week _______________________  (Please note your next appointment above and if you are unable to keep, kindly give a 24 hour notice. Thank you.)     Physician signature:__________________________        If you experience any of the following, please call the [facility] during business hours:     * Increase in Pain  * Temperature over 101  * Increase in drainage from your wound  * Drainage with a foul odor  * Bleeding  * Increase in swelling  * Need for compression bandage changes due to slippage, breakthrough drainage. If you need medical attention outside of the business hours of the [facility] please contact your PCP or go to the nearest emergency room.

## 2023-02-20 ENCOUNTER — HOSPITAL ENCOUNTER (OUTPATIENT)
Dept: WOUND CARE | Age: 73
Discharge: HOME | End: 2023-02-20
Payer: MEDICARE

## 2023-02-20 VITALS
SYSTOLIC BLOOD PRESSURE: 157 MMHG | HEART RATE: 76 BPM | DIASTOLIC BLOOD PRESSURE: 88 MMHG | RESPIRATION RATE: 18 BRPM | TEMPERATURE: 96.7 F

## 2023-02-20 DIAGNOSIS — L97.211 SKIN ULCER OF RIGHT CALF, LIMITED TO BREAKDOWN OF SKIN (HCC): ICD-10-CM

## 2023-02-20 DIAGNOSIS — R60.9 VENOUS STASIS ULCER OF LEFT LOWER LEG WITH EDEMA OF LEFT LOWER LEG (HCC): Primary | ICD-10-CM

## 2023-02-20 DIAGNOSIS — I83.029 VENOUS STASIS ULCER OF LEFT LOWER LEG WITH EDEMA OF LEFT LOWER LEG (HCC): Primary | ICD-10-CM

## 2023-02-20 DIAGNOSIS — I83.892 VENOUS STASIS ULCER OF LEFT LOWER LEG WITH EDEMA OF LEFT LOWER LEG (HCC): Primary | ICD-10-CM

## 2023-02-20 DIAGNOSIS — E66.01 MORBID OBESITY WITH BMI OF 45.0-49.9, ADULT (HCC): ICD-10-CM

## 2023-02-20 DIAGNOSIS — L97.929 VENOUS STASIS ULCER OF LEFT LOWER LEG WITH EDEMA OF LEFT LOWER LEG (HCC): Primary | ICD-10-CM

## 2023-02-20 PROBLEM — I87.2 VENOUS STASIS ULCER OF RIGHT CALF WITH FAT LAYER EXPOSED WITHOUT VARICOSE VEINS (HCC): Chronic | Status: RESOLVED | Noted: 2020-11-25 | Resolved: 2023-02-20

## 2023-02-20 PROBLEM — L97.212 VENOUS STASIS ULCER OF RIGHT CALF WITH FAT LAYER EXPOSED WITHOUT VARICOSE VEINS (HCC): Chronic | Status: RESOLVED | Noted: 2020-11-25 | Resolved: 2023-02-20

## 2023-02-20 PROCEDURE — 97597 DBRDMT OPN WND 1ST 20 CM/<: CPT | Performed by: SURGERY

## 2023-02-20 PROCEDURE — 97597 DBRDMT OPN WND 1ST 20 CM/<: CPT

## 2023-02-20 RX ORDER — LIDOCAINE HYDROCHLORIDE 20 MG/ML
JELLY TOPICAL ONCE
OUTPATIENT
Start: 2023-02-20 | End: 2023-02-20

## 2023-02-20 RX ORDER — LIDOCAINE 40 MG/G
CREAM TOPICAL ONCE
OUTPATIENT
Start: 2023-02-20 | End: 2023-02-20

## 2023-02-20 RX ORDER — BACITRACIN ZINC AND POLYMYXIN B SULFATE 500; 1000 [USP'U]/G; [USP'U]/G
OINTMENT TOPICAL ONCE
OUTPATIENT
Start: 2023-02-20 | End: 2023-02-20

## 2023-02-20 RX ORDER — GINSENG 100 MG
CAPSULE ORAL ONCE
OUTPATIENT
Start: 2023-02-20 | End: 2023-02-20

## 2023-02-20 RX ORDER — CLOBETASOL PROPIONATE 0.5 MG/G
OINTMENT TOPICAL ONCE
OUTPATIENT
Start: 2023-02-20 | End: 2023-02-20

## 2023-02-20 RX ORDER — LIDOCAINE 50 MG/G
OINTMENT TOPICAL ONCE
OUTPATIENT
Start: 2023-02-20 | End: 2023-02-20

## 2023-02-20 RX ORDER — GENTAMICIN SULFATE 1 MG/G
OINTMENT TOPICAL ONCE
OUTPATIENT
Start: 2023-02-20 | End: 2023-02-20

## 2023-02-20 RX ORDER — LIDOCAINE HYDROCHLORIDE 40 MG/ML
SOLUTION TOPICAL ONCE
Status: DISCONTINUED | OUTPATIENT
Start: 2023-02-20 | End: 2023-02-21 | Stop reason: HOSPADM

## 2023-02-20 RX ORDER — LIDOCAINE HYDROCHLORIDE 40 MG/ML
SOLUTION TOPICAL ONCE
OUTPATIENT
Start: 2023-02-20 | End: 2023-02-20

## 2023-02-20 RX ORDER — BACITRACIN, NEOMYCIN, POLYMYXIN B 400; 3.5; 5 [USP'U]/G; MG/G; [USP'U]/G
OINTMENT TOPICAL ONCE
OUTPATIENT
Start: 2023-02-20 | End: 2023-02-20

## 2023-02-20 RX ORDER — BETAMETHASONE DIPROPIONATE 0.05 %
OINTMENT (GRAM) TOPICAL ONCE
OUTPATIENT
Start: 2023-02-20 | End: 2023-02-20

## 2023-02-20 NOTE — DISCHARGE INSTRUCTIONS
Visit Discharge/Physician Orders     Discharge condition: Stable     Assessment of pain at discharge: None     Anesthetic used: Lidocaine 4%     Discharge to: Home     Left via:Private automobile     Accompanied by: accompanied by self     ECF/HHA: none     Dressing Orders: Right leg wound: Cleanse with normal saline, apply UNNA BOOT AND COBAN- CHANGE WEEKLY AT WOUND CARE      Keep wrap dry at all times. If wrap becomes wet or falls 2 inches call [facility] ([phone])and may remove wrap and apply double tubigrip. Treatment Orders:  Lymphedema therapy referral for next week   FOLLOW NUTRITIOUS DIET. CHOOSE FOODS HIGH IN PROTEIN -CHICKEN- FISH-AND EGGS,  CHOOSE FOODS HIGH IN VITAMIN C.   MULTIVITAMIN DAILY. Keep leg elevated as much as possible. Culture taken in clinic 1/23/23- reviewed, take antibiotics as prescribed  Blood work- reviewed  Continue using antifungal pill and cream as prescribed     [facility]      [facility] followup visit ______1 week _______________________  (Please note your next appointment above and if you are unable to keep, kindly give a 24 hour notice. Thank you.)     Physician signature:__________________________        If you experience any of the following, please call the [address] [place] [address] [facility] during business hours:     * Increase in Pain  * Temperature over 101  * Increase in drainage from your wound  * Drainage with a foul odor  * Bleeding  * Increase in swelling  * Need for compression bandage changes due to slippage, breakthrough drainage. If you need medical attention outside of the business hours of the [address] [place] [address] Road please contact your PCP or go to the nearest emergency room.

## 2023-02-20 NOTE — PROGRESS NOTES
[facility]aling Center Followup Visit Note    Referring Physician : Jerod Hammer MD  [address] [place] [address] [place] RECORD NUMBER:  [id number]  AGE: 67 y.o. GENDER: male  : 1950  EPISODE DATE:  2023    Subjective:     Chief Complaint   Patient presents with    Wound Check     Right leg       HISTORY of PRESENT ILLNESS HPI   Bishop Stafford is a 67 y.o. male who presents today in regards to follow up evaluation and treatment of wound/ulcer. That patient's past medical, family and social hx were reviewed and changes were made if present. History of Wound Context:  The patient has had a wound of right leg which was first noted approximately several weeks ago, although patient tells me he thinks only 2 weeks but looking at the status of the wound, probably much longer duration. This has been treated by the patient during his dressing changes, recently also seen at the wound care center last week, and the patient was referred to vascular service because of past relationship . On their initial visit to the wound healing center, 23, the patient has noted that the wound has not been improving.   The patient has had similar previous wounds in the past.          Patient had longstanding history of leg swelling, skin breakdown and ulcerations, due to venous insufficiency as well as lymphedema has undergone lymphedema therapy in the past but not recently, underwent extensive vascular work-up in the past including venous ultrasound studies, arterial Doppler studies, recommended medical therapy        Pt is currently not on abx.       2023  Discussed with nursing staff, was informed, the home health care is not seeing the patient due to his home situation  Patient tells me that his neighbor across the street is doing the dressing changes  Patient was advised, when he takes a shower, to scrub the legs of all the hyperkeratotic and dead epithelium with a washcloth and then do the dressing changes and keep the legs elevated to decrease the swelling  Wounds look slightly better, patient did not  some of his prescriptions, was advised to call his pharmacy  Lab work reviewed, CBC, CMP, no major abnormal findings    2/6/2023  Right leg wound looks better  Discussed with patient and the nursing staff regarding referral of the patient to podiatry service for onychomycotic toenails  Inform the patient, that we will make additional recommendation once the vascular lab testing is completed    2/13/2023  Discussed the patient regarding results of the ankle-brachial index on the venous ultrasound as outlined below, overall satisfactory    The vascular lab testings were reviewed    1. Ankle-brachial index, normal with good arterial flow    2.   Bilateral venous ultrasound studies, no DVT, and no evidence of any significant reflux at the saphenofemoral junction noted on the worksheet, the report revealed only left side    Right calf wound looks better  Some of the onychomycotic toenails were debrided, patient was recommended, to make sure that he does follow-up with his podiatrist regarding the toenail care  Continue the compression bandage    2/20/2023  Right calf wound much better, skin scab only fat layer almost healed completely  Reviewed dry scaly hyperkeratotic skin, discussed with the nursing staff, apply Aquaphor underneath the bandage    Wound/Ulcer Pain Timing/Severity: constant  Quality of pain: dull, aching  Severity:  3 / 10   Modifying Factors: Pain worsens with walking, walks with help of a walker  Associated Signs/Symptoms: edema and drainage     Ulcer Identification:  Ulcer Type: venous and non-healing/non-surgical  Contributing Factors: edema, venous stasis, lymphedema, and poor hygiene     Diabetic/Pressure/Non Pressure Ulcers only:  Ulcer: N/A     If patient has diabetic lower extremity wounds  Mitchell Classification of diabetic lower extremity wounds:     Grade Description   []  0 No open wound []  1 Superficial ulcer involving the full skin thickness   []  2 Deep ulcer involves ligament, tendon, joint capsule, or fascia  No bone involvement or abscess presence   []  3 Deep Ulcer with abcess formation and/or osteomyelitis   []  4 Localized gangrene   []  5 Extensive gangrene of the foot      Wound: Patient does have extensive onychomycosis, dermatophytosis, tinea pedis, ulceration of the right Buttock over the lateral aspect right calf with drainage     Other pertinent information:     1. Recent wound cultures revealed evidence of MSSA     2. No recent lab work was done patient recommended to have a CBC and CMP done today     3. CTA of the aorta with runoff that was done in August 2017, no evidence of left popliteal artery occlusive disease that was suspected     4. Past ankle-brachial index revealed adequate arterial flow to both feet, in 2017     5.   Bilateral venous ultrasound studies the reflux, done in 2017 revealed no evidence of deep vein thrombosis, the right saphenofemoral junction could not be evaluated by the right mid and distal saphenous vein was competent and on the left side, left saphenofemoral junction was reported to be competent        1/23/2023     Discussed the patient, options, risks benefits and alternatives were explained to the patient, patient was recommended to see his podiatrist regarding onychomycosis of the toenails and also trimming of the toenails  Patient recommended complete work-up including CBC, CMP, vascular testing including lower extremity arterial Doppler study as well as venous ultrasound with reflux  Patient recommended topical antifungal cream, miconazole along with short-term oral Lamisil therapy and also based on the wound cultures, Keflex 500 mg p.o. 4 times daily for 10 days pending repeat wound cultures that were done today  The patient recommended, to keep the legs elevated to decrease edema also importance of nutrition, multivitamin supplementation and protein supplementation was discussed  All his questions were answered               PAST MEDICAL HISTORY      Diagnosis Date    Cellulitis of right lower leg     Chronic venous insufficiency 1/23/2023    Decreased dorsalis pedis pulse 1/23/2023    Dermatophytosis 1/23/2023    Diabetes mellitus ([address]     Leg swelling 1/23/2023    Lymphedema     Lymphedema of both lower extremities 1/23/2023    Obesity, Class III, BMI 40-49.9 (morbid obesity) ([address]     Onychomycosis 1/23/2023    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    Skin ulcer of right calf, limited to breakdown of skin ([address] 2/20/2023    Tinea pedis of both feet 1/23/2023     Past Surgical History:   Procedure Laterality Date    FRACTURE SURGERY  2005    right ankle repair     Family History   Problem Relation Age of Onset    Heart Disease Mother     Heart Disease Father      Social History     Tobacco Use    Smoking status: Never    Smokeless tobacco: Never   Vaping Use    Vaping Use: Never used   Substance Use Topics    Alcohol use: No     Comment: on special occassions    Drug use: No     Allergies   Allergen Reactions    Metformin And Related Diarrhea     Current Outpatient Medications on File Prior to Encounter   Medication Sig Dispense Refill    miconazole nitrate 2 % OINT Apply topically 2 times daily Please apply them from the toes, in between the toes, both feet and both calfs up to the knee twice a day for 1 month 1 each 10    aspirin (ASPIRIN CHILDRENS) 81 MG chewable tablet Take 1 tablet by mouth daily 30 tablet 5     No current facility-administered medications on file prior to encounter.        REVIEW OF SYSTEMS See HPI    Objective:    BP (!) 157/88   Pulse 76   Temp (!) 96.7 °F (35.9 °C) (Temporal)   Resp 18   Wt Readings from Last 3 Encounters:   02/13/23 (!) 350 lb (158.8 kg)   02/06/23 (!) 350 lb (158.8 kg)   01/30/23 (!) 350 lb (158.8 kg)     PHYSICAL EXAM  CONSTITUTIONAL:   Awake, alert, cooperative   EYES:  lids and lashes normal ENT: external ears and nose without lesions   NECK:  supple, symmetrical, trachea midline   SKIN:  Open wound Present    Assessment:     Problem List Items Addressed This Visit          High    Skin ulcer of right calf, limited to breakdown of skin ([address]       Low    Venous stasis ulcer of left lower leg with edema of left lower leg (HCC) - Primary    Relevant Medications    lidocaine (XYLOCAINE) 4 % external solution    Other Relevant Orders    Initiate Outpatient Wound Care Protocol       Unprioritized    Morbid obesity with BMI of 45.0-49.9, adult (HCC)    Relevant Medications    lidocaine (XYLOCAINE) 4 % external solution    Other Relevant Orders    Initiate Outpatient Wound Care Protocol       Pre Debridement Measurements:  Are located in the [facility]  Documentation Flow Sheet  Post Debridement Measurements:  Wound/Ulcer Descriptions are Pre Debridement except measurements:    Wound 09/13/21 Leg Left;Lateral #6 venous (Active)   Number of days: 525       Wound 01/19/23 Pretibial Right;Lateral #1 (Active)   Wound Image   02/20/23 1359   Wound Etiology Venous 01/19/23 0939   Dressing Status New dressing applied 02/13/23 1608   Wound Cleansed Cleansed with saline 02/13/23 1608   Dressing/Treatment Alginate with Ag;ABD 02/13/23 1608   Wound Length (cm) 0.5 cm 02/20/23 1359   Wound Width (cm) 0.5 cm 02/20/23 1359   Wound Depth (cm) 0.1 cm 02/20/23 1359   Wound Surface Area (cm^2) 0.25 cm^2 02/20/23 1359   Change in Wound Size % (l*w) 99.89 02/20/23 1359   Wound Volume (cm^3) 0.025 cm^3 02/20/23 1359   Wound Healing % 100 02/20/23 1359   Post-Procedure Length (cm) 1.2 cm 02/13/23 1532   Post-Procedure Width (cm) 3 cm 02/13/23 1532   Post-Procedure Depth (cm) 0.3 cm 02/13/23 1532   Post-Procedure Surface Area (cm^2) 3.6 cm^2 02/13/23 1532   Post-Procedure Volume (cm^3) 1.08 cm^3 02/13/23 1532   Wound Assessment Dry;Fibrin 02/20/23 1359   Drainage Amount Small 02/20/23 1359   Drainage Description Serosanguinous; Serous 02/20/23 1359   Odor None 02/20/23 1359   Colleen-wound Assessment Dry/flaky 02/20/23 1359   Number of days: 32          Procedure Note  Indications:  Based on my examination of this patient's wound(s)/ulcer(s) today, debridement is required to promote healing and evaluate the wound base. Performed by: Tomeka Bruno MD    Consent obtained:  Yes    Time out taken:  Yes    Pain Control: Anesthetic  Anesthetic: 4% Lidocaine Liquid Topical     Debridement:Excisional Debridement    Using curette the wound(s)/ulcer(s) was/were sharply debrided down through and including the removal of epidermis and dermis. Devitalized Tissue Debrided:  fibrin and slough to stimulate bleeding to promote healing, post debridement good bleeding base and wound edges noted    Wound/Ulcer #: 1    Percent of Wound/Ulcer Debrided: 40%    Total Surface Area Debrided:  3 sq cm     Estimated Blood Loss:  Minimal  Hemostasis Achieved:  by pressure    Procedural Pain:  4  / 10   Post Procedural Pain:  3 / 10     Response to treatment:  Well tolerated by patient. Plan:   Treatment Note please see attached Discharge Instructions    Written patient dismissal instructions given to patient and signed by patient or POA. Discharge Instructions         Visit Discharge/Physician Orders     Discharge condition: Stable     Assessment of pain at discharge: None     Anesthetic used: Lidocaine 4%     Discharge to: Home     Left via:Private automobile     Accompanied by: accompanied by self     ECF/HHA: Home Health to evaluate and treat     Dressing Orders: Right leg wound: Cleanse with normal saline, apply alginate AG and ABD. Apply profore wrap. Okay to apply aquaphor to dry areas. Keep wrap dry at all times. If wrap becomes wet or falls 2 inches call [facility] ([phone])and may remove wrap and apply double tubigrip. Treatment Orders: FOLLOW NUTRITIOUS DIET.  CHOOSE FOODS HIGH IN PROTEIN -CHICKEN- FISH-AND EGGS,  CHOOSE FOODS HIGH IN VITAMIN C.   MULTIVITAMIN DAILY. Keep leg elevated as much as possible. Culture taken in clinic 1/23/23- reviewed, take antibiotics as prescribed  Blood work- reviewed  Continue using antifungal pill and cream as prescribed     [facility]      [address] [place] [address],[address] followup visit ______1 week _______________________  (Please note your next appointment above and if you are unable to keep, kindly give a 24 hour notice. Thank you.)     Physician signature:__________________________        If you experience any of the following, please call the [facility] during business hours:     * Increase in Pain  * Temperature over 101  * Increase in drainage from your wound  * Drainage with a foul odor  * Bleeding  * Increase in swelling  * Need for compression bandage changes due to slippage, breakthrough drainage. If you need medical attention outside of the business hours of the [facility] please contact your PCP or go to the nearest emergency room.        Electronically signed by Evelina Olmstead MD on 2/20/2023 at 2:57 PM

## 2023-02-27 ENCOUNTER — HOSPITAL ENCOUNTER (OUTPATIENT)
Dept: WOUND CARE | Age: 73
Discharge: HOME | End: 2023-02-27
Payer: MEDICARE

## 2023-02-27 VITALS — TEMPERATURE: 97.4 F | DIASTOLIC BLOOD PRESSURE: 80 MMHG | HEART RATE: 78 BPM | SYSTOLIC BLOOD PRESSURE: 122 MMHG

## 2023-02-27 DIAGNOSIS — I83.029 VENOUS STASIS ULCER OF LEFT LOWER LEG WITH EDEMA OF LEFT LOWER LEG (HCC): Primary | ICD-10-CM

## 2023-02-27 DIAGNOSIS — E66.01 MORBID OBESITY WITH BMI OF 45.0-49.9, ADULT (HCC): ICD-10-CM

## 2023-02-27 DIAGNOSIS — I83.892 VENOUS STASIS ULCER OF LEFT LOWER LEG WITH EDEMA OF LEFT LOWER LEG (HCC): Primary | ICD-10-CM

## 2023-02-27 DIAGNOSIS — R60.9 VENOUS STASIS ULCER OF LEFT LOWER LEG WITH EDEMA OF LEFT LOWER LEG (HCC): Primary | ICD-10-CM

## 2023-02-27 DIAGNOSIS — L97.929 VENOUS STASIS ULCER OF LEFT LOWER LEG WITH EDEMA OF LEFT LOWER LEG (HCC): Primary | ICD-10-CM

## 2023-02-27 PROCEDURE — 97597 DBRDMT OPN WND 1ST 20 CM/<: CPT

## 2023-02-27 PROCEDURE — 97597 DBRDMT OPN WND 1ST 20 CM/<: CPT | Performed by: SURGERY

## 2023-02-27 RX ORDER — LIDOCAINE HYDROCHLORIDE 40 MG/ML
SOLUTION TOPICAL ONCE
OUTPATIENT
Start: 2023-02-27 | End: 2023-02-27

## 2023-02-27 RX ORDER — GENTAMICIN SULFATE 1 MG/G
OINTMENT TOPICAL ONCE
OUTPATIENT
Start: 2023-02-27 | End: 2023-02-27

## 2023-02-27 RX ORDER — LIDOCAINE 40 MG/G
CREAM TOPICAL ONCE
OUTPATIENT
Start: 2023-02-27 | End: 2023-02-27

## 2023-02-27 RX ORDER — LIDOCAINE HYDROCHLORIDE 20 MG/ML
JELLY TOPICAL ONCE
OUTPATIENT
Start: 2023-02-27 | End: 2023-02-27

## 2023-02-27 RX ORDER — LIDOCAINE 50 MG/G
OINTMENT TOPICAL ONCE
OUTPATIENT
Start: 2023-02-27 | End: 2023-02-27

## 2023-02-27 RX ORDER — CLOBETASOL PROPIONATE 0.5 MG/G
OINTMENT TOPICAL ONCE
OUTPATIENT
Start: 2023-02-27 | End: 2023-02-27

## 2023-02-27 RX ORDER — BACITRACIN, NEOMYCIN, POLYMYXIN B 400; 3.5; 5 [USP'U]/G; MG/G; [USP'U]/G
OINTMENT TOPICAL ONCE
OUTPATIENT
Start: 2023-02-27 | End: 2023-02-27

## 2023-02-27 RX ORDER — GINSENG 100 MG
CAPSULE ORAL ONCE
OUTPATIENT
Start: 2023-02-27 | End: 2023-02-27

## 2023-02-27 RX ORDER — BETAMETHASONE DIPROPIONATE 0.05 %
OINTMENT (GRAM) TOPICAL ONCE
OUTPATIENT
Start: 2023-02-27 | End: 2023-02-27

## 2023-02-27 RX ORDER — BACITRACIN ZINC AND POLYMYXIN B SULFATE 500; 1000 [USP'U]/G; [USP'U]/G
OINTMENT TOPICAL ONCE
OUTPATIENT
Start: 2023-02-27 | End: 2023-02-27

## 2023-02-27 NOTE — PROGRESS NOTES
[facility] Followup Visit Note    Referring Physician : Tricia Mims MD  [address] [place] [address] [place] RECORD NUMBER:  [id number]  AGE: 67 y.o. GENDER: male  : 1950  EPISODE DATE:  2023    Subjective:     Chief Complaint   Patient presents with    Wound Check     Wound left leg      HISTORY of PRESENT ILLNESS HPI   Melony Corbett is a 67 y.o. male who presents today in regards to follow up evaluation and treatment of wound/ulcer. That patient's past medical, family and social hx were reviewed and changes were made if present. History of Wound Context:  The patient has had a wound of right leg which was first noted approximately several weeks ago, although patient tells me he thinks only 2 weeks but looking at the status of the wound, probably much longer duration. This has been treated by the patient during his dressing changes, recently also seen at the wound care center last week, and the patient was referred to vascular service because of past relationship . On their initial visit to the wound healing center, 23, the patient has noted that the wound has not been improving.   The patient has had similar previous wounds in the past.          Patient had longstanding history of leg swelling, skin breakdown and ulcerations, due to venous insufficiency as well as lymphedema has undergone lymphedema therapy in the past but not recently, underwent extensive vascular work-up in the past including venous ultrasound studies, arterial Doppler studies, recommended medical therapy        Pt is currently not on abx.       2023  Discussed with nursing staff, was informed, the home health care is not seeing the patient due to his home situation  Patient tells me that his neighbor across the street is doing the dressing changes  Patient was advised, when he takes a shower, to scrub the legs of all the hyperkeratotic and dead epithelium with a washcloth and then do the dressing changes and keep the legs elevated to decrease the swelling  Wounds look slightly better, patient did not  some of his prescriptions, was advised to call his pharmacy  Lab work reviewed, CBC, CMP, no major abnormal findings    2/6/2023  Right leg wound looks better  Discussed with patient and the nursing staff regarding referral of the patient to podiatry service for onychomycotic toenails  Inform the patient, that we will make additional recommendation once the vascular lab testing is completed    2/13/2023  Discussed the patient regarding results of the ankle-brachial index on the venous ultrasound as outlined below, overall satisfactory    The vascular lab testings were reviewed    1. Ankle-brachial index, normal with good arterial flow    2.   Bilateral venous ultrasound studies, no DVT, and no evidence of any significant reflux at the saphenofemoral junction noted on the worksheet, the report revealed only left side    Right calf wound looks better  Some of the onychomycotic toenails were debrided, patient was recommended, to make sure that he does follow-up with his podiatrist regarding the toenail care  Continue the compression bandage    2/20/2023  Right calf wound much better, skin scab only fat layer almost healed completely  Reviewed dry scaly hyperkeratotic skin, discussed with the nursing staff, apply Aquaphor underneath the bandage  2/27/2023  Wounds healing well, small skin ulcer only, will apply compression wrapping again, next week, may consider referral to lymphedema therapy once everything is healed    Wound/Ulcer Pain Timing/Severity: constant  Quality of pain: dull, aching  Severity:  3 / 10   Modifying Factors: Pain worsens with walking, walks with help of a walker  Associated Signs/Symptoms: edema and drainage     Ulcer Identification:  Ulcer Type: venous and non-healing/non-surgical  Contributing Factors: edema, venous stasis, lymphedema, and poor hygiene     Diabetic/Pressure/Non Pressure Ulcers only:  Ulcer: N/A     If patient has diabetic lower extremity wounds  Mitchell Classification of diabetic lower extremity wounds:     Grade Description   []  0 No open wound   []  1 Superficial ulcer involving the full skin thickness   []  2 Deep ulcer involves ligament, tendon, joint capsule, or fascia  No bone involvement or abscess presence   []  3 Deep Ulcer with abcess formation and/or osteomyelitis   []  4 Localized gangrene   []  5 Extensive gangrene of the foot      Wound: Patient does have extensive onychomycosis, dermatophytosis, tinea pedis, ulceration of the right Buttock over the lateral aspect right calf with drainage     Other pertinent information:     1. Recent wound cultures revealed evidence of MSSA     2. No recent lab work was done patient recommended to have a CBC and CMP done today     3. CTA of the aorta with runoff that was done in August 2017, no evidence of left popliteal artery occlusive disease that was suspected     4. Past ankle-brachial index revealed adequate arterial flow to both feet, in 2017     5.   Bilateral venous ultrasound studies the reflux, done in 2017 revealed no evidence of deep vein thrombosis, the right saphenofemoral junction could not be evaluated by the right mid and distal saphenous vein was competent and on the left side, left saphenofemoral junction was reported to be competent        1/23/2023     Discussed the patient, options, risks benefits and alternatives were explained to the patient, patient was recommended to see his podiatrist regarding onychomycosis of the toenails and also trimming of the toenails  Patient recommended complete work-up including CBC, CMP, vascular testing including lower extremity arterial Doppler study as well as venous ultrasound with reflux  Patient recommended topical antifungal cream, miconazole along with short-term oral Lamisil therapy and also based on the wound cultures, Keflex 500 mg p.o. 4 times daily for 10 days pending repeat wound cultures that were done today  The patient recommended, to keep the legs elevated to decrease edema also importance of nutrition, multivitamin supplementation and protein supplementation was discussed  All his questions were answered               PAST MEDICAL HISTORY      Diagnosis Date    Cellulitis of right lower leg     Chronic venous insufficiency 1/23/2023    Decreased dorsalis pedis pulse 1/23/2023    Dermatophytosis 1/23/2023    Diabetes mellitus ([address]     Leg swelling 1/23/2023    Lymphedema     Lymphedema of both lower extremities 1/23/2023    Obesity, Class III, BMI 40-49.9 (morbid obesity) ([address]     Onychomycosis 1/23/2023    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    Skin ulcer of right calf, limited to breakdown of skin ([address] 2/20/2023    Tinea pedis of both feet 1/23/2023     Past Surgical History:   Procedure Laterality Date    FRACTURE SURGERY  2005    right ankle repair     Family History   Problem Relation Age of Onset    Heart Disease Mother     Heart Disease Father      Social History     Tobacco Use    Smoking status: Never    Smokeless tobacco: Never   Vaping Use    Vaping Use: Never used   Substance Use Topics    Alcohol use: No     Comment: on special occassions    Drug use: No     Allergies   Allergen Reactions    Metformin And Related Diarrhea     Current Outpatient Medications on File Prior to Encounter   Medication Sig Dispense Refill    miconazole nitrate 2 % OINT Apply topically 2 times daily Please apply them from the toes, in between the toes, both feet and both calfs up to the knee twice a day for 1 month 1 each 10    aspirin (ASPIRIN CHILDRENS) 81 MG chewable tablet Take 1 tablet by mouth daily 30 tablet 5     No current facility-administered medications on file prior to encounter.        REVIEW OF SYSTEMS See HPI    Objective:    /80   Pulse 78   Temp 97.4 °F (36.3 °C) (Temporal)   Wt Readings from Last 3 Encounters:   02/13/23 (!) 350 lb (158.8 kg)   02/06/23 (!) 350 lb (158.8 kg)   01/30/23 (!) 350 lb (158.8 kg)     PHYSICAL EXAM  CONSTITUTIONAL:   Awake, alert, cooperative   EYES:  lids and lashes normal   ENT: external ears and nose without lesions   NECK:  supple, symmetrical, trachea midline   SKIN:  Open wound Present    Assessment:     Problem List Items Addressed This Visit          Low    Venous stasis ulcer of left lower leg with edema of left lower leg ([address] - Primary    Relevant Orders    Initiate Outpatient Wound Care Protocol       Unprioritized    Morbid obesity with BMI of 45.0-49.9, adult [facility])    Relevant Orders    Initiate Outpatient Wound Care Protocol       Pre Debridement Measurements:  Are located in the [facility]  Documentation Flow Sheet  Post Debridement Measurements:  Wound/Ulcer Descriptions are Pre Debridement except measurements:    Wound 09/13/21 Leg Left;Lateral #6 venous (Active)   Number of days: 741       Wound 01/19/23 Pretibial Right;Lateral #1 (Active)   Wound Image   02/20/23 1359   Wound Etiology Venous 02/20/23 1515   Dressing Status New dressing applied;Clean;Dry; Intact 02/27/23 1503   Wound Cleansed Cleansed with saline 02/27/23 1503   Dressing/Treatment Alginate with Ag;ABD 02/20/23 1515   Wound Length (cm) 0.2 cm 02/27/23 1416   Wound Width (cm) 0.2 cm 02/27/23 1416   Wound Depth (cm) 0.1 cm 02/27/23 1416   Wound Surface Area (cm^2) 0.04 cm^2 02/27/23 1416   Change in Wound Size % (l*w) 99.98 02/27/23 1416   Wound Volume (cm^3) 0.004 cm^3 02/27/23 1416   Wound Healing % 100 02/27/23 1416   Post-Procedure Length (cm) 0.3 cm 02/27/23 1449   Post-Procedure Width (cm) 0.3 cm 02/27/23 1449   Post-Procedure Depth (cm) 0.1 cm 02/27/23 1449   Post-Procedure Surface Area (cm^2) 0.09 cm^2 02/27/23 1449   Post-Procedure Volume (cm^3) 0.009 cm^3 02/27/23 1449   Wound Assessment Pink/red 02/27/23 1416   Drainage Amount Scant 02/27/23 1416   Drainage Description Serosanguinous 02/27/23 1416   Odor None 02/27/23 1416 Colleen-wound Assessment Edematous;Dry/flaky 02/27/23 1416   Number of days: 39          Procedure Note  Indications:  Based on my examination of this patient's wound(s)/ulcer(s) today, debridement is required to promote healing and evaluate the wound base. Performed by: Romayne Apo, MD    Consent obtained:  Yes    Time out taken:  Yes    Pain Control:       Debridement:Excisional Debridement    Using curette the wound(s)/ulcer(s) was/were sharply debrided down through and including the removal of epidermis and dermis. Devitalized Tissue Debrided:  fibrin and slough to stimulate bleeding to promote healing, post debridement good bleeding base and wound edges noted    Wound/Ulcer #: 1    Percent of Wound/Ulcer Debrided: 40%    Total Surface Area Debrided:  3 sq cm     Estimated Blood Loss:  Minimal  Hemostasis Achieved:  by pressure    Procedural Pain:  4  / 10   Post Procedural Pain:  3 / 10     Response to treatment:  Well tolerated by patient. Plan:   Treatment Note please see attached Discharge Instructions    Written patient dismissal instructions given to patient and signed by patient or POA. Discharge Instructions         Visit Discharge/Physician Orders     Discharge condition: Stable     Assessment of pain at discharge: None     Anesthetic used: Lidocaine 4%     Discharge to: Home     Left via:Private automobile     Accompanied by: accompanied by self     ECF/HHA: none     Dressing Orders: Right leg wound: Cleanse with normal saline, apply UNNA BOOT AND COBAN- CHANGE WEEKLY AT WOUND CARE      Keep wrap dry at all times. If wrap becomes wet or falls 2 inches call [address] [place] [address],[address] ([phone])and may remove wrap and apply double tubigrip. Treatment Orders:  Lymphedema therapy referral for next week   FOLLOW NUTRITIOUS DIET. CHOOSE FOODS HIGH IN PROTEIN -CHICKEN- FISH-AND EGGS,  CHOOSE FOODS HIGH IN VITAMIN C.   MULTIVITAMIN DAILY. Keep leg elevated as much as possible.      Culture taken in clinic 1/23/23- reviewed, take antibiotics as prescribed  Blood work- reviewed  Continue using antifungal pill and cream as prescribed     Vasculars      [facility],[address] followup visit ______1 week _______________________  (Please note your next appointment above and if you are unable to keep, kindly give a 24 hour notice. Thank you.)     Physician signature:__________________________        If you experience any of the following, please call the [facility] during business hours:     * Increase in Pain  * Temperature over 101  * Increase in drainage from your wound  * Drainage with a foul odor  * Bleeding  * Increase in swelling  * Need for compression bandage changes due to slippage, breakthrough drainage. If you need medical attention outside of the business hours of the [facility] please contact your PCP or go to the nearest emergency room.           Electronically signed by Krishna Bob MD on 2/27/2023 at 4:38 PM

## 2023-03-01 NOTE — DISCHARGE INSTRUCTIONS
Visit Discharge/Physician Orders     Discharge condition: Stable     Assessment of pain at discharge: None     Anesthetic used: Lidocaine 4%     Discharge to: Home     Left via:Private automobile     Accompanied by: accompanied by self     ECF/HHA: none     Dressing Orders: Right leg wound: healed. Continue to moisturize. Spandigrip to bilateral legs, on in the AM and off in the PM.        Treatment Orders:  Lymphedema therapy referral for next week   FOLLOW NUTRITIOUS DIET. CHOOSE FOODS HIGH IN PROTEIN -CHICKEN- FISH-AND EGGS,  CHOOSE FOODS HIGH IN VITAMIN C.   MULTIVITAMIN DAILY. Keep leg elevated as much as possible. Culture taken in clinic 1/23/23- reviewed, take antibiotics as prescribed  Blood work- reviewed  Continue using antifungal pill and cream as prescribed     Vascular[facility]      [facility] followup visit ______no follow up, call if needed_______________________  (Please note your next appointment above and if you are unable to keep, kindly give a 24 hour notice. Thank you.)     Physician signature:__________________________        If you experience any of the following, please call the [facility] during business hours:     * Increase in Pain  * Temperature over 101  * Increase in drainage from your wound  * Drainage with a foul odor  * Bleeding  * Increase in swelling  * Need for compression bandage changes due to slippage, breakthrough drainage. If you need medical attention outside of the business hours of the [facility] [address] please contact your PCP or go to the nearest emergency room.

## 2023-03-06 ENCOUNTER — HOSPITAL ENCOUNTER (OUTPATIENT)
Dept: WOUND CARE | Age: 73
Discharge: HOME | End: 2023-03-06
Payer: MEDICARE

## 2023-03-06 VITALS
HEART RATE: 75 BPM | DIASTOLIC BLOOD PRESSURE: 89 MMHG | SYSTOLIC BLOOD PRESSURE: 164 MMHG | BODY MASS INDEX: 40.43 KG/M2 | TEMPERATURE: 97.5 F | RESPIRATION RATE: 18 BRPM | WEIGHT: 315 LBS | HEIGHT: 74 IN

## 2023-03-06 DIAGNOSIS — L97.929 VENOUS STASIS ULCER OF LEFT LOWER LEG WITH EDEMA OF LEFT LOWER LEG (HCC): Primary | ICD-10-CM

## 2023-03-06 DIAGNOSIS — I89.0 LYMPHEDEMA OF BOTH LOWER EXTREMITIES: ICD-10-CM

## 2023-03-06 DIAGNOSIS — I83.892 VENOUS STASIS ULCER OF LEFT LOWER LEG WITH EDEMA OF LEFT LOWER LEG (HCC): Primary | ICD-10-CM

## 2023-03-06 DIAGNOSIS — R60.9 VENOUS STASIS ULCER OF LEFT LOWER LEG WITH EDEMA OF LEFT LOWER LEG (HCC): Primary | ICD-10-CM

## 2023-03-06 DIAGNOSIS — E66.01 MORBID OBESITY WITH BMI OF 45.0-49.9, ADULT (HCC): ICD-10-CM

## 2023-03-06 DIAGNOSIS — I83.029 VENOUS STASIS ULCER OF LEFT LOWER LEG WITH EDEMA OF LEFT LOWER LEG (HCC): Primary | ICD-10-CM

## 2023-03-06 PROCEDURE — 99213 OFFICE O/P EST LOW 20 MIN: CPT

## 2023-03-06 PROCEDURE — 99212 OFFICE O/P EST SF 10 MIN: CPT | Performed by: SURGERY

## 2023-03-06 RX ORDER — LIDOCAINE HYDROCHLORIDE 20 MG/ML
JELLY TOPICAL ONCE
Status: CANCELLED | OUTPATIENT
Start: 2023-03-06 | End: 2023-03-06

## 2023-03-06 RX ORDER — CLOBETASOL PROPIONATE 0.5 MG/G
OINTMENT TOPICAL ONCE
Status: CANCELLED | OUTPATIENT
Start: 2023-03-06 | End: 2023-03-06

## 2023-03-06 RX ORDER — LIDOCAINE HYDROCHLORIDE 40 MG/ML
SOLUTION TOPICAL ONCE
Status: CANCELLED | OUTPATIENT
Start: 2023-03-06 | End: 2023-03-06

## 2023-03-06 RX ORDER — BETAMETHASONE DIPROPIONATE 0.05 %
OINTMENT (GRAM) TOPICAL ONCE
Status: CANCELLED | OUTPATIENT
Start: 2023-03-06 | End: 2023-03-06

## 2023-03-06 RX ORDER — GENTAMICIN SULFATE 1 MG/G
OINTMENT TOPICAL ONCE
Status: CANCELLED | OUTPATIENT
Start: 2023-03-06 | End: 2023-03-06

## 2023-03-06 RX ORDER — LIDOCAINE 40 MG/G
CREAM TOPICAL ONCE
Status: CANCELLED | OUTPATIENT
Start: 2023-03-06 | End: 2023-03-06

## 2023-03-06 RX ORDER — GINSENG 100 MG
CAPSULE ORAL ONCE
Status: CANCELLED | OUTPATIENT
Start: 2023-03-06 | End: 2023-03-06

## 2023-03-06 RX ORDER — BACITRACIN, NEOMYCIN, POLYMYXIN B 400; 3.5; 5 [USP'U]/G; MG/G; [USP'U]/G
OINTMENT TOPICAL ONCE
Status: CANCELLED | OUTPATIENT
Start: 2023-03-06 | End: 2023-03-06

## 2023-03-06 RX ORDER — LIDOCAINE HYDROCHLORIDE 40 MG/ML
SOLUTION TOPICAL ONCE
Status: COMPLETED | OUTPATIENT
Start: 2023-03-06 | End: 2023-03-06

## 2023-03-06 RX ORDER — LIDOCAINE 50 MG/G
OINTMENT TOPICAL ONCE
Status: CANCELLED | OUTPATIENT
Start: 2023-03-06 | End: 2023-03-06

## 2023-03-06 RX ORDER — BACITRACIN ZINC AND POLYMYXIN B SULFATE 500; 1000 [USP'U]/G; [USP'U]/G
OINTMENT TOPICAL ONCE
Status: CANCELLED | OUTPATIENT
Start: 2023-03-06 | End: 2023-03-06

## 2023-03-06 RX ADMIN — LIDOCAINE HYDROCHLORIDE 10 ML: 40 SOLUTION TOPICAL at 14:37

## 2023-03-06 NOTE — PLAN OF CARE
Problem: Chronic Conditions and Co-morbidities  Goal: Patient's chronic conditions and co-morbidity symptoms are monitored and maintained or improved  Outcome: Progressing     Problem: Chronic Conditions and Co-morbidities  Goal: Patient's chronic conditions and co-morbidity symptoms are monitored and maintained or improved  Outcome: Progressing     Problem: Wound:  Goal: Will show signs of wound healing; wound closure and no evidence of infection  Description: Will show signs of wound healing; wound closure and no evidence of infection  Outcome: Progressing     Problem: Venous:  Goal: Signs of wound healing will improve  Description: Signs of wound healing will improve  Outcome: Progressing     Problem: Compression therapy:  Goal: Will be free from complications associated with compression therapy  Description: Will be free from complications associated with compression therapy  Outcome: Progressing

## 2023-03-06 NOTE — PROGRESS NOTES
Wound Healing Center Followup Visit Note    Referring Physician : Cleve Monahan MD  [address] [place] [address] [place] RECORD NUMBER:  [id number]  AGE: 67 y.o. GENDER: male  : 1950  EPISODE DATE:  3/6/2023    Subjective:     Chief Complaint   Patient presents with    Wound Check     Right lower leg      HISTORY of PRESENT ILLNESS HPI   Tima Jarquin is a 67 y.o. male who presents today in regards to follow up evaluation and treatment of wound/ulcer. That patient's past medical, family and social hx were reviewed and changes were made if present. History of Wound Context:  The patient has had a wound of right leg which was first noted approximately several weeks ago, although patient tells me he thinks only 2 weeks but looking at the status of the wound, probably much longer duration. This has been treated by the patient during his dressing changes, recently also seen at the wound care center last week, and the patient was referred to vascular service because of past relationship . On their initial visit to the wound healing center, 23, the patient has noted that the wound has not been improving.   The patient has had similar previous wounds in the past.          Patient had longstanding history of leg swelling, skin breakdown and ulcerations, due to venous insufficiency as well as lymphedema has undergone lymphedema therapy in the past but not recently, underwent extensive vascular work-up in the past including venous ultrasound studies, arterial Doppler studies, recommended medical therapy        Pt is currently not on abx.       2023  Discussed with nursing staff, was informed, the home health care is not seeing the patient due to his home situation  Patient tells me that his neighbor across the street is doing the dressing changes  Patient was advised, when he takes a shower, to scrub the legs of all the hyperkeratotic and dead epithelium with a washcloth and then do the dressing changes and keep the legs elevated to decrease the swelling  Wounds look slightly better, patient did not  some of his prescriptions, was advised to call his pharmacy  Lab work reviewed, CBC, CMP, no major abnormal findings    2/6/2023  Right leg wound looks better  Discussed with patient and the nursing staff regarding referral of the patient to podiatry service for onychomycotic toenails  Inform the patient, that we will make additional recommendation once the vascular lab testing is completed    2/13/2023  Discussed the patient regarding results of the ankle-brachial index on the venous ultrasound as outlined below, overall satisfactory    The vascular lab testings were reviewed    1.  Ankle-brachial index, normal with good arterial flow    2.  Bilateral venous ultrasound studies, no DVT, and no evidence of any significant reflux at the saphenofemoral junction noted on the worksheet, the report revealed only left side    Right calf wound looks better  Some of the onychomycotic toenails were debrided, patient was recommended, to make sure that he does follow-up with his podiatrist regarding the toenail care  Continue the compression bandage    2/20/2023  Right calf wound much better, skin scab only fat layer almost healed completely  Reviewed dry scaly hyperkeratotic skin, discussed with the nursing staff, apply Aquaphor underneath the bandage  2/27/2023  Wounds healing well, small skin ulcer only, will apply compression wrapping again, next week, may consider referral to lymphedema therapy once everything is healed  3/6/2023  Wounds healed, patient denied extensive hyperkeratotic dry scaly skin, instructed to apply Aquaphor on a daily basis, twice a day for the next few weeks, continue lymphedema therapy, keep the legs elevated and call as needed if the situation worsens    Wound/Ulcer Pain Timing/Severity: constant  Quality of pain: dull, aching  Severity:  3 / 10   Modifying Factors: Pain worsens with  walking, walks with help of a walker  Associated Signs/Symptoms: edema and drainage     Ulcer Identification:  Ulcer Type: venous and non-healing/non-surgical  Contributing Factors: edema, venous stasis, lymphedema, and poor hygiene     Diabetic/Pressure/Non Pressure Ulcers only:  Ulcer: N/A     If patient has diabetic lower extremity wounds  Mitchell Classification of diabetic lower extremity wounds:     Grade Description   []  0 No open wound   []  1 Superficial ulcer involving the full skin thickness   []  2 Deep ulcer involves ligament, tendon, joint capsule, or fascia  No bone involvement or abscess presence   []  3 Deep Ulcer with abcess formation and/or osteomyelitis   []  4 Localized gangrene   []  5 Extensive gangrene of the foot      Wound: Patient does have extensive onychomycosis, dermatophytosis, tinea pedis, ulceration of the right Buttock over the lateral aspect right calf with drainage     Other pertinent information:     1. Recent wound cultures revealed evidence of MSSA     2. No recent lab work was done patient recommended to have a CBC and CMP done today     3. CTA of the aorta with runoff that was done in August 2017, no evidence of left popliteal artery occlusive disease that was suspected     4. Past ankle-brachial index revealed adequate arterial flow to both feet, in 2017     5.   Bilateral venous ultrasound studies the reflux, done in 2017 revealed no evidence of deep vein thrombosis, the right saphenofemoral junction could not be evaluated by the right mid and distal saphenous vein was competent and on the left side, left saphenofemoral junction was reported to be competent        1/23/2023     Discussed the patient, options, risks benefits and alternatives were explained to the patient, patient was recommended to see his podiatrist regarding onychomycosis of the toenails and also trimming of the toenails  Patient recommended complete work-up including CBC, CMP, vascular testing including lower extremity arterial Doppler study as well as venous ultrasound with reflux  Patient recommended topical antifungal cream, miconazole along with short-term oral Lamisil therapy and also based on the wound cultures, Keflex 500 mg p.o. 4 times daily for 10 days pending repeat wound cultures that were done today  The patient recommended, to keep the legs elevated to decrease edema also importance of nutrition, multivitamin supplementation and protein supplementation was discussed  All his questions were answered               PAST MEDICAL HISTORY      Diagnosis Date    Cellulitis of right lower leg     Chronic venous insufficiency 1/23/2023    Decreased dorsalis pedis pulse 1/23/2023    Dermatophytosis 1/23/2023    Diabetes mellitus ([address]     Leg swelling 1/23/2023    Lymphedema     Lymphedema of both lower extremities 1/23/2023    Obesity, Class III, BMI 40-49.9 (morbid obesity) ([address]     Onychomycosis 1/23/2023    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    Skin ulcer of right calf, limited to breakdown of skin ([address] 2/20/2023    Tinea pedis of both feet 1/23/2023     Past Surgical History:   Procedure Laterality Date    FRACTURE SURGERY  2005    right ankle repair     Family History   Problem Relation Age of Onset    Heart Disease Mother     Heart Disease Father      Social History     Tobacco Use    Smoking status: Never    Smokeless tobacco: Never   Vaping Use    Vaping Use: Never used   Substance Use Topics    Alcohol use: No     Comment: on special occassions    Drug use: No     Allergies   Allergen Reactions    Metformin And Related Diarrhea     Current Outpatient Medications on File Prior to Encounter   Medication Sig Dispense Refill    miconazole nitrate 2 % OINT Apply topically 2 times daily Please apply them from the toes, in between the toes, both feet and both calfs up to the knee twice a day for 1 month 1 each 10    aspirin (ASPIRIN CHILDRENS) 81 MG chewable tablet Take 1 tablet by mouth daily 30 tablet 5     No current facility-administered medications on file prior to encounter. REVIEW OF SYSTEMS See HPI    Objective:    BP (!) 164/89   Pulse 75   Temp 97.5 °F (36.4 °C) (Temporal)   Resp 18   Ht 6' 2\" (1.88 m)   Wt (!) 350 lb (158.8 kg)   BMI 44.94 kg/m²   Wt Readings from Last 3 Encounters:   03/06/23 (!) 350 lb (158.8 kg)   02/13/23 (!) 350 lb (158.8 kg)   02/06/23 (!) 350 lb (158.8 kg)     PHYSICAL EXAM  CONSTITUTIONAL:   Awake, alert, cooperative   EYES:  lids and lashes normal   ENT: external ears and nose without lesions   NECK:  supple, symmetrical, trachea midline   SKIN:  Open wound Present    Assessment:     Problem List Items Addressed This Visit          Medium    Lymphedema of both lower extremities       Low    Venous stasis ulcer of left lower leg with edema of left lower leg ([address] - Primary    Relevant Orders    Initiate Outpatient Wound Care Protocol       Unprioritized    Morbid obesity with BMI of 45.0-49.9, adult [facility])    Relevant Orders    Initiate Outpatient Wound Care Protocol       Pre Debridement Measurements:  Are located in the [place]  Documentation Flow Sheet  Post Debridement Measurements:  Wound/Ulcer Descriptions are Pre Debridement except measurements:    Wound 09/13/21 Leg Left;Lateral #6 venous (Active)   Number of days: 673       Wound 01/19/23 Pretibial Right;Lateral #1 (Active)   Wound Image   03/06/23 1438   Wound Etiology Venous 02/20/23 1515   Dressing Status New dressing applied;Clean;Dry; Intact 02/27/23 1503   Wound Cleansed Cleansed with saline 02/27/23 1503   Dressing/Treatment Alginate with Ag;ABD 02/20/23 1515   Wound Length (cm) 0 cm 03/06/23 1438   Wound Width (cm) 0 cm 03/06/23 1438   Wound Depth (cm) 0 cm 03/06/23 1438   Wound Surface Area (cm^2) 0 cm^2 03/06/23 1438   Change in Wound Size % (l*w) 100 03/06/23 1438   Wound Volume (cm^3) 0 cm^3 03/06/23 1438   Wound Healing % 100 03/06/23 1438   Post-Procedure Length (cm) 0 cm 03/06/23 1528 Post-Procedure Width (cm) 0 cm 03/06/23 1522   Post-Procedure Depth (cm) 0 cm 03/06/23 1522   Post-Procedure Surface Area (cm^2) 0 cm^2 03/06/23 1522   Post-Procedure Volume (cm^3) 0 cm^3 03/06/23 1522   Wound Assessment Epithelialization 03/06/23 1438   Drainage Amount None 03/06/23 1438   Drainage Description Serosanguinous 02/27/23 1416   Odor None 03/06/23 1438   [place]len-wound Assessment Dry/flaky 03/06/23 1438   Number of days: 46          Procedure Note  Indications:  Based on my examination of this patient's wound(s)/ulcer(s) today, debridement is required to promote healing and evaluate the wound base. Performed by: Ammy Caceres MD    Consent obtained:  Yes    Time out taken:  Yes    Pain Control: Anesthetic  Anesthetic: 4% Lidocaine Liquid Topical     Debridement: Wound not debrided wound healed    Using curette the wound(s)/ulcer(s) was/were sharply debrided down through and including the removal of epidermis and dermis. Devitalized Tissue Debrided:  fibrin and slough to stimulate bleeding to promote healing, post debridement good bleeding base and wound edges noted      Response to treatment:  Well tolerated by patient. Plan:   Treatment Note please see attached Discharge Instructions    Written patient dismissal instructions given to patient and signed by patient or POA. Discharge Instructions         Visit Discharge/Physician Orders     Discharge condition: Stable     Assessment of pain at discharge: None     Anesthetic used: Lidocaine 4%     Discharge to: Home     Left via:Private automobile     Accompanied by: accompanied by self     ECF/HHA: none     Dressing Orders: Right leg wound: healed. Continue to moisturize. Spandigrip to bilateral legs, on in the AM and off in the PM.        Treatment Orders:  Lymphedema therapy referral for next week   FOLLOW NUTRITIOUS DIET.  CHOOSE FOODS HIGH IN PROTEIN -CHICKEN- FISH-AND EGGS,  CHOOSE FOODS HIGH IN VITAMIN C.   MULTIVITAMIN DAILY. Keep leg elevated as much as possible. Culture taken in clinic 1/23/23- reviewed, take antibiotics as prescribed  Blood work- reviewed  Continue using antifungal pill and cream as prescribed     Vasculars      [address] [place] [address],[address] followup visit ______no follow up, call if needed_______________________  (Please note your next appointment above and if you are unable to keep, kindly give a 24 hour notice. Thank you.)     Physician signature:__________________________        If you experience any of the following, please call the [facility] during business hours:     * Increase in Pain  * Temperature over 101  * Increase in drainage from your wound  * Drainage with a foul odor  * Bleeding  * Increase in swelling  * Need for compression bandage changes due to slippage, breakthrough drainage. If you need medical attention outside of the business hours of the [facility] please contact your PCP or go to the nearest emergency room.              Electronically signed by Alisia Blount MD on 3/6/2023 at 3:25 PM

## 2023-03-08 RX ORDER — CLOTRIMAZOLE 1 %
CREAM (GRAM) TOPICAL
Qty: 30 G | Refills: 1 | Status: SHIPPED | OUTPATIENT
Start: 2023-03-08

## 2023-06-26 NOTE — PROGRESS NOTES
Wound Healing Center Followup Visit Note    Referring Physician : Evgeny Chow MD  [address] [place] [address] [place] RECORD NUMBER:  [id number]  AGE: 79 y.o. GENDER: male  : 1950  EPISODE DATE:  5/10/2021    Subjective:     Chief Complaint   Patient presents with    Wound Check     right leg      HISTORY of PRESENT ILLNESS HPI   Moira Cabrera is a 79 y.o. male who presents today in regards to follow up evaluation and treatment of wound/ulcer. That patient's past medical, family and social hx were reviewed and changes were made if present. History of Wound Context:  The patient has had a wound of right  which was first noted approximately 1 week ago.  This has not been treated. On their initial visit to the wound healing center, 2021,  the patient has noted that the wound has not been improving.  The patient has had similar previous wounds in the past.       Pt is not on abx at time of initial visit.     21 continue local care, elevate, compression.     5-3-21 wound covered with devitalized nonviable tissue, through sub q      Wound/Ulcer Pain Timing/Severity: none  Quality of pain:   Severity:   / 10   Modifying Factors:   Associated Signs/Symptoms:      Ulcer Identification:  Ulcer Type: venous  Contributing Factors: edema and venous stasis        PAST MEDICAL HISTORY      Diagnosis Date    Cellulitis of right lower leg     Diabetes mellitus ([address]     Lymphedema     Obesity, Class III, BMI 40-49.9 (morbid obesity) ([address]     Popliteal aneurysm ([address] 2017    Left     Past Surgical History:   Procedure Laterality Date    FRACTURE SURGERY      right ankle repair     Family History   Problem Relation Age of Onset    Heart Disease Mother     Heart Disease Father      Social History     Tobacco Use    Smoking status: Never Smoker    Smokeless tobacco: Never Used   Substance Use Topics    Alcohol use: No     Comment: on special occassions    Drug use: No     Allergies   Allergen Reactions    Metformin And Related Diarrhea     Current Outpatient Medications on File Prior to Encounter   Medication Sig Dispense Refill    aspirin (ASPIRIN CHILDRENS) 81 MG chewable tablet Take 1 tablet by mouth daily 30 tablet 5     No current facility-administered medications on file prior to encounter. 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, limited to breakdown of skin ([address] - Primary    Relevant Medications    lidocaine (XYLOCAINE) 4 % external solution (Start on 5/10/2021  1:30 PM)    Other Relevant Orders    Initiate Outpatient Wound Care Protocol          Pre Debridement Measurements:  Are located in the [facility]  Documentation Flow Sheet  Post Debridement Measurements:  Wound/Ulcer Descriptions are Pre Debridement except measurements:     Wound 04/19/21 Leg Right;Lateral #1 (Active)   Wound Image   04/19/21 1323   Dressing Status New dressing applied;Clean;Dry; Intact 04/26/21 1351   Wound Cleansed Betadine/povidone iodine 05/03/21 1332   Dressing/Treatment ABD; Alginate with Ag 05/03/21 1332   Offloading for Diabetic Foot Ulcers Other (comment) 04/26/21 1351   Wound Length (cm) 5.1 cm 05/10/21 1313   Wound Width (cm) 3 cm 05/10/21 1313   Wound Depth (cm) 0.1 cm 05/10/21 1313   Wound Surface Area (cm^2) 15.3 cm^2 05/10/21 1313   Change in Wound Size % (l*w) 91.5 05/10/21 1313   Wound Volume (cm^3) 1.53 cm^3 05/10/21 1313   Wound Healing % 96 05/10/21 1313 Carac Counseling:  I discussed with the patient the risks of Carac including but not limited to erythema, scaling, itching, weeping, crusting, and pain.

## 2023-07-03 ENCOUNTER — HOSPITAL ENCOUNTER (OUTPATIENT)
Dept: WOUND CARE | Age: 73
Discharge: HOME | End: 2023-07-03

## 2023-07-17 ENCOUNTER — HOSPITAL ENCOUNTER (OUTPATIENT)
Dept: WOUND CARE | Age: 73
Discharge: HOME | End: 2023-07-17
Payer: MEDICARE

## 2023-07-17 VITALS
RESPIRATION RATE: 18 BRPM | DIASTOLIC BLOOD PRESSURE: 86 MMHG | HEIGHT: 74 IN | TEMPERATURE: 96 F | HEART RATE: 71 BPM | BODY MASS INDEX: 40.43 KG/M2 | WEIGHT: 315 LBS | SYSTOLIC BLOOD PRESSURE: 158 MMHG

## 2023-07-17 DIAGNOSIS — I89.0 LYMPHEDEMA OF BOTH LOWER EXTREMITIES: ICD-10-CM

## 2023-07-17 DIAGNOSIS — M79.89 LEG SWELLING: ICD-10-CM

## 2023-07-17 DIAGNOSIS — I87.2 CHRONIC VENOUS INSUFFICIENCY: ICD-10-CM

## 2023-07-17 DIAGNOSIS — B35.1 ONYCHOMYCOSIS: Primary | ICD-10-CM

## 2023-07-17 DIAGNOSIS — L97.212 LOWER LIMB ULCER, CALF, RIGHT, WITH FAT LAYER EXPOSED (HCC): ICD-10-CM

## 2023-07-17 DIAGNOSIS — B35.9 DERMATOPHYTOSIS: ICD-10-CM

## 2023-07-17 DIAGNOSIS — B35.3 TINEA PEDIS OF BOTH FEET: ICD-10-CM

## 2023-07-17 PROBLEM — L97.929 VENOUS STASIS ULCER OF LEFT LOWER LEG WITH EDEMA OF LEFT LOWER LEG (HCC): Status: RESOLVED | Noted: 2023-01-19 | Resolved: 2023-07-17

## 2023-07-17 PROBLEM — L97.211 SKIN ULCER OF RIGHT CALF, LIMITED TO BREAKDOWN OF SKIN (HCC): Status: RESOLVED | Noted: 2023-02-20 | Resolved: 2023-07-17

## 2023-07-17 PROBLEM — L97.222 VENOUS STASIS ULCER OF LEFT CALF WITH FAT LAYER EXPOSED WITHOUT VARICOSE VEINS (HCC): Chronic | Status: RESOLVED | Noted: 2020-11-25 | Resolved: 2023-07-17

## 2023-07-17 PROBLEM — I83.029 VENOUS STASIS ULCER OF LEFT LOWER LEG WITH EDEMA OF LEFT LOWER LEG (HCC): Status: RESOLVED | Noted: 2023-01-19 | Resolved: 2023-07-17

## 2023-07-17 PROBLEM — R60.0 VENOUS STASIS ULCER OF LEFT LOWER LEG WITH EDEMA OF LEFT LOWER LEG (HCC): Status: RESOLVED | Noted: 2023-01-19 | Resolved: 2023-07-17

## 2023-07-17 PROBLEM — I83.892 VENOUS STASIS ULCER OF LEFT LOWER LEG WITH EDEMA OF LEFT LOWER LEG (HCC): Status: RESOLVED | Noted: 2023-01-19 | Resolved: 2023-07-17

## 2023-07-17 PROCEDURE — 11042 DBRDMT SUBQ TIS 1ST 20SQCM/<: CPT

## 2023-07-17 PROCEDURE — 99213 OFFICE O/P EST LOW 20 MIN: CPT

## 2023-07-17 RX ORDER — TERBINAFINE HYDROCHLORIDE 250 MG/1
250 TABLET ORAL DAILY
Qty: 20 TABLET | Refills: 0 | Status: SHIPPED | OUTPATIENT
Start: 2023-07-17 | End: 2023-08-06

## 2023-07-17 RX ORDER — BETAMETHASONE DIPROPIONATE 0.05 %
OINTMENT (GRAM) TOPICAL ONCE
OUTPATIENT
Start: 2023-07-17 | End: 2023-07-17

## 2023-07-17 RX ORDER — IBUPROFEN 200 MG
TABLET ORAL ONCE
OUTPATIENT
Start: 2023-07-17 | End: 2023-07-17

## 2023-07-17 RX ORDER — SODIUM CHLOR/HYPOCHLOROUS ACID 0.033 %
SOLUTION, IRRIGATION IRRIGATION ONCE
OUTPATIENT
Start: 2023-07-17 | End: 2023-07-17

## 2023-07-17 RX ORDER — LIDOCAINE HYDROCHLORIDE 20 MG/ML
JELLY TOPICAL ONCE
OUTPATIENT
Start: 2023-07-17 | End: 2023-07-17

## 2023-07-17 RX ORDER — BACITRACIN ZINC 500 [USP'U]/G
OINTMENT TOPICAL ONCE
OUTPATIENT
Start: 2023-07-17 | End: 2023-07-17

## 2023-07-17 RX ORDER — LIDOCAINE HYDROCHLORIDE 40 MG/ML
SOLUTION TOPICAL ONCE
OUTPATIENT
Start: 2023-07-17 | End: 2023-07-17

## 2023-07-17 RX ORDER — CLOBETASOL PROPIONATE 0.5 MG/G
OINTMENT TOPICAL ONCE
OUTPATIENT
Start: 2023-07-17 | End: 2023-07-17

## 2023-07-17 RX ORDER — GENTAMICIN SULFATE 1 MG/G
OINTMENT TOPICAL ONCE
OUTPATIENT
Start: 2023-07-17 | End: 2023-07-17

## 2023-07-17 RX ORDER — BACITRACIN ZINC AND POLYMYXIN B SULFATE 500; 1000 [USP'U]/G; [USP'U]/G
OINTMENT TOPICAL ONCE
OUTPATIENT
Start: 2023-07-17 | End: 2023-07-17

## 2023-07-17 RX ORDER — LIDOCAINE 40 MG/G
CREAM TOPICAL ONCE
OUTPATIENT
Start: 2023-07-17 | End: 2023-07-17

## 2023-07-17 RX ORDER — LIDOCAINE HYDROCHLORIDE 40 MG/ML
SOLUTION TOPICAL ONCE
Status: COMPLETED | OUTPATIENT
Start: 2023-07-17 | End: 2023-07-17

## 2023-07-17 RX ORDER — LIDOCAINE 50 MG/G
OINTMENT TOPICAL ONCE
OUTPATIENT
Start: 2023-07-17 | End: 2023-07-17

## 2023-07-17 RX ADMIN — LIDOCAINE HYDROCHLORIDE 15 ML: 40 SOLUTION TOPICAL at 14:09

## 2023-07-17 NOTE — PROGRESS NOTES
No    Treatment Note please see attached Discharge Instructions    Written patient dismissal instructions given to patient and signed by patient or POA. Discharge Instructions         Visit Discharge/Physician Orders    Discharge condition: Stable  Assessment of pain at discharge: none  Anesthetic used: lidocaine 4%   Discharge to: Home  Left via:Private automobile  Accompanied by: accompanied by self  ECF/HHA: none at this time    Dressing Orders: Cleanse wound to right leg with normal saline, apply ALGINATE AG to wound bed and cover with ABD pad and  wrap leg with UNNA boot and COBAN- change weekly at wound care center     Treatment Orders:  Miconazole Ointment prescription sent to pharmacy- apply to feet, toes, and legs- avoiding wounds twice a day. Lamisil tablets prescription sent to pharmacy- take by mouth as prescribed. 7/17 Lab work ordered    EAT DIET WITH PROTEINS AND VITAMIN C  TAKE A MULTIVITAMIN DAILY IF NOT CONTRAINDICATED       [address] [place] [address] followup visit _________1 week_____________  (Please note your next appointment above and if you are unable to keep, kindly give a 24 hour notice. Thank you.)    Physician signature:__________________________      If you experience any of the following, please call the [facility] during business hours:    * Increase in Pain  * Temperature over 101  * Increase in drainage from your wound  * Drainage with a foul odor  * Bleeding  * Increase in swelling  * Need for compression bandage changes due to slippage, breakthrough drainage. If you need medical attention outside of the business hours of the [facility] please contact your PCP or go to the nearest emergency room.          Electronically signed by Lacey Christina MD on 7/17/2023 at 3:07 PM

## 2023-07-19 NOTE — DISCHARGE INSTRUCTIONS
Visit Discharge/Physician Orders     Discharge condition: Stable  Assessment of pain at discharge: none  Anesthetic used: lidocaine 4%   Discharge to: Home  Left via:Private automobile  Accompanied by: accompanied by self  ECF/HHA: none at this time     Dressing Orders: Cleanse wound to right leg with normal saline, apply ALGINATE AG to wound bed and cover with ABD pad and  wrap leg with UNNA boot and COBAN- change weekly at wound care center      Treatment Orders:  7/24 Lymphedema therapy eval and treat sent   Miconazole Ointment prescription sent to pharmacy- apply to feet, toes, and legs- avoiding wounds twice a day. Lamisil tablets prescription sent to pharmacy- take by mouth as prescribed. 7/17 Lab work ordered    EAT DIET WITH PROTEINS AND VITAMIN C  TAKE A MULTIVITAMIN DAILY IF NOT CONTRAINDICATED        [address] [place] [address] followup visit _________1 week_____________  (Please note your next appointment above and if you are unable to keep, kindly give a 24 hour notice. Thank you.)     Physician signature:__________________________        If you experience any of the following, please call the [facility] during business hours:     * Increase in Pain  * Temperature over 101  * Increase in drainage from your wound  * Drainage with a foul odor  * Bleeding  * Increase in swelling  * Need for compression bandage changes due to slippage, breakthrough drainage. If you need medical attention outside of the business hours of the [facility] please contact your PCP or go to the nearest emergency room.

## 2023-07-24 ENCOUNTER — HOSPITAL ENCOUNTER (OUTPATIENT)
Dept: WOUND CARE | Age: 73
Discharge: HOME | End: 2023-07-24
Payer: MEDICARE

## 2023-07-24 ENCOUNTER — HOSPITAL ENCOUNTER (OUTPATIENT)
Age: 73
Discharge: HOME | End: 2023-07-24
Payer: MEDICARE

## 2023-07-24 DIAGNOSIS — I89.0 LYMPHEDEMA OF BOTH LOWER EXTREMITIES: ICD-10-CM

## 2023-07-24 DIAGNOSIS — L97.212 LOWER LIMB ULCER, CALF, RIGHT, WITH FAT LAYER EXPOSED (HCC): Primary | ICD-10-CM

## 2023-07-24 DIAGNOSIS — L97.212 LOWER LIMB ULCER, CALF, RIGHT, WITH FAT LAYER EXPOSED (HCC): ICD-10-CM

## 2023-07-24 DIAGNOSIS — B35.9 DERMATOPHYTOSIS: ICD-10-CM

## 2023-07-24 DIAGNOSIS — I87.2 CHRONIC VENOUS INSUFFICIENCY: ICD-10-CM

## 2023-07-24 LAB
ALBUMIN SERPL-MCNC: 3.9 G/DL (ref 3.5–5.2)
ALP SERPL-CCNC: 67 U/L (ref 40–129)
ALT SERPL-CCNC: 13 U/L (ref 0–40)
ANION GAP SERPL CALCULATED.3IONS-SCNC: 13 MMOL/L (ref 7–16)
AST SERPL-CCNC: 14 U/L (ref 0–39)
BILIRUB SERPL-MCNC: 0.4 MG/DL (ref 0–1.2)
BUN SERPL-MCNC: 11 MG/DL (ref 6–23)
CALCIUM SERPL-MCNC: 8.8 MG/DL (ref 8.6–10.2)
CHLORIDE SERPL-SCNC: 103 MMOL/L (ref 98–107)
CO2 SERPL-SCNC: 23 MMOL/L (ref 22–29)
CREAT SERPL-MCNC: 0.8 MG/DL (ref 0.7–1.2)
ERYTHROCYTE [DISTWIDTH] IN BLOOD BY AUTOMATED COUNT: 14.8 % (ref 11.5–15)
GFR SERPL CREATININE-BSD FRML MDRD: >60 ML/MIN/1.73M2
GLUCOSE SERPL-MCNC: 107 MG/DL (ref 74–99)
HCT VFR BLD AUTO: 41.4 % (ref 37–54)
HGB BLD-MCNC: 12.5 G/DL (ref 12.5–16.5)
MCH RBC QN AUTO: 27.1 PG (ref 26–35)
MCHC RBC AUTO-ENTMCNC: 30.2 G/DL (ref 32–34.5)
MCV RBC AUTO: 89.8 FL (ref 80–99.9)
PLATELET # BLD AUTO: 263 K/UL (ref 130–450)
PMV BLD AUTO: 9.7 FL (ref 7–12)
POTASSIUM SERPL-SCNC: 3.4 MMOL/L (ref 3.5–5)
PROT SERPL-MCNC: 8.4 G/DL (ref 6.4–8.3)
RBC # BLD AUTO: 4.61 M/UL (ref 3.8–5.8)
SODIUM SERPL-SCNC: 139 MMOL/L (ref 132–146)
WBC OTHER # BLD: 7.2 K/UL (ref 4.5–11.5)

## 2023-07-24 PROCEDURE — 11042 DBRDMT SUBQ TIS 1ST 20SQCM/<: CPT | Performed by: SURGERY

## 2023-07-24 PROCEDURE — 80053 COMPREHEN METABOLIC PANEL: CPT

## 2023-07-24 PROCEDURE — 36415 COLL VENOUS BLD VENIPUNCTURE: CPT

## 2023-07-24 PROCEDURE — 11042 DBRDMT SUBQ TIS 1ST 20SQCM/<: CPT

## 2023-07-24 PROCEDURE — 85027 COMPLETE CBC AUTOMATED: CPT

## 2023-07-24 RX ORDER — LIDOCAINE HYDROCHLORIDE 20 MG/ML
JELLY TOPICAL ONCE
OUTPATIENT
Start: 2023-07-24 | End: 2023-07-24

## 2023-07-24 RX ORDER — LIDOCAINE 50 MG/G
OINTMENT TOPICAL ONCE
OUTPATIENT
Start: 2023-07-24 | End: 2023-07-24

## 2023-07-24 RX ORDER — BACITRACIN ZINC 500 [USP'U]/G
OINTMENT TOPICAL ONCE
OUTPATIENT
Start: 2023-07-24 | End: 2023-07-24

## 2023-07-24 RX ORDER — LIDOCAINE HYDROCHLORIDE 40 MG/ML
SOLUTION TOPICAL ONCE
Status: COMPLETED | OUTPATIENT
Start: 2023-07-24 | End: 2023-07-24

## 2023-07-24 RX ORDER — IBUPROFEN 200 MG
TABLET ORAL ONCE
OUTPATIENT
Start: 2023-07-24 | End: 2023-07-24

## 2023-07-24 RX ORDER — SODIUM CHLOR/HYPOCHLOROUS ACID 0.033 %
SOLUTION, IRRIGATION IRRIGATION ONCE
OUTPATIENT
Start: 2023-07-24 | End: 2023-07-24

## 2023-07-24 RX ORDER — CLOBETASOL PROPIONATE 0.5 MG/G
OINTMENT TOPICAL ONCE
OUTPATIENT
Start: 2023-07-24 | End: 2023-07-24

## 2023-07-24 RX ORDER — BETAMETHASONE DIPROPIONATE 0.05 %
OINTMENT (GRAM) TOPICAL ONCE
OUTPATIENT
Start: 2023-07-24 | End: 2023-07-24

## 2023-07-24 RX ORDER — GENTAMICIN SULFATE 1 MG/G
OINTMENT TOPICAL ONCE
OUTPATIENT
Start: 2023-07-24 | End: 2023-07-24

## 2023-07-24 RX ORDER — LIDOCAINE HYDROCHLORIDE 40 MG/ML
SOLUTION TOPICAL ONCE
OUTPATIENT
Start: 2023-07-24 | End: 2023-07-24

## 2023-07-24 RX ORDER — BACITRACIN ZINC AND POLYMYXIN B SULFATE 500; 1000 [USP'U]/G; [USP'U]/G
OINTMENT TOPICAL ONCE
OUTPATIENT
Start: 2023-07-24 | End: 2023-07-24

## 2023-07-24 RX ORDER — LIDOCAINE 40 MG/G
CREAM TOPICAL ONCE
OUTPATIENT
Start: 2023-07-24 | End: 2023-07-24

## 2023-07-24 RX ADMIN — LIDOCAINE HYDROCHLORIDE 5 ML: 40 SOLUTION TOPICAL at 13:50

## 2023-07-26 ENCOUNTER — CLINICAL DOCUMENTATION (OUTPATIENT)
Dept: VASCULAR SURGERY | Age: 73
End: 2023-07-26

## 2023-07-26 ENCOUNTER — TELEPHONE (OUTPATIENT)
Dept: INTERNAL MEDICINE | Age: 73
End: 2023-07-26

## 2023-07-26 NOTE — PROGRESS NOTES
Lab work reviewed, CBC and CMP, no major abnormal findings other than minimally elevated blood glucose level 104 and potassium of 3.4, will request the nursing staff from the wound care to fax results to the patient's PCP

## 2023-07-26 NOTE — PROGRESS NOTES
Lymphedema therapy eval and treat, face sheet and doctors note sent to [facility] lymphedema with confirmation back
Patient lab results faxed to Dr Swanson Free office for review with confirmation fax back.
Addressed This Visit          Other    Lower limb ulcer, calf, right, with fat layer exposed ([address]) - Primary    Relevant Orders    Initiate Outpatient Wound Care Protocol    PT lymphedema evaluation and treatment    Lymphedema of both lower extremities    Relevant Orders    PT lymphedema evaluation and treatment       Pre Debridement Measurements:  Are located in the [place]  Documentation Flow Sheet  Post Debridement Measurements:  Wound/Ulcer Descriptions are Pre Debridement except measurements:    Wound 09/13/21 Leg Left;Lateral #6 venous (Active)   Number of days: 679       Wound 07/17/23 Leg Right; Lower; Lateral #1 (Active)   Wound Image   07/17/23 1402   Wound Etiology Venous 07/17/23 1402   Wound Length (cm) 5.1 cm 07/24/23 1347   Wound Width (cm) 6.6 cm 07/24/23 1347   Wound Depth (cm) 0.1 cm 07/24/23 1347   Wound Surface Area (cm^2) 33.66 cm^2 07/24/23 1347   Change in Wound Size % (l*w) 6.5 07/24/23 1347   Wound Volume (cm^3) 3.366 cm^3 07/24/23 1347   Wound Healing % 7 07/24/23 1347   Post-Procedure Length (cm) 5.3 cm 07/24/23 1445   Post-Procedure Width (cm) 6.7 cm 07/24/23 1445   Post-Procedure Depth (cm) 0.2 cm 07/24/23 1445   Post-Procedure Surface Area (cm^2) 35.51 cm^2 07/24/23 1445   Post-Procedure Volume (cm^3) 7.102 cm^3 07/24/23 1445   Wound Assessment Fibrin;Pale granulation tissue 07/24/23 1347   Drainage Amount Moderate 07/24/23 1347   Drainage Description Serous; Yellow 07/24/23 1347   Odor None 07/24/23 1347   [place]leen-wound Assessment Dry/flaky; Maceration 07/24/23 1347   Margins Attached edges 07/24/23 1347   Wound Thickness Description not for Pressure Injury Full thickness 07/24/23 1347   Number of days: 7          Procedure Note  Indications:  Based on my examination of this patient's wound(s)/ulcer(s) today, debridement is  required to promote healing and evaluate the wound base.     Performed by: Raymond Galan MD    Consent obtained:  Yes    Time out taken:  Yes    Pain Control:

## 2023-07-26 NOTE — TELEPHONE ENCOUNTER
Notified and faxed labs that were sent to Internal medicine to vascular office for review that they ordered.  Patient last seen in Internal Medicine 2021

## 2023-07-31 ENCOUNTER — HOSPITAL ENCOUNTER (OUTPATIENT)
Dept: WOUND CARE | Age: 73
Discharge: HOME | End: 2023-07-31
Payer: MEDICARE

## 2023-07-31 VITALS
WEIGHT: 315 LBS | RESPIRATION RATE: 18 BRPM | TEMPERATURE: 97.6 F | HEART RATE: 74 BPM | BODY MASS INDEX: 40.43 KG/M2 | SYSTOLIC BLOOD PRESSURE: 157 MMHG | DIASTOLIC BLOOD PRESSURE: 87 MMHG | HEIGHT: 74 IN

## 2023-07-31 DIAGNOSIS — E87.6 HYPOKALEMIA: ICD-10-CM

## 2023-07-31 DIAGNOSIS — L97.212 LOWER LIMB ULCER, CALF, RIGHT, WITH FAT LAYER EXPOSED (HCC): Primary | ICD-10-CM

## 2023-07-31 PROCEDURE — 99212 OFFICE O/P EST SF 10 MIN: CPT | Performed by: SURGERY

## 2023-07-31 PROCEDURE — 11042 DBRDMT SUBQ TIS 1ST 20SQCM/<: CPT

## 2023-07-31 PROCEDURE — 11042 DBRDMT SUBQ TIS 1ST 20SQCM/<: CPT | Performed by: SURGERY

## 2023-07-31 RX ORDER — LIDOCAINE HYDROCHLORIDE 20 MG/ML
JELLY TOPICAL ONCE
OUTPATIENT
Start: 2023-07-31 | End: 2023-07-31

## 2023-07-31 RX ORDER — SODIUM CHLOR/HYPOCHLOROUS ACID 0.033 %
SOLUTION, IRRIGATION IRRIGATION ONCE
OUTPATIENT
Start: 2023-07-31 | End: 2023-07-31

## 2023-07-31 RX ORDER — BETAMETHASONE DIPROPIONATE 0.05 %
OINTMENT (GRAM) TOPICAL ONCE
OUTPATIENT
Start: 2023-07-31 | End: 2023-07-31

## 2023-07-31 RX ORDER — LIDOCAINE HYDROCHLORIDE 40 MG/ML
SOLUTION TOPICAL ONCE
OUTPATIENT
Start: 2023-07-31 | End: 2023-07-31

## 2023-07-31 RX ORDER — LIDOCAINE 50 MG/G
OINTMENT TOPICAL ONCE
OUTPATIENT
Start: 2023-07-31 | End: 2023-07-31

## 2023-07-31 RX ORDER — CLOBETASOL PROPIONATE 0.5 MG/G
OINTMENT TOPICAL ONCE
OUTPATIENT
Start: 2023-07-31 | End: 2023-07-31

## 2023-07-31 RX ORDER — BACITRACIN ZINC AND POLYMYXIN B SULFATE 500; 1000 [USP'U]/G; [USP'U]/G
OINTMENT TOPICAL ONCE
OUTPATIENT
Start: 2023-07-31 | End: 2023-07-31

## 2023-07-31 RX ORDER — GENTAMICIN SULFATE 1 MG/G
OINTMENT TOPICAL ONCE
OUTPATIENT
Start: 2023-07-31 | End: 2023-07-31

## 2023-07-31 RX ORDER — BACITRACIN ZINC 500 [USP'U]/G
OINTMENT TOPICAL ONCE
OUTPATIENT
Start: 2023-07-31 | End: 2023-07-31

## 2023-07-31 RX ORDER — LIDOCAINE 40 MG/G
CREAM TOPICAL ONCE
OUTPATIENT
Start: 2023-07-31 | End: 2023-07-31

## 2023-07-31 RX ORDER — LIDOCAINE HYDROCHLORIDE 40 MG/ML
SOLUTION TOPICAL ONCE
Status: COMPLETED | OUTPATIENT
Start: 2023-07-31 | End: 2023-07-31

## 2023-07-31 RX ORDER — IBUPROFEN 200 MG
TABLET ORAL ONCE
OUTPATIENT
Start: 2023-07-31 | End: 2023-07-31

## 2023-07-31 RX ADMIN — LIDOCAINE HYDROCHLORIDE 5 ML: 40 SOLUTION TOPICAL at 13:16

## 2023-07-31 NOTE — PROGRESS NOTES
Wound Healing Center Followup Visit Note    Referring Physician : Emily Epperson MD  [address] RECORD NUMBER:  [id number]  AGE: 68 y.o. GENDER: male  : 1950  EPISODE DATE:  2023    Subjective:     Chief Complaint   Patient presents with    Wound Check     Right leg      HISTORY of PRESENT ILLNESS HPI   Cherylene Boyers is a 68 y.o. male who presents today in regards to follow up evaluation and treatment of wound/ulcer. That patient's past medical, family and social hx were reviewed and changes were made if present. History of Wound Context:  The patient has had a wound of right calf which was first noted approximately 1 month ago. This has been treated by the patient. On their initial visit to the wound healing center, 23, the patient has noted that the wound has not been improving.   The patient has had similar previous wounds in the past.       In the past, patient was found to have venous insufficiency, dermatophytosis, tinea pedis, lymphedema, was treated with a topical antifungal cream, oral Lamisil therapy, underwent extensive vascular work-up including ankle-brachial index, normal, with good arterial flow to both feet, also did undergo venous ultrasound with reflux, no DVT and no reflux, will recommend lymphedema therapy and once maximum improvement is noted, have the legs measured for compression device but patient did not follow-up with lymphedema therapy and has not been wearing any compression stockings     Pt is currently not on abx.            2023  Patient's ulcer looks better, we will also refer him to lymphedema therapy in anticipation of wound healing in the interim, instructed to keep legs elevated  2023  Wound improving  The potassium levels were faxed to patient's PCP, they call our office indicating that since they did not see him for a long time, they would not be treating it, I gave her prescription for potassium supplement for the

## 2023-07-31 NOTE — DISCHARGE INSTRUCTIONS
Visit Discharge/Physician Orders     Discharge condition: Stable  Assessment of pain at discharge: none  Anesthetic used: lidocaine 4%   Discharge to: Home  Left via:Private automobile  Accompanied by: accompanied by self  ECF/HHA: none at this time     Dressing Orders:     Cleanse wound to right leg with normal saline, apply ALGINATE AG to wound bed and cover with ABD pad and  wrap leg with UNNA boot and COBAN- change weekly at wound care center      Treatment Orders:      Potassium prescription sent in for 30 day supply due to a low potassium level  Appointment to be set up at clinic    7/24 Lymphedema therapy eval and treat sent-- unable to be seen by them. Okay to use lymphedema pumps at home    Continue using miconazole cream and taking Lamisil as prescribed  EAT DIET WITH PROTEINS AND VITAMIN C  TAKE A MULTIVITAMIN DAILY IF NOT CONTRAINDICATED        [facility] followup visit _________1 week_____________  (Please note your next appointment above and if you are unable to keep, kindly give a 24 hour notice. Thank you.)     Physician signature:__________________________        If you experience any of the following, please call the [facility] during business hours:     * Increase in Pain  * Temperature over 101  * Increase in drainage from your wound  * Drainage with a foul odor  * Bleeding  * Increase in swelling  * Need for compression bandage changes due to slippage, breakthrough drainage. If you need medical attention outside of the business hours of the [facility] please contact your PCP or go to the nearest emergency room.

## 2023-08-03 NOTE — DISCHARGE INSTRUCTIONS
Visit Discharge/Physician Orders     Discharge condition: Stable  Assessment of pain at discharge: none  Anesthetic used: lidocaine 4%   Discharge to: Home  Left via:Private automobile  Accompanied by: accompanied by self  ECF/HHA: none at this time     Dressing Orders:      Cleanse wound to right leg with normal saline, apply ALGINATE AG to wound bed and cover with ABD pad and  wrap leg with UNNA boot and COBAN- change weekly at wound care center      Treatment Orders:       Potassium prescription sent in for 30 day supply due to a low potassium level  Appointment set up with [facility] for Friday Aug 11th @ , transportation set up     Continue using miconazole cream and taking Lamisil as prescribed  EAT DIET WITH PROTEINS AND VITAMIN C  TAKE A MULTIVITAMIN DAILY IF NOT CONTRAINDICATED        [facility] followup visit _________1 week_____________  (Please note your next appointment above and if you are unable to keep, kindly give a 24 hour notice. Thank you.)     Physician signature:__________________________        If you experience any of the following, please call the [facility] during business hours:     * Increase in Pain  * Temperature over 101  * Increase in drainage from your wound  * Drainage with a foul odor  * Bleeding  * Increase in swelling  * Need for compression bandage changes due to slippage, breakthrough drainage. If you need medical attention outside of the business hours of the [facility] please contact your PCP or go to the nearest emergency room.

## 2023-08-07 ENCOUNTER — HOSPITAL ENCOUNTER (OUTPATIENT)
Dept: WOUND CARE | Age: 73
Discharge: HOME | End: 2023-08-07
Payer: MEDICARE

## 2023-08-07 VITALS
WEIGHT: 315 LBS | RESPIRATION RATE: 18 BRPM | SYSTOLIC BLOOD PRESSURE: 151 MMHG | TEMPERATURE: 96.5 F | DIASTOLIC BLOOD PRESSURE: 80 MMHG | BODY MASS INDEX: 40.43 KG/M2 | HEART RATE: 70 BPM | HEIGHT: 74 IN

## 2023-08-07 DIAGNOSIS — L97.212 LOWER LIMB ULCER, CALF, RIGHT, WITH FAT LAYER EXPOSED (HCC): Primary | ICD-10-CM

## 2023-08-07 PROCEDURE — 11042 DBRDMT SUBQ TIS 1ST 20SQCM/<: CPT | Performed by: SURGERY

## 2023-08-07 PROCEDURE — 11042 DBRDMT SUBQ TIS 1ST 20SQCM/<: CPT

## 2023-08-07 RX ORDER — LIDOCAINE HYDROCHLORIDE 40 MG/ML
SOLUTION TOPICAL ONCE
Status: COMPLETED | OUTPATIENT
Start: 2023-08-07 | End: 2023-08-07

## 2023-08-07 RX ORDER — LIDOCAINE 50 MG/G
OINTMENT TOPICAL ONCE
OUTPATIENT
Start: 2023-08-07 | End: 2023-08-07

## 2023-08-07 RX ORDER — SODIUM CHLOR/HYPOCHLOROUS ACID 0.033 %
SOLUTION, IRRIGATION IRRIGATION ONCE
OUTPATIENT
Start: 2023-08-07 | End: 2023-08-07

## 2023-08-07 RX ORDER — BACITRACIN ZINC 500 [USP'U]/G
OINTMENT TOPICAL ONCE
OUTPATIENT
Start: 2023-08-07 | End: 2023-08-07

## 2023-08-07 RX ORDER — LIDOCAINE HYDROCHLORIDE 20 MG/ML
JELLY TOPICAL ONCE
OUTPATIENT
Start: 2023-08-07 | End: 2023-08-07

## 2023-08-07 RX ORDER — BACITRACIN ZINC AND POLYMYXIN B SULFATE 500; 1000 [USP'U]/G; [USP'U]/G
OINTMENT TOPICAL ONCE
OUTPATIENT
Start: 2023-08-07 | End: 2023-08-07

## 2023-08-07 RX ORDER — LIDOCAINE 40 MG/G
CREAM TOPICAL ONCE
OUTPATIENT
Start: 2023-08-07 | End: 2023-08-07

## 2023-08-07 RX ORDER — GENTAMICIN SULFATE 1 MG/G
OINTMENT TOPICAL ONCE
OUTPATIENT
Start: 2023-08-07 | End: 2023-08-07

## 2023-08-07 RX ORDER — BETAMETHASONE DIPROPIONATE 0.05 %
OINTMENT (GRAM) TOPICAL ONCE
OUTPATIENT
Start: 2023-08-07 | End: 2023-08-07

## 2023-08-07 RX ORDER — LIDOCAINE HYDROCHLORIDE 40 MG/ML
SOLUTION TOPICAL ONCE
OUTPATIENT
Start: 2023-08-07 | End: 2023-08-07

## 2023-08-07 RX ORDER — IBUPROFEN 200 MG
TABLET ORAL ONCE
OUTPATIENT
Start: 2023-08-07 | End: 2023-08-07

## 2023-08-07 RX ORDER — CLOBETASOL PROPIONATE 0.5 MG/G
OINTMENT TOPICAL ONCE
OUTPATIENT
Start: 2023-08-07 | End: 2023-08-07

## 2023-08-07 RX ADMIN — LIDOCAINE HYDROCHLORIDE 10 ML: 40 SOLUTION TOPICAL at 13:30

## 2023-08-07 NOTE — PROGRESS NOTES
Wound Healing Center Followup Visit Note    Referring Physician : Yolande Simpson MD  [address] RECORD NUMBER:  [id number]  AGE: 68 y.o. GENDER: male  : 1950  EPISODE DATE:  2023    Subjective:     Chief Complaint   Patient presents with    Wound Check     Right leg       HISTORY of PRESENT ILLNESS HPI   Jose Alberto Verdin is a 68 y.o. male who presents today in regards to follow up evaluation and treatment of wound/ulcer. That patient's past medical, family and social hx were reviewed and changes were made if present. History of Wound Context:  The patient has had a wound of right calf which was first noted approximately 1 month ago. This has been treated by the patient. On their initial visit to the wound healing center, 23, the patient has noted that the wound has not been improving.   The patient has had similar previous wounds in the past.       In the past, patient was found to have venous insufficiency, dermatophytosis, tinea pedis, lymphedema, was treated with a topical antifungal cream, oral Lamisil therapy, underwent extensive vascular work-up including ankle-brachial index, normal, with good arterial flow to both feet, also did undergo venous ultrasound with reflux, no DVT and no reflux, will recommend lymphedema therapy and once maximum improvement is noted, have the legs measured for compression device but patient did not follow-up with lymphedema therapy and has not been wearing any compression stockings     Pt is currently not on abx.            2023  Patient's ulcer looks better, we will also refer him to lymphedema therapy in anticipation of wound healing in the interim, instructed to keep legs elevated  2023  Wound improving  The potassium levels were faxed to patient's PCP, they call our office indicating that since they did not see him for a long time, they would not be treating it, I gave her prescription for potassium supplement for the

## 2023-08-07 NOTE — PROGRESS NOTES
[facility] pharmacy called to confirm potassium prescription was available; however prior authorization was needed. I spoke with the pharmacist and confirmed with Dr Dawn Atkinson, to replace with a medication that is covered through the patient's insurance. Patient informed.

## 2023-08-07 NOTE — PLAN OF CARE
Problem: Chronic Conditions and Co-morbidities  Goal: Patient's chronic conditions and co-morbidity symptoms are monitored and maintained or improved  Outcome: Progressing     Problem: Wound:  Goal: Will show signs of wound healing; wound closure and no evidence of infection  Description: Will show signs of wound healing; wound closure and no evidence of infection  Outcome: Progressing     Problem: Venous:  Goal: Signs of wound healing will improve  Description: Signs of wound healing will improve  Outcome: Completed     Problem: Compression therapy:  Goal: Will be free from complications associated with compression therapy  Description: Will be free from complications associated with compression therapy  Outcome: Completed

## 2023-08-09 NOTE — PROGRESS NOTES
Doctor appt set up for this patient at [facility] for august 11th at 2pm. Appointment set up after Dr. Yunior Vásquez request due to the patient not having a recent PCP. Patient transportation was set up by our  at the wound care center for this appt. Patient was informed and given information about appointment and transportation today at his [facility] appt. Pateint understands everything is set up and he needs to go to be seen by a family doctor.

## 2023-08-09 NOTE — DISCHARGE INSTRUCTIONS
Visit Discharge/Physician Orders     Discharge condition: Stable  Assessment of pain at discharge: none  Anesthetic used: lidocaine 4%   Discharge to: Home  Left via:Private automobile  Accompanied by: accompanied by self  ECF/HHA: none at this time     Dressing Orders:      Cleanse wound to right leg with normal saline, apply ALGINATE AG to wound bed and cover with ABD pad and  wrap leg with UNNA boot and COBAN- change weekly at wound care center      Treatment Orders:       Potassium prescription sent in for 30 day supply due to a low potassium level  Appointment set up with [facility] for Friday Aug 11th @ , transportation set up     Continue using miconazole cream and taking Lamisil as prescribed  EAT DIET WITH PROTEINS AND VITAMIN C  TAKE A MULTIVITAMIN DAILY IF NOT CONTRAINDICATED        [facility] followup visit _________1 week_____________  (Please note your next appointment above and if you are unable to keep, kindly give a 24 hour notice. Thank you.)     Physician signature:__________________________        If you experience any of the following, please call the [facility] during business hours:     * Increase in Pain  * Temperature over 101  * Increase in drainage from your wound  * Drainage with a foul odor  * Bleeding  * Increase in swelling  * Need for compression bandage changes due to slippage, breakthrough drainage. If you need medical attention outside of the business hours of the [facility] please contact your PCP or go to the nearest emergency room.

## 2023-08-14 ENCOUNTER — TELEPHONE (OUTPATIENT)
Dept: WOUND CARE | Age: 73
End: 2023-08-14

## 2023-08-14 ENCOUNTER — HOSPITAL ENCOUNTER (OUTPATIENT)
Dept: WOUND CARE | Age: 73
Discharge: HOME | End: 2023-08-14

## 2023-08-14 ENCOUNTER — HOSPITAL ENCOUNTER (EMERGENCY)
Age: 73
Discharge: HOME | End: 2023-08-14
Payer: MEDICARE

## 2023-08-14 VITALS
WEIGHT: 315 LBS | TEMPERATURE: 97.8 F | RESPIRATION RATE: 18 BRPM | SYSTOLIC BLOOD PRESSURE: 147 MMHG | HEART RATE: 74 BPM | DIASTOLIC BLOOD PRESSURE: 85 MMHG | OXYGEN SATURATION: 100 % | BODY MASS INDEX: 43.65 KG/M2

## 2023-08-14 DIAGNOSIS — Z48.00 CHANGE OR REMOVAL OF WOUND DRESSING: Primary | ICD-10-CM

## 2023-08-14 PROCEDURE — 99282 EMERGENCY DEPT VISIT SF MDM: CPT

## 2023-08-14 ASSESSMENT — LIFESTYLE VARIABLES
HOW MANY STANDARD DRINKS CONTAINING ALCOHOL DO YOU HAVE ON A TYPICAL DAY: PATIENT DOES NOT DRINK
HOW OFTEN DO YOU HAVE A DRINK CONTAINING ALCOHOL: NEVER

## 2023-08-14 ASSESSMENT — PAIN - FUNCTIONAL ASSESSMENT: PAIN_FUNCTIONAL_ASSESSMENT: NONE - DENIES PAIN

## 2023-08-14 NOTE — ED PROVIDER NOTES
Independent [facility] Visit. [facility]  ED  Encounter Note  Admit Date/RoomTime: 2023  3:22 PM  ED Room:   NAME: Charmayne Foyer  : 1950  MRN: [id number]  PCP: Az Chauhan MD    CHIEF COMPLAINT     Wound Check ([facility] brought him to wrong place, was supposed to go to wound care. [facility] said they cant take him back so they left him here to be seen )    HISTORY OF PRESENT ILLNESS        Charmayne Foyer is a 68 y.o. male who presents to the ED by private vehicle for wound care. Patient states that he follows with wound care for chronic venous stasis ulcerations to the right lower leg. Patient's dressing was last placed 1 week ago today. He states that his ride share caused him to miss his appointment this morning. He presents to the ER for wound care. He states that the dressing cannot stay on for longer than 1 week and it is due to be changed today. He denies any injury to the area, no increased pain, fevers or chills. He has no current complaints. Nothing make his symptoms better or worse. REVIEW OF SYSTEMS     Pertinent positives and negatives are stated within HPI, all other systems reviewed and are negative. Past Medical History:  has a past medical history of Cellulitis of right lower leg, Chronic venous insufficiency, Decreased dorsalis pedis pulse, Dermatophytosis, Diabetes mellitus ([address]), Hypokalemia, Leg swelling, Lower limb ulcer, calf, right, with fat layer exposed ([address]), Lymphedema, Lymphedema of both lower extremities, Obesity, Class III, BMI 40-49.9 (morbid obesity) ([address]), Onychomycosis, Popliteal aneurysm ([address]), Skin ulcer of right calf, limited to breakdown of skin ([address]), and Tinea pedis of both feet. Surgical History:  has a past surgical history that includes fracture surgery (). Social History:  reports that he has never smoked.  He has never used smokeless tobacco. He reports that he

## 2023-08-14 NOTE — TELEPHONE ENCOUNTER
Returned call to [facility] regarding voicemail that was left about dressing that was removed wanting to know what to replace with. When attempting to instruct nurse to just cover with a dry dressing the, Nurse at facility states that due to the facility not having silver alginate, the drs at the facility felt it was best for the patient to be treated at the ER. Pts' ride share for appointment today dropped patient off at wrong location, which caused patient to miss scheduled appointment. Questions and concerns addressed.

## 2023-08-16 NOTE — DISCHARGE INSTRUCTIONS
Visit Discharge/Physician Orders     Discharge condition: Stable  Assessment of pain at discharge: none  Anesthetic used: lidocaine 4%   Discharge to: Home  Left via:Private automobile  Accompanied by: accompanied by self  ECF/HHA: none at this time     Dressing Orders:      Cleanse wound to right leg with normal saline, apply ALGINATE AG to wound bed and cover with ABD pad and  wrap leg with UNNA boot and COBAN- change weekly at wound care center   Apply aquaphor to leg before wrapping     Treatment Orders:       EAT DIET WITH PROTEINS AND VITAMIN C  TAKE A MULTIVITAMIN DAILY IF NOT CONTRAINDICATED        [address] [place] [address] followup visit _________1 week_____________  (Please note your next appointment above and if you are unable to keep, kindly give a 24 hour notice. Thank you.)     Physician signature:__________________________        If you experience any of the following, please call the [facility] during business hours:     * Increase in Pain  * Temperature over 101  * Increase in drainage from your wound  * Drainage with a foul odor  * Bleeding  * Increase in swelling  * Need for compression bandage changes due to slippage, breakthrough drainage. If you need medical attention outside of the business hours of the [facility] please contact your PCP or go to the nearest emergency room.

## 2023-08-21 ENCOUNTER — HOSPITAL ENCOUNTER (OUTPATIENT)
Dept: WOUND CARE | Age: 73
Discharge: HOME | End: 2023-08-21
Payer: MEDICARE

## 2023-08-21 VITALS
HEIGHT: 74 IN | BODY MASS INDEX: 40.43 KG/M2 | TEMPERATURE: 96.5 F | SYSTOLIC BLOOD PRESSURE: 163 MMHG | WEIGHT: 315 LBS | DIASTOLIC BLOOD PRESSURE: 85 MMHG | HEART RATE: 79 BPM | RESPIRATION RATE: 18 BRPM

## 2023-08-21 DIAGNOSIS — L97.212 LOWER LIMB ULCER, CALF, RIGHT, WITH FAT LAYER EXPOSED (HCC): Primary | ICD-10-CM

## 2023-08-21 PROCEDURE — 11042 DBRDMT SUBQ TIS 1ST 20SQCM/<: CPT | Performed by: SURGERY

## 2023-08-21 PROCEDURE — 11042 DBRDMT SUBQ TIS 1ST 20SQCM/<: CPT

## 2023-08-21 RX ORDER — LIDOCAINE 40 MG/G
CREAM TOPICAL ONCE
OUTPATIENT
Start: 2023-08-21 | End: 2023-08-21

## 2023-08-21 RX ORDER — LIDOCAINE HYDROCHLORIDE 20 MG/ML
JELLY TOPICAL ONCE
OUTPATIENT
Start: 2023-08-21 | End: 2023-08-21

## 2023-08-21 RX ORDER — IBUPROFEN 200 MG
TABLET ORAL ONCE
OUTPATIENT
Start: 2023-08-21 | End: 2023-08-21

## 2023-08-21 RX ORDER — CLOBETASOL PROPIONATE 0.5 MG/G
OINTMENT TOPICAL ONCE
OUTPATIENT
Start: 2023-08-21 | End: 2023-08-21

## 2023-08-21 RX ORDER — LIDOCAINE HYDROCHLORIDE 40 MG/ML
SOLUTION TOPICAL ONCE
OUTPATIENT
Start: 2023-08-21 | End: 2023-08-21

## 2023-08-21 RX ORDER — LIDOCAINE HYDROCHLORIDE 40 MG/ML
SOLUTION TOPICAL ONCE
Status: COMPLETED | OUTPATIENT
Start: 2023-08-21 | End: 2023-08-21

## 2023-08-21 RX ORDER — BACITRACIN ZINC AND POLYMYXIN B SULFATE 500; 1000 [USP'U]/G; [USP'U]/G
OINTMENT TOPICAL ONCE
OUTPATIENT
Start: 2023-08-21 | End: 2023-08-21

## 2023-08-21 RX ORDER — LIDOCAINE 50 MG/G
OINTMENT TOPICAL ONCE
OUTPATIENT
Start: 2023-08-21 | End: 2023-08-21

## 2023-08-21 RX ORDER — BACITRACIN ZINC 500 [USP'U]/G
OINTMENT TOPICAL ONCE
OUTPATIENT
Start: 2023-08-21 | End: 2023-08-21

## 2023-08-21 RX ORDER — SODIUM CHLOR/HYPOCHLOROUS ACID 0.033 %
SOLUTION, IRRIGATION IRRIGATION ONCE
OUTPATIENT
Start: 2023-08-21 | End: 2023-08-21

## 2023-08-21 RX ORDER — GENTAMICIN SULFATE 1 MG/G
OINTMENT TOPICAL ONCE
OUTPATIENT
Start: 2023-08-21 | End: 2023-08-21

## 2023-08-21 RX ORDER — BETAMETHASONE DIPROPIONATE 0.05 %
OINTMENT (GRAM) TOPICAL ONCE
OUTPATIENT
Start: 2023-08-21 | End: 2023-08-21

## 2023-08-21 RX ADMIN — LIDOCAINE HYDROCHLORIDE 8 ML: 40 SOLUTION TOPICAL at 15:38

## 2023-08-21 NOTE — PROGRESS NOTES
Surface Area (cm^2) 3.3 cm^2 08/21/23 1530   Wound Volume (cm^3) 0.33 cm^3 08/21/23 1530   Post-Procedure Length (cm) 1.1 cm 08/21/23 1556   Post-Procedure Width (cm) 3.4 cm 08/21/23 1556   Post-Procedure Depth (cm) 0.2 cm 08/21/23 1556   Post-Procedure Surface Area (cm^2) 3.74 cm^2 08/21/23 1556   Post-Procedure Volume (cm^3) 0.748 cm^3 08/21/23 1556   Wound Assessment Granulation tissue;Fibrin 08/21/23 1530   Drainage Amount Moderate (25-50%) 08/21/23 1530   Drainage Description Serosanguinous; Serous 08/21/23 1530   Odor None 08/21/23 1530   Colleen-wound Assessment Dry/flaky 08/21/23 1530   Margins Attached edges 08/21/23 1530   Wound Thickness Description not for Pressure Injury Full thickness 08/21/23 1530   Number of days: 0          Procedure Note  Indications:  Based on my examination of this patient's wound(s)/ulcer(s) today, debridement is  required to promote healing and evaluate the wound base. Performed by: Hernandez Fulton MD    Consent obtained:  Yes    Time out taken:  Yes    Pain Control: Anesthetic  Anesthetic: 4% Lidocaine Liquid Topical     Debridement:Excisional Debridement    Using curette the wound(s)/ulcer(s) was/were sharply debrided down through and including the removal of epidermis, dermis, and subcutaneous tissue. Devitalized Tissue Debrided:  fibrin and slough to stimulate bleeding to promote healing, post debridement good bleeding base and wound edges noted    Wound/Ulcer #: 1    Percent of Wound/Ulcer Debrided: 90%    Total Surface Area Debrided:  3 sq cm     Estimated Blood Loss:  Minimal  Hemostasis Achieved:  by pressure    Procedural Pain:  4  / 10   Post Procedural Pain:  3 / 10     Response to treatment:  Well tolerated by patient. Plan:   Treatment Note please see attached Discharge Instructions    Written patient dismissal instructions given to patient and signed by patient or POA.          Discharge Instructions         Visit Discharge/Physician Orders

## 2023-08-25 NOTE — DISCHARGE INSTRUCTIONS
Visit Discharge/Physician Orders     Discharge condition: Stable  Assessment of pain at discharge: none  Anesthetic used: lidocaine 4%   Discharge to: Home  Left via:Private automobile  Accompanied by: accompanied by self  ECF/HHA: none at this time     Dressing Orders:      Cleanse wound to right leg with normal saline, apply ALGINATE AG to wound bed and cover with ABD pad and  wrap leg with UNNA boot and COBAN- change weekly at wound care center   Apply aquaphor to leg before wrapping     Treatment Orders:       EAT DIET WITH PROTEINS AND VITAMIN C  TAKE A MULTIVITAMIN DAILY IF NOT CONTRAINDICATED        [facility] followup visit _________1 week Tuesday_____________  (Please note your next appointment above and if you are unable to keep, kindly give a 24 hour notice. Thank you.)     Physician signature:__________________________        If you experience any of the following, please call the [facility] during business hours:     * Increase in Pain  * Temperature over 101  * Increase in drainage from your wound  * Drainage with a foul odor  * Bleeding  * Increase in swelling  * Need for compression bandage changes due to slippage, breakthrough drainage. If you need medical attention outside of the business hours of the [facility] please contact your PCP or go to the nearest emergency room.

## 2023-08-28 ENCOUNTER — HOSPITAL ENCOUNTER (OUTPATIENT)
Dept: WOUND CARE | Age: 73
Discharge: HOME | End: 2023-08-28
Payer: MEDICARE

## 2023-08-28 VITALS
RESPIRATION RATE: 18 BRPM | SYSTOLIC BLOOD PRESSURE: 173 MMHG | TEMPERATURE: 97 F | DIASTOLIC BLOOD PRESSURE: 83 MMHG | HEART RATE: 84 BPM

## 2023-08-28 DIAGNOSIS — L97.211 LOWER LIMB ULCER, CALF, RIGHT, LIMITED TO BREAKDOWN OF SKIN (HCC): ICD-10-CM

## 2023-08-28 DIAGNOSIS — L97.212 LOWER LIMB ULCER, CALF, RIGHT, WITH FAT LAYER EXPOSED (HCC): Primary | ICD-10-CM

## 2023-08-28 PROCEDURE — 97597 DBRDMT OPN WND 1ST 20 CM/<: CPT | Performed by: SURGERY

## 2023-08-28 PROCEDURE — 97597 DBRDMT OPN WND 1ST 20 CM/<: CPT

## 2023-08-28 NOTE — PROGRESS NOTES
Blood Loss:  Minimal  Hemostasis Achieved:  by pressure    Procedural Pain:  4  / 10   Post Procedural Pain:  3 / 10     Response to treatment:  Well tolerated by patient. Plan:   Treatment Note please see attached Discharge Instructions    Written patient dismissal instructions given to patient and signed by patient or POA. Discharge Instructions              Visit Discharge/Physician Orders     Discharge condition: Stable  Assessment of pain at discharge: none  Anesthetic used: lidocaine 4%   Discharge to: Home  Left via:Private automobile  Accompanied by: accompanied by self  ECF/HHA: none at this time     Dressing Orders:      Cleanse wound to right leg with normal saline, apply ALGINATE AG to wound bed and cover with ABD pad and  wrap leg with UNNA boot and COBAN- change weekly at wound care center   Apply aquaphor to leg before wrapping     Treatment Orders:       EAT DIET WITH PROTEINS AND VITAMIN C  TAKE A MULTIVITAMIN DAILY IF NOT CONTRAINDICATED        [address] [place] [address] followup visit _________1 week Tuesday_____________  (Please note your next appointment above and if you are unable to keep, kindly give a 24 hour notice. Thank you.)     Physician signature:__________________________        If you experience any of the following, please call the [facility] during business hours:     * Increase in Pain  * Temperature over 101  * Increase in drainage from your wound  * Drainage with a foul odor  * Bleeding  * Increase in swelling  * Need for compression bandage changes due to slippage, breakthrough drainage. If you need medical attention outside [facility] please contact your PCP or go to the nearest emergency room.                Electronically signed by Mariam Burch MD on 8/28/2023 at 2:09 PM

## 2023-09-08 NOTE — DISCHARGE INSTRUCTIONS
Visit Discharge/Physician Orders     Discharge condition: Stable  Assessment of pain at discharge: none  Anesthetic used: lidocaine 4%   Discharge to: Home  Left via:Private automobile  Accompanied by: accompanied by self  ECF/HHA: none at this time     Dressing Orders:      wound to right leg healed, spandigrip to right leg  Spandgrip on Right leg- On in morning and off at night        Treatment Orders:       EAT DIET WITH PROTEINS AND VITAMIN C  TAKE A MULTIVITAMIN DAILY IF NOT CONTRAINDICATED        [facility] followup visit _________call as needed_____________  (Please note your next appointment above and if you are unable to keep, kindly give a 24 hour notice. Thank you.)     Physician signature:__________________________        If you experience any of the following, please call the [facility] during business hours:     * Increase in Pain  * Temperature over 101  * Increase in drainage from your wound  * Drainage with a foul odor  * Bleeding  * Increase in swelling  * Need for compression bandage changes due to slippage, breakthrough drainage. If you need medical attention outside of the business hours of the [facility] please contact your PCP or go to the nearest emergency room.

## 2023-09-11 ENCOUNTER — HOSPITAL ENCOUNTER (OUTPATIENT)
Dept: WOUND CARE | Age: 73
Discharge: HOME | End: 2023-09-11
Payer: MEDICARE

## 2023-09-11 VITALS
DIASTOLIC BLOOD PRESSURE: 74 MMHG | TEMPERATURE: 96.9 F | SYSTOLIC BLOOD PRESSURE: 154 MMHG | RESPIRATION RATE: 16 BRPM | HEART RATE: 74 BPM

## 2023-09-11 DIAGNOSIS — L97.212 LOWER LIMB ULCER, CALF, RIGHT, WITH FAT LAYER EXPOSED (HCC): Primary | ICD-10-CM

## 2023-09-11 DIAGNOSIS — I89.0 LYMPHEDEMA OF BOTH LOWER EXTREMITIES: ICD-10-CM

## 2023-09-11 DIAGNOSIS — L97.211 LOWER LIMB ULCER, CALF, RIGHT, LIMITED TO BREAKDOWN OF SKIN (HCC): ICD-10-CM

## 2023-09-11 PROCEDURE — 99212 OFFICE O/P EST SF 10 MIN: CPT | Performed by: SURGERY

## 2023-09-11 PROCEDURE — 99212 OFFICE O/P EST SF 10 MIN: CPT

## 2023-09-11 RX ORDER — GENTAMICIN SULFATE 1 MG/G
OINTMENT TOPICAL ONCE
Status: CANCELLED | OUTPATIENT
Start: 2023-09-11 | End: 2023-09-11

## 2023-09-11 RX ORDER — LIDOCAINE HYDROCHLORIDE 40 MG/ML
SOLUTION TOPICAL ONCE
Status: COMPLETED | OUTPATIENT
Start: 2023-09-11 | End: 2023-09-11

## 2023-09-11 RX ORDER — LIDOCAINE HYDROCHLORIDE 20 MG/ML
JELLY TOPICAL ONCE
Status: CANCELLED | OUTPATIENT
Start: 2023-09-11 | End: 2023-09-11

## 2023-09-11 RX ORDER — LIDOCAINE HYDROCHLORIDE 40 MG/ML
SOLUTION TOPICAL ONCE
Status: CANCELLED | OUTPATIENT
Start: 2023-09-11 | End: 2023-09-11

## 2023-09-11 RX ORDER — BACITRACIN ZINC AND POLYMYXIN B SULFATE 500; 1000 [USP'U]/G; [USP'U]/G
OINTMENT TOPICAL ONCE
Status: CANCELLED | OUTPATIENT
Start: 2023-09-11 | End: 2023-09-11

## 2023-09-11 RX ORDER — SODIUM CHLOR/HYPOCHLOROUS ACID 0.033 %
SOLUTION, IRRIGATION IRRIGATION ONCE
Status: CANCELLED | OUTPATIENT
Start: 2023-09-11 | End: 2023-09-11

## 2023-09-11 RX ORDER — BACITRACIN ZINC 500 [USP'U]/G
OINTMENT TOPICAL ONCE
Status: CANCELLED | OUTPATIENT
Start: 2023-09-11 | End: 2023-09-11

## 2023-09-11 RX ORDER — LIDOCAINE 50 MG/G
OINTMENT TOPICAL ONCE
Status: CANCELLED | OUTPATIENT
Start: 2023-09-11 | End: 2023-09-11

## 2023-09-11 RX ORDER — LIDOCAINE 40 MG/G
CREAM TOPICAL ONCE
Status: CANCELLED | OUTPATIENT
Start: 2023-09-11 | End: 2023-09-11

## 2023-09-11 RX ORDER — BETAMETHASONE DIPROPIONATE 0.05 %
OINTMENT (GRAM) TOPICAL ONCE
Status: CANCELLED | OUTPATIENT
Start: 2023-09-11 | End: 2023-09-11

## 2023-09-11 RX ORDER — CLOBETASOL PROPIONATE 0.5 MG/G
OINTMENT TOPICAL ONCE
Status: CANCELLED | OUTPATIENT
Start: 2023-09-11 | End: 2023-09-11

## 2023-09-11 RX ORDER — IBUPROFEN 200 MG
TABLET ORAL ONCE
Status: CANCELLED | OUTPATIENT
Start: 2023-09-11 | End: 2023-09-11

## 2023-09-11 RX ADMIN — LIDOCAINE HYDROCHLORIDE: 40 SOLUTION TOPICAL at 13:33

## 2023-10-02 RX ORDER — CLOTRIMAZOLE 1 %
CREAM (GRAM) TOPICAL
Qty: 30 G | Refills: 1 | OUTPATIENT
Start: 2023-10-02

## 2023-10-23 ENCOUNTER — HOSPITAL ENCOUNTER (OUTPATIENT)
Dept: WOUND CARE | Age: 73
Discharge: HOME | End: 2023-10-23
Attending: SURGERY
Payer: MEDICARE

## 2023-10-23 VITALS
HEART RATE: 78 BPM | DIASTOLIC BLOOD PRESSURE: 76 MMHG | RESPIRATION RATE: 18 BRPM | SYSTOLIC BLOOD PRESSURE: 151 MMHG | TEMPERATURE: 97.1 F

## 2023-10-23 DIAGNOSIS — B35.3 TINEA PEDIS OF BOTH FEET: ICD-10-CM

## 2023-10-23 DIAGNOSIS — I89.0 LYMPHEDEMA OF BOTH LOWER EXTREMITIES: Primary | ICD-10-CM

## 2023-10-23 DIAGNOSIS — L97.222 LOWER LIMB ULCER, CALF, LEFT, WITH FAT LAYER EXPOSED (HCC): ICD-10-CM

## 2023-10-23 DIAGNOSIS — L97.211 ULCER OF CALF, LIMITED TO BREAKDOWN OF SKIN, RIGHT (HCC): ICD-10-CM

## 2023-10-23 DIAGNOSIS — B35.9 DERMATOPHYTOSIS: ICD-10-CM

## 2023-10-23 PROBLEM — E87.6 HYPOKALEMIA: Status: RESOLVED | Noted: 2023-07-31 | Resolved: 2023-10-23

## 2023-10-23 PROBLEM — L97.212 LOWER LIMB ULCER, CALF, RIGHT, WITH FAT LAYER EXPOSED (HCC): Status: RESOLVED | Noted: 2023-07-17 | Resolved: 2023-10-23

## 2023-10-23 PROCEDURE — 87070 CULTURE OTHR SPECIMN AEROBIC: CPT

## 2023-10-23 PROCEDURE — 11042 DBRDMT SUBQ TIS 1ST 20SQCM/<: CPT

## 2023-10-23 PROCEDURE — 99214 OFFICE O/P EST MOD 30 MIN: CPT

## 2023-10-23 PROCEDURE — 86403 PARTICLE AGGLUT ANTBDY SCRN: CPT

## 2023-10-23 PROCEDURE — 11042 DBRDMT SUBQ TIS 1ST 20SQCM/<: CPT | Performed by: SURGERY

## 2023-10-23 PROCEDURE — 87205 SMEAR GRAM STAIN: CPT

## 2023-10-23 PROCEDURE — 99214 OFFICE O/P EST MOD 30 MIN: CPT | Performed by: SURGERY

## 2023-10-23 RX ORDER — LIDOCAINE HYDROCHLORIDE 20 MG/ML
JELLY TOPICAL ONCE
OUTPATIENT
Start: 2023-10-23 | End: 2023-10-23

## 2023-10-23 RX ORDER — GENTAMICIN SULFATE 1 MG/G
OINTMENT TOPICAL ONCE
OUTPATIENT
Start: 2023-10-23 | End: 2023-10-23

## 2023-10-23 RX ORDER — IBUPROFEN 200 MG
TABLET ORAL ONCE
OUTPATIENT
Start: 2023-10-23 | End: 2023-10-23

## 2023-10-23 RX ORDER — BACITRACIN ZINC AND POLYMYXIN B SULFATE 500; 1000 [USP'U]/G; [USP'U]/G
OINTMENT TOPICAL ONCE
OUTPATIENT
Start: 2023-10-23 | End: 2023-10-23

## 2023-10-23 RX ORDER — LIDOCAINE HYDROCHLORIDE 40 MG/ML
SOLUTION TOPICAL ONCE
OUTPATIENT
Start: 2023-10-23 | End: 2023-10-23

## 2023-10-23 RX ORDER — LIDOCAINE 40 MG/G
CREAM TOPICAL ONCE
OUTPATIENT
Start: 2023-10-23 | End: 2023-10-23

## 2023-10-23 RX ORDER — TRIAMCINOLONE ACETONIDE 1 MG/G
OINTMENT TOPICAL ONCE
OUTPATIENT
Start: 2023-10-23 | End: 2023-10-23

## 2023-10-23 RX ORDER — BETAMETHASONE DIPROPIONATE 0.05 %
OINTMENT (GRAM) TOPICAL ONCE
OUTPATIENT
Start: 2023-10-23 | End: 2023-10-23

## 2023-10-23 RX ORDER — TERBINAFINE HYDROCHLORIDE 250 MG/1
250 TABLET ORAL DAILY
Qty: 20 TABLET | Refills: 0 | Status: SHIPPED | OUTPATIENT
Start: 2023-10-23 | End: 2023-11-12

## 2023-10-23 RX ORDER — LIDOCAINE 50 MG/G
OINTMENT TOPICAL ONCE
OUTPATIENT
Start: 2023-10-23 | End: 2023-10-23

## 2023-10-23 RX ORDER — SODIUM CHLOR/HYPOCHLOROUS ACID 0.033 %
SOLUTION, IRRIGATION IRRIGATION ONCE
OUTPATIENT
Start: 2023-10-23 | End: 2023-10-23

## 2023-10-23 RX ORDER — CLOBETASOL PROPIONATE 0.5 MG/G
OINTMENT TOPICAL ONCE
OUTPATIENT
Start: 2023-10-23 | End: 2023-10-23

## 2023-10-23 RX ORDER — BACITRACIN ZINC 500 [USP'U]/G
OINTMENT TOPICAL ONCE
OUTPATIENT
Start: 2023-10-23 | End: 2023-10-23

## 2023-10-23 NOTE — PROGRESS NOTES
Wound Healing Center /Hyperbarics   History and Physical/Consultation  Vascular    Referring Physician : Mayra Dickey MD  [address]  RECORD NUMBER:  [id number]  AGE: 68 y.o. GENDER: male  : 1950  EPISODE DATE:  10/23/2023  Subjective:     Chief Complaint   Patient presents with    Wound Check         HISTORY of PRESENT ILLNESS HPI     Lamar Estevez is a 68 y.o. male who presents today for wound/ulcer evaluation. History of Wound Context:  The patient has had a wound of both calves, minimal skin on the right side, multiple ulcers on the left calf, fat layer exposed which was first noted approximately 2023. This has been treated by the patient and his niece. On their initial visit to the wound healing center, 10/23/23, the patient has noted that the wound has not been improving. The patient has had similar previous wounds in the past.        Patient had lymphedema therapy in the past, with a lymphedema pump at home that the patient used to use on a regular basis, broke down,, is beyond repair and patient was recommended new lymphedema pump for which he was given the prescription    Pt is currently not on abx.       Wound/Ulcer Pain Timing/Severity: constant  Quality of pain: dull, aching  Severity:  3 / 10   Modifying Factors: Pain worsens with walking  Associated Signs/Symptoms: Edema, pain, drainage    Ulcer Identification:  Ulcer Type: venous, non-healing/non-surgical, and malignant wound  Contributing Factors: edema, venous stasis, lymphedema, chronic pressure, decreased mobility, and shear force    Diabetic/Pressure/Non Pressure Ulcers only:  Ulcer: Non-Pressure ulcer,      If patient has diabetic lower extremity wounds  Mitchell Classification of diabetic lower extremity wounds:    Grade Description   []  0 No open wound   []  1 Superficial ulcer involving the full skin thickness   []  2 Deep ulcer involves ligament, tendon, joint capsule, or fascia  No bone

## 2023-10-23 NOTE — DISCHARGE INSTRUCTIONS
Visit Discharge/Physician Orders    Discharge condition: Stable    Assessment of pain at discharge: yes    Anesthetic used: lidocaine 4%    Discharge to: Home    Left via:Private automobile    Accompanied by: accompanied by self    ECF/HHA:     Dressing Orders: Cleanse wound to left leg with normal saline, apply ALGINATE AG to wound bed and cover with ABD pad and UNNA BOOT AND COBAN from base of toes to base of knees- change weekly at wound care clinic. Right leg: Unna boot and coban- change weekly at wound care. Keep wrap dry at all times. If wrap becomes wet or falls 2 inches or painful- may remove wrap and apply daily dressings and spandigrip     Treatment Orders: 10/23 New Lymphedema pumps ordered   10/23  Culture taken  EAT DIET WITH PROTEINS AND VITAMIN C  TAKE A MULTIVITAMIN DAILY IF NOT CONTRAINDICATED       [address] [place] [address] followup visit ___________1 week_____________  (Please note your next appointment above and if you are unable to keep, kindly give a 24 hour notice. Thank you.)    Physician signature:__________________________      If you experience any of the following, please call the [employer] during business hours:    * Increase in Pain  * Temperature over 101  * Increase in drainage from your wound  * Drainage with a foul odor  * Bleeding  * Increase in swelling  * Need for compression bandage changes due to slippage, breakthrough drainage. If you need medical attention outside of the business hours of the [facility] please contact your PCP or go to the nearest emergency room.

## 2023-10-25 LAB
MICROORGANISM SPEC CULT: ABNORMAL
MICROORGANISM/AGENT SPEC: ABNORMAL
SPECIMEN DESCRIPTION: ABNORMAL

## 2023-10-25 NOTE — DISCHARGE INSTRUCTIONS
Visit Discharge/Physician Orders     Discharge condition: Stable     Assessment of pain at discharge: yes     Anesthetic used: lidocaine 4%     Discharge to: Home     Left via:Private automobile     Accompanied by: accompanied by self     ECF/HHA:      Dressing Orders: Cleanse wound to left leg with normal saline, apply ALGINATE AG to wound bed and cover with ABD pad and UNNA BOOT AND COBAN from base of toes to base of knees- change weekly at wound care clinic. Right leg: Unna boot and coban- change weekly at wound care. Keep wrap dry at all times. If wrap becomes wet or falls 2 inches or painful- may remove wrap and apply daily dressings and spandigrip      Treatment Orders: 10/23 New Lymphedema pumps ordered   10/23  Culture taken- mix fernando  10/30 culture taken   EAT DIET WITH PROTEINS AND VITAMIN C  TAKE A MULTIVITAMIN DAILY IF NOT CONTRAINDICATED        [address] [place] [address] followup visit ___________1 week_____________  (Please note your next appointment above and if you are unable to keep, kindly give a 24 hour notice. Thank you.)     Physician signature:__________________________        If you experience any of the following, please call the [facility] during business hours:     * Increase in Pain  * Temperature over 101  * Increase in drainage from your wound  * Drainage with a foul odor  * Bleeding  * Increase in swelling  * Need for compression bandage changes due to slippage, breakthrough drainage. If you need medical attention outside of the business hours of the [address] [place] [address] please contact your PCP or go to the nearest emergency room. General

## 2023-10-30 ENCOUNTER — HOSPITAL ENCOUNTER (OUTPATIENT)
Dept: WOUND CARE | Age: 73
Discharge: HOME | End: 2023-10-30
Attending: SURGERY
Payer: MEDICARE

## 2023-10-30 VITALS
RESPIRATION RATE: 20 BRPM | TEMPERATURE: 96.8 F | SYSTOLIC BLOOD PRESSURE: 138 MMHG | HEIGHT: 74 IN | DIASTOLIC BLOOD PRESSURE: 78 MMHG | WEIGHT: 315 LBS | HEART RATE: 88 BPM | BODY MASS INDEX: 40.43 KG/M2

## 2023-10-30 DIAGNOSIS — L97.222 LOWER LIMB ULCER, CALF, LEFT, WITH FAT LAYER EXPOSED (HCC): Primary | ICD-10-CM

## 2023-10-30 PROCEDURE — 87077 CULTURE AEROBIC IDENTIFY: CPT

## 2023-10-30 PROCEDURE — 11042 DBRDMT SUBQ TIS 1ST 20SQCM/<: CPT | Performed by: SURGERY

## 2023-10-30 PROCEDURE — 87070 CULTURE OTHR SPECIMN AEROBIC: CPT

## 2023-10-30 PROCEDURE — 11042 DBRDMT SUBQ TIS 1ST 20SQCM/<: CPT

## 2023-10-30 PROCEDURE — 87205 SMEAR GRAM STAIN: CPT

## 2023-10-30 RX ORDER — BACITRACIN ZINC AND POLYMYXIN B SULFATE 500; 1000 [USP'U]/G; [USP'U]/G
OINTMENT TOPICAL ONCE
OUTPATIENT
Start: 2023-10-30 | End: 2023-10-30

## 2023-10-30 RX ORDER — LIDOCAINE HYDROCHLORIDE 40 MG/ML
SOLUTION TOPICAL ONCE
OUTPATIENT
Start: 2023-10-30 | End: 2023-10-30

## 2023-10-30 RX ORDER — LIDOCAINE 50 MG/G
OINTMENT TOPICAL ONCE
OUTPATIENT
Start: 2023-10-30 | End: 2023-10-30

## 2023-10-30 RX ORDER — LIDOCAINE HYDROCHLORIDE 20 MG/ML
JELLY TOPICAL ONCE
OUTPATIENT
Start: 2023-10-30 | End: 2023-10-30

## 2023-10-30 RX ORDER — LIDOCAINE 40 MG/G
CREAM TOPICAL ONCE
OUTPATIENT
Start: 2023-10-30 | End: 2023-10-30

## 2023-10-30 RX ORDER — TRIAMCINOLONE ACETONIDE 1 MG/G
OINTMENT TOPICAL ONCE
OUTPATIENT
Start: 2023-10-30 | End: 2023-10-30

## 2023-10-30 RX ORDER — CLOBETASOL PROPIONATE 0.5 MG/G
OINTMENT TOPICAL ONCE
OUTPATIENT
Start: 2023-10-30 | End: 2023-10-30

## 2023-10-30 RX ORDER — BACITRACIN ZINC 500 [USP'U]/G
OINTMENT TOPICAL ONCE
OUTPATIENT
Start: 2023-10-30 | End: 2023-10-30

## 2023-10-30 RX ORDER — BETAMETHASONE DIPROPIONATE 0.05 %
OINTMENT (GRAM) TOPICAL ONCE
OUTPATIENT
Start: 2023-10-30 | End: 2023-10-30

## 2023-10-30 RX ORDER — LIDOCAINE HYDROCHLORIDE 40 MG/ML
SOLUTION TOPICAL ONCE
Status: COMPLETED | OUTPATIENT
Start: 2023-10-30 | End: 2023-10-30

## 2023-10-30 RX ORDER — GENTAMICIN SULFATE 1 MG/G
OINTMENT TOPICAL ONCE
OUTPATIENT
Start: 2023-10-30 | End: 2023-10-30

## 2023-10-30 RX ORDER — IBUPROFEN 200 MG
TABLET ORAL ONCE
OUTPATIENT
Start: 2023-10-30 | End: 2023-10-30

## 2023-10-30 RX ORDER — SODIUM CHLOR/HYPOCHLOROUS ACID 0.033 %
SOLUTION, IRRIGATION IRRIGATION ONCE
OUTPATIENT
Start: 2023-10-30 | End: 2023-10-30

## 2023-10-30 RX ADMIN — LIDOCAINE HYDROCHLORIDE 15 ML: 40 SOLUTION TOPICAL at 13:27

## 2023-11-01 NOTE — DISCHARGE INSTRUCTIONS
Visit Discharge/Physician Orders     Discharge condition: Stable     Assessment of pain at discharge: yes     Anesthetic used: lidocaine 4%     Discharge to: Home     Left via:Private automobile     Accompanied by: accompanied by self     ECF/HHA:      Dressing Orders: Cleanse wound to left leg with normal saline, apply ALGINATE AG to wound bed and cover with ABD pad and UNNA BOOT AND COBAN from base of toes to base of knees- change weekly at wound care clinic. Right leg: Spandgrip on Right leg- On in morning and off at night     Keep wrap dry at all times. If wrap becomes wet or falls 2 inches or painful- may remove wrap and apply daily dressings and spandigrip      Treatment Orders: 10/23 New Lymphedema pumps ordered   10/23  Culture taken- mix fernando  10/30 culture taken - 11/6 antibiotic ordered- please    EAT DIET WITH PROTEINS AND VITAMIN C  TAKE A MULTIVITAMIN DAILY IF NOT CONTRAINDICATED        [address] [place] [address] followup visit ___________1 week_____________  (Please note your next appointment above and if you are unable to keep, kindly give a 24 hour notice. Thank you.)     Physician signature:__________________________        If you experience any of the following, please call the [employer] during business hours:     * Increase in Pain  * Temperature over 101  * Increase in drainage from your wound  * Drainage with a foul odor  * Bleeding  * Increase in swelling  * Need for compression bandage changes due to slippage, breakthrough drainage. If you need medical attention outside of the business hours of the [facility] please contact your PCP or go to the nearest emergency room.

## 2023-11-03 NOTE — PROGRESS NOTES
Multiple attempts to contact patient for new medication order but no answer and voicemail not set up to notify patient. Also, no pharmacy on file.
1/23/2023    Decreased dorsalis pedis pulse 1/23/2023    Dermatophytosis 1/23/2023    Diabetes mellitus ([address])     Hypokalemia 7/31/2023    Leg swelling 1/23/2023    Lower limb ulcer, calf, left, with fat layer exposed ([address]) 10/23/2023    Lower limb ulcer, calf, right, limited to breakdown of skin ([address]) 8/28/2023    Lower limb ulcer, calf, right, with fat layer exposed ([address]) 7/17/2023    Lymphedema     Lymphedema of both lower extremities 1/23/2023    Obesity, Class III, BMI 40-49.9 (morbid obesity) ([address])     Onychomycosis 1/23/2023    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    Left    Skin ulcer of right calf, limited to breakdown of skin ([address]) 2/20/2023    Tinea pedis of both feet 1/23/2023     Past Surgical History:   Procedure Laterality Date    FRACTURE SURGERY  2005    right ankle repair     Family History   Problem Relation Age of Onset    Heart Disease Mother     Heart Disease Father      Social History     Tobacco Use    Smoking status: Never    Smokeless tobacco: Never   Vaping Use    Vaping Use: Never used   Substance Use Topics    Alcohol use: No     Comment: on special occassions    Drug use: No     Allergies   Allergen Reactions    Metformin And Related Diarrhea     Current Outpatient Medications on File Prior to Encounter   Medication Sig Dispense Refill    miconazole nitrate 2 % OINT Apply topically 2 times daily Please apply to the toes, in between the toes, both feet and legs up to the upper calf twice a day for 1 month (Patient not taking: Reported on 10/30/2023) 1 each 10    terbinafine (LAMISIL) 250 MG tablet Take 1 tablet by mouth daily for 20 days (Patient not taking: Reported on 10/30/2023) 20 tablet 0    potassium bicarbonate (EFFER-K) 25 MEQ disintegrating tablet Take 1 tablet by mouth daily (Patient not taking: Reported on 8/7/2023) 30 tablet 0    miconazole nitrate 2 % OINT Apply topically 2 times daily Please apply to the toes, in between the toes, both feet and legs up to the upper calf twice

## 2023-11-06 ENCOUNTER — HOSPITAL ENCOUNTER (OUTPATIENT)
Dept: WOUND CARE | Age: 73
Discharge: HOME | End: 2023-11-06
Attending: SURGERY
Payer: MEDICARE

## 2023-11-06 VITALS
RESPIRATION RATE: 20 BRPM | HEIGHT: 74 IN | DIASTOLIC BLOOD PRESSURE: 91 MMHG | SYSTOLIC BLOOD PRESSURE: 147 MMHG | WEIGHT: 315 LBS | HEART RATE: 84 BPM | BODY MASS INDEX: 40.43 KG/M2 | TEMPERATURE: 96.6 F

## 2023-11-06 DIAGNOSIS — L97.222 LOWER LIMB ULCER, CALF, LEFT, WITH FAT LAYER EXPOSED (HCC): Primary | ICD-10-CM

## 2023-11-06 PROCEDURE — 11042 DBRDMT SUBQ TIS 1ST 20SQCM/<: CPT | Performed by: SURGERY

## 2023-11-06 PROCEDURE — 11042 DBRDMT SUBQ TIS 1ST 20SQCM/<: CPT

## 2023-11-06 RX ORDER — LIDOCAINE HYDROCHLORIDE 40 MG/ML
SOLUTION TOPICAL ONCE
Status: COMPLETED | OUTPATIENT
Start: 2023-11-06 | End: 2023-11-06

## 2023-11-06 RX ORDER — LIDOCAINE 40 MG/G
CREAM TOPICAL ONCE
OUTPATIENT
Start: 2023-11-06 | End: 2023-11-06

## 2023-11-06 RX ORDER — GENTAMICIN SULFATE 1 MG/G
OINTMENT TOPICAL ONCE
OUTPATIENT
Start: 2023-11-06 | End: 2023-11-06

## 2023-11-06 RX ORDER — LIDOCAINE HYDROCHLORIDE 20 MG/ML
JELLY TOPICAL ONCE
OUTPATIENT
Start: 2023-11-06 | End: 2023-11-06

## 2023-11-06 RX ORDER — AMOXICILLIN AND CLAVULANATE POTASSIUM 875; 125 MG/1; MG/1
1 TABLET, FILM COATED ORAL 2 TIMES DAILY
Qty: 20 TABLET | Refills: 0 | Status: SHIPPED | OUTPATIENT
Start: 2023-11-06 | End: 2023-11-16

## 2023-11-06 RX ORDER — BETAMETHASONE DIPROPIONATE 0.05 %
OINTMENT (GRAM) TOPICAL ONCE
OUTPATIENT
Start: 2023-11-06 | End: 2023-11-06

## 2023-11-06 RX ORDER — IBUPROFEN 200 MG
TABLET ORAL ONCE
OUTPATIENT
Start: 2023-11-06 | End: 2023-11-06

## 2023-11-06 RX ORDER — BACITRACIN ZINC AND POLYMYXIN B SULFATE 500; 1000 [USP'U]/G; [USP'U]/G
OINTMENT TOPICAL ONCE
OUTPATIENT
Start: 2023-11-06 | End: 2023-11-06

## 2023-11-06 RX ORDER — LIDOCAINE HYDROCHLORIDE 40 MG/ML
SOLUTION TOPICAL ONCE
OUTPATIENT
Start: 2023-11-06 | End: 2023-11-06

## 2023-11-06 RX ORDER — CLOBETASOL PROPIONATE 0.5 MG/G
OINTMENT TOPICAL ONCE
OUTPATIENT
Start: 2023-11-06 | End: 2023-11-06

## 2023-11-06 RX ORDER — SODIUM CHLOR/HYPOCHLOROUS ACID 0.033 %
SOLUTION, IRRIGATION IRRIGATION ONCE
OUTPATIENT
Start: 2023-11-06 | End: 2023-11-06

## 2023-11-06 RX ORDER — TRIAMCINOLONE ACETONIDE 1 MG/G
OINTMENT TOPICAL ONCE
OUTPATIENT
Start: 2023-11-06 | End: 2023-11-06

## 2023-11-06 RX ORDER — BACITRACIN ZINC 500 [USP'U]/G
OINTMENT TOPICAL ONCE
OUTPATIENT
Start: 2023-11-06 | End: 2023-11-06

## 2023-11-06 RX ORDER — LIDOCAINE 50 MG/G
OINTMENT TOPICAL ONCE
OUTPATIENT
Start: 2023-11-06 | End: 2023-11-06

## 2023-11-06 RX ADMIN — LIDOCAINE HYDROCHLORIDE 10 ML: 40 SOLUTION TOPICAL at 13:52

## 2023-11-06 NOTE — PROGRESS NOTES
in drainage from your wound  * Drainage with a foul odor  * Bleeding  * Increase in swelling  * Need for compression bandage changes due to slippage, breakthrough drainage. If you need medical attention outside of the business hours of the [address] [place] [address] please contact your PCP or go to the nearest emergency room.          Electronically signed by Paulla Aschoff, MD on 11/6/2023 at 2:55 PM

## 2023-11-07 NOTE — DISCHARGE INSTRUCTIONS
Visit Discharge/Physician Orders     Discharge condition: Stable     Assessment of pain at discharge: yes     Anesthetic used: lidocaine 4%     Discharge to: Home     Left via:Private automobile     Accompanied by: accompanied by self     ECF/HHA:      Dressing Orders: Cleanse wound to left leg with normal saline, apply ALGINATE AG to wound bed and cover with ABD pad and UNNA BOOT AND COBAN from base of toes to base of knees- change weekly at wound care clinic. Right leg: Spandgrip on Right leg- On in morning and off at night     Keep wrap dry at all times. If wrap becomes wet or falls 2 inches or painful- may remove wrap and apply daily dressings and spandigrip      Treatment Orders: 10/23 New Lymphedema pumps ordered   10/23  Culture taken- mix fernando  10/30 culture taken - 11/6 antibiotic ordered- please    EAT DIET WITH PROTEINS AND VITAMIN C  TAKE A MULTIVITAMIN DAILY IF NOT CONTRAINDICATED        [address] [place] [address] followup visit ___________1 week_____________  (Please note your next appointment above and if you are unable to keep, kindly give a 24 hour notice. Thank you.)     Physician signature:__________________________        If you experience any of the following, please call the [facility] during business hours:     * Increase in Pain  * Temperature over 101  * Increase in drainage from your wound  * Drainage with a foul odor  * Bleeding  * Increase in swelling  * Need for compression bandage changes due to slippage, breakthrough drainage. If you need medical attention outside of the business hours of the [facility] please contact your PCP or go to the nearest emergency room.

## 2023-11-13 ENCOUNTER — HOSPITAL ENCOUNTER (OUTPATIENT)
Dept: WOUND CARE | Age: 73
Discharge: HOME | End: 2023-11-13
Attending: SURGERY
Payer: MEDICARE

## 2023-11-13 VITALS
HEART RATE: 84 BPM | TEMPERATURE: 96.8 F | SYSTOLIC BLOOD PRESSURE: 130 MMHG | DIASTOLIC BLOOD PRESSURE: 68 MMHG | RESPIRATION RATE: 20 BRPM

## 2023-11-13 DIAGNOSIS — L97.222 LOWER LIMB ULCER, CALF, LEFT, WITH FAT LAYER EXPOSED (HCC): Primary | ICD-10-CM

## 2023-11-13 DIAGNOSIS — L97.322 ANKLE ULCER, LEFT, WITH FAT LAYER EXPOSED (HCC): ICD-10-CM

## 2023-11-13 PROCEDURE — 11042 DBRDMT SUBQ TIS 1ST 20SQCM/<: CPT | Performed by: SURGERY

## 2023-11-13 PROCEDURE — 11042 DBRDMT SUBQ TIS 1ST 20SQCM/<: CPT

## 2023-11-13 RX ORDER — TRIAMCINOLONE ACETONIDE 1 MG/G
OINTMENT TOPICAL ONCE
OUTPATIENT
Start: 2023-11-13 | End: 2023-11-13

## 2023-11-13 RX ORDER — LIDOCAINE HYDROCHLORIDE 40 MG/ML
SOLUTION TOPICAL ONCE
OUTPATIENT
Start: 2023-11-13 | End: 2023-11-13

## 2023-11-13 RX ORDER — SODIUM CHLOR/HYPOCHLOROUS ACID 0.033 %
SOLUTION, IRRIGATION IRRIGATION ONCE
OUTPATIENT
Start: 2023-11-13 | End: 2023-11-13

## 2023-11-13 RX ORDER — LIDOCAINE 40 MG/G
CREAM TOPICAL ONCE
OUTPATIENT
Start: 2023-11-13 | End: 2023-11-13

## 2023-11-13 RX ORDER — BETAMETHASONE DIPROPIONATE 0.05 %
OINTMENT (GRAM) TOPICAL ONCE
OUTPATIENT
Start: 2023-11-13 | End: 2023-11-13

## 2023-11-13 RX ORDER — LIDOCAINE HYDROCHLORIDE 40 MG/ML
SOLUTION TOPICAL ONCE
Status: COMPLETED | OUTPATIENT
Start: 2023-11-13 | End: 2023-11-13

## 2023-11-13 RX ORDER — GENTAMICIN SULFATE 1 MG/G
OINTMENT TOPICAL ONCE
OUTPATIENT
Start: 2023-11-13 | End: 2023-11-13

## 2023-11-13 RX ORDER — CLOBETASOL PROPIONATE 0.5 MG/G
OINTMENT TOPICAL ONCE
OUTPATIENT
Start: 2023-11-13 | End: 2023-11-13

## 2023-11-13 RX ORDER — LIDOCAINE 50 MG/G
OINTMENT TOPICAL ONCE
OUTPATIENT
Start: 2023-11-13 | End: 2023-11-13

## 2023-11-13 RX ORDER — LIDOCAINE HYDROCHLORIDE 20 MG/ML
JELLY TOPICAL ONCE
OUTPATIENT
Start: 2023-11-13 | End: 2023-11-13

## 2023-11-13 RX ORDER — IBUPROFEN 200 MG
TABLET ORAL ONCE
OUTPATIENT
Start: 2023-11-13 | End: 2023-11-13

## 2023-11-13 RX ORDER — BACITRACIN ZINC 500 [USP'U]/G
OINTMENT TOPICAL ONCE
OUTPATIENT
Start: 2023-11-13 | End: 2023-11-13

## 2023-11-13 RX ORDER — BACITRACIN ZINC AND POLYMYXIN B SULFATE 500; 1000 [USP'U]/G; [USP'U]/G
OINTMENT TOPICAL ONCE
OUTPATIENT
Start: 2023-11-13 | End: 2023-11-13

## 2023-11-13 RX ADMIN — LIDOCAINE HYDROCHLORIDE 10 ML: 40 SOLUTION TOPICAL at 13:20

## 2023-11-13 NOTE — PROGRESS NOTES
Wound Healing Center Followup Visit Note    Referring Physician : Adiel Hu MD  [address] RECORD NUMBER:  [id number]  AGE: 68 y.o. GENDER: male  : 1950  EPISODE DATE:  2023    Subjective:     Chief Complaint   Patient presents with    Wound Check      HISTORY of PRESENT ILLNESS HPI   Omer Bahena is a 68 y.o. male who presents today in regards to follow up evaluation and treatment of wound/ulcer. That patient's past medical, family and social hx were reviewed and changes were made if present. History of Wound Context:  The patient has had a wound of both calves, minimal skin on the right side, multiple ulcers on the left calf, fat layer exposed which was first noted approximately 2023. This has been treated by the patient and his niece. On their initial visit to the wound healing center, 10/23/23, the patient has noted that the wound has not been improving. The patient has had similar previous wounds in the past.          Patient had lymphedema therapy in the past, with a lymphedema pump at home that the patient used to use on a regular basis, broke down,, is beyond repair and patient was recommended new lymphedema pump for which he was given the prescription     Pt is currently not on abx.         10/30/2023  Left calf wound looks same, repeat cultures were done today because of exudate, right calf skin ulcer healed, patient still waiting for lymphedema pump  2023  Left leg wounds, stable, patient was given prescription Augmentin 875-125 mg twice a day for 10 days for wound infection, as there is no clinical improvement  2023  Left lateral calf wound, stable to minimal improvement, has a ulcer on the medial aspect left ankle with some fat layer exposed      Wound/Ulcer Pain Timing/Severity: constant  Quality of pain: dull, aching  Severity:  3 / 10   Modifying Factors: Pain worsens with walking  Associated Signs/Symptoms: Edema, pain,

## 2023-11-15 NOTE — DISCHARGE INSTRUCTIONS
Visit Discharge/Physician Orders     Discharge condition: Stable     Assessment of pain at discharge: yes     Anesthetic used: lidocaine 4%     Discharge to: Home     Left via:Private automobile     Accompanied by: accompanied by self     ECF/HHA:      Dressing Orders: Cleanse wound to left leg with normal saline, apply ALGINATE AG to wound bed and cover with ABD pad and UNNA BOOT AND COBAN from base of toes to base of knees- change weekly at wound care clinic. Right leg: Spandgrip on Right leg- On in morning and off at night     Keep wrap dry at all times. If wrap becomes wet or falls 2 inches or painful- may remove wrap and apply daily dressings and spandigrip      Treatment Orders: 10/23 New Lymphedema pumps ordered   10/23  Culture taken- mix fernando  10/30 culture taken - 11/6 antibiotic ordered- please    EAT DIET WITH PROTEINS AND VITAMIN C  TAKE A MULTIVITAMIN DAILY IF NOT CONTRAINDICATED        [facility] followup visit ___________1 week_____________  (Please note your next appointment above and if you are unable to keep, kindly give a 24 hour notice. Thank you.)     Physician signature:__________________________        If you experience any of the following, please call the [facility] [employer] during business hours:     * Increase in Pain  * Temperature over 101  * Increase in drainage from your wound  * Drainage with a foul odor  * Bleeding  * Increase in swelling  * Need for compression bandage changes due to slippage, breakthrough drainage. If you need medical attention outside of the business hours of the [employer] please contact your PCP or go to the nearest emergency room.

## 2023-11-20 ENCOUNTER — HOSPITAL ENCOUNTER (OUTPATIENT)
Dept: WOUND CARE | Age: 73
Discharge: HOME | End: 2023-11-20
Attending: SURGERY
Payer: MEDICARE

## 2023-11-20 VITALS
DIASTOLIC BLOOD PRESSURE: 94 MMHG | RESPIRATION RATE: 18 BRPM | TEMPERATURE: 98.4 F | HEART RATE: 82 BPM | SYSTOLIC BLOOD PRESSURE: 154 MMHG

## 2023-11-20 DIAGNOSIS — L97.322 ANKLE ULCER, LEFT, WITH FAT LAYER EXPOSED (HCC): ICD-10-CM

## 2023-11-20 DIAGNOSIS — L97.222 LOWER LIMB ULCER, CALF, LEFT, WITH FAT LAYER EXPOSED (HCC): Primary | ICD-10-CM

## 2023-11-20 PROCEDURE — 11042 DBRDMT SUBQ TIS 1ST 20SQCM/<: CPT | Performed by: SURGERY

## 2023-11-20 PROCEDURE — 11042 DBRDMT SUBQ TIS 1ST 20SQCM/<: CPT

## 2023-11-20 RX ORDER — BACITRACIN ZINC AND POLYMYXIN B SULFATE 500; 1000 [USP'U]/G; [USP'U]/G
OINTMENT TOPICAL ONCE
OUTPATIENT
Start: 2023-11-20 | End: 2023-11-20

## 2023-11-20 RX ORDER — IBUPROFEN 200 MG
TABLET ORAL ONCE
OUTPATIENT
Start: 2023-11-20 | End: 2023-11-20

## 2023-11-20 RX ORDER — LIDOCAINE 40 MG/G
CREAM TOPICAL ONCE
OUTPATIENT
Start: 2023-11-20 | End: 2023-11-20

## 2023-11-20 RX ORDER — TRIAMCINOLONE ACETONIDE 1 MG/G
OINTMENT TOPICAL ONCE
OUTPATIENT
Start: 2023-11-20 | End: 2023-11-20

## 2023-11-20 RX ORDER — LIDOCAINE HYDROCHLORIDE 40 MG/ML
SOLUTION TOPICAL ONCE
Status: DISCONTINUED | OUTPATIENT
Start: 2023-11-20 | End: 2023-11-21 | Stop reason: HOSPADM

## 2023-11-20 RX ORDER — BETAMETHASONE DIPROPIONATE 0.05 %
OINTMENT (GRAM) TOPICAL ONCE
OUTPATIENT
Start: 2023-11-20 | End: 2023-11-20

## 2023-11-20 RX ORDER — GENTAMICIN SULFATE 1 MG/G
OINTMENT TOPICAL ONCE
OUTPATIENT
Start: 2023-11-20 | End: 2023-11-20

## 2023-11-20 RX ORDER — SODIUM CHLOR/HYPOCHLOROUS ACID 0.033 %
SOLUTION, IRRIGATION IRRIGATION ONCE
OUTPATIENT
Start: 2023-11-20 | End: 2023-11-20

## 2023-11-20 RX ORDER — LIDOCAINE HYDROCHLORIDE 20 MG/ML
JELLY TOPICAL ONCE
OUTPATIENT
Start: 2023-11-20 | End: 2023-11-20

## 2023-11-20 RX ORDER — LIDOCAINE HYDROCHLORIDE 40 MG/ML
SOLUTION TOPICAL ONCE
OUTPATIENT
Start: 2023-11-20 | End: 2023-11-20

## 2023-11-20 RX ORDER — CLOBETASOL PROPIONATE 0.5 MG/G
OINTMENT TOPICAL ONCE
OUTPATIENT
Start: 2023-11-20 | End: 2023-11-20

## 2023-11-20 RX ORDER — LIDOCAINE 50 MG/G
OINTMENT TOPICAL ONCE
OUTPATIENT
Start: 2023-11-20 | End: 2023-11-20

## 2023-11-20 RX ORDER — BACITRACIN ZINC 500 [USP'U]/G
OINTMENT TOPICAL ONCE
OUTPATIENT
Start: 2023-11-20 | End: 2023-11-20

## 2023-11-20 NOTE — PROGRESS NOTES
dressings and spandigrip      Treatment Orders: 10/23 New Lymphedema pumps ordered   10/23  Culture taken- mix fernando  10/30 culture taken - 11/6 antibiotic ordered- please    EAT DIET WITH PROTEINS AND VITAMIN C  TAKE A MULTIVITAMIN DAILY IF NOT CONTRAINDICATED        [address] [place] [address] followup visit ___________1 week_____________  (Please note your next appointment above and if you are unable to keep, kindly give a 24 hour notice. Thank you.)     Physician signature:__________________________        If you experience any of the following, please call the [facility] during business hours:     * Increase in Pain  * Temperature over 101  * Increase in drainage from your wound  * Drainage with a foul odor  * Bleeding  * Increase in swelling  * Need for compression bandage changes due to slippage, breakthrough drainage. If you need medical attention outside of the business hours of the [facility] please contact your PCP or go to the nearest emergency room.          Electronically signed by Sea Fajardo MD on 11/20/2023 at 3:12 PM

## 2023-11-27 ENCOUNTER — HOSPITAL ENCOUNTER (OUTPATIENT)
Dept: WOUND CARE | Age: 73
Discharge: HOME | End: 2023-11-27
Attending: SURGERY
Payer: MEDICARE

## 2023-11-27 VITALS
SYSTOLIC BLOOD PRESSURE: 140 MMHG | BODY MASS INDEX: 40.43 KG/M2 | DIASTOLIC BLOOD PRESSURE: 70 MMHG | WEIGHT: 315 LBS | TEMPERATURE: 96.7 F | HEIGHT: 74 IN | RESPIRATION RATE: 18 BRPM | HEART RATE: 78 BPM

## 2023-11-27 DIAGNOSIS — L97.322 ANKLE ULCER, LEFT, WITH FAT LAYER EXPOSED (HCC): ICD-10-CM

## 2023-11-27 DIAGNOSIS — L97.222 LOWER LIMB ULCER, CALF, LEFT, WITH FAT LAYER EXPOSED (HCC): Primary | ICD-10-CM

## 2023-11-27 PROBLEM — L97.211 ULCER OF CALF, LIMITED TO BREAKDOWN OF SKIN, RIGHT (HCC): Status: RESOLVED | Noted: 2023-08-28 | Resolved: 2023-11-27

## 2023-11-27 PROCEDURE — 11042 DBRDMT SUBQ TIS 1ST 20SQCM/<: CPT

## 2023-11-27 PROCEDURE — 11042 DBRDMT SUBQ TIS 1ST 20SQCM/<: CPT | Performed by: SURGERY

## 2023-11-27 RX ORDER — LIDOCAINE HYDROCHLORIDE 40 MG/ML
SOLUTION TOPICAL ONCE
Status: COMPLETED | OUTPATIENT
Start: 2023-11-27 | End: 2023-11-27

## 2023-11-27 RX ORDER — BACITRACIN ZINC AND POLYMYXIN B SULFATE 500; 1000 [USP'U]/G; [USP'U]/G
OINTMENT TOPICAL ONCE
OUTPATIENT
Start: 2023-11-27 | End: 2023-11-27

## 2023-11-27 RX ORDER — BACITRACIN ZINC 500 [USP'U]/G
OINTMENT TOPICAL ONCE
OUTPATIENT
Start: 2023-11-27 | End: 2023-11-27

## 2023-11-27 RX ORDER — LIDOCAINE HYDROCHLORIDE 40 MG/ML
SOLUTION TOPICAL ONCE
OUTPATIENT
Start: 2023-11-27 | End: 2023-11-27

## 2023-11-27 RX ORDER — SODIUM CHLOR/HYPOCHLOROUS ACID 0.033 %
SOLUTION, IRRIGATION IRRIGATION ONCE
OUTPATIENT
Start: 2023-11-27 | End: 2023-11-27

## 2023-11-27 RX ORDER — LIDOCAINE 40 MG/G
CREAM TOPICAL ONCE
OUTPATIENT
Start: 2023-11-27 | End: 2023-11-27

## 2023-11-27 RX ORDER — IBUPROFEN 200 MG
TABLET ORAL ONCE
OUTPATIENT
Start: 2023-11-27 | End: 2023-11-27

## 2023-11-27 RX ORDER — LIDOCAINE HYDROCHLORIDE 20 MG/ML
JELLY TOPICAL ONCE
OUTPATIENT
Start: 2023-11-27 | End: 2023-11-27

## 2023-11-27 RX ORDER — CLOBETASOL PROPIONATE 0.5 MG/G
OINTMENT TOPICAL ONCE
OUTPATIENT
Start: 2023-11-27 | End: 2023-11-27

## 2023-11-27 RX ORDER — LIDOCAINE 50 MG/G
OINTMENT TOPICAL ONCE
OUTPATIENT
Start: 2023-11-27 | End: 2023-11-27

## 2023-11-27 RX ORDER — GENTAMICIN SULFATE 1 MG/G
OINTMENT TOPICAL ONCE
OUTPATIENT
Start: 2023-11-27 | End: 2023-11-27

## 2023-11-27 RX ORDER — BETAMETHASONE DIPROPIONATE 0.05 %
OINTMENT (GRAM) TOPICAL ONCE
OUTPATIENT
Start: 2023-11-27 | End: 2023-11-27

## 2023-11-27 RX ORDER — TRIAMCINOLONE ACETONIDE 1 MG/G
OINTMENT TOPICAL ONCE
OUTPATIENT
Start: 2023-11-27 | End: 2023-11-27

## 2023-11-27 RX ADMIN — LIDOCAINE HYDROCHLORIDE 15 ML: 40 SOLUTION TOPICAL at 14:06

## 2023-11-27 ASSESSMENT — PAIN SCALES - GENERAL: PAINLEVEL_OUTOF10: 0

## 2023-11-27 NOTE — PROGRESS NOTES
BOOT AND COBAN from base of toes to base of knees- change weekly at wound care clinic. Right leg: Spandgrip on Right leg- On in morning and off at night     Keep wrap dry at all times. If wrap becomes wet or falls 2 inches or painful- may remove wrap and apply daily dressings and spandigrip      Treatment Orders: 10/23 New Lymphedema pumps ordered   10/23  Culture taken- mix fernando  10/30 culture taken - 11/6 antibiotic ordered- please    EAT DIET WITH PROTEINS AND VITAMIN C  TAKE A MULTIVITAMIN DAILY IF NOT CONTRAINDICATED        [address] [place] [address] followup visit ___________1 week_____________  (Please note your next appointment above and if you are unable to keep, kindly give a 24 hour notice. Thank you.)     Physician signature:__________________________        If you experience any of the following, please call the [employer] during business hours:     * Increase in Pain  * Temperature over 101  * Increase in drainage from your wound  * Drainage with a foul odor  * Bleeding  * Increase in swelling  * Need for compression bandage changes due to slippage, breakthrough drainage. If you need medical attention outside of the business hours of the [employer] please contact your PCP or go to the nearest emergency room.          Electronically signed by Ishan Mendez MD on 11/27/2023 at 2:19 PM

## 2023-11-27 NOTE — DISCHARGE INSTRUCTIONS
Visit Discharge/Physician Orders     Discharge condition: Stable     Assessment of pain at discharge: yes     Anesthetic used: lidocaine 4%     Discharge to: Home     Left via:Private automobile     Accompanied by: accompanied by self     ECF/HHA:      Dressing Orders: Cleanse wounds to left medial and outer leg with normal saline, apply ALGINATE AG to wound bed and cover with ABD pad and UNNA BOOT AND COBAN from base of toes to base of knees- change weekly at wound care clinic. Right leg: Spandgrip on Right leg- On in morning and off at night     Keep wrap dry at all times. If wrap becomes wet or falls 2 inches or painful- may remove wrap and apply daily dressings and spandigrip      Treatment Orders: 10/23 New Lymphedema pumps ordered   10/23  Culture taken- mix fernando  10/30 culture taken - 11/6 antibiotic ordered- please    EAT DIET WITH PROTEINS AND VITAMIN C  TAKE A MULTIVITAMIN DAILY IF NOT CONTRAINDICATED        [address] [place] [address] followup visit ___________1 week_____________  (Please note your next appointment above and if you are unable to keep, kindly give a 24 hour notice. Thank you.)     Physician signature:__________________________        If you experience any of the following, please call the [facility] during business hours:     * Increase in Pain  * Temperature over 101  * Increase in drainage from your wound  * Drainage with a foul odor  * Bleeding  * Increase in swelling  * Need for compression bandage changes due to slippage, breakthrough drainage. If you need medical attention outside of the business hours of the [facility] please contact your PCP or go to the nearest emergency room.

## 2023-11-29 NOTE — DISCHARGE INSTRUCTIONS
Visit Discharge/Physician Orders     Discharge condition: Stable     Assessment of pain at discharge: yes     Anesthetic used: lidocaine 4%     Discharge to: Home     Left via:Private automobile     Accompanied by: accompanied by self     ECF/HHA:      Dressing Orders: Cleanse wounds to left medial and proximal leg with normal saline, apply ALGINATE AG to wound bed and cover with ABD pad and UNNA BOOT AND COBAN from base of toes to base of knees- change weekly at wound care clinic. Right leg: Spandgrip on Right leg- On in morning and off at night     Keep wrap dry at all times. If wrap becomes wet or falls 2 inches or painful- may remove wrap and apply daily dressings and spandigrip      Treatment Orders: 10/23 New Lymphedema pumps ordered   10/23  Culture taken- mix fernando  10/30 culture taken - 11/6 antibiotic ordered- please    EAT DIET WITH PROTEINS AND VITAMIN C  TAKE A MULTIVITAMIN DAILY IF NOT CONTRAINDICATED        [facility] followup visit ___________1 week_____________  (Please note your next appointment above and if you are unable to keep, kindly give a 24 hour notice. Thank you.)     Physician signature:__________________________        If you experience any of the following, please call the [facility] during business hours:     * Increase in Pain  * Temperature over 101  * Increase in drainage from your wound  * Drainage with a foul odor  * Bleeding  * Increase in swelling  * Need for compression bandage changes due to slippage, breakthrough drainage. If you need medical attention outside of the business hours of the [facility] please contact your PCP or go to the nearest emergency room.

## 2023-12-04 ENCOUNTER — HOSPITAL ENCOUNTER (OUTPATIENT)
Dept: WOUND CARE | Age: 73
Discharge: HOME | End: 2023-12-04
Attending: SURGERY
Payer: MEDICARE

## 2023-12-04 VITALS
HEIGHT: 74 IN | TEMPERATURE: 97 F | DIASTOLIC BLOOD PRESSURE: 76 MMHG | BODY MASS INDEX: 40.43 KG/M2 | HEART RATE: 78 BPM | RESPIRATION RATE: 18 BRPM | SYSTOLIC BLOOD PRESSURE: 131 MMHG | WEIGHT: 315 LBS

## 2023-12-04 DIAGNOSIS — L97.322 ANKLE ULCER, LEFT, WITH FAT LAYER EXPOSED (HCC): ICD-10-CM

## 2023-12-04 DIAGNOSIS — L97.222 LOWER LIMB ULCER, CALF, LEFT, WITH FAT LAYER EXPOSED (HCC): Primary | ICD-10-CM

## 2023-12-04 PROCEDURE — 11042 DBRDMT SUBQ TIS 1ST 20SQCM/<: CPT | Performed by: SURGERY

## 2023-12-04 PROCEDURE — 11042 DBRDMT SUBQ TIS 1ST 20SQCM/<: CPT

## 2023-12-04 RX ORDER — GENTAMICIN SULFATE 1 MG/G
OINTMENT TOPICAL ONCE
OUTPATIENT
Start: 2023-12-04 | End: 2023-12-04

## 2023-12-04 RX ORDER — SODIUM CHLOR/HYPOCHLOROUS ACID 0.033 %
SOLUTION, IRRIGATION IRRIGATION ONCE
OUTPATIENT
Start: 2023-12-04 | End: 2023-12-04

## 2023-12-04 RX ORDER — LIDOCAINE 40 MG/G
CREAM TOPICAL ONCE
OUTPATIENT
Start: 2023-12-04 | End: 2023-12-04

## 2023-12-04 RX ORDER — TRIAMCINOLONE ACETONIDE 1 MG/G
OINTMENT TOPICAL ONCE
OUTPATIENT
Start: 2023-12-04 | End: 2023-12-04

## 2023-12-04 RX ORDER — BACITRACIN ZINC 500 [USP'U]/G
OINTMENT TOPICAL ONCE
OUTPATIENT
Start: 2023-12-04 | End: 2023-12-04

## 2023-12-04 RX ORDER — LIDOCAINE HYDROCHLORIDE 40 MG/ML
SOLUTION TOPICAL ONCE
OUTPATIENT
Start: 2023-12-04 | End: 2023-12-04

## 2023-12-04 RX ORDER — CLOBETASOL PROPIONATE 0.5 MG/G
OINTMENT TOPICAL ONCE
OUTPATIENT
Start: 2023-12-04 | End: 2023-12-04

## 2023-12-04 RX ORDER — LIDOCAINE HYDROCHLORIDE 20 MG/ML
JELLY TOPICAL ONCE
OUTPATIENT
Start: 2023-12-04 | End: 2023-12-04

## 2023-12-04 RX ORDER — LIDOCAINE 50 MG/G
OINTMENT TOPICAL ONCE
OUTPATIENT
Start: 2023-12-04 | End: 2023-12-04

## 2023-12-04 RX ORDER — BACITRACIN ZINC AND POLYMYXIN B SULFATE 500; 1000 [USP'U]/G; [USP'U]/G
OINTMENT TOPICAL ONCE
OUTPATIENT
Start: 2023-12-04 | End: 2023-12-04

## 2023-12-04 RX ORDER — BETAMETHASONE DIPROPIONATE 0.05 %
OINTMENT (GRAM) TOPICAL ONCE
OUTPATIENT
Start: 2023-12-04 | End: 2023-12-04

## 2023-12-04 RX ORDER — IBUPROFEN 200 MG
TABLET ORAL ONCE
OUTPATIENT
Start: 2023-12-04 | End: 2023-12-04

## 2023-12-04 RX ORDER — LIDOCAINE HYDROCHLORIDE 40 MG/ML
SOLUTION TOPICAL ONCE
Status: COMPLETED | OUTPATIENT
Start: 2023-12-04 | End: 2023-12-04

## 2023-12-04 RX ADMIN — LIDOCAINE HYDROCHLORIDE 5 ML: 40 SOLUTION TOPICAL at 15:07

## 2023-12-04 NOTE — PROGRESS NOTES
medial and proximal leg with normal saline, apply ALGINATE AG to wound bed and cover with ABD pad and UNNA [address] [place] [address] from base of toes to base of knees- change weekly at wound care clinic. Right leg: Spandgrip on Right leg- On in morning and off at night     Keep wrap dry at all times. If wrap becomes wet or falls 2 inches or painful- may remove wrap and apply daily dressings and spandigrip      Treatment Orders: 10/23 New Lymphedema pumps ordered   10/23  Culture taken- mix fernando  10/30 culture taken - 11/6 antibiotic ordered- please    EAT DIET WITH PROTEINS AND VITAMIN C  TAKE A MULTIVITAMIN DAILY IF NOT CONTRAINDICATED        [address] [place] [address] followup visit ___________1 week_____________  (Please note your next appointment above and if you are unable to keep, kindly give a 24 hour notice. Thank you.)     Physician signature:__________________________        If you experience any of the following, please call the [employer] during business hours:     * Increase in Pain  * Temperature over 101  * Increase in drainage from your wound  * Drainage with a foul odor  * Bleeding  * Increase in swelling  * Need for compression bandage changes due to slippage, breakthrough drainage. If you need medical attention outside of the business hours of the [employer] please contact your PCP or go to the nearest emergency room.          Electronically signed by Paulla Aschoff, MD on 12/4/2023 at 3:21 PM

## 2023-12-11 ENCOUNTER — HOSPITAL ENCOUNTER (OUTPATIENT)
Dept: WOUND CARE | Age: 73
Discharge: HOME | End: 2023-12-11
Attending: SURGERY
Payer: MEDICARE

## 2023-12-11 VITALS
RESPIRATION RATE: 18 BRPM | HEIGHT: 74 IN | DIASTOLIC BLOOD PRESSURE: 83 MMHG | HEART RATE: 78 BPM | BODY MASS INDEX: 40.43 KG/M2 | WEIGHT: 315 LBS | SYSTOLIC BLOOD PRESSURE: 132 MMHG | TEMPERATURE: 98.3 F

## 2023-12-11 DIAGNOSIS — I89.0 LYMPHEDEMA OF BOTH LOWER EXTREMITIES: ICD-10-CM

## 2023-12-11 DIAGNOSIS — L97.222 LOWER LIMB ULCER, CALF, LEFT, WITH FAT LAYER EXPOSED (HCC): Primary | ICD-10-CM

## 2023-12-11 PROCEDURE — 11042 DBRDMT SUBQ TIS 1ST 20SQCM/<: CPT

## 2023-12-11 PROCEDURE — 11042 DBRDMT SUBQ TIS 1ST 20SQCM/<: CPT | Performed by: SURGERY

## 2023-12-11 PROCEDURE — 11045 DBRDMT SUBQ TISS EACH ADDL: CPT

## 2023-12-11 PROCEDURE — 11045 DBRDMT SUBQ TISS EACH ADDL: CPT | Performed by: SURGERY

## 2023-12-11 RX ORDER — BACITRACIN ZINC AND POLYMYXIN B SULFATE 500; 1000 [USP'U]/G; [USP'U]/G
OINTMENT TOPICAL ONCE
OUTPATIENT
Start: 2023-12-11 | End: 2023-12-11

## 2023-12-11 RX ORDER — IBUPROFEN 200 MG
TABLET ORAL ONCE
OUTPATIENT
Start: 2023-12-11 | End: 2023-12-11

## 2023-12-11 RX ORDER — BETAMETHASONE DIPROPIONATE 0.05 %
OINTMENT (GRAM) TOPICAL ONCE
OUTPATIENT
Start: 2023-12-11 | End: 2023-12-11

## 2023-12-11 RX ORDER — LIDOCAINE 40 MG/G
CREAM TOPICAL ONCE
OUTPATIENT
Start: 2023-12-11 | End: 2023-12-11

## 2023-12-11 RX ORDER — LIDOCAINE HYDROCHLORIDE 40 MG/ML
SOLUTION TOPICAL ONCE
OUTPATIENT
Start: 2023-12-11 | End: 2023-12-11

## 2023-12-11 RX ORDER — CLOBETASOL PROPIONATE 0.5 MG/G
OINTMENT TOPICAL ONCE
OUTPATIENT
Start: 2023-12-11 | End: 2023-12-11

## 2023-12-11 RX ORDER — GENTAMICIN SULFATE 1 MG/G
OINTMENT TOPICAL ONCE
OUTPATIENT
Start: 2023-12-11 | End: 2023-12-11

## 2023-12-11 RX ORDER — LIDOCAINE HYDROCHLORIDE 20 MG/ML
JELLY TOPICAL ONCE
OUTPATIENT
Start: 2023-12-11 | End: 2023-12-11

## 2023-12-11 RX ORDER — BACITRACIN ZINC 500 [USP'U]/G
OINTMENT TOPICAL ONCE
OUTPATIENT
Start: 2023-12-11 | End: 2023-12-11

## 2023-12-11 RX ORDER — LIDOCAINE HYDROCHLORIDE 40 MG/ML
SOLUTION TOPICAL ONCE
Status: COMPLETED | OUTPATIENT
Start: 2023-12-11 | End: 2023-12-11

## 2023-12-11 RX ORDER — LIDOCAINE 50 MG/G
OINTMENT TOPICAL ONCE
OUTPATIENT
Start: 2023-12-11 | End: 2023-12-11

## 2023-12-11 RX ORDER — TRIAMCINOLONE ACETONIDE 1 MG/G
OINTMENT TOPICAL ONCE
OUTPATIENT
Start: 2023-12-11 | End: 2023-12-11

## 2023-12-11 RX ORDER — SODIUM CHLOR/HYPOCHLOROUS ACID 0.033 %
SOLUTION, IRRIGATION IRRIGATION ONCE
OUTPATIENT
Start: 2023-12-11 | End: 2023-12-11

## 2023-12-11 RX ADMIN — LIDOCAINE HYDROCHLORIDE 10 ML: 40 SOLUTION TOPICAL at 14:03

## 2023-12-11 NOTE — PROGRESS NOTES
Accompanied by: accompanied by self     ECF/HHA:      Dressing Orders: Cleanse wounds to left medial and proximal leg with normal saline, apply ALGINATE AG to wound bed and cover with ABD pad and UNNA BOOT AND COBAN from base of toes to base of knees- change weekly at wound care clinic. Right leg: Spandgrip on Right leg- On in morning and off at night     Keep wrap dry at all times. If wrap becomes wet or falls 2 inches or painful- may remove wrap and apply daily dressings and spandigrip      Treatment Orders: 10/23 New Lymphedema pumps ordered   10/23  Culture taken- mix fernando  10/30 culture taken - 11/6 antibiotic ordered- please    EAT DIET WITH PROTEINS AND VITAMIN C  TAKE A MULTIVITAMIN DAILY IF NOT CONTRAINDICATED        [facility] followup visit ___________1 week_____________  (Please note your next appointment above and if you are unable to keep, kindly give a 24 hour notice. Thank you.)     Physician signature:__________________________        If you experience any of the following, please call the [employer] during business hours:     * Increase in Pain  * Temperature over 101  * Increase in drainage from your wound  * Drainage with a foul odor  * Bleeding  * Increase in swelling  * Need for compression bandage changes due to slippage, breakthrough drainage. If you need medical attention outside of the business hours of the [employer] please contact your PCP or go to the nearest emergency room.          Electronically signed by Yifan Villarreal MD on 12/11/2023 at 2:52 PM

## 2023-12-11 NOTE — DISCHARGE INSTRUCTIONS
Visit Discharge/Physician Orders     Discharge condition: Stable     Assessment of pain at discharge: yes     Anesthetic used: lidocaine 4%     Discharge to: Home     Left via:Private automobile     Accompanied by: accompanied by self     ECF/HHA:      Dressing Orders: Cleanse wounds to left medial and proximal leg with normal saline, apply ALGINATE AG to wound bed and cover with ABD pad and UNNA BOOT AND COBAN from base of toes to base of knees- change weekly at wound care clinic. Right leg: Spandgrip on Right leg- On in morning and off at night     Keep wrap dry at all times. If wrap becomes wet or falls 2 inches or painful- may remove wrap and apply daily dressings and spandigrip      Treatment Orders: 10/23 New Lymphedema pumps ordered   10/23  Culture taken- mix fernando  10/30 culture taken - 11/6 antibiotic ordered- please    EAT DIET WITH PROTEINS AND VITAMIN C  TAKE A MULTIVITAMIN DAILY IF NOT CONTRAINDICATED        [facility] followup visit ___________1 week_____________  (Please note your next appointment above and if you are unable to keep, kindly give a 24 hour notice. Thank you.)     Physician signature:__________________________        If you experience any of the following, please call the [facility] during business hours:     * Increase in Pain  * Temperature over 101  * Increase in drainage from your wound  * Drainage with a foul odor  * Bleeding  * Increase in swelling  * Need for compression bandage changes due to slippage, breakthrough drainage. If you need medical attention outside of the business hours of the [facility] please contact your PCP or go to the nearest emergency room.

## 2023-12-21 NOTE — DISCHARGE INSTRUCTIONS
Visit Discharge/Physician Orders     Discharge condition: Stable  Assessment of pain at discharge: yes  Anesthetic used: lidocaine 4%  Discharge to: Home  Left via:Private automobile  Accompanied by: accompanied by self  ECF/HHA:      Dressing Orders: Lower left and right leg wounds: Cleanse wounds with normal saline, apply aquaphor to dry skin on legs. Apply  ALGINATE AG to wound bed and cover with ABD pad and UNNA BOOT AND COBAN from base of toes to base of knees- change weekly at wound care clinic. Keep wrap dry at all times. If wrap becomes wet or falls 2 inches or painful- may remove wrap and apply daily dressings and spandigrip      Treatment Orders: 10/23 New Lymphedema pumps ordered   10/23  Culture taken- mix fernando  10/30 culture taken - 11/6 antibiotic ordered- please    EAT DIET WITH PROTEINS AND VITAMIN C  TAKE A MULTIVITAMIN DAILY IF NOT CONTRAINDICATED        [address] [place] [address] followup visit ___________1 week____12/27 PM THEN 1/3 PM (CONSULT PK) then 1/8 Monday PM _________  (Please note your next appointment above and if you are unable to keep, kindly give a 24 hour notice. Thank you.)     Physician signature:__________________________        If you experience any of the following, please call the [address] [place] [address] during business hours:     * Increase in Pain  * Temperature over 101  * Increase in drainage from your wound  * Drainage with a foul odor  * Bleeding  * Increase in swelling  * Need for compression bandage changes due to slippage, breakthrough drainage. If you need medical attention outside of the business hours of the [address] [place] [address] please contact your PCP or go to the nearest emergency room.

## 2023-12-27 ENCOUNTER — HOSPITAL ENCOUNTER (OUTPATIENT)
Dept: WOUND CARE | Age: 73
Discharge: HOME | End: 2023-12-27
Attending: SURGERY
Payer: MEDICARE

## 2023-12-27 VITALS
DIASTOLIC BLOOD PRESSURE: 80 MMHG | WEIGHT: 315 LBS | HEART RATE: 83 BPM | SYSTOLIC BLOOD PRESSURE: 153 MMHG | TEMPERATURE: 97.1 F | RESPIRATION RATE: 18 BRPM | HEIGHT: 74 IN | BODY MASS INDEX: 40.43 KG/M2

## 2023-12-27 DIAGNOSIS — L97.212 LOWER LIMB ULCER, CALF, RIGHT, WITH FAT LAYER EXPOSED (HCC): ICD-10-CM

## 2023-12-27 DIAGNOSIS — L97.222 LOWER LIMB ULCER, CALF, LEFT, WITH FAT LAYER EXPOSED (HCC): Primary | ICD-10-CM

## 2023-12-27 PROBLEM — L97.322 ANKLE ULCER, LEFT, WITH FAT LAYER EXPOSED (HCC): Status: RESOLVED | Noted: 2023-11-13 | Resolved: 2023-12-27

## 2023-12-27 PROCEDURE — 11042 DBRDMT SUBQ TIS 1ST 20SQCM/<: CPT | Performed by: SURGERY

## 2023-12-27 PROCEDURE — 11042 DBRDMT SUBQ TIS 1ST 20SQCM/<: CPT

## 2023-12-27 RX ORDER — BACITRACIN ZINC 500 [USP'U]/G
OINTMENT TOPICAL ONCE
OUTPATIENT
Start: 2023-12-27 | End: 2023-12-27

## 2023-12-27 RX ORDER — LIDOCAINE 40 MG/G
CREAM TOPICAL ONCE
OUTPATIENT
Start: 2023-12-27 | End: 2023-12-27

## 2023-12-27 RX ORDER — BACITRACIN ZINC AND POLYMYXIN B SULFATE 500; 1000 [USP'U]/G; [USP'U]/G
OINTMENT TOPICAL ONCE
OUTPATIENT
Start: 2023-12-27 | End: 2023-12-27

## 2023-12-27 RX ORDER — SODIUM CHLOR/HYPOCHLOROUS ACID 0.033 %
SOLUTION, IRRIGATION IRRIGATION ONCE
OUTPATIENT
Start: 2023-12-27 | End: 2023-12-27

## 2023-12-27 RX ORDER — LIDOCAINE HYDROCHLORIDE 40 MG/ML
SOLUTION TOPICAL ONCE
Status: COMPLETED | OUTPATIENT
Start: 2023-12-27 | End: 2023-12-27

## 2023-12-27 RX ORDER — LIDOCAINE HYDROCHLORIDE 40 MG/ML
SOLUTION TOPICAL ONCE
OUTPATIENT
Start: 2023-12-27 | End: 2023-12-27

## 2023-12-27 RX ORDER — TRIAMCINOLONE ACETONIDE 1 MG/G
OINTMENT TOPICAL ONCE
OUTPATIENT
Start: 2023-12-27 | End: 2023-12-27

## 2023-12-27 RX ORDER — LIDOCAINE HYDROCHLORIDE 20 MG/ML
JELLY TOPICAL ONCE
OUTPATIENT
Start: 2023-12-27 | End: 2023-12-27

## 2023-12-27 RX ORDER — GENTAMICIN SULFATE 1 MG/G
OINTMENT TOPICAL ONCE
OUTPATIENT
Start: 2023-12-27 | End: 2023-12-27

## 2023-12-27 RX ORDER — LIDOCAINE 50 MG/G
OINTMENT TOPICAL ONCE
OUTPATIENT
Start: 2023-12-27 | End: 2023-12-27

## 2023-12-27 RX ORDER — CLOBETASOL PROPIONATE 0.5 MG/G
OINTMENT TOPICAL ONCE
OUTPATIENT
Start: 2023-12-27 | End: 2023-12-27

## 2023-12-27 RX ORDER — IBUPROFEN 200 MG
TABLET ORAL ONCE
OUTPATIENT
Start: 2023-12-27 | End: 2023-12-27

## 2023-12-27 RX ORDER — BETAMETHASONE DIPROPIONATE 0.05 %
OINTMENT (GRAM) TOPICAL ONCE
OUTPATIENT
Start: 2023-12-27 | End: 2023-12-27

## 2023-12-27 RX ADMIN — LIDOCAINE HYDROCHLORIDE 20 ML: 40 SOLUTION TOPICAL at 14:10

## 2023-12-27 NOTE — PLAN OF CARE
Problem: Chronic Conditions and Co-morbidities  Goal: Patient's chronic conditions and co-morbidity symptoms are monitored and maintained or improved  Outcome: Progressing     Problem: Wound:  Goal: Will show signs of wound healing; wound closure and no evidence of infection  Description: Will show signs of wound healing; wound closure and no evidence of infection  Outcome: Progressing     Problem: Compression therapy:  Goal: Will be free from complications associated with compression therapy  Description: Will be free from complications associated with compression therapy  Outcome: Progressing     Problem: Venous:  Goal: Signs of wound healing will improve  Description: Signs of wound healing will improve  Outcome: Adequate for Discharge

## 2023-12-27 NOTE — DISCHARGE INSTRUCTIONS
Visit Discharge/Physician Orders     Discharge condition: Stable  Assessment of pain at discharge: yes  Anesthetic used: lidocaine 4%  Discharge to: Home  Left via:Private automobile  Accompanied by: accompanied by self  ECF/HHA:      Dressing Orders: Lower left and right leg wounds: Cleanse wounds with normal saline, apply aquaphor to dry skin on legs. Apply  ALGINATE AG to wound bed and cover with ABD pad and UNNA BOOT AND COBAN from base of toes to base of knees- change weekly at wound care clinic.       Keep wrap dry at all times. If wrap becomes wet or falls 2 inches or painful- may remove wrap and apply daily dressings and spandigrip   Elevate legs as much as possible.      Treatment Orders: 10/23 New Lymphedema pumps ordered   10/23  Culture taken- mix fernando  10/30 culture taken - 11/6 antibiotic ordered- please    EAT DIET WITH PROTEINS AND VITAMIN C  TAKE A MULTIVITAMIN DAILY IF NOT CONTRAINDICATED        C followup visit ___________1 week____12/27 PM THEN 1/3 PM (CONSULT PK) then 1/8 Monday PM _________  (Please note your next appointment above and if you are unable to keep, kindly give a 24 hour notice. Thank you.)     Physician signature:__________________________        If you experience any of the following, please call the Wound Care Center during business hours:     * Increase in Pain  * Temperature over 101  * Increase in drainage from your wound  * Drainage with a foul odor  * Bleeding  * Increase in swelling  * Need for compression bandage changes due to slippage, breakthrough drainage.     If you need medical attention outside of the business hours of the Wound Care Centers please contact your PCP or go to the nearest emergency room.

## 2024-01-03 ENCOUNTER — HOSPITAL ENCOUNTER (OUTPATIENT)
Dept: WOUND CARE | Age: 74
Discharge: HOME | End: 2024-01-03
Attending: SURGERY
Payer: MEDICARE

## 2024-01-03 VITALS — HEART RATE: 73 BPM | RESPIRATION RATE: 20 BRPM | TEMPERATURE: 96.6 F

## 2024-01-03 DIAGNOSIS — L97.212 LOWER LIMB ULCER, CALF, RIGHT, WITH FAT LAYER EXPOSED (HCC): Primary | ICD-10-CM

## 2024-01-03 DIAGNOSIS — L97.222 LOWER LIMB ULCER, CALF, LEFT, WITH FAT LAYER EXPOSED (HCC): ICD-10-CM

## 2024-01-03 PROCEDURE — 11042 DBRDMT SUBQ TIS 1ST 20SQCM/<: CPT

## 2024-01-03 PROCEDURE — 11042 DBRDMT SUBQ TIS 1ST 20SQCM/<: CPT | Performed by: SURGERY

## 2024-01-05 NOTE — PROGRESS NOTES
Wound Etiology Venous 10/23/23 1410   Dressing Status New dressing applied 01/03/24 1517   Wound Cleansed Cleansed with saline 01/03/24 1517   Dressing/Treatment Alginate with Ag;ABD 01/03/24 1517   Wound Length (cm) 3.5 cm 01/03/24 1352   Wound Width (cm) 2 cm 01/03/24 1352   Wound Depth (cm) 0.1 cm 01/03/24 1352   Wound Surface Area (cm^2) 7 cm^2 01/03/24 1352   Change in Wound Size % (l*w) -86.67 01/03/24 1352   Wound Volume (cm^3) 0.7 cm^3 01/03/24 1352   Wound Healing % 7 01/03/24 1352   Post-Procedure Length (cm) 3.9 cm 01/03/24 1500   Post-Procedure Width (cm) 2.5 cm 01/03/24 1500   Post-Procedure Depth (cm) 0.4 cm 01/03/24 1500   Post-Procedure Surface Area (cm^2) 9.75 cm^2 01/03/24 1500   Post-Procedure Volume (cm^3) 3.9 cm^3 01/03/24 1500   Wound Assessment Pink/red;Fibrin 01/03/24 1352   Drainage Amount Moderate (25-50%) 01/03/24 1352   Drainage Description Serosanguinous 01/03/24 1352   Odor None 01/03/24 1352   Colleen-wound Assessment Hypopigmented;Maceration 01/03/24 1352   Margins Attached edges 12/18/23 1337   Wound Thickness Description not for Pressure Injury Full thickness 12/18/23 1337   Number of days: 73       Wound 10/23/23 Leg Left;Distal;Lateral #2 left leg (Active)   Wound Image   12/27/23 1354   Wound Etiology Venous 10/23/23 1410   Dressing Status New dressing applied 01/03/24 1517   Wound Cleansed Cleansed with saline 01/03/24 1517   Dressing/Treatment Alginate with Ag;ABD 01/03/24 1517   Wound Length (cm) 0.9 cm 01/03/24 1352   Wound Width (cm) 0.5 cm 01/03/24 1352   Wound Depth (cm) 0.1 cm 01/03/24 1352   Wound Surface Area (cm^2) 0.45 cm^2 01/03/24 1352   Change in Wound Size % (l*w) 89.58 01/03/24 1352   Wound Volume (cm^3) 0.045 cm^3 01/03/24 1352   Wound Healing % 97 01/03/24 1352   Post-Procedure Length (cm) 0.9 cm 01/03/24 1500   Post-Procedure Width (cm) 0.5 cm 01/03/24 1500   Post-Procedure Depth (cm) 0.2 cm 01/03/24 1500   Post-Procedure Surface Area (cm^2) 0.45 cm^2 01/03/24

## 2024-01-08 ENCOUNTER — HOSPITAL ENCOUNTER (OUTPATIENT)
Dept: WOUND CARE | Age: 74
Discharge: HOME | End: 2024-01-08
Attending: SURGERY
Payer: MEDICARE

## 2024-01-08 VITALS
SYSTOLIC BLOOD PRESSURE: 168 MMHG | RESPIRATION RATE: 18 BRPM | HEART RATE: 41 BPM | TEMPERATURE: 96.8 F | DIASTOLIC BLOOD PRESSURE: 74 MMHG

## 2024-01-08 DIAGNOSIS — L97.212 LOWER LIMB ULCER, CALF, RIGHT, WITH FAT LAYER EXPOSED (HCC): Primary | ICD-10-CM

## 2024-01-08 DIAGNOSIS — L97.222 LOWER LIMB ULCER, CALF, LEFT, WITH FAT LAYER EXPOSED (HCC): ICD-10-CM

## 2024-01-08 PROCEDURE — 11042 DBRDMT SUBQ TIS 1ST 20SQCM/<: CPT

## 2024-01-08 PROCEDURE — 11042 DBRDMT SUBQ TIS 1ST 20SQCM/<: CPT | Performed by: SURGERY

## 2024-01-08 NOTE — PROGRESS NOTES
Wound Healing Center Followup Visit Note    Referring Physician : Pankaj Corbett MD  Tori Mathew  MEDICAL RECORD NUMBER:  [id number]  AGE: 73 y.o.   GENDER: male  : 1950  EPISODE DATE:  2024    Subjective:     Chief Complaint   Patient presents with    Wound Check     Bilateral legs      HISTORY of PRESENT ILLNESS HPI   Tori Mathew is a 73 y.o. male who presents today in regards to follow up evaluation and treatment of wound/ulcer.  That patient's past medical, family and social hx were reviewed and changes were made if present.    History of Wound Context:  The patient has had a wound of both calves, minimal skin on the right side, multiple ulcers on the left calf, fat layer exposed which was first noted approximately 2023.  This has been treated by the patient and his niece.  On their initial visit to the wound healing center, 10/23/23, the patient has noted that the wound has not been improving.  The patient has had similar previous wounds in the past.          Patient had lymphedema therapy in the past, with a lymphedema pump at home that the patient used to use on a regular basis, broke down,, is beyond repair and patient was recommended new lymphedema pump for which he was given the prescription     Pt is currently not on abx.     10/23/2023   I long detailed discussion the patient, options, risks benefits and alternatives were explained, patient recommend keep legs elevated decrease swelling component nutrition multivitamin supplement protein supplement were discussed  Because of underlying extensive dermatophytosis and tinea pedis, patient given short-term antifungal cream as well as oral Lamisil therapy  For now patient recommended compression wrap until the ulcers healed  Patient recommended, since his own lymphedema therapy pump broke down completely beyond repair, that he was using a regular basis in the past to keep the ulcerations from coming back and keep the

## 2024-01-08 NOTE — DISCHARGE INSTRUCTIONS
Visit Discharge/Physician Orders     Discharge condition: Stable  Assessment of pain at discharge: yes  Anesthetic used: lidocaine 4%  Discharge to: Home  Left via:Private automobile  Accompanied by: accompanied by self  ECF/HHA:      Dressing Orders: Lower left and right leg wounds: Cleanse wounds with normal saline, apply aquaphor to dry skin on legs. Apply  ALGINATE AG to wound bed and cover with ABD pad and UNNA BOOT AND COBAN from base of toes to base of knees- change weekly at wound care clinic.       Keep wrap dry at all times. If wrap becomes wet or falls 2 inches or painful- may remove wrap and apply daily dressings and spandigrip   Elevate legs as much as possible.      Treatment Orders: 10/23 New Lymphedema pumps ordered   10/23  Culture taken- mix fernando  10/30 culture taken - 11/6 antibiotic ordered- please    EAT DIET WITH PROTEINS AND VITAMIN C  TAKE A MULTIVITAMIN DAILY IF NOT CONTRAINDICATED        [facility] followup visit ___________1 week____Monday 1:15 PM _________  (Please note your next appointment above and if you are unable to keep, kindly give a 24 hour notice. Thank you.)     Physician signature:__________________________        If you experience any of the following, please call the Wound Care Center during business hours:     * Increase in Pain  * Temperature over 101  * Increase in drainage from your wound  * Drainage with a foul odor  * Bleeding  * Increase in swelling  * Need for compression bandage changes due to slippage, breakthrough drainage.     If you need medical attention outside of the business hours of the Wound Care Centers please contact your PCP or go to the nearest emergency room.

## 2024-01-10 NOTE — DISCHARGE INSTRUCTIONS
Visit Discharge/Physician Orders     Discharge condition: Stable  Assessment of pain at discharge: yes  Anesthetic used: lidocaine 4%  Discharge to: Home  Left via:Private automobile  Accompanied by: accompanied by self  ECF/HHA:      Dressing Orders: Lower left and right leg wounds: Cleanse wounds with normal saline, apply aquaphor to dry skin on legs. Apply  DRAWTEX  to wound bed and cover with ABD pad and UNNA BOOT AND COBAN from base of toes to base of knees- change weekly at wound care clinic.       Keep wrap dry at all times. If wrap becomes wet or falls 2 inches or painful- may remove wrap and apply daily dressings and spandigrip   Elevate legs as much as possible.      Treatment Orders: 10/23 New Lymphedema pumps ordered   10/23  Culture taken- mix fernando  10/30 culture taken - 11/6 antibiotic ordered- please    EAT DIET WITH PROTEINS AND VITAMIN C  TAKE A MULTIVITAMIN DAILY IF NOT CONTRAINDICATED        [facility] followup visit ___________1 week____Monday 1:15 PM _________  (Please note your next appointment above and if you are unable to keep, kindly give a 24 hour notice. Thank you.)     Physician signature:__________________________        If you experience any of the following, please call the Wound Care Center during business hours:     * Increase in Pain  * Temperature over 101  * Increase in drainage from your wound  * Drainage with a foul odor  * Bleeding  * Increase in swelling  * Need for compression bandage changes due to slippage, breakthrough drainage.     If you need medical attention outside of the business hours of the Wound Care Centers please contact your PCP or go to the nearest emergency room.

## 2024-01-15 ENCOUNTER — HOSPITAL ENCOUNTER (OUTPATIENT)
Dept: WOUND CARE | Age: 74
Discharge: HOME | End: 2024-01-15
Attending: SURGERY
Payer: MEDICARE

## 2024-01-15 VITALS
HEART RATE: 73 BPM | WEIGHT: 315 LBS | HEIGHT: 74 IN | RESPIRATION RATE: 18 BRPM | DIASTOLIC BLOOD PRESSURE: 92 MMHG | SYSTOLIC BLOOD PRESSURE: 156 MMHG | TEMPERATURE: 97.5 F | BODY MASS INDEX: 40.43 KG/M2

## 2024-01-15 DIAGNOSIS — L97.222 LOWER LIMB ULCER, CALF, LEFT, WITH FAT LAYER EXPOSED (HCC): Primary | ICD-10-CM

## 2024-01-15 DIAGNOSIS — L97.212 LOWER LIMB ULCER, CALF, RIGHT, WITH FAT LAYER EXPOSED (HCC): ICD-10-CM

## 2024-01-15 PROCEDURE — 11042 DBRDMT SUBQ TIS 1ST 20SQCM/<: CPT

## 2024-01-15 PROCEDURE — 11042 DBRDMT SUBQ TIS 1ST 20SQCM/<: CPT | Performed by: SURGERY

## 2024-01-15 RX ORDER — IBUPROFEN 200 MG
TABLET ORAL ONCE
OUTPATIENT
Start: 2024-01-15 | End: 2024-01-15

## 2024-01-15 RX ORDER — LIDOCAINE HYDROCHLORIDE 40 MG/ML
SOLUTION TOPICAL ONCE
Status: COMPLETED | OUTPATIENT
Start: 2024-01-15 | End: 2024-01-15

## 2024-01-15 RX ORDER — BETAMETHASONE DIPROPIONATE 0.05 %
OINTMENT (GRAM) TOPICAL ONCE
OUTPATIENT
Start: 2024-01-15 | End: 2024-01-15

## 2024-01-15 RX ORDER — LIDOCAINE HYDROCHLORIDE 20 MG/ML
JELLY TOPICAL ONCE
OUTPATIENT
Start: 2024-01-15 | End: 2024-01-15

## 2024-01-15 RX ORDER — LIDOCAINE HYDROCHLORIDE 40 MG/ML
SOLUTION TOPICAL ONCE
OUTPATIENT
Start: 2024-01-15 | End: 2024-01-15

## 2024-01-15 RX ORDER — LIDOCAINE 50 MG/G
OINTMENT TOPICAL ONCE
OUTPATIENT
Start: 2024-01-15 | End: 2024-01-15

## 2024-01-15 RX ORDER — BACITRACIN ZINC 500 [USP'U]/G
OINTMENT TOPICAL ONCE
OUTPATIENT
Start: 2024-01-15 | End: 2024-01-15

## 2024-01-15 RX ORDER — LIDOCAINE 40 MG/G
CREAM TOPICAL ONCE
OUTPATIENT
Start: 2024-01-15 | End: 2024-01-15

## 2024-01-15 RX ORDER — CLOBETASOL PROPIONATE 0.5 MG/G
OINTMENT TOPICAL ONCE
OUTPATIENT
Start: 2024-01-15 | End: 2024-01-15

## 2024-01-15 RX ORDER — SODIUM CHLOR/HYPOCHLOROUS ACID 0.033 %
SOLUTION, IRRIGATION IRRIGATION ONCE
OUTPATIENT
Start: 2024-01-15 | End: 2024-01-15

## 2024-01-15 RX ORDER — TRIAMCINOLONE ACETONIDE 1 MG/G
OINTMENT TOPICAL ONCE
OUTPATIENT
Start: 2024-01-15 | End: 2024-01-15

## 2024-01-15 RX ORDER — GENTAMICIN SULFATE 1 MG/G
OINTMENT TOPICAL ONCE
OUTPATIENT
Start: 2024-01-15 | End: 2024-01-15

## 2024-01-15 RX ORDER — BACITRACIN ZINC AND POLYMYXIN B SULFATE 500; 1000 [USP'U]/G; [USP'U]/G
OINTMENT TOPICAL ONCE
OUTPATIENT
Start: 2024-01-15 | End: 2024-01-15

## 2024-01-15 RX ADMIN — LIDOCAINE HYDROCHLORIDE 10 ML: 40 SOLUTION TOPICAL at 13:20

## 2024-01-15 ASSESSMENT — PAIN SCALES - GENERAL: PAINLEVEL_OUTOF10: 0

## 2024-01-15 NOTE — PROGRESS NOTES
Dressing/Treatment Alginate with Ag;ABD 01/03/24 1517   Wound Length (cm) 1 cm 01/08/24 1407   Wound Width (cm) 1.7 cm 01/08/24 1407   Wound Depth (cm) 0.1 cm 01/08/24 1407   Wound Surface Area (cm^2) 1.7 cm^2 01/08/24 1407   Change in Wound Size % (l*w) 60.65 01/08/24 1407   Wound Volume (cm^3) 0.17 cm^3 01/08/24 1407   Wound Healing % 90 01/08/24 1407   Post-Procedure Length (cm) 1.2 cm 01/08/24 1425   Post-Procedure Width (cm) 1.9 cm 01/08/24 1425   Post-Procedure Depth (cm) 0.2 cm 01/08/24 1425   Post-Procedure Surface Area (cm^2) 2.28 cm^2 01/08/24 1425   Post-Procedure Volume (cm^3) 0.456 cm^3 01/08/24 1425   Wound Assessment Fibrin;Pink/red 01/08/24 1407   Drainage Amount Moderate (25-50%) 01/08/24 1407   Drainage Description Serosanguinous 01/08/24 1407   Odor None 01/08/24 1407   Colleen-wound Assessment Hypopigmented;Maceration 01/08/24 1407   Margins Attached edges 12/18/23 1337   Wound Thickness Description not for Pressure Injury Full thickness 12/18/23 1337   Number of days: 77       Wound 12/27/23 Leg Right;Lower;Lateral #7 (Active)   Wound Image   12/27/23 1354   Wound Etiology Venous 12/27/23 1354   Dressing Status New dressing applied 01/03/24 1517   Wound Cleansed Cleansed with saline 01/03/24 1517   Dressing/Treatment Alginate with Ag;ABD 01/03/24 1517   Wound Length (cm) 0.2 cm 01/08/24 1407   Wound Width (cm) 0.2 cm 01/08/24 1407   Wound Depth (cm) 0.1 cm 01/08/24 1407   Wound Surface Area (cm^2) 0.04 cm^2 01/08/24 1407   Change in Wound Size % (l*w) 99.68 01/08/24 1407   Wound Volume (cm^3) 0.004 cm^3 01/08/24 1407   Wound Healing % 100 01/08/24 1407   Post-Procedure Length (cm) 0.3 cm 01/08/24 1425   Post-Procedure Width (cm) 0.3 cm 01/08/24 1425   Post-Procedure Depth (cm) 0.2 cm 01/08/24 1425   Post-Procedure Surface Area (cm^2) 0.09 cm^2 01/08/24 1425   Post-Procedure Volume (cm^3) 0.018 cm^3 01/08/24 1425   Wound Assessment Fibrin;Dry 01/08/24 1407   Drainage Amount Scant (moist but

## 2024-01-16 NOTE — DISCHARGE INSTRUCTIONS
Visit Discharge/Physician Orders     Discharge condition: Stable  Assessment of pain at discharge: yes  Anesthetic used: lidocaine 4%  Discharge to: Home  Left via:Private automobile  Accompanied by: accompanied by self  ECF/HHA:      Dressing Orders: Lower left leg wounds: Cleanse wounds with normal saline, apply aquaphor to dry skin on legs. Apply  DRAWTEX  to wound bed and cover with ABD pad and UNNA BOOT AND COBAN from base of toes to base of knees- change weekly at wound care clinic.     Right leg: UNNA BOOT AND COBAN from base of toes to base of knees- change weekly at wound care clinic.       Keep wrap dry at all times. If wrap becomes wet or falls 2 inches or painful- may remove wrap and apply daily dressings and spandigrip   Elevate legs as much as possible.      Treatment Orders: Lymphedema pumps obtained and using   10/23  Culture taken- mix fernando  10/30 culture taken - 11/6 antibiotic ordered- please    EAT DIET WITH PROTEINS AND VITAMIN C  TAKE A MULTIVITAMIN DAILY IF NOT CONTRAINDICATED        [facility] followup visit ___________1 week____Monday 1:15 PM _________  (Please note your next appointment above and if you are unable to keep, kindly give a 24 hour notice. Thank you.)     Physician signature:__________________________     If you experience any of the following, please call the Wound Care Center during business hours:     * Increase in Pain  * Temperature over 101  * Increase in drainage from your wound  * Drainage with a foul odor  * Bleeding  * Increase in swelling  * Need for compression bandage changes due to slippage, breakthrough drainage.     If you need medical attention outside of the business hours of the Wound Care Centers please contact your PCP or go to the nearest emergency room.

## 2024-01-22 ENCOUNTER — HOSPITAL ENCOUNTER (OUTPATIENT)
Dept: WOUND CARE | Age: 74
Discharge: HOME | End: 2024-01-22
Attending: SURGERY
Payer: MEDICARE

## 2024-01-22 VITALS
TEMPERATURE: 97.5 F | SYSTOLIC BLOOD PRESSURE: 152 MMHG | WEIGHT: 315 LBS | HEART RATE: 75 BPM | HEIGHT: 74 IN | BODY MASS INDEX: 40.43 KG/M2 | RESPIRATION RATE: 18 BRPM | DIASTOLIC BLOOD PRESSURE: 81 MMHG

## 2024-01-22 DIAGNOSIS — L97.222 LOWER LIMB ULCER, CALF, LEFT, WITH FAT LAYER EXPOSED (HCC): Primary | ICD-10-CM

## 2024-01-22 PROCEDURE — 11042 DBRDMT SUBQ TIS 1ST 20SQCM/<: CPT | Performed by: SURGERY

## 2024-01-22 PROCEDURE — 11042 DBRDMT SUBQ TIS 1ST 20SQCM/<: CPT

## 2024-01-22 RX ORDER — IBUPROFEN 200 MG
TABLET ORAL ONCE
OUTPATIENT
Start: 2024-01-22 | End: 2024-01-22

## 2024-01-22 RX ORDER — LIDOCAINE HYDROCHLORIDE 20 MG/ML
JELLY TOPICAL ONCE
OUTPATIENT
Start: 2024-01-22 | End: 2024-01-22

## 2024-01-22 RX ORDER — LIDOCAINE 40 MG/G
CREAM TOPICAL ONCE
OUTPATIENT
Start: 2024-01-22 | End: 2024-01-22

## 2024-01-22 RX ORDER — GENTAMICIN SULFATE 1 MG/G
OINTMENT TOPICAL ONCE
OUTPATIENT
Start: 2024-01-22 | End: 2024-01-22

## 2024-01-22 RX ORDER — LIDOCAINE HYDROCHLORIDE 40 MG/ML
SOLUTION TOPICAL ONCE
OUTPATIENT
Start: 2024-01-22 | End: 2024-01-22

## 2024-01-22 RX ORDER — BACITRACIN ZINC 500 [USP'U]/G
OINTMENT TOPICAL ONCE
OUTPATIENT
Start: 2024-01-22 | End: 2024-01-22

## 2024-01-22 RX ORDER — LIDOCAINE 50 MG/G
OINTMENT TOPICAL ONCE
OUTPATIENT
Start: 2024-01-22 | End: 2024-01-22

## 2024-01-22 RX ORDER — LIDOCAINE HYDROCHLORIDE 40 MG/ML
SOLUTION TOPICAL ONCE
Status: COMPLETED | OUTPATIENT
Start: 2024-01-22 | End: 2024-01-22

## 2024-01-22 RX ORDER — TRIAMCINOLONE ACETONIDE 1 MG/G
OINTMENT TOPICAL ONCE
OUTPATIENT
Start: 2024-01-22 | End: 2024-01-22

## 2024-01-22 RX ORDER — BETAMETHASONE DIPROPIONATE 0.05 %
OINTMENT (GRAM) TOPICAL ONCE
OUTPATIENT
Start: 2024-01-22 | End: 2024-01-22

## 2024-01-22 RX ORDER — SODIUM CHLOR/HYPOCHLOROUS ACID 0.033 %
SOLUTION, IRRIGATION IRRIGATION ONCE
OUTPATIENT
Start: 2024-01-22 | End: 2024-01-22

## 2024-01-22 RX ORDER — BACITRACIN ZINC AND POLYMYXIN B SULFATE 500; 1000 [USP'U]/G; [USP'U]/G
OINTMENT TOPICAL ONCE
OUTPATIENT
Start: 2024-01-22 | End: 2024-01-22

## 2024-01-22 RX ORDER — CLOBETASOL PROPIONATE 0.5 MG/G
OINTMENT TOPICAL ONCE
OUTPATIENT
Start: 2024-01-22 | End: 2024-01-22

## 2024-01-22 RX ADMIN — LIDOCAINE HYDROCHLORIDE 10 ML: 40 SOLUTION TOPICAL at 13:41

## 2024-01-22 ASSESSMENT — PAIN SCALES - GENERAL: PAINLEVEL_OUTOF10: 0

## 2024-01-22 NOTE — PROGRESS NOTES
Wound Healing Center Followup Visit Note    Referring Physician : Pankaj Corbett MD  Tori Mathew  MEDICAL RECORD NUMBER:  [id number]  AGE: 73 y.o.   GENDER: male  : 1950  EPISODE DATE:  2024    Subjective:     Chief Complaint   Patient presents with    Wound Check     Bilateral legs      HISTORY of PRESENT ILLNESS HPI   Tori Mathew is a 73 y.o. male who presents today in regards to follow up evaluation and treatment of wound/ulcer.  That patient's past medical, family and social hx were reviewed and changes were made if present.    History of Wound Context:  The patient has had a wound of both calves, minimal skin on the right side, multiple ulcers on the left calf, fat layer exposed which was first noted approximately 2023.  This has been treated by the patient and his niece.  On their initial visit to the wound healing center, 10/23/23, the patient has noted that the wound has not been improving.  The patient has had similar previous wounds in the past.          Patient had lymphedema therapy in the past, with a lymphedema pump at home that the patient used to use on a regular basis, broke down,, is beyond repair and patient was recommended new lymphedema pump for which he was given the prescription     Pt is currently not on abx.     10/23/2023   I long detailed discussion the patient, options, risks benefits and alternatives were explained, patient recommend keep legs elevated decrease swelling component nutrition multivitamin supplement protein supplement were discussed  Because of underlying extensive dermatophytosis and tinea pedis, patient given short-term antifungal cream as well as oral Lamisil therapy  For now patient recommended compression wrap until the ulcers healed  Patient recommended, since his own lymphedema therapy pump broke down completely beyond repair, that he was using a regular basis in the past to keep the ulcerations from coming back and keep the

## 2024-01-22 NOTE — PLAN OF CARE
Problem: Chronic Conditions and Co-morbidities  Goal: Patient's chronic conditions and co-morbidity symptoms are monitored and maintained or improved  Outcome: Progressing     Problem: Pain  Goal: Verbalizes/displays adequate comfort level or baseline comfort level  Outcome: Progressing     Problem: Pain  Goal: Verbalizes/displays adequate comfort level or baseline comfort level  Outcome: Progressing     Problem: Wound:  Goal: Will show signs of wound healing; wound closure and no evidence of infection  Description: Will show signs of wound healing; wound closure and no evidence of infection  Outcome: Progressing     Problem: Venous:  Goal: Signs of wound healing will improve  Description: Signs of wound healing will improve  Outcome: Progressing     Problem: Compression therapy:  Goal: Will be free from complications associated with compression therapy  Description: Will be free from complications associated with compression therapy  Outcome: Progressing

## 2024-01-23 NOTE — DISCHARGE INSTRUCTIONS
Visit Discharge/Physician Orders     Discharge condition: Stable  Assessment of pain at discharge: yes  Anesthetic used: lidocaine 4%  Discharge to: Home  Left via:Private automobile  Accompanied by: accompanied by self  ECF/HHA:      Dressing Orders: Lower left leg wounds: Cleanse wounds with normal saline, apply aquaphor to dry skin on legs. Apply  DRAWTEX  to wound bed and cover with ABD pad and UNNA BOOT AND COBAN from base of toes to base of knees- change weekly at wound care clinic.      Right leg: UNNA BOOT AND COBAN from base of toes to base of knees- change weekly at wound care clinic.        Keep wrap dry at all times. If wrap becomes wet or falls 2 inches or painful- may remove wrap and apply daily dressings and spandigrip   Elevate legs as much as possible.      Treatment Orders: Lymphedema pumps obtained and using   10/23  Culture taken- mix fernando  10/30 culture taken - 11/6 antibiotic ordered- please    EAT DIET WITH PROTEINS AND VITAMIN C  TAKE A MULTIVITAMIN DAILY IF NOT CONTRAINDICATED        [facility] followup visit ___________1 week____Monday 1:15 PM _________  (Please note your next appointment above and if you are unable to keep, kindly give a 24 hour notice. Thank you.)     Physician signature:__________________________     If you experience any of the following, please call the Wound Care Center during business hours:     * Increase in Pain  * Temperature over 101  * Increase in drainage from your wound  * Drainage with a foul odor  * Bleeding  * Increase in swelling  * Need for compression bandage changes due to slippage, breakthrough drainage.     If you need medical attention outside of the business hours of the Wound Care Centers please contact your PCP or go to the nearest emergency room.

## 2024-01-29 ENCOUNTER — HOSPITAL ENCOUNTER (OUTPATIENT)
Dept: WOUND CARE | Age: 74
Discharge: HOME | End: 2024-01-29
Attending: SURGERY
Payer: MEDICARE

## 2024-01-29 VITALS
RESPIRATION RATE: 18 BRPM | HEIGHT: 74 IN | TEMPERATURE: 96.7 F | HEART RATE: 81 BPM | SYSTOLIC BLOOD PRESSURE: 130 MMHG | DIASTOLIC BLOOD PRESSURE: 78 MMHG | BODY MASS INDEX: 40.43 KG/M2 | WEIGHT: 315 LBS

## 2024-01-29 DIAGNOSIS — L97.222 LOWER LIMB ULCER, CALF, LEFT, WITH FAT LAYER EXPOSED (HCC): Primary | ICD-10-CM

## 2024-01-29 PROCEDURE — 11042 DBRDMT SUBQ TIS 1ST 20SQCM/<: CPT

## 2024-01-29 PROCEDURE — 11042 DBRDMT SUBQ TIS 1ST 20SQCM/<: CPT | Performed by: SURGERY

## 2024-01-29 RX ORDER — BACITRACIN ZINC 500 [USP'U]/G
OINTMENT TOPICAL ONCE
OUTPATIENT
Start: 2024-01-29 | End: 2024-01-29

## 2024-01-29 RX ORDER — IBUPROFEN 200 MG
TABLET ORAL ONCE
OUTPATIENT
Start: 2024-01-29 | End: 2024-01-29

## 2024-01-29 RX ORDER — BETAMETHASONE DIPROPIONATE 0.05 %
OINTMENT (GRAM) TOPICAL ONCE
OUTPATIENT
Start: 2024-01-29 | End: 2024-01-29

## 2024-01-29 RX ORDER — LIDOCAINE 50 MG/G
OINTMENT TOPICAL ONCE
OUTPATIENT
Start: 2024-01-29 | End: 2024-01-29

## 2024-01-29 RX ORDER — GENTAMICIN SULFATE 1 MG/G
OINTMENT TOPICAL ONCE
OUTPATIENT
Start: 2024-01-29 | End: 2024-01-29

## 2024-01-29 RX ORDER — LIDOCAINE HYDROCHLORIDE 40 MG/ML
SOLUTION TOPICAL ONCE
Status: COMPLETED | OUTPATIENT
Start: 2024-01-29 | End: 2024-01-29

## 2024-01-29 RX ORDER — TRIAMCINOLONE ACETONIDE 1 MG/G
OINTMENT TOPICAL ONCE
OUTPATIENT
Start: 2024-01-29 | End: 2024-01-29

## 2024-01-29 RX ORDER — BACITRACIN ZINC AND POLYMYXIN B SULFATE 500; 1000 [USP'U]/G; [USP'U]/G
OINTMENT TOPICAL ONCE
OUTPATIENT
Start: 2024-01-29 | End: 2024-01-29

## 2024-01-29 RX ORDER — LIDOCAINE HYDROCHLORIDE 40 MG/ML
SOLUTION TOPICAL ONCE
OUTPATIENT
Start: 2024-01-29 | End: 2024-01-29

## 2024-01-29 RX ORDER — SODIUM CHLOR/HYPOCHLOROUS ACID 0.033 %
SOLUTION, IRRIGATION IRRIGATION ONCE
OUTPATIENT
Start: 2024-01-29 | End: 2024-01-29

## 2024-01-29 RX ORDER — LIDOCAINE HYDROCHLORIDE 20 MG/ML
JELLY TOPICAL ONCE
OUTPATIENT
Start: 2024-01-29 | End: 2024-01-29

## 2024-01-29 RX ORDER — LIDOCAINE 40 MG/G
CREAM TOPICAL ONCE
OUTPATIENT
Start: 2024-01-29 | End: 2024-01-29

## 2024-01-29 RX ORDER — CLOBETASOL PROPIONATE 0.5 MG/G
OINTMENT TOPICAL ONCE
OUTPATIENT
Start: 2024-01-29 | End: 2024-01-29

## 2024-01-29 RX ADMIN — LIDOCAINE HYDROCHLORIDE: 40 SOLUTION TOPICAL at 13:44

## 2024-01-29 NOTE — PLAN OF CARE
Problem: Chronic Conditions and Co-morbidities  Goal: Patient's chronic conditions and co-morbidity symptoms are monitored and maintained or improved  Outcome: Progressing     Problem: Wound:  Goal: Will show signs of wound healing; wound closure and no evidence of infection  Description: Will show signs of wound healing; wound closure and no evidence of infection  Outcome: Progressing     Problem: Venous:  Goal: Signs of wound healing will improve  Description: Signs of wound healing will improve  Outcome: Progressing     Problem: Compression therapy:  Goal: Will be free from complications associated with compression therapy  Description: Will be free from complications associated with compression therapy  Outcome: Progressing     Problem: Pain  Goal: Verbalizes/displays adequate comfort level or baseline comfort level  Outcome: Progressing

## 2024-01-29 NOTE — PROGRESS NOTES
Wound Healing Center Followup Visit Note    Referring Physician : Pankaj Corbett MD  Tori Mathew  MEDICAL RECORD NUMBER:  [id number]  AGE: 73 y.o.   GENDER: male  : 1950  EPISODE DATE:  2024    Subjective:     Chief Complaint   Patient presents with    Wound Check     Bilateral legs      HISTORY of PRESENT ILLNESS HPI   Tori Mathew is a 73 y.o. male who presents today in regards to follow up evaluation and treatment of wound/ulcer.  That patient's past medical, family and social hx were reviewed and changes were made if present.    History of Wound Context:  The patient has had a wound of both calves, minimal skin on the right side, multiple ulcers on the left calf, fat layer exposed which was first noted approximately 2023.  This has been treated by the patient and his niece.  On their initial visit to the wound healing center, 10/23/23, the patient has noted that the wound has not been improving.  The patient has had similar previous wounds in the past.          Patient had lymphedema therapy in the past, with a lymphedema pump at home that the patient used to use on a regular basis, broke down,, is beyond repair and patient was recommended new lymphedema pump for which he was given the prescription     Pt is currently not on abx.     10/23/2023   I long detailed discussion the patient, options, risks benefits and alternatives were explained, patient recommend keep legs elevated decrease swelling component nutrition multivitamin supplement protein supplement were discussed  Because of underlying extensive dermatophytosis and tinea pedis, patient given short-term antifungal cream as well as oral Lamisil therapy  For now patient recommended compression wrap until the ulcers healed  Patient recommended, since his own lymphedema therapy pump broke down completely beyond repair, that he was using a regular basis in the past to keep the ulcerations from coming back and keep the

## 2024-01-31 NOTE — DISCHARGE INSTRUCTIONS
Visit Discharge/Physician Orders     Discharge condition: Stable  Assessment of pain at discharge: yes  Anesthetic used: lidocaine 4%  Discharge to: Home  Left via:Private automobile  Accompanied by: accompanied by self  ECF/HHA:      Dressing Orders: Lower left leg wounds: Cleanse wounds with normal saline, apply aquaphor to dry skin on legs. Apply  DRAWTEX  to wound bed and cover with ABD pad and UNNA BOOT AND COBAN from base of toes to base of knees- change weekly at wound care clinic.      Right leg: UNNA BOOT AND COBAN from base of toes to base of knees- change weekly at wound care clinic.        Keep wrap dry at all times. If wrap becomes wet or falls 2 inches or painful- may remove wrap and apply daily dressings and spandigrip   Elevate legs as much as possible.      Treatment Orders: Lymphedema pumps obtained and using   10/23  Culture taken- mix fernando  10/30 culture taken - 11/6 antibiotic ordered- please    EAT DIET WITH PROTEINS AND VITAMIN C  TAKE A MULTIVITAMIN DAILY IF NOT CONTRAINDICATED        [facility] followup visit ___________1 week____Monday 1:15 PM _________  (Please note your next appointment above and if you are unable to keep, kindly give a 24 hour notice. Thank you.)     Physician signature:__________________________     If you experience any of the following, please call the Wound Care Center during business hours:     * Increase in Pain  * Temperature over 101  * Increase in drainage from your wound  * Drainage with a foul odor  * Bleeding  * Increase in swelling  * Need for compression bandage changes due to slippage, breakthrough drainage.     If you need medical attention outside of the business hours of the Wound Care Centers please contact your PCP or go to the nearest emergency room.

## 2024-02-05 ENCOUNTER — HOSPITAL ENCOUNTER (OUTPATIENT)
Dept: WOUND CARE | Age: 74
Discharge: HOME | End: 2024-02-05
Attending: SURGERY
Payer: MEDICARE

## 2024-02-05 VITALS
RESPIRATION RATE: 18 BRPM | DIASTOLIC BLOOD PRESSURE: 84 MMHG | BODY MASS INDEX: 40.43 KG/M2 | TEMPERATURE: 96.8 F | WEIGHT: 315 LBS | HEART RATE: 76 BPM | SYSTOLIC BLOOD PRESSURE: 164 MMHG | HEIGHT: 74 IN

## 2024-02-05 DIAGNOSIS — L97.222 LOWER LIMB ULCER, CALF, LEFT, WITH FAT LAYER EXPOSED (HCC): Primary | ICD-10-CM

## 2024-02-05 PROCEDURE — 11042 DBRDMT SUBQ TIS 1ST 20SQCM/<: CPT

## 2024-02-05 PROCEDURE — 11042 DBRDMT SUBQ TIS 1ST 20SQCM/<: CPT | Performed by: SURGERY

## 2024-02-05 RX ORDER — BETAMETHASONE DIPROPIONATE 0.05 %
OINTMENT (GRAM) TOPICAL ONCE
OUTPATIENT
Start: 2024-02-05 | End: 2024-02-05

## 2024-02-05 RX ORDER — LIDOCAINE HYDROCHLORIDE 40 MG/ML
SOLUTION TOPICAL ONCE
Status: COMPLETED | OUTPATIENT
Start: 2024-02-05 | End: 2024-02-05

## 2024-02-05 RX ORDER — LIDOCAINE HYDROCHLORIDE 20 MG/ML
JELLY TOPICAL ONCE
OUTPATIENT
Start: 2024-02-05 | End: 2024-02-05

## 2024-02-05 RX ORDER — SODIUM CHLOR/HYPOCHLOROUS ACID 0.033 %
SOLUTION, IRRIGATION IRRIGATION ONCE
OUTPATIENT
Start: 2024-02-05 | End: 2024-02-05

## 2024-02-05 RX ORDER — BACITRACIN ZINC AND POLYMYXIN B SULFATE 500; 1000 [USP'U]/G; [USP'U]/G
OINTMENT TOPICAL ONCE
OUTPATIENT
Start: 2024-02-05 | End: 2024-02-05

## 2024-02-05 RX ORDER — CLOBETASOL PROPIONATE 0.5 MG/G
OINTMENT TOPICAL ONCE
OUTPATIENT
Start: 2024-02-05 | End: 2024-02-05

## 2024-02-05 RX ORDER — LIDOCAINE 50 MG/G
OINTMENT TOPICAL ONCE
OUTPATIENT
Start: 2024-02-05 | End: 2024-02-05

## 2024-02-05 RX ORDER — BACITRACIN ZINC 500 [USP'U]/G
OINTMENT TOPICAL ONCE
OUTPATIENT
Start: 2024-02-05 | End: 2024-02-05

## 2024-02-05 RX ORDER — TRIAMCINOLONE ACETONIDE 1 MG/G
OINTMENT TOPICAL ONCE
OUTPATIENT
Start: 2024-02-05 | End: 2024-02-05

## 2024-02-05 RX ORDER — IBUPROFEN 200 MG
TABLET ORAL ONCE
OUTPATIENT
Start: 2024-02-05 | End: 2024-02-05

## 2024-02-05 RX ORDER — LIDOCAINE 40 MG/G
CREAM TOPICAL ONCE
OUTPATIENT
Start: 2024-02-05 | End: 2024-02-05

## 2024-02-05 RX ORDER — LIDOCAINE HYDROCHLORIDE 40 MG/ML
SOLUTION TOPICAL ONCE
OUTPATIENT
Start: 2024-02-05 | End: 2024-02-05

## 2024-02-05 RX ORDER — GENTAMICIN SULFATE 1 MG/G
OINTMENT TOPICAL ONCE
OUTPATIENT
Start: 2024-02-05 | End: 2024-02-05

## 2024-02-05 RX ADMIN — LIDOCAINE HYDROCHLORIDE 15 ML: 40 SOLUTION TOPICAL at 14:00

## 2024-02-05 ASSESSMENT — PAIN SCALES - GENERAL: PAINLEVEL_OUTOF10: 0

## 2024-02-05 NOTE — PROGRESS NOTES
with good arterial flow     2.  Bilateral venous ultrasound studies, no DVT, and no evidence of any  reflux at the saphenofemoral junction noted bilaterally      PAST MEDICAL HISTORY      Diagnosis Date    Ankle ulcer, left, with fat layer exposed ([facility]) 11/13/2023    Cellulitis of right lower leg     Chronic venous insufficiency 1/23/2023    Decreased dorsalis pedis pulse 1/23/2023    Dermatophytosis 1/23/2023    Diabetes mellitus (Formerly [facility])     Hypokalemia 7/31/2023    Leg swelling 1/23/2023    Lower limb ulcer, calf, left, with fat layer exposed ([facility]) 10/23/2023    Lower limb ulcer, calf, right, limited to breakdown of skin ([facility]) 8/28/2023    Lower limb ulcer, calf, right, with fat layer exposed ([facility]) 7/17/2023    Lymphedema     Lymphedema of both lower extremities 1/23/2023    Obesity, Class III, BMI 40-49.9 (morbid obesity) (Formerly [facility])     Onychomycosis 1/23/2023    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    Left    Skin ulcer of right calf, limited to breakdown of skin ([facility]) 2/20/2023    Tinea pedis of both feet 1/23/2023     Past Surgical History:   Procedure Laterality Date    FRACTURE SURGERY  2005    right ankle repair     Family History   Problem Relation Age of Onset    Heart Disease Mother     Heart Disease Father      Social History     Tobacco Use    Smoking status: Never    Smokeless tobacco: Never   Vaping Use    Vaping Use: Never used   Substance Use Topics    Alcohol use: No     Comment: on special occassions    Drug use: No     Allergies   Allergen Reactions    Metformin And Related Diarrhea     Current Outpatient Medications on File Prior to Encounter   Medication Sig Dispense Refill    aspirin (ASPIRIN CHILDRENS) 81 MG chewable tablet Take 1 tablet by mouth daily 30 tablet 5     No current facility-administered medications on file prior to encounter.       REVIEW OF SYSTEMS See HPI    Objective:    BP (!) 164/84   Pulse 76   Temp 96.8 °F (36 °C) (Temporal)   Resp 18   Ht 1.88 m (6' 2.02\")   Wt (!) 154.2 kg

## 2024-02-07 NOTE — DISCHARGE INSTRUCTIONS
Visit Discharge/Physician Orders     Discharge condition: Stable  Assessment of pain at discharge: yes  Anesthetic used: lidocaine 4%  Discharge to: Home  Left via:Private automobile  Accompanied by: accompanied by self  ECF/HHA:      Dressing Orders: Lower left leg wounds: Cleanse wounds with normal saline, apply aquaphor to dry skin on legs. Apply  DRAWTEX  to wound bed and cover with ABD pad and UNNA BOOT AND COBAN from base of toes to base of knees- change weekly at wound care clinic.      Right leg: Spandigrip on right leg- on in morning and off at night      Keep wrap dry at all times. If wrap becomes wet or falls 2 inches or painful- may remove wrap and apply daily dressings and spandigrip   Elevate legs as much as possible.      Treatment Orders: Lymphedema pumps obtained and using   10/23  Culture taken- mix fernando  10/30 culture taken - 11/6 antibiotic ordered- please    EAT DIET WITH PROTEINS AND VITAMIN C  TAKE A MULTIVITAMIN DAILY IF NOT CONTRAINDICATED        C followup visit ___________1 week____Monday 1:15 PM _________  (Please note your next appointment above and if you are unable to keep, kindly give a 24 hour notice. Thank you.)     Physician signature:__________________________     If you experience any of the following, please call the Wound Care Center during business hours:     * Increase in Pain  * Temperature over 101  * Increase in drainage from your wound  * Drainage with a foul odor  * Bleeding  * Increase in swelling  * Need for compression bandage changes due to slippage, breakthrough drainage.     If you need medical attention outside of the business hours of the Wound Care Centers please contact your PCP or go to the nearest emergency room.

## 2024-02-12 ENCOUNTER — HOSPITAL ENCOUNTER (OUTPATIENT)
Dept: WOUND CARE | Age: 74
Discharge: HOME | End: 2024-02-12
Attending: SURGERY
Payer: MEDICARE

## 2024-02-12 VITALS
TEMPERATURE: 96.7 F | RESPIRATION RATE: 18 BRPM | SYSTOLIC BLOOD PRESSURE: 163 MMHG | DIASTOLIC BLOOD PRESSURE: 96 MMHG | HEART RATE: 89 BPM

## 2024-02-12 DIAGNOSIS — L97.222 LOWER LIMB ULCER, CALF, LEFT, WITH FAT LAYER EXPOSED (HCC): Primary | ICD-10-CM

## 2024-02-12 PROCEDURE — 11042 DBRDMT SUBQ TIS 1ST 20SQCM/<: CPT | Performed by: SURGERY

## 2024-02-12 PROCEDURE — 11042 DBRDMT SUBQ TIS 1ST 20SQCM/<: CPT

## 2024-02-12 RX ORDER — LIDOCAINE 40 MG/G
CREAM TOPICAL ONCE
OUTPATIENT
Start: 2024-02-12 | End: 2024-02-12

## 2024-02-12 RX ORDER — LIDOCAINE 50 MG/G
OINTMENT TOPICAL ONCE
OUTPATIENT
Start: 2024-02-12 | End: 2024-02-12

## 2024-02-12 RX ORDER — LIDOCAINE HYDROCHLORIDE 20 MG/ML
JELLY TOPICAL ONCE
OUTPATIENT
Start: 2024-02-12 | End: 2024-02-12

## 2024-02-12 RX ORDER — BACITRACIN ZINC 500 [USP'U]/G
OINTMENT TOPICAL ONCE
OUTPATIENT
Start: 2024-02-12 | End: 2024-02-12

## 2024-02-12 RX ORDER — BETAMETHASONE DIPROPIONATE 0.05 %
OINTMENT (GRAM) TOPICAL ONCE
OUTPATIENT
Start: 2024-02-12 | End: 2024-02-12

## 2024-02-12 RX ORDER — IBUPROFEN 200 MG
TABLET ORAL ONCE
OUTPATIENT
Start: 2024-02-12 | End: 2024-02-12

## 2024-02-12 RX ORDER — LIDOCAINE HYDROCHLORIDE 40 MG/ML
SOLUTION TOPICAL ONCE
OUTPATIENT
Start: 2024-02-12 | End: 2024-02-12

## 2024-02-12 RX ORDER — CLOBETASOL PROPIONATE 0.5 MG/G
OINTMENT TOPICAL ONCE
OUTPATIENT
Start: 2024-02-12 | End: 2024-02-12

## 2024-02-12 RX ORDER — LIDOCAINE HYDROCHLORIDE 40 MG/ML
SOLUTION TOPICAL ONCE
Status: COMPLETED | OUTPATIENT
Start: 2024-02-12 | End: 2024-02-12

## 2024-02-12 RX ORDER — TRIAMCINOLONE ACETONIDE 1 MG/G
OINTMENT TOPICAL ONCE
OUTPATIENT
Start: 2024-02-12 | End: 2024-02-12

## 2024-02-12 RX ORDER — GENTAMICIN SULFATE 1 MG/G
OINTMENT TOPICAL ONCE
OUTPATIENT
Start: 2024-02-12 | End: 2024-02-12

## 2024-02-12 RX ORDER — BACITRACIN ZINC AND POLYMYXIN B SULFATE 500; 1000 [USP'U]/G; [USP'U]/G
OINTMENT TOPICAL ONCE
OUTPATIENT
Start: 2024-02-12 | End: 2024-02-12

## 2024-02-12 RX ORDER — SODIUM CHLOR/HYPOCHLOROUS ACID 0.033 %
SOLUTION, IRRIGATION IRRIGATION ONCE
OUTPATIENT
Start: 2024-02-12 | End: 2024-02-12

## 2024-02-12 RX ADMIN — LIDOCAINE HYDROCHLORIDE 10 ML: 40 SOLUTION TOPICAL at 13:52

## 2024-02-12 NOTE — PLAN OF CARE
Problem: Wound:  Goal: Will show signs of wound healing; wound closure and no evidence of infection  Description: Will show signs of wound healing; wound closure and no evidence of infection  Outcome: Progressing     Problem: Venous:  Goal: Signs of wound healing will improve  Description: Signs of wound healing will improve  Outcome: Progressing     Problem: Compression therapy:  Goal: Will be free from complications associated with compression therapy  Description: Will be free from complications associated with compression therapy  Outcome: Progressing     Problem: Chronic Conditions and Co-morbidities  Goal: Patient's chronic conditions and co-morbidity symptoms are monitored and maintained or improved  Outcome: Adequate for Discharge

## 2024-02-12 NOTE — PROGRESS NOTES
Home  Left via:Private automobile  Accompanied by: accompanied by self  ECF/HHA:      Dressing Orders: Lower left leg wounds: Cleanse wounds with normal saline, apply aquaphor to dry skin on legs. Apply  DRAWTEX  to wound bed and cover with ABD pad and UNNA BOOT AND COBAN from base of toes to base of knees- change weekly at wound care clinic.      Right leg: UNNA BOOT AND COBAN from base of toes to base of knees- change weekly at wound care clinic.        Keep wrap dry at all times. If wrap becomes wet or falls 2 inches or painful- may remove wrap and apply daily dressings and spandigrip   Elevate legs as much as possible.      Treatment Orders: Lymphedema pumps obtained and using   10/23  Culture taken- mix fernando  10/30 culture taken - 11/6 antibiotic ordered- please    EAT DIET WITH PROTEINS AND VITAMIN C  TAKE A MULTIVITAMIN DAILY IF NOT CONTRAINDICATED        [facility] followup visit ___________1 week____Monday 1:15 PM _________  (Please note your next appointment above and if you are unable to keep, kindly give a 24 hour notice. Thank you.)     Physician signature:__________________________     If you experience any of the following, please call the Wound Care Center during business hours:     * Increase in Pain  * Temperature over 101  * Increase in drainage from your wound  * Drainage with a foul odor  * Bleeding  * Increase in swelling  * Need for compression bandage changes due to slippage, breakthrough drainage.     If you need medical attention outside of the business hours of the Wound Care Centers please contact your PCP or go to the nearest emergency room.         Electronically signed by Neo Maier MD

## 2024-02-13 NOTE — DISCHARGE INSTRUCTIONS
Visit Discharge/Physician Orders     Discharge condition: Stable  Assessment of pain at discharge: yes  Anesthetic used: lidocaine 4%  Discharge to: Home  Left via:Private automobile  Accompanied by: accompanied by self  ECF/HHA:      Dressing Orders: Lower left leg wounds: Cleanse wounds with normal saline, apply aquaphor to dry skin on legs. Apply  DRAWTEX  to wound bed and cover with ABD pad and  COBAN II from base of toes to base of knees- change weekly at wound care clinic.      Right leg: Spandigrip on right leg- on in morning and off at night      Keep wrap dry at all times. If wrap becomes wet or falls 2 inches or painful- may remove wrap and apply daily dressings and spandigrip   Elevate legs as much as possible.      Treatment Orders: Lymphedema pumps obtained and using   10/23  Culture taken- mix fernando  10/30 culture taken - 11/6 antibiotic ordered- please    EAT DIET WITH PROTEINS AND VITAMIN C  TAKE A MULTIVITAMIN DAILY IF NOT CONTRAINDICATED        [facility] followup visit ___________1 week____Monday 1:15 PM _________  (Please note your next appointment above and if you are unable to keep, kindly give a 24 hour notice. Thank you.)     Physician signature:__________________________     If you experience any of the following, please call the Wound Care Center during business hours:     * Increase in Pain  * Temperature over 101  * Increase in drainage from your wound  * Drainage with a foul odor  * Bleeding  * Increase in swelling  * Need for compression bandage changes due to slippage, breakthrough drainage.     If you need medical attention outside of the business hours of the Wound Care Centers please contact your PCP or go to the nearest emergency room.

## 2024-02-19 ENCOUNTER — HOSPITAL ENCOUNTER (OUTPATIENT)
Dept: WOUND CARE | Age: 74
Discharge: HOME | End: 2024-02-19
Attending: SURGERY
Payer: MEDICARE

## 2024-02-19 VITALS
RESPIRATION RATE: 18 BRPM | HEART RATE: 87 BPM | SYSTOLIC BLOOD PRESSURE: 152 MMHG | DIASTOLIC BLOOD PRESSURE: 85 MMHG | TEMPERATURE: 97.4 F

## 2024-02-19 DIAGNOSIS — L97.222 LOWER LIMB ULCER, CALF, LEFT, WITH FAT LAYER EXPOSED (HCC): Primary | ICD-10-CM

## 2024-02-19 PROCEDURE — 11042 DBRDMT SUBQ TIS 1ST 20SQCM/<: CPT

## 2024-02-19 PROCEDURE — 11042 DBRDMT SUBQ TIS 1ST 20SQCM/<: CPT | Performed by: SURGERY

## 2024-02-19 RX ORDER — LIDOCAINE HYDROCHLORIDE 40 MG/ML
SOLUTION TOPICAL ONCE
OUTPATIENT
Start: 2024-02-19 | End: 2024-02-19

## 2024-02-19 RX ORDER — IBUPROFEN 200 MG
TABLET ORAL ONCE
OUTPATIENT
Start: 2024-02-19 | End: 2024-02-19

## 2024-02-19 RX ORDER — SODIUM CHLOR/HYPOCHLOROUS ACID 0.033 %
SOLUTION, IRRIGATION IRRIGATION ONCE
OUTPATIENT
Start: 2024-02-19 | End: 2024-02-19

## 2024-02-19 RX ORDER — BACITRACIN ZINC 500 [USP'U]/G
OINTMENT TOPICAL ONCE
OUTPATIENT
Start: 2024-02-19 | End: 2024-02-19

## 2024-02-19 RX ORDER — LIDOCAINE HYDROCHLORIDE 20 MG/ML
JELLY TOPICAL ONCE
OUTPATIENT
Start: 2024-02-19 | End: 2024-02-19

## 2024-02-19 RX ORDER — LIDOCAINE 40 MG/G
CREAM TOPICAL ONCE
OUTPATIENT
Start: 2024-02-19 | End: 2024-02-19

## 2024-02-19 RX ORDER — GENTAMICIN SULFATE 1 MG/G
OINTMENT TOPICAL ONCE
OUTPATIENT
Start: 2024-02-19 | End: 2024-02-19

## 2024-02-19 RX ORDER — LIDOCAINE 50 MG/G
OINTMENT TOPICAL ONCE
OUTPATIENT
Start: 2024-02-19 | End: 2024-02-19

## 2024-02-19 RX ORDER — BACITRACIN ZINC AND POLYMYXIN B SULFATE 500; 1000 [USP'U]/G; [USP'U]/G
OINTMENT TOPICAL ONCE
OUTPATIENT
Start: 2024-02-19 | End: 2024-02-19

## 2024-02-19 RX ORDER — LIDOCAINE HYDROCHLORIDE 40 MG/ML
SOLUTION TOPICAL ONCE
Status: COMPLETED | OUTPATIENT
Start: 2024-02-19 | End: 2024-02-19

## 2024-02-19 RX ORDER — BETAMETHASONE DIPROPIONATE 0.05 %
OINTMENT (GRAM) TOPICAL ONCE
OUTPATIENT
Start: 2024-02-19 | End: 2024-02-19

## 2024-02-19 RX ORDER — TRIAMCINOLONE ACETONIDE 1 MG/G
OINTMENT TOPICAL ONCE
OUTPATIENT
Start: 2024-02-19 | End: 2024-02-19

## 2024-02-19 RX ORDER — CLOBETASOL PROPIONATE 0.5 MG/G
OINTMENT TOPICAL ONCE
OUTPATIENT
Start: 2024-02-19 | End: 2024-02-19

## 2024-02-19 RX ADMIN — LIDOCAINE HYDROCHLORIDE 10 ML: 40 SOLUTION TOPICAL at 13:48

## 2024-02-19 NOTE — PROGRESS NOTES
Wound Healing Center Followup Visit Note    Referring Physician : Pankaj Corbett MD  Tori Mathew  MEDICAL RECORD NUMBER:  [id number]  AGE: 73 y.o.   GENDER: male  : 1950  EPISODE DATE:  2024    Subjective:     Chief Complaint   Patient presents with    Wound Check     Bilateral lower legs       HISTORY of PRESENT ILLNESS HPI   Tori Mathew is a 73 y.o. male who presents today in regards to follow up evaluation and treatment of wound/ulcer.  That patient's past medical, family and social hx were reviewed and changes were made if present.    History of Wound Context:  The patient has had a wound of both calves, minimal skin on the right side, multiple ulcers on the left calf, fat layer exposed which was first noted approximately 2023.  This has been treated by the patient and his niece.  On their initial visit to the wound healing center, 10/23/23, the patient has noted that the wound has not been improving.  The patient has had similar previous wounds in the past.          Patient had lymphedema therapy in the past, with a lymphedema pump at home that the patient used to use on a regular basis, broke down,, is beyond repair and patient was recommended new lymphedema pump for which he was given the prescription     Pt is currently not on abx.     10/23/2023   I long detailed discussion the patient, options, risks benefits and alternatives were explained, patient recommend keep legs elevated decrease swelling component nutrition multivitamin supplement protein supplement were discussed  Because of underlying extensive dermatophytosis and tinea pedis, patient given short-term antifungal cream as well as oral Lamisil therapy  For now patient recommended compression wrap until the ulcers healed  Patient recommended, since his own lymphedema therapy pump broke down completely beyond repair, that he was using a regular basis in the past to keep the ulcerations from coming back and

## 2024-02-19 NOTE — PLAN OF CARE
Problem: Chronic Conditions and Co-morbidities  Goal: Patient's chronic conditions and co-morbidity symptoms are monitored and maintained or improved  Outcome: Progressing     Problem: Compression therapy:  Goal: Will be free from complications associated with compression therapy  Description: Will be free from complications associated with compression therapy  Outcome: Progressing     Problem: Wound:  Goal: Will show signs of wound healing; wound closure and no evidence of infection  Description: Will show signs of wound healing; wound closure and no evidence of infection  Outcome: Not Progressing     Problem: Venous:  Goal: Signs of wound healing will improve  Description: Signs of wound healing will improve  Outcome: Not Progressing

## 2024-02-20 NOTE — DISCHARGE INSTRUCTIONS
Visit Discharge/Physician Orders     Discharge condition: Stable  Assessment of pain at discharge: yes  Anesthetic used: lidocaine 4%  Discharge to: Home  Left via:Private automobile  Accompanied by: accompanied by self  ECF/HHA:      Dressing Orders: Lower left leg wounds: Cleanse wounds with normal saline, apply aquaphor to dry skin on legs. Apply  DRAWTEX  to wound bed and cover with ABD pad and  COBAN II from base of toes to base of knees- change weekly at wound care clinic.      Right leg: Spandigrip on right leg- on in morning and off at night      Keep wrap dry at all times. If wrap becomes wet or falls 2 inches or painful- may remove wrap and apply daily dressings and spandigrip   Elevate legs as much as possible.      Treatment Orders: Lymphedema pumps obtained and using   10/23  Culture taken- mix fernando  10/30 culture taken - 11/6 antibiotic ordered- please    EAT DIET WITH PROTEINS AND VITAMIN C  TAKE A MULTIVITAMIN DAILY IF NOT CONTRAINDICATED        [facility] followup visit ___________1 week____Monday 1:15 PM _________  (Please note your next appointment above and if you are unable to keep, kindly give a 24 hour notice. Thank you.)     Physician signature:__________________________     If you experience any of the following, please call the Wound Care Center during business hours:     * Increase in Pain  * Temperature over 101  * Increase in drainage from your wound  * Drainage with a foul odor  * Bleeding  * Increase in swelling  * Need for compression bandage changes due to slippage, breakthrough drainage.     If you need medical attention outside of the business hours of the Wound Care Centers please contact your PCP or go to the nearest emergency room.

## 2024-02-26 ENCOUNTER — HOSPITAL ENCOUNTER (OUTPATIENT)
Dept: WOUND CARE | Age: 74
Discharge: HOME | End: 2024-02-26
Attending: SURGERY
Payer: MEDICARE

## 2024-02-26 VITALS
HEIGHT: 74 IN | DIASTOLIC BLOOD PRESSURE: 98 MMHG | RESPIRATION RATE: 18 BRPM | BODY MASS INDEX: 40.43 KG/M2 | HEART RATE: 84 BPM | SYSTOLIC BLOOD PRESSURE: 142 MMHG | TEMPERATURE: 96.7 F | WEIGHT: 315 LBS

## 2024-02-26 DIAGNOSIS — L97.222 LOWER LIMB ULCER, CALF, LEFT, WITH FAT LAYER EXPOSED (HCC): Primary | ICD-10-CM

## 2024-02-26 PROCEDURE — 11042 DBRDMT SUBQ TIS 1ST 20SQCM/<: CPT | Performed by: SURGERY

## 2024-02-26 PROCEDURE — 11042 DBRDMT SUBQ TIS 1ST 20SQCM/<: CPT

## 2024-02-26 RX ORDER — LIDOCAINE HYDROCHLORIDE 20 MG/ML
JELLY TOPICAL ONCE
OUTPATIENT
Start: 2024-02-26 | End: 2024-02-26

## 2024-02-26 RX ORDER — LIDOCAINE 40 MG/G
CREAM TOPICAL ONCE
OUTPATIENT
Start: 2024-02-26 | End: 2024-02-26

## 2024-02-26 RX ORDER — LIDOCAINE HYDROCHLORIDE 40 MG/ML
SOLUTION TOPICAL ONCE
Status: COMPLETED | OUTPATIENT
Start: 2024-02-26 | End: 2024-02-26

## 2024-02-26 RX ORDER — BACITRACIN ZINC 500 [USP'U]/G
OINTMENT TOPICAL ONCE
OUTPATIENT
Start: 2024-02-26 | End: 2024-02-26

## 2024-02-26 RX ORDER — IBUPROFEN 200 MG
TABLET ORAL ONCE
OUTPATIENT
Start: 2024-02-26 | End: 2024-02-26

## 2024-02-26 RX ORDER — LIDOCAINE 50 MG/G
OINTMENT TOPICAL ONCE
OUTPATIENT
Start: 2024-02-26 | End: 2024-02-26

## 2024-02-26 RX ORDER — SODIUM CHLOR/HYPOCHLOROUS ACID 0.033 %
SOLUTION, IRRIGATION IRRIGATION ONCE
OUTPATIENT
Start: 2024-02-26 | End: 2024-02-26

## 2024-02-26 RX ORDER — CLOBETASOL PROPIONATE 0.5 MG/G
OINTMENT TOPICAL ONCE
OUTPATIENT
Start: 2024-02-26 | End: 2024-02-26

## 2024-02-26 RX ORDER — BACITRACIN ZINC AND POLYMYXIN B SULFATE 500; 1000 [USP'U]/G; [USP'U]/G
OINTMENT TOPICAL ONCE
OUTPATIENT
Start: 2024-02-26 | End: 2024-02-26

## 2024-02-26 RX ORDER — LIDOCAINE HYDROCHLORIDE 40 MG/ML
SOLUTION TOPICAL ONCE
OUTPATIENT
Start: 2024-02-26 | End: 2024-02-26

## 2024-02-26 RX ORDER — BETAMETHASONE DIPROPIONATE 0.05 %
OINTMENT (GRAM) TOPICAL ONCE
OUTPATIENT
Start: 2024-02-26 | End: 2024-02-26

## 2024-02-26 RX ORDER — TRIAMCINOLONE ACETONIDE 1 MG/G
OINTMENT TOPICAL ONCE
OUTPATIENT
Start: 2024-02-26 | End: 2024-02-26

## 2024-02-26 RX ORDER — GENTAMICIN SULFATE 1 MG/G
OINTMENT TOPICAL ONCE
OUTPATIENT
Start: 2024-02-26 | End: 2024-02-26

## 2024-02-26 RX ADMIN — LIDOCAINE HYDROCHLORIDE 10 ML: 40 SOLUTION TOPICAL at 13:46

## 2024-02-26 ASSESSMENT — PAIN SCALES - GENERAL: PAINLEVEL_OUTOF10: 0

## 2024-02-26 NOTE — PROGRESS NOTES
Wound Healing Center Followup Visit Note    Referring Physician : Pankaj Corbett MD  Tori Mathew  MEDICAL RECORD NUMBER:  [id number]  AGE: 73 y.o.   GENDER: male  : 1950  EPISODE DATE:  2024    Subjective:     Chief Complaint   Patient presents with    Wound Check     Left leg      HISTORY of PRESENT ILLNESS HPI   Tori Mathew is a 73 y.o. male who presents today in regards to follow up evaluation and treatment of wound/ulcer.  That patient's past medical, family and social hx were reviewed and changes were made if present.    History of Wound Context:  The patient has had a wound of both calves, minimal skin on the right side, multiple ulcers on the left calf, fat layer exposed which was first noted approximately 2023.  This has been treated by the patient and his niece.  On their initial visit to the wound healing center, 10/23/23, the patient has noted that the wound has not been improving.  The patient has had similar previous wounds in the past.          Patient had lymphedema therapy in the past, with a lymphedema pump at home that the patient used to use on a regular basis, broke down,, is beyond repair and patient was recommended new lymphedema pump for which he was given the prescription     Pt is currently not on abx.     10/23/2023   I long detailed discussion the patient, options, risks benefits and alternatives were explained, patient recommend keep legs elevated decrease swelling component nutrition multivitamin supplement protein supplement were discussed  Because of underlying extensive dermatophytosis and tinea pedis, patient given short-term antifungal cream as well as oral Lamisil therapy  For now patient recommended compression wrap until the ulcers healed  Patient recommended, since his own lymphedema therapy pump broke down completely beyond repair, that he was using a regular basis in the past to keep the ulcerations from coming back and keep the

## 2024-02-26 NOTE — PLAN OF CARE
Problem: Chronic Conditions and Co-morbidities  Goal: Patient's chronic conditions and co-morbidity symptoms are monitored and maintained or improved  Outcome: Progressing     Problem: Pain  Goal: Verbalizes/displays adequate comfort level or baseline comfort level  Outcome: Progressing     Problem: Compression therapy:  Goal: Will be free from complications associated with compression therapy  Description: Will be free from complications associated with compression therapy  Outcome: Progressing     Problem: Wound:  Goal: Will show signs of wound healing; wound closure and no evidence of infection  Description: Will show signs of wound healing; wound closure and no evidence of infection  Outcome: Not Progressing     Problem: Venous:  Goal: Signs of wound healing will improve  Description: Signs of wound healing will improve  Outcome: Not Progressing

## 2024-03-04 ENCOUNTER — HOSPITAL ENCOUNTER (OUTPATIENT)
Dept: WOUND CARE | Age: 74
Discharge: HOME | End: 2024-03-04
Attending: SURGERY
Payer: MEDICARE

## 2024-03-04 VITALS
HEIGHT: 74 IN | WEIGHT: 315 LBS | SYSTOLIC BLOOD PRESSURE: 140 MMHG | DIASTOLIC BLOOD PRESSURE: 74 MMHG | HEART RATE: 84 BPM | BODY MASS INDEX: 40.43 KG/M2 | TEMPERATURE: 97.5 F | RESPIRATION RATE: 18 BRPM

## 2024-03-04 DIAGNOSIS — L97.222 LOWER LIMB ULCER, CALF, LEFT, WITH FAT LAYER EXPOSED (HCC): Primary | ICD-10-CM

## 2024-03-04 PROCEDURE — 11042 DBRDMT SUBQ TIS 1ST 20SQCM/<: CPT | Performed by: SURGERY

## 2024-03-04 PROCEDURE — 11042 DBRDMT SUBQ TIS 1ST 20SQCM/<: CPT

## 2024-03-04 RX ORDER — BACITRACIN ZINC AND POLYMYXIN B SULFATE 500; 1000 [USP'U]/G; [USP'U]/G
OINTMENT TOPICAL ONCE
OUTPATIENT
Start: 2024-03-04 | End: 2024-03-04

## 2024-03-04 RX ORDER — LIDOCAINE 40 MG/G
CREAM TOPICAL ONCE
OUTPATIENT
Start: 2024-03-04 | End: 2024-03-04

## 2024-03-04 RX ORDER — LIDOCAINE HYDROCHLORIDE 40 MG/ML
SOLUTION TOPICAL ONCE
Status: COMPLETED | OUTPATIENT
Start: 2024-03-04 | End: 2024-03-04

## 2024-03-04 RX ORDER — LIDOCAINE HYDROCHLORIDE 20 MG/ML
JELLY TOPICAL ONCE
OUTPATIENT
Start: 2024-03-04 | End: 2024-03-04

## 2024-03-04 RX ORDER — BETAMETHASONE DIPROPIONATE 0.05 %
OINTMENT (GRAM) TOPICAL ONCE
OUTPATIENT
Start: 2024-03-04 | End: 2024-03-04

## 2024-03-04 RX ORDER — BACITRACIN ZINC 500 [USP'U]/G
OINTMENT TOPICAL ONCE
OUTPATIENT
Start: 2024-03-04 | End: 2024-03-04

## 2024-03-04 RX ORDER — LIDOCAINE HYDROCHLORIDE 40 MG/ML
SOLUTION TOPICAL ONCE
OUTPATIENT
Start: 2024-03-04 | End: 2024-03-04

## 2024-03-04 RX ORDER — IBUPROFEN 200 MG
TABLET ORAL ONCE
OUTPATIENT
Start: 2024-03-04 | End: 2024-03-04

## 2024-03-04 RX ORDER — SODIUM CHLOR/HYPOCHLOROUS ACID 0.033 %
SOLUTION, IRRIGATION IRRIGATION ONCE
OUTPATIENT
Start: 2024-03-04 | End: 2024-03-04

## 2024-03-04 RX ORDER — CLOBETASOL PROPIONATE 0.5 MG/G
OINTMENT TOPICAL ONCE
OUTPATIENT
Start: 2024-03-04 | End: 2024-03-04

## 2024-03-04 RX ORDER — LIDOCAINE 50 MG/G
OINTMENT TOPICAL ONCE
OUTPATIENT
Start: 2024-03-04 | End: 2024-03-04

## 2024-03-04 RX ORDER — GENTAMICIN SULFATE 1 MG/G
OINTMENT TOPICAL ONCE
OUTPATIENT
Start: 2024-03-04 | End: 2024-03-04

## 2024-03-04 RX ORDER — TRIAMCINOLONE ACETONIDE 1 MG/G
OINTMENT TOPICAL ONCE
OUTPATIENT
Start: 2024-03-04 | End: 2024-03-04

## 2024-03-04 RX ADMIN — LIDOCAINE HYDROCHLORIDE 15 ML: 40 SOLUTION TOPICAL at 15:59

## 2024-03-04 NOTE — DISCHARGE INSTRUCTIONS
Visit Discharge/Physician Orders     Discharge condition: Stable  Assessment of pain at discharge: yes  Anesthetic used: lidocaine 4%  Discharge to: Home  Left via:Private automobile  Accompanied by: accompanied by self  ECF/HHA:      Dressing Orders: Lower left leg wounds: Cleanse wounds with normal saline, apply aquaphor to dry skin on legs. Apply  DRAWTEX  to wound bed and cover with ABD pad and  COBAN II from base of toes to base of knees- change weekly at wound care clinic.      Right leg: Spandigrip on right leg- on in morning and off at night      Keep wrap dry at all times. If wrap becomes wet or falls 2 inches or painful- may remove wrap and apply daily dressings and spandigrip   Elevate legs as much as possible.      Treatment Orders: Lymphedema pumps obtained and using   10/23  Culture taken- mix fernando  10/30 culture taken - 11/6 antibiotic ordered- please    EAT DIET WITH PROTEINS AND VITAMIN C  TAKE A MULTIVITAMIN DAILY IF NOT CONTRAINDICATED        [facility] followup visit ___________1 week____Monday 1:15 PM _________  (Please note your next appointment above and if you are unable to keep, kindly give a 24 hour notice. Thank you.)     Physician signature:__________________________     If you experience any of the following, please call the Wound Care Center during business hours:     * Increase in Pain  * Temperature over 101  * Increase in drainage from your wound  * Drainage with a foul odor  * Bleeding  * Increase in swelling  * Need for compression bandage changes due to slippage, breakthrough drainage.     If you need medical attention outside of the business hours of the Wound Care Centers please contact your PCP or go to the nearest emergency room.

## 2024-03-04 NOTE — PROGRESS NOTES
_________  (Please note your next appointment above and if you are unable to keep, kindly give a 24 hour notice. Thank you.)     Physician signature:__________________________     If you experience any of the following, please call the Wound Care Center during business hours:     * Increase in Pain  * Temperature over 101  * Increase in drainage from your wound  * Drainage with a foul odor  * Bleeding  * Increase in swelling  * Need for compression bandage changes due to slippage, breakthrough drainage.     If you need medical attention outside of the business hours of the Wound Care Centers please contact your PCP or go to the nearest emergency room.               Electronically signed by Neo Maier MD

## 2024-03-06 NOTE — DISCHARGE INSTRUCTIONS
Visit Discharge/Physician Orders     Discharge condition: Stable  Assessment of pain at discharge: yes  Anesthetic used: lidocaine 4%  Discharge to: Home  Left via:Private automobile  Accompanied by: accompanied by self  ECF/HHA:      Dressing Orders: Lower left leg wounds: Cleanse wounds with normal saline, apply aquaphor to dry skin on legs. Apply  DRAWTEX  to wound bed and cover with ABD pad and  COBAN II from base of toes to base of knees- change weekly at wound care clinic.      Right leg: Spandigrip on right leg- on in morning and off at night      Keep wrap dry at all times. If wrap becomes wet or falls 2 inches or painful- may remove wrap and apply daily dressings and spandigrip   Elevate legs as much as possible.      Treatment Orders: Lymphedema pumps obtained and using   10/23  Culture taken- mix fernando  10/30 culture taken - 11/6 antibiotic ordered- please    EAT DIET WITH PROTEINS AND VITAMIN C  TAKE A MULTIVITAMIN DAILY IF NOT CONTRAINDICATED        [facility] followup visit ___________1 week____Monday 1:15 PM _________  (Please note your next appointment above and if you are unable to keep, kindly give a 24 hour notice. Thank you.)     Physician signature:__________________________     If you experience any of the following, please call the Wound Care Center during business hours:     * Increase in Pain  * Temperature over 101  * Increase in drainage from your wound  * Drainage with a foul odor  * Bleeding  * Increase in swelling  * Need for compression bandage changes due to slippage, breakthrough drainage.     If you need medical attention outside of the business hours of the Wound Care Centers please contact your PCP or go to the nearest emergency room.

## 2024-03-11 ENCOUNTER — HOSPITAL ENCOUNTER (OUTPATIENT)
Dept: WOUND CARE | Age: 74
Discharge: HOME | End: 2024-03-11
Attending: SURGERY
Payer: MEDICARE

## 2024-03-11 VITALS
RESPIRATION RATE: 18 BRPM | HEIGHT: 74 IN | SYSTOLIC BLOOD PRESSURE: 144 MMHG | BODY MASS INDEX: 40.43 KG/M2 | WEIGHT: 315 LBS | DIASTOLIC BLOOD PRESSURE: 76 MMHG | HEART RATE: 76 BPM | TEMPERATURE: 96.4 F

## 2024-03-11 DIAGNOSIS — L97.222 LOWER LIMB ULCER, CALF, LEFT, WITH FAT LAYER EXPOSED (HCC): Primary | ICD-10-CM

## 2024-03-11 PROCEDURE — 11042 DBRDMT SUBQ TIS 1ST 20SQCM/<: CPT

## 2024-03-11 PROCEDURE — 11042 DBRDMT SUBQ TIS 1ST 20SQCM/<: CPT | Performed by: SURGERY

## 2024-03-11 RX ORDER — LIDOCAINE 50 MG/G
OINTMENT TOPICAL ONCE
OUTPATIENT
Start: 2024-03-11 | End: 2024-03-11

## 2024-03-11 RX ORDER — SODIUM CHLOR/HYPOCHLOROUS ACID 0.033 %
SOLUTION, IRRIGATION IRRIGATION ONCE
OUTPATIENT
Start: 2024-03-11 | End: 2024-03-11

## 2024-03-11 RX ORDER — CLOBETASOL PROPIONATE 0.5 MG/G
OINTMENT TOPICAL ONCE
OUTPATIENT
Start: 2024-03-11 | End: 2024-03-11

## 2024-03-11 RX ORDER — LIDOCAINE 40 MG/G
CREAM TOPICAL ONCE
OUTPATIENT
Start: 2024-03-11 | End: 2024-03-11

## 2024-03-11 RX ORDER — LIDOCAINE HYDROCHLORIDE 20 MG/ML
JELLY TOPICAL ONCE
OUTPATIENT
Start: 2024-03-11 | End: 2024-03-11

## 2024-03-11 RX ORDER — GENTAMICIN SULFATE 1 MG/G
OINTMENT TOPICAL ONCE
OUTPATIENT
Start: 2024-03-11 | End: 2024-03-11

## 2024-03-11 RX ORDER — TRIAMCINOLONE ACETONIDE 1 MG/G
OINTMENT TOPICAL ONCE
OUTPATIENT
Start: 2024-03-11 | End: 2024-03-11

## 2024-03-11 RX ORDER — LIDOCAINE HYDROCHLORIDE 40 MG/ML
SOLUTION TOPICAL ONCE
OUTPATIENT
Start: 2024-03-11 | End: 2024-03-11

## 2024-03-11 RX ORDER — BACITRACIN ZINC 500 [USP'U]/G
OINTMENT TOPICAL ONCE
OUTPATIENT
Start: 2024-03-11 | End: 2024-03-11

## 2024-03-11 RX ORDER — LIDOCAINE HYDROCHLORIDE 40 MG/ML
SOLUTION TOPICAL ONCE
Status: COMPLETED | OUTPATIENT
Start: 2024-03-11 | End: 2024-03-11

## 2024-03-11 RX ORDER — BACITRACIN ZINC AND POLYMYXIN B SULFATE 500; 1000 [USP'U]/G; [USP'U]/G
OINTMENT TOPICAL ONCE
OUTPATIENT
Start: 2024-03-11 | End: 2024-03-11

## 2024-03-11 RX ORDER — BETAMETHASONE DIPROPIONATE 0.05 %
OINTMENT (GRAM) TOPICAL ONCE
OUTPATIENT
Start: 2024-03-11 | End: 2024-03-11

## 2024-03-11 RX ORDER — IBUPROFEN 200 MG
TABLET ORAL ONCE
OUTPATIENT
Start: 2024-03-11 | End: 2024-03-11

## 2024-03-11 RX ADMIN — LIDOCAINE HYDROCHLORIDE 15 ML: 40 SOLUTION TOPICAL at 13:35

## 2024-03-11 NOTE — PROGRESS NOTES
Treatment Orders: Lymphedema pumps obtained and using   10/23  Culture taken- mix fernando  10/30 culture taken - 11/6 antibiotic ordered- please    EAT DIET WITH PROTEINS AND VITAMIN C  TAKE A MULTIVITAMIN DAILY IF NOT CONTRAINDICATED        [facility] followup visit ___________1 week____Monday 1:15 PM _________  (Please note your next appointment above and if you are unable to keep, kindly give a 24 hour notice. Thank you.)     Physician signature:__________________________     If you experience any of the following, please call the Wound Care Center during business hours:     * Increase in Pain  * Temperature over 101  * Increase in drainage from your wound  * Drainage with a foul odor  * Bleeding  * Increase in swelling  * Need for compression bandage changes due to slippage, breakthrough drainage.     If you need medical attention outside of the business hours of the Wound Care Centers please contact your PCP or go to the nearest emergency room.               Electronically signed by Neo Maier MD

## 2024-03-11 NOTE — PLAN OF CARE
Problem: Chronic Conditions and Co-morbidities  Goal: Patient's chronic conditions and co-morbidity symptoms are monitored and maintained or improved  Outcome: Progressing     Problem: Venous:  Goal: Signs of wound healing will improve  Description: Signs of wound healing will improve  Outcome: Progressing     Problem: Compression therapy:  Goal: Will be free from complications associated with compression therapy  Description: Will be free from complications associated with compression therapy  Outcome: Progressing     Problem: Wound:  Goal: Will show signs of wound healing; wound closure and no evidence of infection  Description: Will show signs of wound healing; wound closure and no evidence of infection  Outcome: Not Progressing

## 2024-03-13 NOTE — DISCHARGE INSTRUCTIONS
Visit Discharge/Physician Orders     Discharge condition: Stable  Assessment of pain at discharge: yes  Anesthetic used: lidocaine 4%  Discharge to: Home  Left via:Private automobile  Accompanied by: accompanied by self  ECF/HHA:      Dressing Orders: Lower left leg wounds: Cleanse wounds with normal saline, apply aquaphor to dry skin on legs. Apply  DRAWTEX  to wound bed and cover with ABD pad and  COBAN II from base of toes to base of knees- change weekly at wound care clinic.      Right leg: Spandigrip on right leg- on in morning and off at night      Keep wrap dry at all times. If wrap becomes wet or falls 2 inches or painful- may remove wrap and apply daily dressings and spandigrip   Elevate legs as much as possible.      Treatment Orders: Lymphedema pumps obtained and using   10/23  Culture taken- mix fernando  10/30 culture taken - 11/6 antibiotic ordered- please    EAT DIET WITH PROTEINS AND VITAMIN C  TAKE A MULTIVITAMIN DAILY IF NOT CONTRAINDICATED        [facility] followup visit ___________1 week____Monday 1:15 PM _________  (Please note your next appointment above and if you are unable to keep, kindly give a 24 hour notice. Thank you.)     Physician signature:__________________________     If you experience any of the following, please call the Wound Care Center during business hours:     * Increase in Pain  * Temperature over 101  * Increase in drainage from your wound  * Drainage with a foul odor  * Bleeding  * Increase in swelling  * Need for compression bandage changes due to slippage, breakthrough drainage.     If you need medical attention outside of the business hours of the Wound Care Centers please contact your PCP or go to the nearest emergency room.

## 2024-03-18 ENCOUNTER — HOSPITAL ENCOUNTER (OUTPATIENT)
Dept: WOUND CARE | Age: 74
Discharge: HOME | End: 2024-03-18
Attending: SURGERY
Payer: MEDICARE

## 2024-03-18 VITALS
DIASTOLIC BLOOD PRESSURE: 90 MMHG | HEART RATE: 70 BPM | SYSTOLIC BLOOD PRESSURE: 149 MMHG | TEMPERATURE: 95.9 F | RESPIRATION RATE: 20 BRPM

## 2024-03-18 DIAGNOSIS — L97.222 LOWER LIMB ULCER, CALF, LEFT, WITH FAT LAYER EXPOSED (HCC): Primary | ICD-10-CM

## 2024-03-18 PROCEDURE — 11042 DBRDMT SUBQ TIS 1ST 20SQCM/<: CPT | Performed by: SURGERY

## 2024-03-18 PROCEDURE — 11042 DBRDMT SUBQ TIS 1ST 20SQCM/<: CPT

## 2024-03-18 RX ORDER — LIDOCAINE HYDROCHLORIDE 40 MG/ML
SOLUTION TOPICAL ONCE
Status: COMPLETED | OUTPATIENT
Start: 2024-03-18 | End: 2024-03-18

## 2024-03-18 RX ORDER — LIDOCAINE 40 MG/G
CREAM TOPICAL ONCE
OUTPATIENT
Start: 2024-03-18 | End: 2024-03-18

## 2024-03-18 RX ORDER — GENTAMICIN SULFATE 1 MG/G
OINTMENT TOPICAL ONCE
OUTPATIENT
Start: 2024-03-18 | End: 2024-03-18

## 2024-03-18 RX ORDER — SODIUM CHLOR/HYPOCHLOROUS ACID 0.033 %
SOLUTION, IRRIGATION IRRIGATION ONCE
OUTPATIENT
Start: 2024-03-18 | End: 2024-03-18

## 2024-03-18 RX ORDER — LIDOCAINE HYDROCHLORIDE 20 MG/ML
JELLY TOPICAL ONCE
OUTPATIENT
Start: 2024-03-18 | End: 2024-03-18

## 2024-03-18 RX ORDER — IBUPROFEN 200 MG
TABLET ORAL ONCE
OUTPATIENT
Start: 2024-03-18 | End: 2024-03-18

## 2024-03-18 RX ORDER — CLOBETASOL PROPIONATE 0.5 MG/G
OINTMENT TOPICAL ONCE
OUTPATIENT
Start: 2024-03-18 | End: 2024-03-18

## 2024-03-18 RX ORDER — LIDOCAINE HYDROCHLORIDE 40 MG/ML
SOLUTION TOPICAL ONCE
OUTPATIENT
Start: 2024-03-18 | End: 2024-03-18

## 2024-03-18 RX ORDER — BACITRACIN ZINC 500 [USP'U]/G
OINTMENT TOPICAL ONCE
OUTPATIENT
Start: 2024-03-18 | End: 2024-03-18

## 2024-03-18 RX ORDER — BETAMETHASONE DIPROPIONATE 0.05 %
OINTMENT (GRAM) TOPICAL ONCE
OUTPATIENT
Start: 2024-03-18 | End: 2024-03-18

## 2024-03-18 RX ORDER — TRIAMCINOLONE ACETONIDE 1 MG/G
OINTMENT TOPICAL ONCE
OUTPATIENT
Start: 2024-03-18 | End: 2024-03-18

## 2024-03-18 RX ORDER — BACITRACIN ZINC AND POLYMYXIN B SULFATE 500; 1000 [USP'U]/G; [USP'U]/G
OINTMENT TOPICAL ONCE
OUTPATIENT
Start: 2024-03-18 | End: 2024-03-18

## 2024-03-18 RX ORDER — LIDOCAINE 50 MG/G
OINTMENT TOPICAL ONCE
OUTPATIENT
Start: 2024-03-18 | End: 2024-03-18

## 2024-03-18 RX ADMIN — LIDOCAINE HYDROCHLORIDE 10 ML: 40 SOLUTION TOPICAL at 13:33

## 2024-03-18 NOTE — PLAN OF CARE
Problem: Chronic Conditions and Co-morbidities  Goal: Patient's chronic conditions and co-morbidity symptoms are monitored and maintained or improved  Outcome: Progressing     Problem: Wound:  Goal: Will show signs of wound healing; wound closure and no evidence of infection  Description: Will show signs of wound healing; wound closure and no evidence of infection  Outcome: Progressing     Problem: Venous:  Goal: Signs of wound healing will improve  Description: Signs of wound healing will improve  Outcome: Progressing     Problem: Compression therapy:  Goal: Will be free from complications associated with compression therapy  Description: Will be free from complications associated with compression therapy  Outcome: Progressing      Cat 1

## 2024-03-18 NOTE — PROGRESS NOTES
Wound Healing Center Followup Visit Note    Referring Physician : Pankaj Corbett MD  Tori Mathew  MEDICAL RECORD NUMBER:  [id number]  AGE: 73 y.o.   GENDER: male  : 1950  EPISODE DATE:  2024    Subjective:     Chief Complaint   Patient presents with    Wound Check     Left leg      HISTORY of PRESENT ILLNESS HPI   Tori Mathew is a 73 y.o. male who presents today in regards to follow up evaluation and treatment of wound/ulcer.  That patient's past medical, family and social hx were reviewed and changes were made if present.    History of Wound Context:  The patient has had a wound of both calves, minimal skin on the right side, multiple ulcers on the left calf, fat layer exposed which was first noted approximately 2023.  This has been treated by the patient and his niece.  On their initial visit to the wound healing center, 10/23/23, the patient has noted that the wound has not been improving.  The patient has had similar previous wounds in the past.          Patient had lymphedema therapy in the past, with a lymphedema pump at home that the patient used to use on a regular basis, broke down,, is beyond repair and patient was recommended new lymphedema pump for which he was given the prescription     Pt is currently not on abx.     10/23/2023   I long detailed discussion the patient, options, risks benefits and alternatives were explained, patient recommend keep legs elevated decrease swelling component nutrition multivitamin supplement protein supplement were discussed  Because of underlying extensive dermatophytosis and tinea pedis, patient given short-term antifungal cream as well as oral Lamisil therapy  For now patient recommended compression wrap until the ulcers healed  Patient recommended, since his own lymphedema therapy pump broke down completely beyond repair, that he was using a regular basis in the past to keep the ulcerations from coming back and keep the

## 2024-03-21 NOTE — DISCHARGE INSTRUCTIONS
Visit Discharge/Physician Orders     Discharge condition: Stable  Assessment of pain at discharge: yes  Anesthetic used: lidocaine 4%  Discharge to: Home  Left via:Private automobile  Accompanied by: accompanied by self  ECF/HHA:      Dressing Orders: Lower left leg wounds: Cleanse wounds with normal saline, apply aquaphor to dry skin on legs. Apply  DRAWTEX  to wound bed and cover with ABD pad and  COBAN II from base of toes to base of knees- change weekly at wound care clinic.      Right leg: Spandigrip on right leg- on in morning and off at night      Keep wrap dry at all times. If wrap becomes wet or falls 2 inches or painful- may remove wrap and apply daily dressings and spandigrip   Elevate legs as much as possible.      Treatment Orders: Lymphedema pumps obtained and using   3/25 Lab work ordered  3/25 Lamisil tablets prescription sent to pharmacy- take by mouth as prescribed.   3/25 Vascular testing to be schedule   10/23  Culture taken- mix fernando  10/30 culture taken - 11/6 antibiotic ordered- please    EAT DIET WITH PROTEINS AND VITAMIN C  TAKE A MULTIVITAMIN DAILY IF NOT CONTRAINDICATED        [facility] followup visit ___________1 week____Monday 1:15 PM _________  (Please note your next appointment above and if you are unable to keep, kindly give a 24 hour notice. Thank you.)     Physician signature:__________________________     If you experience any of the following, please call the Wound Care Center during business hours:     * Increase in Pain  * Temperature over 101  * Increase in drainage from your wound  * Drainage with a foul odor  * Bleeding  * Increase in swelling  * Need for compression bandage changes due to slippage, breakthrough drainage.     If you need medical attention outside of the business hours of the Wound Care Centers please contact your PCP or go to the nearest emergency room.   
143

## 2024-03-25 ENCOUNTER — HOSPITAL ENCOUNTER (OUTPATIENT)
Dept: WOUND CARE | Age: 74
Discharge: HOME | End: 2024-03-25
Attending: SURGERY
Payer: MEDICARE

## 2024-03-25 ENCOUNTER — HOSPITAL ENCOUNTER (OUTPATIENT)
Age: 74
Discharge: HOME | End: 2024-03-25
Payer: MEDICARE

## 2024-03-25 VITALS
HEART RATE: 68 BPM | HEIGHT: 74 IN | TEMPERATURE: 96.7 F | BODY MASS INDEX: 40.43 KG/M2 | WEIGHT: 315 LBS | RESPIRATION RATE: 18 BRPM | SYSTOLIC BLOOD PRESSURE: 159 MMHG | DIASTOLIC BLOOD PRESSURE: 82 MMHG

## 2024-03-25 DIAGNOSIS — L97.212 LOWER LIMB ULCER, CALF, RIGHT, WITH FAT LAYER EXPOSED (HCC): ICD-10-CM

## 2024-03-25 DIAGNOSIS — B35.1 ONYCHOMYCOSIS: ICD-10-CM

## 2024-03-25 DIAGNOSIS — B35.9 DERMATOPHYTOSIS: ICD-10-CM

## 2024-03-25 DIAGNOSIS — I89.0 LYMPHEDEMA OF BOTH LOWER EXTREMITIES: ICD-10-CM

## 2024-03-25 DIAGNOSIS — R09.89 DECREASED DORSALIS PEDIS PULSE: ICD-10-CM

## 2024-03-25 DIAGNOSIS — B35.3 TINEA PEDIS OF BOTH FEET: ICD-10-CM

## 2024-03-25 DIAGNOSIS — L97.222 LOWER LIMB ULCER, CALF, LEFT, WITH FAT LAYER EXPOSED (HCC): Primary | ICD-10-CM

## 2024-03-25 LAB
ALBUMIN SERPL-MCNC: 3.8 G/DL (ref 3.5–5.2)
ALP SERPL-CCNC: 74 U/L (ref 40–129)
ALT SERPL-CCNC: 12 U/L (ref 0–40)
ANION GAP SERPL CALCULATED.3IONS-SCNC: 13 MMOL/L (ref 7–16)
AST SERPL-CCNC: 16 U/L (ref 0–39)
BILIRUB SERPL-MCNC: 0.4 MG/DL (ref 0–1.2)
BUN SERPL-MCNC: 16 MG/DL (ref 6–23)
CALCIUM SERPL-MCNC: 9.2 MG/DL (ref 8.6–10.2)
CHLORIDE SERPL-SCNC: 103 MMOL/L (ref 98–107)
CO2 SERPL-SCNC: 23 MMOL/L (ref 22–29)
CREAT SERPL-MCNC: 0.8 MG/DL (ref 0.7–1.2)
ERYTHROCYTE [DISTWIDTH] IN BLOOD BY AUTOMATED COUNT: 15.9 % (ref 11.5–15)
GFR SERPL CREATININE-BSD FRML MDRD: >90 ML/MIN/1.73M2
GLUCOSE SERPL-MCNC: 101 MG/DL (ref 74–99)
HCT VFR BLD AUTO: 45 % (ref 37–54)
HGB BLD-MCNC: 13.7 G/DL (ref 12.5–16.5)
MCH RBC QN AUTO: 27.6 PG (ref 26–35)
MCHC RBC AUTO-ENTMCNC: 30.4 G/DL (ref 32–34.5)
MCV RBC AUTO: 90.5 FL (ref 80–99.9)
PLATELET # BLD AUTO: 198 K/UL (ref 130–450)
PMV BLD AUTO: 9.5 FL (ref 7–12)
POTASSIUM SERPL-SCNC: 3.8 MMOL/L (ref 3.5–5)
PROT SERPL-MCNC: 8.4 G/DL (ref 6.4–8.3)
RBC # BLD AUTO: 4.97 M/UL (ref 3.8–5.8)
SODIUM SERPL-SCNC: 139 MMOL/L (ref 132–146)
WBC OTHER # BLD: 6 K/UL (ref 4.5–11.5)

## 2024-03-25 PROCEDURE — 11042 DBRDMT SUBQ TIS 1ST 20SQCM/<: CPT | Performed by: SURGERY

## 2024-03-25 PROCEDURE — 11042 DBRDMT SUBQ TIS 1ST 20SQCM/<: CPT

## 2024-03-25 PROCEDURE — 80053 COMPREHEN METABOLIC PANEL: CPT

## 2024-03-25 PROCEDURE — 36415 COLL VENOUS BLD VENIPUNCTURE: CPT

## 2024-03-25 PROCEDURE — 85027 COMPLETE CBC AUTOMATED: CPT

## 2024-03-25 RX ORDER — LIDOCAINE HYDROCHLORIDE 20 MG/ML
JELLY TOPICAL ONCE
OUTPATIENT
Start: 2024-03-25 | End: 2024-03-25

## 2024-03-25 RX ORDER — CLOBETASOL PROPIONATE 0.5 MG/G
OINTMENT TOPICAL ONCE
OUTPATIENT
Start: 2024-03-25 | End: 2024-03-25

## 2024-03-25 RX ORDER — LIDOCAINE HYDROCHLORIDE 40 MG/ML
SOLUTION TOPICAL ONCE
OUTPATIENT
Start: 2024-03-25 | End: 2024-03-25

## 2024-03-25 RX ORDER — TRIAMCINOLONE ACETONIDE 1 MG/G
OINTMENT TOPICAL ONCE
OUTPATIENT
Start: 2024-03-25 | End: 2024-03-25

## 2024-03-25 RX ORDER — BETAMETHASONE DIPROPIONATE 0.05 %
OINTMENT (GRAM) TOPICAL ONCE
OUTPATIENT
Start: 2024-03-25 | End: 2024-03-25

## 2024-03-25 RX ORDER — GENTAMICIN SULFATE 1 MG/G
OINTMENT TOPICAL ONCE
OUTPATIENT
Start: 2024-03-25 | End: 2024-03-25

## 2024-03-25 RX ORDER — LIDOCAINE 40 MG/G
CREAM TOPICAL ONCE
OUTPATIENT
Start: 2024-03-25 | End: 2024-03-25

## 2024-03-25 RX ORDER — LIDOCAINE 50 MG/G
OINTMENT TOPICAL ONCE
OUTPATIENT
Start: 2024-03-25 | End: 2024-03-25

## 2024-03-25 RX ORDER — LIDOCAINE HYDROCHLORIDE 40 MG/ML
SOLUTION TOPICAL ONCE
Status: COMPLETED | OUTPATIENT
Start: 2024-03-25 | End: 2024-03-25

## 2024-03-25 RX ORDER — IBUPROFEN 200 MG
TABLET ORAL ONCE
OUTPATIENT
Start: 2024-03-25 | End: 2024-03-25

## 2024-03-25 RX ORDER — SODIUM CHLOR/HYPOCHLOROUS ACID 0.033 %
SOLUTION, IRRIGATION IRRIGATION ONCE
OUTPATIENT
Start: 2024-03-25 | End: 2024-03-25

## 2024-03-25 RX ORDER — TERBINAFINE HYDROCHLORIDE 250 MG/1
250 TABLET ORAL DAILY
Qty: 30 TABLET | Refills: 0 | Status: SHIPPED | OUTPATIENT
Start: 2024-03-25 | End: 2024-04-24

## 2024-03-25 RX ORDER — BACITRACIN ZINC 500 [USP'U]/G
OINTMENT TOPICAL ONCE
OUTPATIENT
Start: 2024-03-25 | End: 2024-03-25

## 2024-03-25 RX ORDER — BACITRACIN ZINC AND POLYMYXIN B SULFATE 500; 1000 [USP'U]/G; [USP'U]/G
OINTMENT TOPICAL ONCE
OUTPATIENT
Start: 2024-03-25 | End: 2024-03-25

## 2024-03-25 RX ADMIN — LIDOCAINE HYDROCHLORIDE 10 ML: 40 SOLUTION TOPICAL at 13:36

## 2024-03-25 NOTE — PLAN OF CARE
Problem: Chronic Conditions and Co-morbidities  Goal: Patient's chronic conditions and co-morbidity symptoms are monitored and maintained or improved  Outcome: Progressing     Problem: Wound:  Goal: Will show signs of wound healing; wound closure and no evidence of infection  Description: Will show signs of wound healing; wound closure and no evidence of infection  Outcome: Progressing     Problem: Venous:  Goal: Signs of wound healing will improve  Description: Signs of wound healing will improve  Outcome: Progressing     Problem: Compression therapy:  Goal: Will be free from complications associated with compression therapy  Description: Will be free from complications associated with compression therapy  Outcome: Adequate for Discharge

## 2024-03-25 NOTE — PROGRESS NOTES
signature:__________________________     If you experience any of the following, please call the Wound Care Center during business hours:     * Increase in Pain  * Temperature over 101  * Increase in drainage from your wound  * Drainage with a foul odor  * Bleeding  * Increase in swelling  * Need for compression bandage changes due to slippage, breakthrough drainage.     If you need medical attention outside of the business hours of the Wound Care Centers please contact your PCP or go to the nearest emergency room.         Electronically signed by Neo Maier MD

## 2024-03-26 ENCOUNTER — CLINICAL DOCUMENTATION (OUTPATIENT)
Dept: VASCULAR SURGERY | Age: 74
End: 2024-03-26

## 2024-03-27 NOTE — DISCHARGE INSTRUCTIONS
Visit Discharge/Physician Orders     Discharge condition: Stable  Assessment of pain at discharge: yes  Anesthetic used: lidocaine 4%  Discharge to: Home  Left via:Private automobile  Accompanied by: accompanied by self  ECF/HHA:      Dressing Orders: Lower left leg wounds: Cleanse wounds with normal saline, apply aquaphor to dry skin on legs. Apply -  DRAWTEX  to wound bed and cover with ABD pad and  COBAN II from base of toes to base of knees- change weekly at wound care clinic.      Right leg: Spandigrip on right leg- on in morning and off at night      Keep wrap dry at all times. If wrap becomes wet or falls 2 inches or painful- may remove wrap and apply daily dressings and spandigrip   Elevate legs as much as possible.      Treatment Orders: Lymphedema pumps obtained and using   3/25 Lab work ordered  3/25 Lamisil tablets prescription sent to pharmacy- take by mouth as prescribed.   3/25 Vascular testing to be schedule   10/23  Culture taken- mix fernando  10/30 culture taken - 11/6 antibiotic ordered- please    EAT DIET WITH PROTEINS AND VITAMIN C  TAKE A MULTIVITAMIN DAILY IF NOT CONTRAINDICATED        [facility] followup visit ___________1 week____Monday 1:15 PM _________  (Please note your next appointment above and if you are unable to keep, kindly give a 24 hour notice. Thank you.)     Physician signature:__________________________     If you experience any of the following, please call the Wound Care Center during business hours:     * Increase in Pain  * Temperature over 101  * Increase in drainage from your wound  * Drainage with a foul odor  * Bleeding  * Increase in swelling  * Need for compression bandage changes due to slippage, breakthrough drainage.     If you need medical attention outside of the business hours of the Wound Care Centers please contact your PCP or go to the nearest emergency room.

## 2024-04-01 ENCOUNTER — HOSPITAL ENCOUNTER (OUTPATIENT)
Dept: WOUND CARE | Age: 74
Discharge: HOME | End: 2024-04-01
Attending: SURGERY
Payer: MEDICARE

## 2024-04-01 VITALS
WEIGHT: 315 LBS | HEIGHT: 74 IN | TEMPERATURE: 96.8 F | DIASTOLIC BLOOD PRESSURE: 78 MMHG | SYSTOLIC BLOOD PRESSURE: 144 MMHG | HEART RATE: 72 BPM | BODY MASS INDEX: 40.43 KG/M2 | RESPIRATION RATE: 18 BRPM

## 2024-04-01 DIAGNOSIS — L97.222 LOWER LIMB ULCER, CALF, LEFT, WITH FAT LAYER EXPOSED (HCC): Primary | ICD-10-CM

## 2024-04-01 PROCEDURE — 11042 DBRDMT SUBQ TIS 1ST 20SQCM/<: CPT

## 2024-04-01 PROCEDURE — 11042 DBRDMT SUBQ TIS 1ST 20SQCM/<: CPT | Performed by: SURGERY

## 2024-04-01 RX ORDER — BACITRACIN ZINC AND POLYMYXIN B SULFATE 500; 1000 [USP'U]/G; [USP'U]/G
OINTMENT TOPICAL ONCE
OUTPATIENT
Start: 2024-04-01 | End: 2024-04-01

## 2024-04-01 RX ORDER — LIDOCAINE HYDROCHLORIDE 40 MG/ML
SOLUTION TOPICAL ONCE
OUTPATIENT
Start: 2024-04-01 | End: 2024-04-01

## 2024-04-01 RX ORDER — SODIUM CHLOR/HYPOCHLOROUS ACID 0.033 %
SOLUTION, IRRIGATION IRRIGATION ONCE
OUTPATIENT
Start: 2024-04-01 | End: 2024-04-01

## 2024-04-01 RX ORDER — LIDOCAINE 50 MG/G
OINTMENT TOPICAL ONCE
OUTPATIENT
Start: 2024-04-01 | End: 2024-04-01

## 2024-04-01 RX ORDER — IBUPROFEN 200 MG
TABLET ORAL ONCE
OUTPATIENT
Start: 2024-04-01 | End: 2024-04-01

## 2024-04-01 RX ORDER — GENTAMICIN SULFATE 1 MG/G
OINTMENT TOPICAL ONCE
OUTPATIENT
Start: 2024-04-01 | End: 2024-04-01

## 2024-04-01 RX ORDER — BACITRACIN ZINC 500 [USP'U]/G
OINTMENT TOPICAL ONCE
OUTPATIENT
Start: 2024-04-01 | End: 2024-04-01

## 2024-04-01 RX ORDER — LIDOCAINE HYDROCHLORIDE 40 MG/ML
SOLUTION TOPICAL ONCE
Status: COMPLETED | OUTPATIENT
Start: 2024-04-01 | End: 2024-04-01

## 2024-04-01 RX ORDER — TRIAMCINOLONE ACETONIDE 1 MG/G
OINTMENT TOPICAL ONCE
OUTPATIENT
Start: 2024-04-01 | End: 2024-04-01

## 2024-04-01 RX ORDER — LIDOCAINE 40 MG/G
CREAM TOPICAL ONCE
OUTPATIENT
Start: 2024-04-01 | End: 2024-04-01

## 2024-04-01 RX ORDER — CLOBETASOL PROPIONATE 0.5 MG/G
OINTMENT TOPICAL ONCE
OUTPATIENT
Start: 2024-04-01 | End: 2024-04-01

## 2024-04-01 RX ORDER — LIDOCAINE HYDROCHLORIDE 20 MG/ML
JELLY TOPICAL ONCE
OUTPATIENT
Start: 2024-04-01 | End: 2024-04-01

## 2024-04-01 RX ORDER — BETAMETHASONE DIPROPIONATE 0.05 %
OINTMENT (GRAM) TOPICAL ONCE
OUTPATIENT
Start: 2024-04-01 | End: 2024-04-01

## 2024-04-01 RX ADMIN — LIDOCAINE HYDROCHLORIDE 15 ML: 40 SOLUTION TOPICAL at 13:52

## 2024-04-01 NOTE — PLAN OF CARE
Problem: Chronic Conditions and Co-morbidities  Goal: Patient's chronic conditions and co-morbidity symptoms are monitored and maintained or improved  Outcome: Adequate for Discharge     Problem: Compression therapy:  Goal: Will be free from complications associated with compression therapy  Description: Will be free from complications associated with compression therapy  Outcome: Adequate for Discharge     Problem: Wound:  Goal: Will show signs of wound healing; wound closure and no evidence of infection  Description: Will show signs of wound healing; wound closure and no evidence of infection  Outcome: Not Progressing     Problem: Venous:  Goal: Signs of wound healing will improve  Description: Signs of wound healing will improve  Outcome: Not Progressing

## 2024-04-01 NOTE — PROGRESS NOTES
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    Left    Skin ulcer of right calf, limited to breakdown of skin ([facility]) 2/20/2023    Tinea pedis of both feet 1/23/2023     Past Surgical History:   Procedure Laterality Date    FRACTURE SURGERY  2005    right ankle repair     Family History   Problem Relation Age of Onset    Heart Disease Mother     Heart Disease Father      Social History     Tobacco Use    Smoking status: Never    Smokeless tobacco: Never   Vaping Use    Vaping Use: Never used   Substance Use Topics    Alcohol use: No     Comment: on special occassions    Drug use: No     Allergies   Allergen Reactions    Metformin And Related Diarrhea     Current Outpatient Medications on File Prior to Encounter   Medication Sig Dispense Refill    terbinafine (LAMISIL) 250 MG tablet Take 1 tablet by mouth daily 30 tablet 0    aspirin (ASPIRIN CHILDRENS) 81 MG chewable tablet Take 1 tablet by mouth daily 30 tablet 5     No current facility-administered medications on file prior to encounter.       REVIEW OF SYSTEMS See HPI    Objective:    BP (!) 144/78   Pulse 72   Temp 96.8 °F (36 °C) (Temporal)   Resp 18   Ht 1.88 m (6' 2\")   Wt (!) 154.2 kg (340 lb)   BMI 43.65 kg/m²   Wt Readings from Last 3 Encounters:   04/01/24 (!) 154.2 kg (340 lb)   03/25/24 (!) 154.2 kg (340 lb)   03/11/24 (!) 154.2 kg (340 lb)     PHYSICAL EXAM  CONSTITUTIONAL:   Awake, alert, cooperative   EYES:  lids and lashes normal   ENT: external ears and nose without lesions   NECK:  supple, symmetrical, trachea midline   SKIN:  Open wound present    Assessment:     Problem List Items Addressed This Visit       Lower limb ulcer, calf, left, with fat layer exposed ([facility]) - Primary    Relevant Orders    Initiate Outpatient Wound Care Protocol       Pre Debridement Measurements:  Are located in the Wound/Ulcer Documentation Flow

## 2024-04-03 NOTE — DISCHARGE INSTRUCTIONS
Visit Discharge/Physician Orders     Discharge condition: Stable  Assessment of pain at discharge: yes  Anesthetic used: lidocaine 4%  Discharge to: Home  Left via:Private automobile  Accompanied by: accompanied by self  ECF/HHA:      Dressing Orders: Lower left leg wounds: Cleanse wounds with normal saline, apply aquaphor to dry skin on legs. Apply -  DRAWTEX  to wound bed and cover with ABD pad and  COBAN II from base of toes to base of knees- change weekly at wound care clinic.      Right leg: Spandigrip on right leg- on in morning and off at night      Keep wrap dry at all times. If wrap becomes wet or falls 2 inches or painful- may remove wrap and apply daily dressings and spandigrip   Elevate legs as much as possible.      Treatment Orders: Lymphedema pumps obtained and using   3/25 Lab work ordered  3/25 Lamisil tablets prescription sent to pharmacy- take by mouth as prescribed.   3/25 Vascular testing to be schedule   10/23  Culture taken- mix fernando  10/30 culture taken - 11/6 antibiotic ordered- please    EAT DIET WITH PROTEINS AND VITAMIN C  TAKE A MULTIVITAMIN DAILY IF NOT CONTRAINDICATED        [facility] followup visit ___________1 week____Monday 1:15 PM _________  (Please note your next appointment above and if you are unable to keep, kindly give a 24 hour notice. Thank you.)     Physician signature:__________________________     If you experience any of the following, please call the Wound Care Center during business hours:     * Increase in Pain  * Temperature over 101  * Increase in drainage from your wound  * Drainage with a foul odor  * Bleeding  * Increase in swelling  * Need for compression bandage changes due to slippage, breakthrough drainage.     If you need medical attention outside of the business hours of the Wound Care Centers please contact your PCP or go to the nearest emergency room.

## 2024-04-08 ENCOUNTER — HOSPITAL ENCOUNTER (OUTPATIENT)
Dept: WOUND CARE | Age: 74
Discharge: HOME | End: 2024-04-08
Attending: SURGERY
Payer: MEDICARE

## 2024-04-08 VITALS
HEART RATE: 72 BPM | DIASTOLIC BLOOD PRESSURE: 90 MMHG | SYSTOLIC BLOOD PRESSURE: 140 MMHG | RESPIRATION RATE: 20 BRPM | TEMPERATURE: 98.5 F

## 2024-04-08 DIAGNOSIS — L97.222 LOWER LIMB ULCER, CALF, LEFT, WITH FAT LAYER EXPOSED (HCC): Primary | ICD-10-CM

## 2024-04-08 PROCEDURE — 11042 DBRDMT SUBQ TIS 1ST 20SQCM/<: CPT | Performed by: SURGERY

## 2024-04-08 PROCEDURE — 11042 DBRDMT SUBQ TIS 1ST 20SQCM/<: CPT

## 2024-04-08 RX ORDER — CLOBETASOL PROPIONATE 0.5 MG/G
OINTMENT TOPICAL ONCE
OUTPATIENT
Start: 2024-04-08 | End: 2024-04-08

## 2024-04-08 RX ORDER — LIDOCAINE HYDROCHLORIDE 20 MG/ML
JELLY TOPICAL ONCE
OUTPATIENT
Start: 2024-04-08 | End: 2024-04-08

## 2024-04-08 RX ORDER — IBUPROFEN 200 MG
TABLET ORAL ONCE
OUTPATIENT
Start: 2024-04-08 | End: 2024-04-08

## 2024-04-08 RX ORDER — LIDOCAINE HYDROCHLORIDE 40 MG/ML
SOLUTION TOPICAL ONCE
Status: COMPLETED | OUTPATIENT
Start: 2024-04-08 | End: 2024-04-08

## 2024-04-08 RX ORDER — GENTAMICIN SULFATE 1 MG/G
OINTMENT TOPICAL ONCE
OUTPATIENT
Start: 2024-04-08 | End: 2024-04-08

## 2024-04-08 RX ORDER — LIDOCAINE 40 MG/G
CREAM TOPICAL ONCE
OUTPATIENT
Start: 2024-04-08 | End: 2024-04-08

## 2024-04-08 RX ORDER — LIDOCAINE HYDROCHLORIDE 40 MG/ML
SOLUTION TOPICAL ONCE
OUTPATIENT
Start: 2024-04-08 | End: 2024-04-08

## 2024-04-08 RX ORDER — TRIAMCINOLONE ACETONIDE 1 MG/G
OINTMENT TOPICAL ONCE
OUTPATIENT
Start: 2024-04-08 | End: 2024-04-08

## 2024-04-08 RX ORDER — BACITRACIN ZINC AND POLYMYXIN B SULFATE 500; 1000 [USP'U]/G; [USP'U]/G
OINTMENT TOPICAL ONCE
OUTPATIENT
Start: 2024-04-08 | End: 2024-04-08

## 2024-04-08 RX ORDER — SODIUM CHLOR/HYPOCHLOROUS ACID 0.033 %
SOLUTION, IRRIGATION IRRIGATION ONCE
OUTPATIENT
Start: 2024-04-08 | End: 2024-04-08

## 2024-04-08 RX ORDER — LIDOCAINE 50 MG/G
OINTMENT TOPICAL ONCE
OUTPATIENT
Start: 2024-04-08 | End: 2024-04-08

## 2024-04-08 RX ORDER — BACITRACIN ZINC 500 [USP'U]/G
OINTMENT TOPICAL ONCE
OUTPATIENT
Start: 2024-04-08 | End: 2024-04-08

## 2024-04-08 RX ORDER — BETAMETHASONE DIPROPIONATE 0.05 %
OINTMENT (GRAM) TOPICAL ONCE
OUTPATIENT
Start: 2024-04-08 | End: 2024-04-08

## 2024-04-08 RX ADMIN — LIDOCAINE HYDROCHLORIDE 10 ML: 40 SOLUTION TOPICAL at 14:01

## 2024-04-08 ASSESSMENT — PAIN SCALES - GENERAL: PAINLEVEL_OUTOF10: 0

## 2024-04-08 NOTE — PLAN OF CARE
Problem: Wound:  Goal: Will show signs of wound healing; wound closure and no evidence of infection  Description: Will show signs of wound healing; wound closure and no evidence of infection  4/8/2024 1348 by Sandra Ryan RN  Outcome: Progressing  4/8/2024 1348 by Sandra Ryan, RN  Outcome: Progressing     Problem: Venous:  Goal: Signs of wound healing will improve  Description: Signs of wound healing will improve  Outcome: Progressing     Problem: Compression therapy:  Goal: Will be free from complications associated with compression therapy  Description: Will be free from complications associated with compression therapy  Outcome: Progressing

## 2024-04-08 NOTE — PROGRESS NOTES
Wound Healing Center Followup Visit Note    Referring Physician : Pankaj Corbett MD  Tori Mathew  MEDICAL RECORD NUMBER:  [id number]  AGE: 73 y.o.   GENDER: male  : 1950  EPISODE DATE:  2024    Subjective:     Chief Complaint   Patient presents with    Wound Check     Left lateral lower leg      HISTORY of PRESENT ILLNESS HPI   Tori Mathew is a 73 y.o. male who presents today in regards to follow up evaluation and treatment of wound/ulcer.  That patient's past medical, family and social hx were reviewed and changes were made if present.    History of Wound Context:  The patient has had a wound of both calves, minimal skin on the right side, multiple ulcers on the left calf, fat layer exposed which was first noted approximately 2023.  This has been treated by the patient and his niece.  On their initial visit to the wound healing center, 10/23/23, the patient has noted that the wound has not been improving.  The patient has had similar previous wounds in the past.          Patient had lymphedema therapy in the past, with a lymphedema pump at home that the patient used to use on a regular basis, broke down,, is beyond repair and patient was recommended new lymphedema pump for which he was given the prescription     Pt is currently not on abx.     10/23/2023   I long detailed discussion the patient, options, risks benefits and alternatives were explained, patient recommend keep legs elevated decrease swelling component nutrition multivitamin supplement protein supplement were discussed  Because of underlying extensive dermatophytosis and tinea pedis, patient given short-term antifungal cream as well as oral Lamisil therapy  For now patient recommended compression wrap until the ulcers healed  Patient recommended, since his own lymphedema therapy pump broke down completely beyond repair, that he was using a regular basis in the past to keep the ulcerations from coming back and

## 2024-04-09 NOTE — DISCHARGE INSTRUCTIONS
Visit Discharge/Physician Orders     Discharge condition: Stable  Assessment of pain at discharge: yes  Anesthetic used: lidocaine 4%  Discharge to: Home  Left via:Private automobile  Accompanied by: accompanied by self  ECF/HHA:      Dressing Orders: Lower left leg wounds: Cleanse wounds with normal saline, apply aquaphor to dry skin on legs. Apply -  DRAWTEX  to wound bed and cover with ABD pad and  COBAN II from base of toes to base of knees- change weekly at wound care clinic.      Right leg: Spandigrip on right leg- on in morning and off at night      Keep wrap dry at all times. If wrap becomes wet or falls 2 inches or painful- may remove wrap and apply daily dressings and spandigrip   Elevate legs as much as possible.      Treatment Orders: Lymphedema pumps obtained and using   4/15 Culture taken   3/25 Lamisil tablets prescription sent to pharmacy- take by mouth as prescribed.   Vascular testing to be done 4/9  10/23  Culture taken- mix fernando  10/30 culture taken - 11/6 antibiotic ordered- please    EAT DIET WITH PROTEINS AND VITAMIN C  TAKE A MULTIVITAMIN DAILY IF NOT CONTRAINDICATED        [facility] followup visit ___________1 week____Monday 1:15 PM _________  (Please note your next appointment above and if you are unable to keep, kindly give a 24 hour notice. Thank you.)     Physician signature:__________________________     If you experience any of the following, please call the Wound Care Center during business hours:     * Increase in Pain  * Temperature over 101  * Increase in drainage from your wound  * Drainage with a foul odor  * Bleeding  * Increase in swelling  * Need for compression bandage changes due to slippage, breakthrough drainage.     If you need medical attention outside of the business hours of the Wound Care Centers please contact your PCP or go to the nearest emergency room.

## 2024-04-15 ENCOUNTER — HOSPITAL ENCOUNTER (OUTPATIENT)
Dept: WOUND CARE | Age: 74
Discharge: HOME | End: 2024-04-15
Attending: SURGERY
Payer: MEDICARE

## 2024-04-15 VITALS
RESPIRATION RATE: 18 BRPM | HEART RATE: 78 BPM | SYSTOLIC BLOOD PRESSURE: 136 MMHG | TEMPERATURE: 97.3 F | DIASTOLIC BLOOD PRESSURE: 72 MMHG

## 2024-04-15 DIAGNOSIS — L97.222 LOWER LIMB ULCER, CALF, LEFT, WITH FAT LAYER EXPOSED (HCC): Primary | ICD-10-CM

## 2024-04-15 PROCEDURE — 11042 DBRDMT SUBQ TIS 1ST 20SQCM/<: CPT | Performed by: SURGERY

## 2024-04-15 PROCEDURE — 87205 SMEAR GRAM STAIN: CPT

## 2024-04-15 PROCEDURE — 87070 CULTURE OTHR SPECIMN AEROBIC: CPT

## 2024-04-15 PROCEDURE — 87077 CULTURE AEROBIC IDENTIFY: CPT

## 2024-04-15 PROCEDURE — 11042 DBRDMT SUBQ TIS 1ST 20SQCM/<: CPT

## 2024-04-15 PROCEDURE — 87181 SC STD AGAR DILUTION PER AGT: CPT

## 2024-04-15 RX ORDER — TRIAMCINOLONE ACETONIDE 1 MG/G
OINTMENT TOPICAL ONCE
OUTPATIENT
Start: 2024-04-15 | End: 2024-04-15

## 2024-04-15 RX ORDER — LIDOCAINE HYDROCHLORIDE 20 MG/ML
JELLY TOPICAL ONCE
OUTPATIENT
Start: 2024-04-15 | End: 2024-04-15

## 2024-04-15 RX ORDER — BACITRACIN ZINC AND POLYMYXIN B SULFATE 500; 1000 [USP'U]/G; [USP'U]/G
OINTMENT TOPICAL ONCE
OUTPATIENT
Start: 2024-04-15 | End: 2024-04-15

## 2024-04-15 RX ORDER — BETAMETHASONE DIPROPIONATE 0.05 %
OINTMENT (GRAM) TOPICAL ONCE
OUTPATIENT
Start: 2024-04-15 | End: 2024-04-15

## 2024-04-15 RX ORDER — LIDOCAINE 40 MG/G
CREAM TOPICAL ONCE
OUTPATIENT
Start: 2024-04-15 | End: 2024-04-15

## 2024-04-15 RX ORDER — LIDOCAINE HYDROCHLORIDE 40 MG/ML
SOLUTION TOPICAL ONCE
OUTPATIENT
Start: 2024-04-15 | End: 2024-04-15

## 2024-04-15 RX ORDER — BACITRACIN ZINC 500 [USP'U]/G
OINTMENT TOPICAL ONCE
OUTPATIENT
Start: 2024-04-15 | End: 2024-04-15

## 2024-04-15 RX ORDER — GENTAMICIN SULFATE 1 MG/G
OINTMENT TOPICAL ONCE
OUTPATIENT
Start: 2024-04-15 | End: 2024-04-15

## 2024-04-15 RX ORDER — IBUPROFEN 200 MG
TABLET ORAL ONCE
OUTPATIENT
Start: 2024-04-15 | End: 2024-04-15

## 2024-04-15 RX ORDER — LIDOCAINE 50 MG/G
OINTMENT TOPICAL ONCE
OUTPATIENT
Start: 2024-04-15 | End: 2024-04-15

## 2024-04-15 RX ORDER — LIDOCAINE HYDROCHLORIDE 40 MG/ML
SOLUTION TOPICAL ONCE
Status: COMPLETED | OUTPATIENT
Start: 2024-04-15 | End: 2024-04-15

## 2024-04-15 RX ORDER — CLOBETASOL PROPIONATE 0.5 MG/G
OINTMENT TOPICAL ONCE
OUTPATIENT
Start: 2024-04-15 | End: 2024-04-15

## 2024-04-15 RX ORDER — SODIUM CHLOR/HYPOCHLOROUS ACID 0.033 %
SOLUTION, IRRIGATION IRRIGATION ONCE
OUTPATIENT
Start: 2024-04-15 | End: 2024-04-15

## 2024-04-15 RX ADMIN — LIDOCAINE HYDROCHLORIDE 6 ML: 40 SOLUTION TOPICAL at 14:11

## 2024-04-15 NOTE — PLAN OF CARE
Problem: Wound:  Goal: Will show signs of wound healing; wound closure and no evidence of infection  Description: Will show signs of wound healing; wound closure and no evidence of infection  Outcome: Progressing     Problem: Venous:  Goal: Signs of wound healing will improve  Description: Signs of wound healing will improve  Outcome: Progressing     Problem: Chronic Conditions and Co-morbidities  Goal: Patient's chronic conditions and co-morbidity symptoms are monitored and maintained or improved  Outcome: Adequate for Discharge     Problem: Compression therapy:  Goal: Will be free from complications associated with compression therapy  Description: Will be free from complications associated with compression therapy  Outcome: Adequate for Discharge

## 2024-04-19 RX ORDER — SULFAMETHOXAZOLE AND TRIMETHOPRIM 800; 160 MG/1; MG/1
1 TABLET ORAL 2 TIMES DAILY
Qty: 20 TABLET | Refills: 0 | Status: SHIPPED | OUTPATIENT
Start: 2024-04-19 | End: 2024-04-29

## 2024-04-19 NOTE — PROGRESS NOTES
Dr. Maier reviewed wound culture results. New order noted for Bactrim DS 1 tab BID for 10 days. Patient called- phone number not working- called niece and notified of antibiotic order, verified pharmacy and  understands need to  medication.   
Care Centers please contact your PCP or go to the nearest emergency room.               Electronically signed by Neo Maier MD

## 2024-04-22 ENCOUNTER — HOSPITAL ENCOUNTER (OUTPATIENT)
Dept: WOUND CARE | Age: 74
Discharge: HOME | End: 2024-04-22
Attending: SURGERY
Payer: MEDICARE

## 2024-04-22 VITALS
HEIGHT: 74 IN | DIASTOLIC BLOOD PRESSURE: 89 MMHG | RESPIRATION RATE: 18 BRPM | WEIGHT: 315 LBS | BODY MASS INDEX: 40.43 KG/M2 | HEART RATE: 77 BPM | TEMPERATURE: 98.2 F | SYSTOLIC BLOOD PRESSURE: 155 MMHG

## 2024-04-22 DIAGNOSIS — L97.222 LOWER LIMB ULCER, CALF, LEFT, WITH FAT LAYER EXPOSED (HCC): Primary | ICD-10-CM

## 2024-04-22 PROCEDURE — 11042 DBRDMT SUBQ TIS 1ST 20SQCM/<: CPT | Performed by: SURGERY

## 2024-04-22 PROCEDURE — 11042 DBRDMT SUBQ TIS 1ST 20SQCM/<: CPT

## 2024-04-22 RX ORDER — BACITRACIN ZINC 500 [USP'U]/G
OINTMENT TOPICAL ONCE
OUTPATIENT
Start: 2024-04-22 | End: 2024-04-22

## 2024-04-22 RX ORDER — LIDOCAINE 40 MG/G
CREAM TOPICAL ONCE
OUTPATIENT
Start: 2024-04-22 | End: 2024-04-22

## 2024-04-22 RX ORDER — LIDOCAINE HYDROCHLORIDE 20 MG/ML
JELLY TOPICAL ONCE
OUTPATIENT
Start: 2024-04-22 | End: 2024-04-22

## 2024-04-22 RX ORDER — LIDOCAINE HYDROCHLORIDE 40 MG/ML
SOLUTION TOPICAL ONCE
Status: COMPLETED | OUTPATIENT
Start: 2024-04-22 | End: 2024-04-22

## 2024-04-22 RX ORDER — IBUPROFEN 200 MG
TABLET ORAL ONCE
OUTPATIENT
Start: 2024-04-22 | End: 2024-04-22

## 2024-04-22 RX ORDER — BETAMETHASONE DIPROPIONATE 0.05 %
OINTMENT (GRAM) TOPICAL ONCE
OUTPATIENT
Start: 2024-04-22 | End: 2024-04-22

## 2024-04-22 RX ORDER — CLOBETASOL PROPIONATE 0.5 MG/G
OINTMENT TOPICAL ONCE
OUTPATIENT
Start: 2024-04-22 | End: 2024-04-22

## 2024-04-22 RX ORDER — TRIAMCINOLONE ACETONIDE 1 MG/G
OINTMENT TOPICAL ONCE
OUTPATIENT
Start: 2024-04-22 | End: 2024-04-22

## 2024-04-22 RX ORDER — SODIUM CHLOR/HYPOCHLOROUS ACID 0.033 %
SOLUTION, IRRIGATION IRRIGATION ONCE
OUTPATIENT
Start: 2024-04-22 | End: 2024-04-22

## 2024-04-22 RX ORDER — LIDOCAINE 50 MG/G
OINTMENT TOPICAL ONCE
OUTPATIENT
Start: 2024-04-22 | End: 2024-04-22

## 2024-04-22 RX ORDER — GENTAMICIN SULFATE 1 MG/G
OINTMENT TOPICAL ONCE
OUTPATIENT
Start: 2024-04-22 | End: 2024-04-22

## 2024-04-22 RX ORDER — LIDOCAINE HYDROCHLORIDE 40 MG/ML
SOLUTION TOPICAL ONCE
OUTPATIENT
Start: 2024-04-22 | End: 2024-04-22

## 2024-04-22 RX ORDER — BACITRACIN ZINC AND POLYMYXIN B SULFATE 500; 1000 [USP'U]/G; [USP'U]/G
OINTMENT TOPICAL ONCE
OUTPATIENT
Start: 2024-04-22 | End: 2024-04-22

## 2024-04-22 RX ADMIN — LIDOCAINE HYDROCHLORIDE 10 ML: 40 SOLUTION TOPICAL at 14:12

## 2024-04-22 NOTE — DISCHARGE INSTRUCTIONS
Visit Discharge/Physician Orders     Discharge condition: Stable  Assessment of pain at discharge: yes  Anesthetic used: lidocaine 4%  Discharge to: Home  Left via:Private automobile  Accompanied by: accompanied by self  ECF/HHA:      Dressing Orders: Lower left leg wounds: Cleanse wounds with normal saline, apply aquaphor to dry skin on legs. Apply -  DRAWTEX  to wound bed and cover with ABD pad and  COBAN II from base of toes to base of knees- change weekly at wound care clinic.      Right leg: Spandigrip on right leg- on in morning and off at night      Keep wrap dry at all times. If wrap becomes wet or falls 2 inches or painful- may remove wrap and apply daily dressings and spandigrip   Elevate legs as much as possible.      Treatment Orders: Lymphedema pumps obtained and using   4/15 Culture taken   3/25 Lamisil tablets prescription sent to pharmacy- take by mouth as prescribed.   Vascular testing to be done 4/9  10/23  Culture taken- mix fernando  10/30 culture taken - 11/6 antibiotic ordered- please    EAT DIET WITH PROTEINS AND VITAMIN C  TAKE A MULTIVITAMIN DAILY IF NOT CONTRAINDICATED        [facility] followup visit ___________1 week____Monday 1:15 PM _________  (Please note your next appointment above and if you are unable to keep, kindly give a 24 hour notice. Thank you.)     Physician signature:__________________________     If you experience any of the following, please call the Wound Care Center during business hours:     * Increase in Pain  * Temperature over 101  * Increase in drainage from your wound  * Drainage with a foul odor  * Bleeding  * Increase in swelling  * Need for compression bandage changes due to slippage, breakthrough drainage.     If you need medical attention outside of the business hours of the Wound Care Centers please contact your PCP or go to the nearest emergency room.

## 2024-04-22 NOTE — PROGRESS NOTES
Wound Healing Center Followup Visit Note    Referring Physician : Pankaj Corbett MD  Tori Mathew  MEDICAL RECORD NUMBER:  [id number]  AGE: 73 y.o.   GENDER: male  : 1950  EPISODE DATE:  2024    Subjective:     Chief Complaint   Patient presents with    Wound Check     BLE      HISTORY of PRESENT ILLNESS HPI   Tori Mathew is a 73 y.o. male who presents today in regards to follow up evaluation and treatment of wound/ulcer.  That patient's past medical, family and social hx were reviewed and changes were made if present.    History of Wound Context:  The patient has had a wound of both calves, minimal skin on the right side, multiple ulcers on the left calf, fat layer exposed which was first noted approximately 2023.  This has been treated by the patient and his niece.  On their initial visit to the wound healing center, 10/23/23, the patient has noted that the wound has not been improving.  The patient has had similar previous wounds in the past.          Patient had lymphedema therapy in the past, with a lymphedema pump at home that the patient used to use on a regular basis, broke down,, is beyond repair and patient was recommended new lymphedema pump for which he was given the prescription     Pt is currently not on abx.     10/23/2023   I long detailed discussion the patient, options, risks benefits and alternatives were explained, patient recommend keep legs elevated decrease swelling component nutrition multivitamin supplement protein supplement were discussed  Because of underlying extensive dermatophytosis and tinea pedis, patient given short-term antifungal cream as well as oral Lamisil therapy  For now patient recommended compression wrap until the ulcers healed  Patient recommended, since his own lymphedema therapy pump broke down completely beyond repair, that he was using a regular basis in the past to keep the ulcerations from coming back and keep the swelling

## 2024-04-24 NOTE — PROGRESS NOTES
Insurance Verification Request            Ordering Center:     [facility]  [address] [id number]  Telephone: [phone]       FAX [phone]    Facility NPI: [id number]  Facility Tax ID [id number]    Anali Haynes RN    Provider Information:     Provider's Name and NPI MARCIANOKAR NPI : [id number]    Patient Information:      Tori Roe  [address] [id number]   [phone]   : 1950  AGE: 73 y.o.     GENDER: male   TODAYS DATE:  2024    Application/product Information:     Benefit Verification Request:    Reason for Request: New Wound    [employer] Fax # [phone]    Trunk/Arms/Legs 60684 (1st 25 sq cm) and Trunk/Arms/Legs 78310 (additional 25 sq cm)    Apligraf, NuShield, and Puraply AM    Has patient been treated with any other Skin Substitute for this Wound:   No    If yes, How many previous applications:  NA    WOUND #: 1    Total Square CM: 28    Procedure setting: Hospital Outpatient Department POS 22    Is the Patient currently in a skilled nursing facility: No     Is the wound work related: No    Procedure date: 24      Insurance:        PRIMARY INSURANCE:  Plan: MEDICARE PART A AND B  Coverage: MEDICARE  Effective Date: 3/1/2008  Group Number: [unfilled]  Subscriber Number: [id number] - (Medicare)    Payer/Plan Subscr  Sex Relation Sub. Ins. ID Effective Group Num   1. MEDICARE - ME* TORI ROE 1950 Male Self [id number] 3/1/08                                    [address]        Patient Information:     Dx Codes:   Diabetic Ulcer [] Yes   [x] No,   Venous Ulcer [x] Yes   [] No,   Other [] Yes   [x] No    Wound ICD-10 Code: L97.222, I87.2, L97.212    Problem List Items Addressed This Visit          Other    Lower limb ulcer, calf, left, with fat layer exposed (HCC) - Primary       No data recorded     Is the Patient a Diabetic: No    HgBA1c:    Lab Results   Component Value

## 2024-04-24 NOTE — PROGRESS NOTES
Wound Care Request for [facility]      [facility] Company:     [employer] [phone]    Ordering Center:     [facility]  [address] [facility] [address]  [phone]  WOUND CARE Dept: [phone]   FAX NUMBER [phone]    Patient Information:      Lenin Roe  [address] [facility] [address]   [phone]   : 1950  AGE: 73 y.o.     GENDER: male   EPISODE DATE: 2024    Insurance:      PRIMARY INSURANCE:  Plan: MEDICARE PART A AND B  Coverage: MEDICARE  Effective Date: 3/1/2008  Group Number: [unfilled]  Subscriber Number: [id number] - (Medicare)    Payer/Plan Subscr  Sex Relation Sub. Ins. ID Effective Group Num   1. MEDICARE - ME* LENIN ROE 1950 Male Self [id number] 3/1/08                                    PO [address]        Patient Wound Information:      Problem List Items Addressed This Visit          Other    Lower limb ulcer, calf, left, with fat layer exposed (HCC) - Primary       Wound 21 Leg Left;Lateral #6 venous (Active)   Number of days: 953       Wound 10/23/23 Leg Left;Lateral;Distal #1 left  lateral leg (Active)   Wound Image   24 1349   Wound Etiology Venous 10/23/23 1410   Dressing Status New dressing applied 04/15/24 1433   Wound Cleansed Cleansed with saline 04/15/24 1433   Dressing/Treatment ABD 04/15/24 1433   Offloading for Diabetic Foot Ulcers Offloading ordered 24 1425   Wound Length (cm) 6.2 cm 24 1405   Wound Width (cm) 4.3 cm 24 1405   Wound Depth (cm) 0.2 cm 24 1405   Wound Surface Area (cm^2) 26.66 cm^2 24 1405   Change in Wound Size % (l*w) -610.93 24 1405   Wound Volume (cm^3) 5.332 cm^3 24 1405   Wound Healing % -611 24 1405   Post-Procedure Length (cm) 6.4 cm 04/22/24 1433   Post-Procedure Width (cm) 4.4 cm 24   Post-Procedure Depth (cm) 0.3 cm 24   Post-Procedure Surface Area (cm^2) 28.16 cm^2 24   Post-Procedure Volume

## 2024-04-29 ENCOUNTER — HOSPITAL ENCOUNTER (OUTPATIENT)
Dept: WOUND CARE | Age: 74
Discharge: HOME | End: 2024-04-29
Attending: SURGERY
Payer: MEDICARE

## 2024-04-29 VITALS
HEIGHT: 74 IN | WEIGHT: 315 LBS | SYSTOLIC BLOOD PRESSURE: 138 MMHG | TEMPERATURE: 98 F | RESPIRATION RATE: 20 BRPM | DIASTOLIC BLOOD PRESSURE: 60 MMHG | HEART RATE: 70 BPM | BODY MASS INDEX: 40.43 KG/M2

## 2024-04-29 DIAGNOSIS — L97.222 LOWER LIMB ULCER, CALF, LEFT, WITH FAT LAYER EXPOSED (HCC): Primary | ICD-10-CM

## 2024-04-29 PROCEDURE — 11042 DBRDMT SUBQ TIS 1ST 20SQCM/<: CPT

## 2024-04-29 PROCEDURE — 11042 DBRDMT SUBQ TIS 1ST 20SQCM/<: CPT | Performed by: SURGERY

## 2024-04-29 RX ORDER — LIDOCAINE HYDROCHLORIDE 20 MG/ML
JELLY TOPICAL ONCE
OUTPATIENT
Start: 2024-04-29 | End: 2024-04-29

## 2024-04-29 RX ORDER — IBUPROFEN 200 MG
TABLET ORAL ONCE
OUTPATIENT
Start: 2024-04-29 | End: 2024-04-29

## 2024-04-29 RX ORDER — SODIUM CHLOR/HYPOCHLOROUS ACID 0.033 %
SOLUTION, IRRIGATION IRRIGATION ONCE
OUTPATIENT
Start: 2024-04-29 | End: 2024-04-29

## 2024-04-29 RX ORDER — LIDOCAINE HYDROCHLORIDE 40 MG/ML
SOLUTION TOPICAL ONCE
Status: DISCONTINUED | OUTPATIENT
Start: 2024-04-29 | End: 2024-04-30 | Stop reason: HOSPADM

## 2024-04-29 RX ORDER — GENTAMICIN SULFATE 1 MG/G
OINTMENT TOPICAL ONCE
OUTPATIENT
Start: 2024-04-29 | End: 2024-04-29

## 2024-04-29 RX ORDER — TRIAMCINOLONE ACETONIDE 1 MG/G
OINTMENT TOPICAL ONCE
OUTPATIENT
Start: 2024-04-29 | End: 2024-04-29

## 2024-04-29 RX ORDER — LIDOCAINE 40 MG/G
CREAM TOPICAL ONCE
OUTPATIENT
Start: 2024-04-29 | End: 2024-04-29

## 2024-04-29 RX ORDER — BETAMETHASONE DIPROPIONATE 0.05 %
OINTMENT (GRAM) TOPICAL ONCE
OUTPATIENT
Start: 2024-04-29 | End: 2024-04-29

## 2024-04-29 RX ORDER — LIDOCAINE 50 MG/G
OINTMENT TOPICAL ONCE
OUTPATIENT
Start: 2024-04-29 | End: 2024-04-29

## 2024-04-29 RX ORDER — BACITRACIN ZINC AND POLYMYXIN B SULFATE 500; 1000 [USP'U]/G; [USP'U]/G
OINTMENT TOPICAL ONCE
OUTPATIENT
Start: 2024-04-29 | End: 2024-04-29

## 2024-04-29 RX ORDER — BACITRACIN ZINC 500 [USP'U]/G
OINTMENT TOPICAL ONCE
OUTPATIENT
Start: 2024-04-29 | End: 2024-04-29

## 2024-04-29 RX ORDER — CLOBETASOL PROPIONATE 0.5 MG/G
OINTMENT TOPICAL ONCE
OUTPATIENT
Start: 2024-04-29 | End: 2024-04-29

## 2024-04-29 RX ORDER — LIDOCAINE HYDROCHLORIDE 40 MG/ML
SOLUTION TOPICAL ONCE
OUTPATIENT
Start: 2024-04-29 | End: 2024-04-29

## 2024-04-29 NOTE — PROGRESS NOTES
Wound Healing Center Followup Visit Note    Referring Physician : Pankaj Corbett MD  Tori Mathew  MEDICAL RECORD NUMBER:  [id number]  AGE: 73 y.o.   GENDER: male  : 1950  EPISODE DATE:  2024    Subjective:     Chief Complaint   Patient presents with    Wound Check     BLE      HISTORY of PRESENT ILLNESS HPI   Tori Mathew is a 73 y.o. male who presents today in regards to follow up evaluation and treatment of wound/ulcer.  That patient's past medical, family and social hx were reviewed and changes were made if present.    History of Wound Context:  The patient has had a wound of both calves, minimal skin on the right side, multiple ulcers on the left calf, fat layer exposed which was first noted approximately 2023.  This has been treated by the patient and his niece.  On their initial visit to the wound healing center, 10/23/23, the patient has noted that the wound has not been improving.  The patient has had similar previous wounds in the past.          Patient had lymphedema therapy in the past, with a lymphedema pump at home that the patient used to use on a regular basis, broke down,, is beyond repair and patient was recommended new lymphedema pump for which he was given the prescription     Pt is currently not on abx.     10/23/2023   I long detailed discussion the patient, options, risks benefits and alternatives were explained, patient recommend keep legs elevated decrease swelling component nutrition multivitamin supplement protein supplement were discussed  Because of underlying extensive dermatophytosis and tinea pedis, patient given short-term antifungal cream as well as oral Lamisil therapy  For now patient recommended compression wrap until the ulcers healed  Patient recommended, since his own lymphedema therapy pump broke down completely beyond repair, that he was using a regular basis in the past to keep the ulcerations from coming back and keep the swelling

## 2024-04-29 NOTE — DISCHARGE INSTRUCTIONS
Visit Discharge/Physician Orders     Discharge condition: Stable  Assessment of pain at discharge: yes  Anesthetic used: lidocaine 4%  Discharge to: Home  Left via:Private automobile  Accompanied by: accompanied by self  ECF/HHA:      Dressing Orders: Lower left leg wounds: Cleanse wounds with normal saline, apply aquaphor to dry skin on legs. Apply -double Alginate AG  to wound bed and cover with ABD pad and  COBAN II from base of toes to base of knees- change weekly at wound care clinic.      Right leg: Spandigrip on right leg- on in morning and off at night      Keep wrap dry at all times. If wrap becomes wet or falls 2 inches or painful- may remove wrap and apply daily dressings and spandigrip   Elevate legs as much as possible.      Treatment Orders: Lymphedema pumps obtained and using   4/15 Culture taken   3/25 Lamisil tablets prescription sent to pharmacy- take by mouth as prescribed.   Vascular testing to be done 5/20   10/23  Culture taken- mix fernando  10/30 culture taken - 11/6 antibiotic ordered- please    EAT DIET WITH PROTEINS AND VITAMIN C  TAKE A MULTIVITAMIN DAILY IF NOT CONTRAINDICATED        [facility] followup visit ___________1 week____Monday 1:15 PM _________  (Please note your next appointment above and if you are unable to keep, kindly give a 24 hour notice. Thank you.)     Physician signature:__________________________     If you experience any of the following, please call the Wound Care Center during business hours:     * Increase in Pain  * Temperature over 101  * Increase in drainage from your wound  * Drainage with a foul odor  * Bleeding  * Increase in swelling  * Need for compression bandage changes due to slippage, breakthrough drainage.     If you need medical attention outside of the business hours of the Wound Care Centers please contact your PCP or go to the nearest emergency room.

## 2024-05-01 NOTE — DISCHARGE INSTRUCTIONS
slippage, breakthrough drainage.     If you need medical attention outside of the business hours of the Wound Care Centers please contact your PCP or go to the nearest emergency room.

## 2024-05-06 ENCOUNTER — HOSPITAL ENCOUNTER (OUTPATIENT)
Dept: WOUND CARE | Age: 74
Discharge: HOME | End: 2024-05-06
Attending: SURGERY
Payer: MEDICARE

## 2024-05-06 ENCOUNTER — HOSPITAL ENCOUNTER (OUTPATIENT)
Age: 74
Discharge: HOME | End: 2024-05-06
Payer: MEDICARE

## 2024-05-06 VITALS
DIASTOLIC BLOOD PRESSURE: 76 MMHG | HEART RATE: 82 BPM | WEIGHT: 315 LBS | BODY MASS INDEX: 40.43 KG/M2 | HEIGHT: 74 IN | TEMPERATURE: 98.5 F | SYSTOLIC BLOOD PRESSURE: 119 MMHG | RESPIRATION RATE: 18 BRPM

## 2024-05-06 DIAGNOSIS — I89.0 LYMPHEDEMA OF BOTH LOWER EXTREMITIES: ICD-10-CM

## 2024-05-06 DIAGNOSIS — I87.2 CHRONIC VENOUS INSUFFICIENCY: ICD-10-CM

## 2024-05-06 DIAGNOSIS — L97.212 LOWER LIMB ULCER, CALF, RIGHT, WITH FAT LAYER EXPOSED (HCC): ICD-10-CM

## 2024-05-06 DIAGNOSIS — L97.222 LOWER LIMB ULCER, CALF, LEFT, WITH FAT LAYER EXPOSED (HCC): Primary | ICD-10-CM

## 2024-05-06 DIAGNOSIS — L97.222 LOWER LIMB ULCER, CALF, LEFT, WITH FAT LAYER EXPOSED (HCC): ICD-10-CM

## 2024-05-06 LAB
ALBUMIN SERPL-MCNC: 3.9 G/DL (ref 3.5–5.2)
ALP SERPL-CCNC: 76 U/L (ref 40–129)
ALT SERPL-CCNC: 17 U/L (ref 0–40)
ANION GAP SERPL CALCULATED.3IONS-SCNC: 17 MMOL/L (ref 7–16)
AST SERPL-CCNC: 16 U/L (ref 0–39)
BILIRUB SERPL-MCNC: 0.5 MG/DL (ref 0–1.2)
BUN SERPL-MCNC: 19 MG/DL (ref 6–23)
CALCIUM SERPL-MCNC: 9.1 MG/DL (ref 8.6–10.2)
CHLORIDE SERPL-SCNC: 103 MMOL/L (ref 98–107)
CO2 SERPL-SCNC: 20 MMOL/L (ref 22–29)
CREAT SERPL-MCNC: 0.9 MG/DL (ref 0.7–1.2)
CRP SERPL HS-MCNC: 49 MG/L (ref 0–5)
ERYTHROCYTE [DISTWIDTH] IN BLOOD BY AUTOMATED COUNT: 15 % (ref 11.5–15)
ERYTHROCYTE [SEDIMENTATION RATE] IN BLOOD BY WESTERGREN METHOD: 66 MM/HR (ref 0–15)
GFR, ESTIMATED: 88 ML/MIN/1.73M2
GLUCOSE SERPL-MCNC: 105 MG/DL (ref 74–99)
HCT VFR BLD AUTO: 40.9 % (ref 37–54)
HGB BLD-MCNC: 13 G/DL (ref 12.5–16.5)
MCH RBC QN AUTO: 28 PG (ref 26–35)
MCHC RBC AUTO-ENTMCNC: 31.8 G/DL (ref 32–34.5)
MCV RBC AUTO: 88.1 FL (ref 80–99.9)
PLATELET # BLD AUTO: 235 K/UL (ref 130–450)
PMV BLD AUTO: 9.6 FL (ref 7–12)
POTASSIUM SERPL-SCNC: 4.1 MMOL/L (ref 3.5–5)
PROT SERPL-MCNC: 8.8 G/DL (ref 6.4–8.3)
RBC # BLD AUTO: 4.64 M/UL (ref 3.8–5.8)
RHEUMATOID FACT SER NEPH-ACNC: <10 IU/ML (ref 0–13)
SODIUM SERPL-SCNC: 140 MMOL/L (ref 132–146)
WBC OTHER # BLD: 7.9 K/UL (ref 4.5–11.5)

## 2024-05-06 PROCEDURE — 36415 COLL VENOUS BLD VENIPUNCTURE: CPT

## 2024-05-06 PROCEDURE — 86038 ANTINUCLEAR ANTIBODIES: CPT

## 2024-05-06 PROCEDURE — 11042 DBRDMT SUBQ TIS 1ST 20SQCM/<: CPT | Performed by: SURGERY

## 2024-05-06 PROCEDURE — 86225 DNA ANTIBODY NATIVE: CPT

## 2024-05-06 PROCEDURE — 80053 COMPREHEN METABOLIC PANEL: CPT

## 2024-05-06 PROCEDURE — 85652 RBC SED RATE AUTOMATED: CPT

## 2024-05-06 PROCEDURE — 11045 DBRDMT SUBQ TISS EACH ADDL: CPT | Performed by: SURGERY

## 2024-05-06 PROCEDURE — 86140 C-REACTIVE PROTEIN: CPT

## 2024-05-06 PROCEDURE — 86431 RHEUMATOID FACTOR QUANT: CPT

## 2024-05-06 PROCEDURE — 85027 COMPLETE CBC AUTOMATED: CPT

## 2024-05-06 PROCEDURE — 86039 ANTINUCLEAR ANTIBODIES (ANA): CPT

## 2024-05-06 PROCEDURE — 87205 SMEAR GRAM STAIN: CPT

## 2024-05-06 PROCEDURE — 11045 DBRDMT SUBQ TISS EACH ADDL: CPT

## 2024-05-06 PROCEDURE — 87070 CULTURE OTHR SPECIMN AEROBIC: CPT

## 2024-05-06 PROCEDURE — 11042 DBRDMT SUBQ TIS 1ST 20SQCM/<: CPT

## 2024-05-06 RX ORDER — LIDOCAINE 50 MG/G
OINTMENT TOPICAL ONCE
OUTPATIENT
Start: 2024-05-06 | End: 2024-05-06

## 2024-05-06 RX ORDER — CLOBETASOL PROPIONATE 0.5 MG/G
OINTMENT TOPICAL ONCE
OUTPATIENT
Start: 2024-05-06 | End: 2024-05-06

## 2024-05-06 RX ORDER — LIDOCAINE HYDROCHLORIDE 20 MG/ML
JELLY TOPICAL ONCE
OUTPATIENT
Start: 2024-05-06 | End: 2024-05-06

## 2024-05-06 RX ORDER — IBUPROFEN 200 MG
TABLET ORAL ONCE
OUTPATIENT
Start: 2024-05-06 | End: 2024-05-06

## 2024-05-06 RX ORDER — GENTAMICIN SULFATE 1 MG/G
OINTMENT TOPICAL ONCE
OUTPATIENT
Start: 2024-05-06 | End: 2024-05-06

## 2024-05-06 RX ORDER — TRIAMCINOLONE ACETONIDE 1 MG/G
OINTMENT TOPICAL ONCE
OUTPATIENT
Start: 2024-05-06 | End: 2024-05-06

## 2024-05-06 RX ORDER — BACITRACIN ZINC AND POLYMYXIN B SULFATE 500; 1000 [USP'U]/G; [USP'U]/G
OINTMENT TOPICAL ONCE
OUTPATIENT
Start: 2024-05-06 | End: 2024-05-06

## 2024-05-06 RX ORDER — LIDOCAINE 40 MG/G
CREAM TOPICAL ONCE
OUTPATIENT
Start: 2024-05-06 | End: 2024-05-06

## 2024-05-06 RX ORDER — LIDOCAINE HYDROCHLORIDE 40 MG/ML
SOLUTION TOPICAL ONCE
OUTPATIENT
Start: 2024-05-06 | End: 2024-05-06

## 2024-05-06 RX ORDER — SODIUM CHLOR/HYPOCHLOROUS ACID 0.033 %
SOLUTION, IRRIGATION IRRIGATION ONCE
OUTPATIENT
Start: 2024-05-06 | End: 2024-05-06

## 2024-05-06 RX ORDER — BETAMETHASONE DIPROPIONATE 0.05 %
OINTMENT (GRAM) TOPICAL ONCE
OUTPATIENT
Start: 2024-05-06 | End: 2024-05-06

## 2024-05-06 RX ORDER — BACITRACIN ZINC 500 [USP'U]/G
OINTMENT TOPICAL ONCE
OUTPATIENT
Start: 2024-05-06 | End: 2024-05-06

## 2024-05-06 RX ORDER — LIDOCAINE HYDROCHLORIDE 40 MG/ML
SOLUTION TOPICAL ONCE
Status: COMPLETED | OUTPATIENT
Start: 2024-05-06 | End: 2024-05-06

## 2024-05-06 RX ADMIN — LIDOCAINE HYDROCHLORIDE 10 ML: 40 SOLUTION TOPICAL at 13:22

## 2024-05-06 NOTE — PLAN OF CARE
Problem: Venous:  Goal: Signs of wound healing will improve  Description: Signs of wound healing will improve  5/6/2024 1303 by Anali Haynes RN  Outcome: Progressing  5/6/2024 1303 by Anali Haynes RN  Outcome: Progressing     Problem: Chronic Conditions and Co-morbidities  Goal: Patient's chronic conditions and co-morbidity symptoms are monitored and maintained or improved  5/6/2024 1303 by Anali Haynes RN  Outcome: Adequate for Discharge  5/6/2024 1303 by Anali Haynes RN  Outcome: Adequate for Discharge     Problem: Compression therapy:  Goal: Will be free from complications associated with compression therapy  Description: Will be free from complications associated with compression therapy  5/6/2024 1303 by Anali Haynes RN  Outcome: Adequate for Discharge  5/6/2024 1303 by Anali Haynes RN  Outcome: Adequate for Discharge     Problem: Wound:  Goal: Will show signs of wound healing; wound closure and no evidence of infection  Description: Will show signs of wound healing; wound closure and no evidence of infection  5/6/2024 1303 by Anali Haynes RN  Outcome: Not Progressing  5/6/2024 1303 by Anali Haynes RN  Outcome: Progressing

## 2024-05-06 NOTE — PROGRESS NOTES
Wound Healing Center Followup Visit Note    Referring Physician : Pankaj Corbett MD  Tori Mathew  MEDICAL RECORD NUMBER:  [id number]  AGE: 73 y.o.   GENDER: male  : 1950  EPISODE DATE:  2024    Subjective:     Chief Complaint   Patient presents with    Wound Check     Left leg      HISTORY of PRESENT ILLNESS HPI   Tori Mathwe is a 73 y.o. male who presents today in regards to follow up evaluation and treatment of wound/ulcer.  That patient's past medical, family and social hx were reviewed and changes were made if present.    History of Wound Context:  The patient has had a wound of both calves, minimal skin on the right side, multiple ulcers on the left calf, fat layer exposed which was first noted approximately 2023.  This has been treated by the patient and his niece.  On their initial visit to the wound healing center, 10/23/23, the patient has noted that the wound has not been improving.  The patient has had similar previous wounds in the past.          Patient had lymphedema therapy in the past, with a lymphedema pump at home that the patient used to use on a regular basis, broke down,, is beyond repair and patient was recommended new lymphedema pump for which he was given the prescription     Pt is currently not on abx.     10/23/2023   I long detailed discussion the patient, options, risks benefits and alternatives were explained, patient recommend keep legs elevated decrease swelling component nutrition multivitamin supplement protein supplement were discussed  Because of underlying extensive dermatophytosis and tinea pedis, patient given short-term antifungal cream as well as oral Lamisil therapy  For now patient recommended compression wrap until the ulcers healed  Patient recommended, since his own lymphedema therapy pump broke down completely beyond repair, that he was using a regular basis in the past to keep the ulcerations from coming back and keep the swelling

## 2024-05-07 LAB
ANA PAT SER IF-IMP: ABNORMAL
ANA SER QL IA: POSITIVE
ANA TITER: ABNORMAL
ANTI DNA DOUBLE STRANDED: NEGATIVE

## 2024-05-08 NOTE — DISCHARGE INSTRUCTIONS
Drainage with a foul odor  * Bleeding  * Increase in swelling  * Need for compression bandage changes due to slippage, breakthrough drainage.     If you need medical attention outside of the business hours of the Wound Care Centers please contact your PCP or go to the nearest emergency room.

## 2024-05-13 ENCOUNTER — HOSPITAL ENCOUNTER (OUTPATIENT)
Dept: WOUND CARE | Age: 74
Discharge: HOME | End: 2024-05-13
Attending: SURGERY
Payer: MEDICARE

## 2024-05-13 VITALS
RESPIRATION RATE: 22 BRPM | HEIGHT: 74 IN | TEMPERATURE: 97.9 F | BODY MASS INDEX: 40.43 KG/M2 | SYSTOLIC BLOOD PRESSURE: 149 MMHG | DIASTOLIC BLOOD PRESSURE: 90 MMHG | HEART RATE: 81 BPM | WEIGHT: 315 LBS

## 2024-05-13 DIAGNOSIS — L97.212 LOWER LIMB ULCER, CALF, RIGHT, WITH FAT LAYER EXPOSED (HCC): ICD-10-CM

## 2024-05-13 DIAGNOSIS — L97.222 LOWER LIMB ULCER, CALF, LEFT, WITH FAT LAYER EXPOSED (HCC): Primary | ICD-10-CM

## 2024-05-13 DIAGNOSIS — R76.8 ANA POSITIVE: ICD-10-CM

## 2024-05-13 DIAGNOSIS — M79.89 LEG SWELLING: ICD-10-CM

## 2024-05-13 PROCEDURE — 11042 DBRDMT SUBQ TIS 1ST 20SQCM/<: CPT

## 2024-05-13 PROCEDURE — 11045 DBRDMT SUBQ TISS EACH ADDL: CPT

## 2024-05-13 PROCEDURE — 11042 DBRDMT SUBQ TIS 1ST 20SQCM/<: CPT | Performed by: SURGERY

## 2024-05-13 PROCEDURE — 87205 SMEAR GRAM STAIN: CPT

## 2024-05-13 PROCEDURE — 87070 CULTURE OTHR SPECIMN AEROBIC: CPT

## 2024-05-13 PROCEDURE — 11045 DBRDMT SUBQ TISS EACH ADDL: CPT | Performed by: SURGERY

## 2024-05-13 PROCEDURE — 86403 PARTICLE AGGLUT ANTBDY SCRN: CPT

## 2024-05-13 RX ORDER — LIDOCAINE HYDROCHLORIDE 40 MG/ML
SOLUTION TOPICAL ONCE
OUTPATIENT
Start: 2024-05-13 | End: 2024-05-13

## 2024-05-13 RX ORDER — LIDOCAINE HYDROCHLORIDE 20 MG/ML
JELLY TOPICAL ONCE
OUTPATIENT
Start: 2024-05-13 | End: 2024-05-13

## 2024-05-13 RX ORDER — CLOBETASOL PROPIONATE 0.5 MG/G
OINTMENT TOPICAL ONCE
OUTPATIENT
Start: 2024-05-13 | End: 2024-05-13

## 2024-05-13 RX ORDER — BACITRACIN ZINC 500 [USP'U]/G
OINTMENT TOPICAL ONCE
OUTPATIENT
Start: 2024-05-13 | End: 2024-05-13

## 2024-05-13 RX ORDER — LIDOCAINE HYDROCHLORIDE 40 MG/ML
SOLUTION TOPICAL ONCE
Status: COMPLETED | OUTPATIENT
Start: 2024-05-13 | End: 2024-05-13

## 2024-05-13 RX ORDER — GENTAMICIN SULFATE 1 MG/G
OINTMENT TOPICAL ONCE
OUTPATIENT
Start: 2024-05-13 | End: 2024-05-13

## 2024-05-13 RX ORDER — SODIUM CHLOR/HYPOCHLOROUS ACID 0.033 %
SOLUTION, IRRIGATION IRRIGATION ONCE
OUTPATIENT
Start: 2024-05-13 | End: 2024-05-13

## 2024-05-13 RX ORDER — LIDOCAINE 50 MG/G
OINTMENT TOPICAL ONCE
OUTPATIENT
Start: 2024-05-13 | End: 2024-05-13

## 2024-05-13 RX ORDER — CEPHALEXIN 500 MG/1
1000 CAPSULE ORAL 3 TIMES DAILY
Qty: 60 CAPSULE | Refills: 0 | Status: SHIPPED | OUTPATIENT
Start: 2024-05-13 | End: 2024-05-23

## 2024-05-13 RX ORDER — IBUPROFEN 200 MG
TABLET ORAL ONCE
OUTPATIENT
Start: 2024-05-13 | End: 2024-05-13

## 2024-05-13 RX ORDER — TRIAMCINOLONE ACETONIDE 1 MG/G
OINTMENT TOPICAL ONCE
OUTPATIENT
Start: 2024-05-13 | End: 2024-05-13

## 2024-05-13 RX ORDER — BACITRACIN ZINC AND POLYMYXIN B SULFATE 500; 1000 [USP'U]/G; [USP'U]/G
OINTMENT TOPICAL ONCE
OUTPATIENT
Start: 2024-05-13 | End: 2024-05-13

## 2024-05-13 RX ORDER — BETAMETHASONE DIPROPIONATE 0.05 %
OINTMENT (GRAM) TOPICAL ONCE
OUTPATIENT
Start: 2024-05-13 | End: 2024-05-13

## 2024-05-13 RX ORDER — LIDOCAINE 40 MG/G
CREAM TOPICAL ONCE
OUTPATIENT
Start: 2024-05-13 | End: 2024-05-13

## 2024-05-13 RX ADMIN — LIDOCAINE HYDROCHLORIDE 15 ML: 40 SOLUTION TOPICAL at 13:47

## 2024-05-13 NOTE — PROGRESS NOTES
drainage from your wound  * Drainage with a foul odor  * Bleeding  * Increase in swelling  * Need for compression bandage changes due to slippage, breakthrough drainage.     If you need medical attention outside of the business hours of the Wound Care Centers please contact your PCP or go to the nearest emergency room.         Electronically signed by Neo Maier MD

## 2024-05-13 NOTE — PLAN OF CARE
Problem: Wound:  Goal: Will show signs of wound healing; wound closure and no evidence of infection  Description: Will show signs of wound healing; wound closure and no evidence of infection  Outcome: Not Progressing     Problem: Venous:  Goal: Signs of wound healing will improve  Description: Signs of wound healing will improve  Outcome: Not Progressing     Problem: Compression therapy:  Goal: Will be free from complications associated with compression therapy  Description: Will be free from complications associated with compression therapy  Outcome: Progressing     Problem: Chronic Conditions and Co-morbidities  Goal: Patient's chronic conditions and co-morbidity symptoms are monitored and maintained or improved  Outcome: Adequate for Discharge     Problem: Wound:  Goal: Will show signs of wound healing; wound closure and no evidence of infection  Description: Will show signs of wound healing; wound closure and no evidence of infection  Outcome: Not Progressing     Problem: Venous:  Goal: Signs of wound healing will improve  Description: Signs of wound healing will improve  Outcome: Not Progressing

## 2024-05-16 NOTE — DISCHARGE INSTRUCTIONS
Visit Discharge/Physician Orders     Discharge condition: Stable  Assessment of pain at discharge: yes  Anesthetic used: lidocaine 4%  Discharge to: Home  Left via:Private automobile  Accompanied by: accompanied by self  ECF/HHA: [facility]      Dressing Orders: Lower left leg wounds: Cleanse wounds with normal saline, apply aquaphor to dry skin on legs. Apply - Alginate AG  to wound bed then Drawtex  and cover with ABD pad and  COBAN II from base of toes to base of knees- change weekly at wound care clinic. - Home health to change twice  a week at home one Wednesday and Friday (may use kerlix and coban if insurance wont provide COBAN II)      Right leg: Spandigrip on right leg- on in morning and off at night      Keep wrap dry at all times. If wrap becomes wet or falls 2 inches or painful- may remove wrap and apply daily dressings and spandigrip   Elevate legs as much as possible.      Treatment Orders: 5/13 culture taken- keflex sent to [facility]   Lymphedema pumps obtained and using   4/15 Culture taken- bactrim sent in- take as prescribed   3/25 Lamisil tablets prescription sent to pharmacy- take by mouth as prescribed.   Vascular testing to be done 5/20 at 1pm   10/23  Culture taken- mix fernando  10/30 culture taken - 11/6 antibiotic ordered- please    EAT DIET WITH PROTEINS AND VITAMIN C  TAKE A MULTIVITAMIN DAILY IF NOT CONTRAINDICATED        [facility] followup visit ___WEDNESDAY 5/29 AT 11AM VASCULAR TESTING-THEN WOUND CARE AFTER_with DR DERAS at 2pm- then back to dr hamilton monday__6/3 at 1:15pm _______  (Please note your next appointment above and if you are unable to keep, kindly give a 24 hour notice. Thank you.)     Physician signature:__________________________     If you experience any of the following, please call the Wound Care Center during business hours:     * Increase in Pain  * Temperature over 101  * Increase in drainage from your wound  * Drainage with a foul odor  * Bleeding  * Increase

## 2024-05-20 ENCOUNTER — HOSPITAL ENCOUNTER (OUTPATIENT)
Dept: WOUND CARE | Age: 74
Discharge: HOME | End: 2024-05-20
Attending: SURGERY
Payer: MEDICARE

## 2024-05-20 VITALS
DIASTOLIC BLOOD PRESSURE: 66 MMHG | TEMPERATURE: 97.6 F | HEIGHT: 74 IN | WEIGHT: 315 LBS | SYSTOLIC BLOOD PRESSURE: 128 MMHG | HEART RATE: 88 BPM | BODY MASS INDEX: 40.43 KG/M2 | RESPIRATION RATE: 20 BRPM

## 2024-05-20 DIAGNOSIS — L97.222 LOWER LIMB ULCER, CALF, LEFT, WITH FAT LAYER EXPOSED (HCC): Primary | ICD-10-CM

## 2024-05-20 PROCEDURE — 11042 DBRDMT SUBQ TIS 1ST 20SQCM/<: CPT

## 2024-05-20 PROCEDURE — 11042 DBRDMT SUBQ TIS 1ST 20SQCM/<: CPT | Performed by: SURGERY

## 2024-05-20 RX ORDER — BACITRACIN ZINC 500 [USP'U]/G
OINTMENT TOPICAL ONCE
OUTPATIENT
Start: 2024-05-20 | End: 2024-05-20

## 2024-05-20 RX ORDER — SODIUM CHLOR/HYPOCHLOROUS ACID 0.033 %
SOLUTION, IRRIGATION IRRIGATION ONCE
OUTPATIENT
Start: 2024-05-20 | End: 2024-05-20

## 2024-05-20 RX ORDER — LIDOCAINE HYDROCHLORIDE 40 MG/ML
SOLUTION TOPICAL ONCE
Status: COMPLETED | OUTPATIENT
Start: 2024-05-20 | End: 2024-05-20

## 2024-05-20 RX ORDER — BETAMETHASONE DIPROPIONATE 0.05 %
OINTMENT (GRAM) TOPICAL ONCE
OUTPATIENT
Start: 2024-05-20 | End: 2024-05-20

## 2024-05-20 RX ORDER — CLOBETASOL PROPIONATE 0.5 MG/G
OINTMENT TOPICAL ONCE
OUTPATIENT
Start: 2024-05-20 | End: 2024-05-20

## 2024-05-20 RX ORDER — TRIAMCINOLONE ACETONIDE 1 MG/G
OINTMENT TOPICAL ONCE
OUTPATIENT
Start: 2024-05-20 | End: 2024-05-20

## 2024-05-20 RX ORDER — LIDOCAINE HYDROCHLORIDE 40 MG/ML
SOLUTION TOPICAL ONCE
OUTPATIENT
Start: 2024-05-20 | End: 2024-05-20

## 2024-05-20 RX ORDER — LIDOCAINE HYDROCHLORIDE 20 MG/ML
JELLY TOPICAL ONCE
OUTPATIENT
Start: 2024-05-20 | End: 2024-05-20

## 2024-05-20 RX ORDER — GENTAMICIN SULFATE 1 MG/G
OINTMENT TOPICAL ONCE
OUTPATIENT
Start: 2024-05-20 | End: 2024-05-20

## 2024-05-20 RX ORDER — IBUPROFEN 200 MG
TABLET ORAL ONCE
OUTPATIENT
Start: 2024-05-20 | End: 2024-05-20

## 2024-05-20 RX ORDER — LIDOCAINE 50 MG/G
OINTMENT TOPICAL ONCE
OUTPATIENT
Start: 2024-05-20 | End: 2024-05-20

## 2024-05-20 RX ORDER — BACITRACIN ZINC AND POLYMYXIN B SULFATE 500; 1000 [USP'U]/G; [USP'U]/G
OINTMENT TOPICAL ONCE
OUTPATIENT
Start: 2024-05-20 | End: 2024-05-20

## 2024-05-20 RX ORDER — LIDOCAINE 40 MG/G
CREAM TOPICAL ONCE
OUTPATIENT
Start: 2024-05-20 | End: 2024-05-20

## 2024-05-20 RX ADMIN — LIDOCAINE HYDROCHLORIDE 20 ML: 40 SOLUTION TOPICAL at 14:43

## 2024-05-20 NOTE — PROGRESS NOTES
(Active)   Number of days: 980       Wound 10/23/23 Leg Left;Lateral;Distal #1 left  lateral leg (Active)   Wound Image   05/06/24 1313   Wound Etiology Venous 10/23/23 1410   Dressing Status New dressing applied 05/06/24 1405   Wound Cleansed Cleansed with saline 05/06/24 1405   Dressing/Treatment ABD;Alginate with Ag 05/06/24 1405   Offloading for Diabetic Foot Ulcers Offloading ordered 02/26/24 1425   Wound Length (cm) 9 cm 05/20/24 1437   Wound Width (cm) 5.4 cm 05/20/24 1437   Wound Depth (cm) 0.6 cm 05/20/24 1437   Wound Surface Area (cm^2) 48.6 cm^2 05/20/24 1437   Change in Wound Size % (l*w) -1196 05/20/24 1437   Wound Volume (cm^3) 29.16 cm^3 05/20/24 1437   Wound Healing % -3788 05/20/24 1437   Post-Procedure Length (cm) 9.2 cm 05/20/24 1500   Post-Procedure Width (cm) 5.6 cm 05/20/24 1500   Post-Procedure Depth (cm) 0.7 cm 05/20/24 1500   Post-Procedure Surface Area (cm^2) 51.52 cm^2 05/20/24 1500   Post-Procedure Volume (cm^3) 36.064 cm^3 05/20/24 1500   Wound Assessment Pink/red;Granulation tissue;Fibrin 05/20/24 1437   Drainage Amount Large (50-75% saturated) 05/20/24 1437   Drainage Description Serosanguinous;Serous 05/20/24 1437   Odor None 05/20/24 1437   Colleen-wound Assessment Maceration;Dry/flaky 05/20/24 1437   Margins Attached edges 02/05/24 1354   Wound Thickness Description not for Pressure Injury Full thickness 02/05/24 1354   Number of days: 210       Wound 03/25/24 Leg Left;Lateral;Proximal #8 (Active)   Wound Image   05/06/24 1313   Dressing Status New dressing applied 05/06/24 1405   Wound Cleansed Cleansed with saline 05/06/24 1405   Dressing/Treatment Alginate with Ag;ABD 05/06/24 1405   Wound Length (cm) 1.8 cm 05/20/24 1437   Wound Width (cm) 2.3 cm 05/20/24 1437   Wound Depth (cm) 0.1 cm 05/20/24 1437   Wound Surface Area (cm^2) 4.14 cm^2 05/20/24 1437   Change in Wound Size % (l*w) -3.5 05/20/24 1437   Wound Volume (cm^3) 0.414 cm^3 05/20/24 1437   Wound Healing % -4 05/20/24 1437

## 2024-05-24 NOTE — DISCHARGE INSTRUCTIONS
Visit Discharge/Physician Orders     Discharge condition: Stable    Assessment of pain at discharge: yes    Anesthetic used: lidocaine 4%    Discharge to: Home    Left via:Private automobile    Accompanied by: self    ECF/HHA: [facility]      Dressing Orders: LEFT LOWER LEG WOUNDS: Cleanse wounds with normal saline, apply aquaphor to dry skin on legs. Apply - Alginate AG  to wound bed then Drawtex  and cover with ABD pad and  COBAN II from base of toes to base of knees- change weekly at wound care clinic. - Home health to change twice  a week at home one Wednesday and Friday (may use kerlix and coban if insurance wont provide COBAN II)      Right leg: Spandigrip on right leg- on in morning and off at night      Keep wrap dry at all times. If wrap becomes wet or falls 2 inches or painful- may remove wrap and apply daily dressings and spandigrip   Elevate legs as much as possible.      Treatment Orders: 5/13 culture taken- keflex sent to [facility]   Lymphedema pumps obtained and using   4/15 Culture taken- bactrim sent in- take as prescribed   3/25 Lamisil tablets prescription sent to pharmacy- take by mouth as prescribed.   Vascular testing to be done 5/20 at 1pm   10/23  Culture taken- mix fernando  10/30 culture taken - 11/6 antibiotic ordered- please    EAT DIET WITH PROTEINS AND VITAMIN C  TAKE A MULTIVITAMIN DAILY IF NOT CONTRAINDICATED        [facility] followup visit : 1 week Dr Maier 6/3 at 1:15pm _____________________________  (Please note your next appointment above and if you are unable to keep, kindly give a 24 hour notice. Thank you.)     Physician signature:__________________________     If you experience any of the following, please call the Wound Care Center during business hours:     * Increase in Pain  * Temperature over 101  * Increase in drainage from your wound  * Drainage with a foul odor  * Bleeding  * Increase in swelling  * Need for compression bandage changes due to slippage,

## 2024-05-29 ENCOUNTER — HOSPITAL ENCOUNTER (OUTPATIENT)
Dept: ULTRASOUND IMAGING | Age: 74
Discharge: HOME | End: 2024-05-31
Attending: SURGERY
Payer: MEDICARE

## 2024-05-29 ENCOUNTER — HOSPITAL ENCOUNTER (OUTPATIENT)
Dept: INTERVENTIONAL RADIOLOGY/VASCULAR | Age: 74
Discharge: HOME | End: 2024-05-31
Attending: SURGERY
Payer: MEDICARE

## 2024-05-29 ENCOUNTER — HOSPITAL ENCOUNTER (OUTPATIENT)
Dept: WOUND CARE | Age: 74
Discharge: HOME | End: 2024-05-29
Attending: SURGERY
Payer: MEDICARE

## 2024-05-29 VITALS
HEART RATE: 72 BPM | SYSTOLIC BLOOD PRESSURE: 177 MMHG | TEMPERATURE: 97 F | DIASTOLIC BLOOD PRESSURE: 92 MMHG | WEIGHT: 315 LBS | HEIGHT: 74 IN | RESPIRATION RATE: 20 BRPM | BODY MASS INDEX: 40.43 KG/M2

## 2024-05-29 DIAGNOSIS — L97.212 LOWER LIMB ULCER, CALF, RIGHT, WITH FAT LAYER EXPOSED (HCC): ICD-10-CM

## 2024-05-29 DIAGNOSIS — L97.222 LOWER LIMB ULCER, CALF, LEFT, WITH FAT LAYER EXPOSED (HCC): ICD-10-CM

## 2024-05-29 DIAGNOSIS — R09.89 DECREASED DORSALIS PEDIS PULSE: ICD-10-CM

## 2024-05-29 DIAGNOSIS — B35.3 TINEA PEDIS OF BOTH FEET: ICD-10-CM

## 2024-05-29 DIAGNOSIS — B35.9 DERMATOPHYTOSIS: ICD-10-CM

## 2024-05-29 DIAGNOSIS — L97.222 LOWER LIMB ULCER, CALF, LEFT, WITH FAT LAYER EXPOSED (HCC): Primary | ICD-10-CM

## 2024-05-29 DIAGNOSIS — M79.89 LEG SWELLING: ICD-10-CM

## 2024-05-29 DIAGNOSIS — B35.1 ONYCHOMYCOSIS: ICD-10-CM

## 2024-05-29 PROCEDURE — 93971 EXTREMITY STUDY: CPT

## 2024-05-29 PROCEDURE — 11042 DBRDMT SUBQ TIS 1ST 20SQCM/<: CPT

## 2024-05-29 PROCEDURE — 11042 DBRDMT SUBQ TIS 1ST 20SQCM/<: CPT | Performed by: SURGERY

## 2024-05-29 PROCEDURE — 93922 UPR/L XTREMITY ART 2 LEVELS: CPT

## 2024-05-29 RX ORDER — BETAMETHASONE DIPROPIONATE 0.05 %
OINTMENT (GRAM) TOPICAL ONCE
OUTPATIENT
Start: 2024-05-29 | End: 2024-05-29

## 2024-05-29 RX ORDER — CLOBETASOL PROPIONATE 0.5 MG/G
OINTMENT TOPICAL ONCE
OUTPATIENT
Start: 2024-05-29 | End: 2024-05-29

## 2024-05-29 RX ORDER — BACITRACIN ZINC 500 [USP'U]/G
OINTMENT TOPICAL ONCE
OUTPATIENT
Start: 2024-05-29 | End: 2024-05-29

## 2024-05-29 RX ORDER — BACITRACIN ZINC AND POLYMYXIN B SULFATE 500; 1000 [USP'U]/G; [USP'U]/G
OINTMENT TOPICAL ONCE
OUTPATIENT
Start: 2024-05-29 | End: 2024-05-29

## 2024-05-29 RX ORDER — LIDOCAINE HYDROCHLORIDE 40 MG/ML
SOLUTION TOPICAL ONCE
OUTPATIENT
Start: 2024-05-29 | End: 2024-05-29

## 2024-05-29 RX ORDER — LIDOCAINE 50 MG/G
OINTMENT TOPICAL ONCE
OUTPATIENT
Start: 2024-05-29 | End: 2024-05-29

## 2024-05-29 RX ORDER — LIDOCAINE HYDROCHLORIDE 20 MG/ML
JELLY TOPICAL ONCE
OUTPATIENT
Start: 2024-05-29 | End: 2024-05-29

## 2024-05-29 RX ORDER — GENTAMICIN SULFATE 1 MG/G
OINTMENT TOPICAL ONCE
OUTPATIENT
Start: 2024-05-29 | End: 2024-05-29

## 2024-05-29 RX ORDER — LIDOCAINE HYDROCHLORIDE 40 MG/ML
SOLUTION TOPICAL ONCE
Status: COMPLETED | OUTPATIENT
Start: 2024-05-29 | End: 2024-05-29

## 2024-05-29 RX ORDER — LIDOCAINE 40 MG/G
CREAM TOPICAL ONCE
OUTPATIENT
Start: 2024-05-29 | End: 2024-05-29

## 2024-05-29 RX ORDER — IBUPROFEN 200 MG
TABLET ORAL ONCE
OUTPATIENT
Start: 2024-05-29 | End: 2024-05-29

## 2024-05-29 RX ORDER — SODIUM CHLOR/HYPOCHLOROUS ACID 0.033 %
SOLUTION, IRRIGATION IRRIGATION ONCE
OUTPATIENT
Start: 2024-05-29 | End: 2024-05-29

## 2024-05-29 RX ORDER — TRIAMCINOLONE ACETONIDE 1 MG/G
OINTMENT TOPICAL ONCE
OUTPATIENT
Start: 2024-05-29 | End: 2024-05-29

## 2024-05-29 RX ADMIN — LIDOCAINE HYDROCHLORIDE 10 ML: 40 SOLUTION TOPICAL at 13:34

## 2024-05-29 NOTE — PLAN OF CARE
Problem: Chronic Conditions and Co-morbidities  Goal: Patient's chronic conditions and co-morbidity symptoms are monitored and maintained or improved  Outcome: Progressing     Problem: Wound:  Goal: Will show signs of wound healing; wound closure and no evidence of infection  Description: Will show signs of wound healing; wound closure and no evidence of infection  Outcome: Progressing     Problem: Venous:  Goal: Signs of wound healing will improve  Description: Signs of wound healing will improve  Outcome: Adequate for Discharge     Problem: Compression therapy:  Goal: Will be free from complications associated with compression therapy  Description: Will be free from complications associated with compression therapy  Outcome: Progressing

## 2024-05-29 NOTE — DISCHARGE INSTRUCTIONS
Visit Discharge/Physician Orders     Discharge condition: Stable     Assessment of pain at discharge: yes     Anesthetic used: lidocaine 4%     Discharge to: Home     Left via:Private automobile     Accompanied by: self     ECF/HHA: [facility]      Dressing Orders: LEFT LOWER LEG WOUNDS: Cleanse wounds with normal saline, apply aquaphor to dry skin on legs. *Distal wound has skin graft in place- do not remove- do not apply any dressing with AG*   Apply -plain Alginate to wound bed then Drawtex  and cover with ABD pad and  COBAN II from base of toes to base of knees- change weekly at wound care clinic. - Home health to change twice  a week at home one Wednesday and Friday (may use kerlix and coban if insurance wont provide COBAN II)      Right leg: Spandigrip on right leg- on in morning and off at night      Keep wrap dry at all times. If wrap becomes wet or falls 2 inches or painful- may remove wrap and apply daily dressings and spandigrip   Elevate legs as much as possible.      Treatment Orders: 5/13 culture taken- keflex sent to [facility]   Lymphedema pumps obtained and using   4/15 Culture taken- bactrim sent in- take as prescribed   3/25 Lamisil tablets prescription sent to pharmacy- take by mouth as prescribed.   Vascular testing to be done 5/20 at 1pm   10/23  Culture taken- mix fernadno  10/30 culture taken - 11/6 antibiotic ordered- please    EAT DIET WITH PROTEINS AND VITAMIN C  TAKE A MULTIVITAMIN DAILY IF NOT CONTRAINDICATED        [facility] followup visit : 1 week Dr Maier 6/3 at 1:15pm _____________________________  (Please note your next appointment above and if you are unable to keep, kindly give a 24 hour notice. Thank you.)     Physician signature:__________________________     If you experience any of the following, please call the Wound Care Center during business hours:     * Increase in Pain  * Temperature over 101  * Increase in drainage from your wound  * Drainage with a foul odor  *

## 2024-05-29 NOTE — PROGRESS NOTES
swelling under control, patient was given a prescription for new lymphedema pump to be used on regular basis once ulcer is healed, to prevent recurrent ulcerations due to lymphedema both legs  All his questions were answered    10/30/2023  Left calf wound looks same, repeat cultures were done today because of exudate, right calf skin ulcer healed, patient still waiting for lymphedema pump  11/6/2023  Left leg wounds, stable, patient was given prescription Augmentin 875-125 mg twice a day for 10 days for wound infection, as there is no clinical improvement  11/13/2023  Left lateral calf wound, stable to minimal improvement, has a ulcer on the medial aspect left ankle with some fat layer exposed  11/20/2023  Left lateral calf wound, improving slowly, with improvement of the left ankle ulcer  11/27/2023  Ulcer left medial ankle getting better, ulcers of the lateral asp left calf, some improvement noted  12/4/2023  Ulcer on the left medial ankle, almost healed, left lateral calf ulcer same, lymphedema pump in progress  12/11/2023  Ulcer on the lateral asp left calf, clinically look better, but any needs are being made for lymphedema pump for the patient  12/18/2023  Ulcers look similar stable  Patient tells me that he was unable to do lymphedema pump, they should send him short sleeves, in the process of getting the correct length sleeves for the legs   12/27/2023  Patient developed new ulcer over the lateral asp right calf, left ankle ulcer healed, ulcer over the lateral aspect left calf stable  Patient still in the process of getting a lymphedema pump at home  1/3/24   unna boot, alginate   ? Of may thurners  - extensive swelling  bilaterally L > R  Could consider L LE venogram, possible stent if wounds not improving  1/8/2024  Right leg wound much improved, almost healed, left leg ulcer looks clean  Patient, to be refitted with new lymphedema pump sleeves to  1/15/2024  Right leg ulcer better, left leg ulcer stable,

## 2024-05-30 ENCOUNTER — CLINICAL DOCUMENTATION (OUTPATIENT)
Dept: VASCULAR SURGERY | Age: 74
End: 2024-05-30

## 2024-05-30 NOTE — PROGRESS NOTES
The lower extremity arterial Doppler study was personally reviewed by me, has good arterial flow to lower extremities bilaterally as documented below          Normal ankle arm index with triphasic ankle Doppler tracings are slightly  diminished on the left side but with good arterial flow to both feet based upon  the pulse volume recordings over the metatarsals.     The pulse volume recordings over the toes right foot is satisfactory except with  markedly diminished right fifth toe and patient does have diminished pulse  volume recordings over the left foot compared to the right side    Will discuss with patient the test results when he comes to see me next week to the wound care center

## 2024-06-03 ENCOUNTER — HOSPITAL ENCOUNTER (OUTPATIENT)
Dept: WOUND CARE | Age: 74
Discharge: HOME | End: 2024-06-03
Attending: SURGERY
Payer: MEDICARE

## 2024-06-03 VITALS
HEIGHT: 74 IN | RESPIRATION RATE: 22 BRPM | HEART RATE: 81 BPM | WEIGHT: 315 LBS | DIASTOLIC BLOOD PRESSURE: 89 MMHG | TEMPERATURE: 98.5 F | BODY MASS INDEX: 40.43 KG/M2 | SYSTOLIC BLOOD PRESSURE: 170 MMHG

## 2024-06-03 DIAGNOSIS — L97.222 LOWER LIMB ULCER, CALF, LEFT, WITH FAT LAYER EXPOSED (HCC): Primary | ICD-10-CM

## 2024-06-03 DIAGNOSIS — I87.2 CHRONIC VENOUS INSUFFICIENCY: ICD-10-CM

## 2024-06-03 DIAGNOSIS — R76.8 ANA POSITIVE: ICD-10-CM

## 2024-06-03 PROCEDURE — 11042 DBRDMT SUBQ TIS 1ST 20SQCM/<: CPT | Performed by: SURGERY

## 2024-06-03 PROCEDURE — 15271 SKIN SUB GRAFT TRNK/ARM/LEG: CPT | Performed by: SURGERY

## 2024-06-03 PROCEDURE — C5271 LOW COST SKIN SUBSTITUTE APP: HCPCS

## 2024-06-03 RX ORDER — BACITRACIN ZINC 500 [USP'U]/G
OINTMENT TOPICAL ONCE
OUTPATIENT
Start: 2024-06-03 | End: 2024-06-03

## 2024-06-03 RX ORDER — TRIAMCINOLONE ACETONIDE 1 MG/G
OINTMENT TOPICAL ONCE
OUTPATIENT
Start: 2024-06-03 | End: 2024-06-03

## 2024-06-03 RX ORDER — LIDOCAINE 40 MG/G
CREAM TOPICAL ONCE
OUTPATIENT
Start: 2024-06-03 | End: 2024-06-03

## 2024-06-03 RX ORDER — LIDOCAINE HYDROCHLORIDE 20 MG/ML
JELLY TOPICAL ONCE
OUTPATIENT
Start: 2024-06-03 | End: 2024-06-03

## 2024-06-03 RX ORDER — BETAMETHASONE DIPROPIONATE 0.05 %
OINTMENT (GRAM) TOPICAL ONCE
OUTPATIENT
Start: 2024-06-03 | End: 2024-06-03

## 2024-06-03 RX ORDER — LIDOCAINE HYDROCHLORIDE 40 MG/ML
SOLUTION TOPICAL ONCE
OUTPATIENT
Start: 2024-06-03 | End: 2024-06-03

## 2024-06-03 RX ORDER — SODIUM CHLOR/HYPOCHLOROUS ACID 0.033 %
SOLUTION, IRRIGATION IRRIGATION ONCE
OUTPATIENT
Start: 2024-06-03 | End: 2024-06-03

## 2024-06-03 RX ORDER — LIDOCAINE HYDROCHLORIDE 40 MG/ML
SOLUTION TOPICAL ONCE
Status: COMPLETED | OUTPATIENT
Start: 2024-06-03 | End: 2024-06-03

## 2024-06-03 RX ORDER — CLOBETASOL PROPIONATE 0.5 MG/G
OINTMENT TOPICAL ONCE
OUTPATIENT
Start: 2024-06-03 | End: 2024-06-03

## 2024-06-03 RX ORDER — GENTAMICIN SULFATE 1 MG/G
OINTMENT TOPICAL ONCE
OUTPATIENT
Start: 2024-06-03 | End: 2024-06-03

## 2024-06-03 RX ORDER — LIDOCAINE 50 MG/G
OINTMENT TOPICAL ONCE
OUTPATIENT
Start: 2024-06-03 | End: 2024-06-03

## 2024-06-03 RX ORDER — IBUPROFEN 200 MG
TABLET ORAL ONCE
OUTPATIENT
Start: 2024-06-03 | End: 2024-06-03

## 2024-06-03 RX ORDER — BACITRACIN ZINC AND POLYMYXIN B SULFATE 500; 1000 [USP'U]/G; [USP'U]/G
OINTMENT TOPICAL ONCE
OUTPATIENT
Start: 2024-06-03 | End: 2024-06-03

## 2024-06-03 RX ADMIN — LIDOCAINE HYDROCHLORIDE 10 ML: 40 SOLUTION TOPICAL at 13:35

## 2024-06-03 NOTE — PROGRESS NOTES
PuraPly AMTreatment Note    NAME:  Tori Mathew  YOB: 1950  MEDICAL RECORD NUMBER:  [id number]  DATE:  6/3/2024    Goal:  Patient will receive safe and proper application of skin substitute.  Patient will comply with caring for dressing, and reporting complications.     Expiration date checked immediately prior to use.  Package intact prior to use and no damage noted.  PuraPly AM is stored at room temperature.  PuraPly AM was removed from protective sterile packaging by provider and applied to prepared ulcer bed.   PuraPly AM was hydrated with sterile normal saline per provider.  PuraPly AM was applied to left distal leg wound  and affixed with steri-strips by the provider.   PuraPly AM was covered with non-adherent ulcer dressing.   Applied alignate  over non-adherent.  Applied dry gauze and/or roll gauze.   Patient/caregiver was instructed not to remove dressing and to keep it clean and dry.   Pt/family/caregiver was instructed on signs and symptoms of complications to report such as draining through dressing, dressing falling down/slipping, getting wet, or severe pain or tingling.        Guidelines followed  PuraPly AM may only be used every 12 months per wound.     Date of first application of PuraPly for this current wound is Neha 3, 2024.    Application # 1 out of 10     Electronically signed by Anali Haynes RN on 6/3/2024 at 2:00 PM  
Description not for Pressure Injury Full thickness 03/25/24 4855   Number of days: 70          Diabetic/Pressure/Non Pressure Ulcers:  Ulcer:   Non-Pressure ulcer, fat layer exposed      Total Surface Area of Ulcer(s) Covered 20 sq/cm    Was the Product Layered  Yes     Amount of Product Applied 16 sq/cm     Amount of Product Wasted 0 sq/cm     Reason for Waste: N/A     Surgically Fixated: No:     Secured With: Steri Strips    Procedural Pain: 5/10     Post Procedural Pain: 4 / 10    Response to Treatment: Well tolerated by patient.                            Procedure Note  Indications:  Based on my examination of this patient's wound(s)/ulcer(s) today, debridement is required to promote healing and evaluate the wound base.    Performed by: Livier    Consent obtained:  Yes    Time out taken:  Yes    Pain Control: Anesthetic  Anesthetic: 4% Lidocaine Liquid Topical     Debridement:Excisional Debridement    Using curette the wound(s)/ulcer(s) was/were sharply debrided down through and including the removal of epidermis, dermis, and subcutaneous tissue.        Devitalized Tissue Debrided:  fibrin, slough, and necrotic/eschar to stimulate bleeding to promote healing, post debridement good bleeding base and wound edges noted    Wound/Ulcer #: 1, 8    Percent of Wound/Ulcer Debrided: 90 %    Total Surface Area Debrided:  20  sq cm     Estimated Blood Loss:  Minimal  Hemostasis Achieved:  by pressure    Procedural Pain:  5  / 10   Post Procedural Pain:  4 / 10     Response to treatment:  Well tolerated by patient.     Plan:   Treatment Note please see attached Discharge Instructions    Written patient dismissal instructions given to patient and signed by patient or POA.         Discharge Instructions         Visit Discharge/Physician Orders     Discharge condition: Stable    Assessment of pain at discharge: yes    Anesthetic used: lidocaine 4%    Discharge to: Home    Left via:Private automobile    Accompanied by:

## 2024-06-03 NOTE — PLAN OF CARE
Problem: Wound:  Goal: Will show signs of wound healing; wound closure and no evidence of infection  Description: Will show signs of wound healing; wound closure and no evidence of infection  Outcome: Not Progressing     Problem: Venous:  Goal: Signs of wound healing will improve  Description: Signs of wound healing will improve  Outcome: Not Progressing     Problem: Chronic Conditions and Co-morbidities  Goal: Patient's chronic conditions and co-morbidity symptoms are monitored and maintained or improved  Outcome: Adequate for Discharge     Problem: Compression therapy:  Goal: Will be free from complications associated with compression therapy  Description: Will be free from complications associated with compression therapy  Outcome: Adequate for Discharge     Problem: Wound:  Goal: Will show signs of wound healing; wound closure and no evidence of infection  Description: Will show signs of wound healing; wound closure and no evidence of infection  Outcome: Not Progressing     Problem: Venous:  Goal: Signs of wound healing will improve  Description: Signs of wound healing will improve  Outcome: Not Progressing

## 2024-06-04 ENCOUNTER — CLINICAL DOCUMENTATION (OUTPATIENT)
Dept: VASCULAR SURGERY | Age: 74
End: 2024-06-04

## 2024-06-04 NOTE — DISCHARGE INSTRUCTIONS
Visit Discharge/Physician Orders     Discharge condition: Stable     Assessment of pain at discharge: yes     Anesthetic used: lidocaine 4%     Discharge to: Home     Left via:Private automobile     Accompanied by: self     ECF/HHA: [facility]      Dressing Orders: LEFT LOWER LEG WOUNDS: Cleanse wounds with normal saline, apply aquaphor to dry skin on legs. *Distal wound has skin graft in place- do not remove- do not apply any dressing with AG*   Apply -plain Alginate to wound bed then Drawtex to both wounds  and cover with ABD pad and  COBAN II from base of toes to base of knees- change weekly at wound care clinic. - Home health to change twice  a week at home one Wednesday and Friday (may use kerlix and coban if insurance wont provide COBAN II)      Right leg: Spandigrip on right leg- on in morning and off at night      Keep wrap dry at all times. If wrap becomes wet or falls 2 inches or painful- may remove wrap and apply daily dressings and spandigrip   Elevate legs as much as possible.      Treatment Orders: 5/13 culture taken- keflex sent to [facility]   Lymphedema pumps obtained and using   4/15 Culture taken- bactrim sent in- take as prescribed   3/25 Lamisil tablets prescription sent to pharmacy- take by mouth as prescribed.   Vascular testing to be done 5/20 at 1pm   10/23  Culture taken- mix fernando  10/30 culture taken - 11/6 antibiotic ordered- please    EAT DIET WITH PROTEINS AND VITAMIN C  TAKE A MULTIVITAMIN DAILY IF NOT CONTRAINDICATED        [facility] followup visit : 1 week Dr Maier 6/3 at 1:15pm _____________________________  (Please note your next appointment above and if you are unable to keep, kindly give a 24 hour notice. Thank you.)     Physician signature:__________________________     If you experience any of the following, please call the Wound Care Center during business hours:     * Increase in Pain  * Temperature over 101  * Increase in drainage from your wound  * Drainage

## 2024-06-04 NOTE — PROGRESS NOTES
The venous ultrasound study of the left leg was personally reviewed by me, no evidence of deep vein thrombosis, reflux of 0.8 seconds nodule left saphenofemoral junction, without any reflux at the junction the lesser saphenous vein and the popliteal vein    Will discuss with the patient the test results when he comes to see me at the wound care center next week

## 2024-06-10 ENCOUNTER — HOSPITAL ENCOUNTER (OUTPATIENT)
Dept: WOUND CARE | Age: 74
Discharge: HOME | End: 2024-06-10
Attending: SURGERY
Payer: MEDICARE

## 2024-06-10 VITALS — TEMPERATURE: 97.5 F | SYSTOLIC BLOOD PRESSURE: 153 MMHG | DIASTOLIC BLOOD PRESSURE: 85 MMHG | HEART RATE: 88 BPM

## 2024-06-10 DIAGNOSIS — L97.222 LOWER LIMB ULCER, CALF, LEFT, WITH FAT LAYER EXPOSED (HCC): Primary | ICD-10-CM

## 2024-06-10 PROCEDURE — 15271 SKIN SUB GRAFT TRNK/ARM/LEG: CPT | Performed by: SURGERY

## 2024-06-10 PROCEDURE — C5271 LOW COST SKIN SUBSTITUTE APP: HCPCS

## 2024-06-10 PROCEDURE — 99212 OFFICE O/P EST SF 10 MIN: CPT | Performed by: SURGERY

## 2024-06-10 RX ORDER — LIDOCAINE 50 MG/G
OINTMENT TOPICAL ONCE
OUTPATIENT
Start: 2024-06-10 | End: 2024-06-10

## 2024-06-10 RX ORDER — LIDOCAINE HYDROCHLORIDE 40 MG/ML
SOLUTION TOPICAL ONCE
OUTPATIENT
Start: 2024-06-10 | End: 2024-06-10

## 2024-06-10 RX ORDER — SODIUM CHLOR/HYPOCHLOROUS ACID 0.033 %
SOLUTION, IRRIGATION IRRIGATION ONCE
OUTPATIENT
Start: 2024-06-10 | End: 2024-06-10

## 2024-06-10 RX ORDER — BACITRACIN ZINC 500 [USP'U]/G
OINTMENT TOPICAL ONCE
OUTPATIENT
Start: 2024-06-10 | End: 2024-06-10

## 2024-06-10 RX ORDER — GENTAMICIN SULFATE 1 MG/G
OINTMENT TOPICAL ONCE
OUTPATIENT
Start: 2024-06-10 | End: 2024-06-10

## 2024-06-10 RX ORDER — LIDOCAINE 40 MG/G
CREAM TOPICAL ONCE
OUTPATIENT
Start: 2024-06-10 | End: 2024-06-10

## 2024-06-10 RX ORDER — BACITRACIN ZINC AND POLYMYXIN B SULFATE 500; 1000 [USP'U]/G; [USP'U]/G
OINTMENT TOPICAL ONCE
OUTPATIENT
Start: 2024-06-10 | End: 2024-06-10

## 2024-06-10 RX ORDER — TRIAMCINOLONE ACETONIDE 1 MG/G
OINTMENT TOPICAL ONCE
OUTPATIENT
Start: 2024-06-10 | End: 2024-06-10

## 2024-06-10 RX ORDER — IBUPROFEN 200 MG
TABLET ORAL ONCE
OUTPATIENT
Start: 2024-06-10 | End: 2024-06-10

## 2024-06-10 RX ORDER — LIDOCAINE HYDROCHLORIDE 20 MG/ML
JELLY TOPICAL ONCE
OUTPATIENT
Start: 2024-06-10 | End: 2024-06-10

## 2024-06-10 RX ORDER — CLOBETASOL PROPIONATE 0.5 MG/G
OINTMENT TOPICAL ONCE
OUTPATIENT
Start: 2024-06-10 | End: 2024-06-10

## 2024-06-10 RX ORDER — BETAMETHASONE DIPROPIONATE 0.05 %
OINTMENT (GRAM) TOPICAL ONCE
OUTPATIENT
Start: 2024-06-10 | End: 2024-06-10

## 2024-06-10 RX ORDER — LIDOCAINE HYDROCHLORIDE 40 MG/ML
SOLUTION TOPICAL ONCE
Status: COMPLETED | OUTPATIENT
Start: 2024-06-10 | End: 2024-06-10

## 2024-06-10 RX ADMIN — LIDOCAINE HYDROCHLORIDE 10 ML: 40 SOLUTION TOPICAL at 13:25

## 2024-06-10 ASSESSMENT — PAIN SCALES - GENERAL: PAINLEVEL_OUTOF10: 0

## 2024-06-10 NOTE — PROGRESS NOTES
Livier    Consent obtained:  Yes    Time out taken:  Yes    Pain Control: Anesthetic  Anesthetic: 4% Lidocaine Liquid Topical     Debridement:Excisional Debridement    Using curette the wound(s)/ulcer(s) was/were sharply debrided down through and including the removal of epidermis, dermis, and subcutaneous tissue.        Devitalized Tissue Debrided:  fibrin, slough, and necrotic/eschar to stimulate bleeding to promote healing, post debridement good bleeding base and wound edges noted    Wound/Ulcer #: 1, 8    Percent of Wound/Ulcer Debrided: 90 %    Total Surface Area Debrided:  20  sq cm     Estimated Blood Loss:  Minimal  Hemostasis Achieved:  by pressure    Procedural Pain:  5  / 10   Post Procedural Pain:  4 / 10     Response to treatment:  Well tolerated by patient.     Plan:   Treatment Note please see attached Discharge Instructions    Written patient dismissal instructions given to patient and signed by patient or POA.         Discharge Instructions         Visit Discharge/Physician Orders     Discharge condition: Stable    Assessment of pain at discharge: yes    Anesthetic used: lidocaine 4%    Discharge to: Home    Left via:Private automobile    Accompanied by: self    ECF/HHA: [facility]      Dressing Orders: LEFT LOWER LEG WOUNDS: Cleanse wounds with normal saline, apply aquaphor to dry skin on legs. Apply - Alginate AG  to wound bed then Drawtex  and cover with ABD pad and  COBAN II from base of toes to base of knees- change weekly at wound care clinic. - Home health to change twice  a week at home one Wednesday and Friday (may use kerlix and coban if insurance wont provide COBAN II)      Right leg: Spandigrip on right leg- on in morning and off at night      Keep wrap dry at all times. If wrap becomes wet or falls 2 inches or painful- may remove wrap and apply daily dressings and spandigrip   Elevate legs as much as possible.      Treatment Orders: 5/13 culture taken- keflex sent to [facility]

## 2024-06-17 ENCOUNTER — HOSPITAL ENCOUNTER (OUTPATIENT)
Dept: WOUND CARE | Age: 74
Discharge: HOME | End: 2024-06-17
Attending: SURGERY
Payer: MEDICARE

## 2024-06-17 VITALS
DIASTOLIC BLOOD PRESSURE: 76 MMHG | TEMPERATURE: 97.3 F | RESPIRATION RATE: 20 BRPM | SYSTOLIC BLOOD PRESSURE: 132 MMHG | HEART RATE: 92 BPM

## 2024-06-17 DIAGNOSIS — R76.8 ANA POSITIVE: ICD-10-CM

## 2024-06-17 DIAGNOSIS — L97.222 LOWER LIMB ULCER, CALF, LEFT, WITH FAT LAYER EXPOSED (HCC): Primary | ICD-10-CM

## 2024-06-17 PROCEDURE — 87070 CULTURE OTHR SPECIMN AEROBIC: CPT

## 2024-06-17 PROCEDURE — 87205 SMEAR GRAM STAIN: CPT

## 2024-06-17 PROCEDURE — 11042 DBRDMT SUBQ TIS 1ST 20SQCM/<: CPT

## 2024-06-17 PROCEDURE — 87077 CULTURE AEROBIC IDENTIFY: CPT

## 2024-06-17 PROCEDURE — 11045 DBRDMT SUBQ TISS EACH ADDL: CPT

## 2024-06-17 PROCEDURE — 11045 DBRDMT SUBQ TISS EACH ADDL: CPT | Performed by: SURGERY

## 2024-06-17 PROCEDURE — 11042 DBRDMT SUBQ TIS 1ST 20SQCM/<: CPT | Performed by: SURGERY

## 2024-06-17 RX ORDER — LIDOCAINE 40 MG/G
CREAM TOPICAL ONCE
OUTPATIENT
Start: 2024-06-17 | End: 2024-06-17

## 2024-06-17 RX ORDER — BACITRACIN ZINC 500 [USP'U]/G
OINTMENT TOPICAL ONCE
OUTPATIENT
Start: 2024-06-17 | End: 2024-06-17

## 2024-06-17 RX ORDER — BETAMETHASONE DIPROPIONATE 0.05 %
OINTMENT (GRAM) TOPICAL ONCE
OUTPATIENT
Start: 2024-06-17 | End: 2024-06-17

## 2024-06-17 RX ORDER — GENTAMICIN SULFATE 1 MG/G
OINTMENT TOPICAL ONCE
OUTPATIENT
Start: 2024-06-17 | End: 2024-06-17

## 2024-06-17 RX ORDER — SODIUM CHLOR/HYPOCHLOROUS ACID 0.033 %
SOLUTION, IRRIGATION IRRIGATION ONCE
OUTPATIENT
Start: 2024-06-17 | End: 2024-06-17

## 2024-06-17 RX ORDER — LIDOCAINE HYDROCHLORIDE 20 MG/ML
JELLY TOPICAL ONCE
OUTPATIENT
Start: 2024-06-17 | End: 2024-06-17

## 2024-06-17 RX ORDER — TRIAMCINOLONE ACETONIDE 1 MG/G
OINTMENT TOPICAL ONCE
OUTPATIENT
Start: 2024-06-17 | End: 2024-06-17

## 2024-06-17 RX ORDER — LIDOCAINE HYDROCHLORIDE 40 MG/ML
SOLUTION TOPICAL ONCE
Status: COMPLETED | OUTPATIENT
Start: 2024-06-17 | End: 2024-06-17

## 2024-06-17 RX ORDER — LIDOCAINE 50 MG/G
OINTMENT TOPICAL ONCE
OUTPATIENT
Start: 2024-06-17 | End: 2024-06-17

## 2024-06-17 RX ORDER — BACITRACIN ZINC AND POLYMYXIN B SULFATE 500; 1000 [USP'U]/G; [USP'U]/G
OINTMENT TOPICAL ONCE
OUTPATIENT
Start: 2024-06-17 | End: 2024-06-17

## 2024-06-17 RX ORDER — IBUPROFEN 200 MG
TABLET ORAL ONCE
OUTPATIENT
Start: 2024-06-17 | End: 2024-06-17

## 2024-06-17 RX ORDER — LIDOCAINE HYDROCHLORIDE 40 MG/ML
SOLUTION TOPICAL ONCE
OUTPATIENT
Start: 2024-06-17 | End: 2024-06-17

## 2024-06-17 RX ORDER — CLOBETASOL PROPIONATE 0.5 MG/G
OINTMENT TOPICAL ONCE
OUTPATIENT
Start: 2024-06-17 | End: 2024-06-17

## 2024-06-17 RX ADMIN — LIDOCAINE HYDROCHLORIDE 10 ML: 40 SOLUTION TOPICAL at 13:17

## 2024-06-17 NOTE — DISCHARGE INSTRUCTIONS
Visit Discharge/Physician Orders     Discharge condition: Stable     Assessment of pain at discharge: yes     Anesthetic used: lidocaine 4%     Discharge to: Home     Left via:Private automobile     Accompanied by: self     [facility]      Dressing Orders: LEFT LOWER LEG WOUNDS: Cleanse wounds with normal saline, apply aquaphor to dry skin on legs.Apply plain Alginate to wound bed then Drawtex to both wounds  and cover with ABD pad and  COBAN II from base of toes to base of knees- change weekly at wound care clinic. - Home health to change twice  a week at home one Wednesday and Friday (may use kerlix and coban if insurance wont provide COBAN II)      Right leg: Spandigrip on right leg- on in morning and off at night    Keep wrap dry at all times. If wrap becomes wet or falls 2 inches or painful- may remove wrap and apply daily dressings and spandigrip   Elevate legs as much as possible.      Graft #1 Puraply AM 6/3  Graft #2  Puraply AM 6/4 6/17 Graft on hold     Treatment Orders: 6/17culture taken  Lymphedema pumps obtained and using   4/15 Culture taken- bactrim sent in- take as prescribed   3/25 Lamisil tablets prescription sent to pharmacy- take by mouth as prescribed.   Vascular testing to be done 5/20 at 1pm   10/23  Culture taken- mix fernando  10/30 culture taken - 11/6 antibiotic ordered- please    EAT DIET WITH PROTEINS AND VITAMIN C  TAKE A MULTIVITAMIN DAILY IF NOT CONTRAINDICATED        [facility] followup visit : 1 week Dr Maier 1:15pm _____________________________  (Please note your next appointment above and if you are unable to keep, kindly give a 24 hour notice. Thank you.)     Physician signature:__________________________     If you experience any of the following, please call the Wound Care Center during business hours:     * Increase in Pain  * Temperature over 101  * Increase in drainage from your wound  * Drainage with a foul odor  * Bleeding  * Increase in swelling  * Need

## 2024-06-17 NOTE — PROGRESS NOTES
Patient lab work faxed to [facility] for PCP to review due to positive JEANNE. Faxed to [phone] with confirmation back.   
Care Protocol    JEANNE positive       Pre Debridement Measurements:  Are located in the Wound/Ulcer Documentation Flow Sheet  Post Debridement Measurements:  Wound/Ulcer Descriptions are Pre Debridement except measurements:    Wound 09/13/21 Leg Left;Lateral #6 venous (Active)   Number of days: 1008       Wound 10/23/23 Leg Left;Lateral;Distal #1 left  lateral leg (Active)   Wound Image   06/03/24 1323   Wound Etiology Venous 10/23/23 1410   Dressing Status New dressing applied 06/10/24 1525   Wound Cleansed Cleansed with saline 06/10/24 1525   Dressing/Treatment ABD;Alginate with Ag 06/10/24 1525   Offloading for Diabetic Foot Ulcers Offloading ordered 02/26/24 1425   Wound Length (cm) 8 cm 06/17/24 1318   Wound Width (cm) 5.4 cm 06/17/24 1318   Wound Depth (cm) 0.7 cm 06/17/24 1318   Wound Surface Area (cm^2) 43.2 cm^2 06/17/24 1318   Change in Wound Size % (l*w) -1052 06/17/24 1318   Wound Volume (cm^3) 30.24 cm^3 06/17/24 1318   Wound Healing % -3932 06/17/24 1318   Post-Procedure Length (cm) 8.2 cm 06/17/24 1345   Post-Procedure Width (cm) 5.4 cm 06/17/24 1345   Post-Procedure Depth (cm) 0.8 cm 06/17/24 1345   Post-Procedure Surface Area (cm^2) 44.28 cm^2 06/17/24 1345   Post-Procedure Volume (cm^3) 35.424 cm^3 06/17/24 1345   Wound Assessment Granulation tissue;Fibrin;Purple/maroon 06/17/24 1318   Drainage Amount Copious (>75 % saturated) 06/17/24 1318   Drainage Description Serosanguinous;Yellow 06/17/24 1318   Odor Moderate 06/17/24 1318   Colleen-wound Assessment Maceration 06/17/24 1318   Margins Attached edges 02/05/24 1354   Wound Thickness Description not for Pressure Injury Full thickness 02/05/24 1354   Number of days: 238       Wound 03/25/24 Leg Left;Lateral;Proximal #8 (Active)   Wound Image   06/03/24 1323   Dressing Status New dressing applied 06/10/24 1525   Wound Cleansed Cleansed with saline 06/10/24 1525   Dressing/Treatment Alginate with Ag;Other (comment);ABD 06/10/24 1525   Wound Length (cm)

## 2024-06-17 NOTE — PLAN OF CARE
Problem: Wound:  Goal: Will show signs of wound healing; wound closure and no evidence of infection  Description: Will show signs of wound healing; wound closure and no evidence of infection  Outcome: Not Progressing     Problem: Venous:  Goal: Signs of wound healing will improve  Description: Signs of wound healing will improve  Outcome: Not Progressing     Problem: Compression therapy:  Goal: Will be free from complications associated with compression therapy  Description: Will be free from complications associated with compression therapy  Outcome: Not Progressing     Problem: Chronic Conditions and Co-morbidities  Goal: Patient's chronic conditions and co-morbidity symptoms are monitored and maintained or improved  Outcome: Completed     Problem: Wound:  Goal: Will show signs of wound healing; wound closure and no evidence of infection  Description: Will show signs of wound healing; wound closure and no evidence of infection  Outcome: Not Progressing     Problem: Venous:  Goal: Signs of wound healing will improve  Description: Signs of wound healing will improve  Outcome: Not Progressing     Problem: Compression therapy:  Goal: Will be free from complications associated with compression therapy  Description: Will be free from complications associated with compression therapy  Outcome: Not Progressing

## 2024-06-18 NOTE — DISCHARGE INSTRUCTIONS
Visit Discharge/Physician Orders     Discharge condition: Stable  Assessment of pain at discharge: yes  Anesthetic used: lidocaine 4%  Discharge to: Home  Left via:Private automobile  Accompanied by: self  ECF/HHA: [facility]      Dressing Orders: LEFT LOWER LEG WOUNDS: Cleanse wounds with normal saline, apply aquaphor to dry skin on legs.Apply plain Alginate to wound bed then Drawtex to both wounds  and cover with ABD pad and  COBAN II from base of toes to base of knees- change weekly at wound care clinic. - Home health to change twice  a week at home one Wednesday and Friday (may use kerlix and coban if insurance wont provide COBAN II)      Right leg: Spandigrip on right leg- on in morning and off at night    Keep wrap dry at all times. If wrap becomes wet or falls 2 inches or painful- may remove wrap and apply daily dressings and spandigrip   Elevate legs as much as possible.      Graft #1 Puraply AM 6/3  Graft #2  Puraply AM 6/4 6/17 Graft on hold     Treatment Orders: 6/17culture taken 6/24- Levaquin ordered  take by mouth as prescribed.   Lymphedema pumps obtained and using   3/25 Lamisil tablets prescription sent to pharmacy- take by mouth as prescribed.   EAT DIET WITH PROTEINS AND VITAMIN C  TAKE A MULTIVITAMIN DAILY IF NOT CONTRAINDICATED        [facility] followup visit : 1 week Dr Maier 1:15pm _____________________________  (Please note your next appointment above and if you are unable to keep, kindly give a 24 hour notice. Thank you.)     Physician signature:__________________________     If you experience any of the following, please call the Wound Care Center during business hours:     * Increase in Pain  * Temperature over 101  * Increase in drainage from your wound  * Drainage with a foul odor  * Bleeding  * Increase in swelling  * Need for compression bandage changes due to slippage, breakthrough drainage.     If you need medical attention outside of the business hours of the Wound Care  11

## 2024-06-19 LAB
MICROORGANISM SPEC CULT: ABNORMAL
MICROORGANISM/AGENT SPEC: ABNORMAL
SERVICE CMNT-IMP: ABNORMAL
SPECIMEN DESCRIPTION: ABNORMAL

## 2024-06-24 ENCOUNTER — HOSPITAL ENCOUNTER (OUTPATIENT)
Dept: WOUND CARE | Age: 74
Discharge: HOME | End: 2024-06-24
Attending: SURGERY
Payer: MEDICARE

## 2024-06-24 VITALS
WEIGHT: 315 LBS | RESPIRATION RATE: 20 BRPM | HEIGHT: 74 IN | DIASTOLIC BLOOD PRESSURE: 81 MMHG | SYSTOLIC BLOOD PRESSURE: 160 MMHG | HEART RATE: 83 BPM | BODY MASS INDEX: 40.43 KG/M2 | TEMPERATURE: 98.1 F

## 2024-06-24 DIAGNOSIS — L97.222 LOWER LIMB ULCER, CALF, LEFT, WITH FAT LAYER EXPOSED (HCC): Primary | ICD-10-CM

## 2024-06-24 PROCEDURE — 11045 DBRDMT SUBQ TISS EACH ADDL: CPT | Performed by: SURGERY

## 2024-06-24 PROCEDURE — 11045 DBRDMT SUBQ TISS EACH ADDL: CPT

## 2024-06-24 PROCEDURE — 11042 DBRDMT SUBQ TIS 1ST 20SQCM/<: CPT | Performed by: SURGERY

## 2024-06-24 PROCEDURE — 11042 DBRDMT SUBQ TIS 1ST 20SQCM/<: CPT

## 2024-06-24 RX ORDER — LIDOCAINE HYDROCHLORIDE 40 MG/ML
SOLUTION TOPICAL ONCE
Status: COMPLETED | OUTPATIENT
Start: 2024-06-24 | End: 2024-06-24

## 2024-06-24 RX ORDER — SODIUM CHLOR/HYPOCHLOROUS ACID 0.033 %
SOLUTION, IRRIGATION IRRIGATION ONCE
OUTPATIENT
Start: 2024-06-24 | End: 2024-06-24

## 2024-06-24 RX ORDER — LIDOCAINE HYDROCHLORIDE 40 MG/ML
SOLUTION TOPICAL ONCE
OUTPATIENT
Start: 2024-06-24 | End: 2024-06-24

## 2024-06-24 RX ORDER — LIDOCAINE 40 MG/G
CREAM TOPICAL ONCE
OUTPATIENT
Start: 2024-06-24 | End: 2024-06-24

## 2024-06-24 RX ORDER — TRIAMCINOLONE ACETONIDE 1 MG/G
OINTMENT TOPICAL ONCE
OUTPATIENT
Start: 2024-06-24 | End: 2024-06-24

## 2024-06-24 RX ORDER — LIDOCAINE HYDROCHLORIDE 20 MG/ML
JELLY TOPICAL ONCE
OUTPATIENT
Start: 2024-06-24 | End: 2024-06-24

## 2024-06-24 RX ORDER — BACITRACIN ZINC AND POLYMYXIN B SULFATE 500; 1000 [USP'U]/G; [USP'U]/G
OINTMENT TOPICAL ONCE
OUTPATIENT
Start: 2024-06-24 | End: 2024-06-24

## 2024-06-24 RX ORDER — BETAMETHASONE DIPROPIONATE 0.05 %
OINTMENT (GRAM) TOPICAL ONCE
OUTPATIENT
Start: 2024-06-24 | End: 2024-06-24

## 2024-06-24 RX ORDER — IBUPROFEN 200 MG
TABLET ORAL ONCE
OUTPATIENT
Start: 2024-06-24 | End: 2024-06-24

## 2024-06-24 RX ORDER — LIDOCAINE 50 MG/G
OINTMENT TOPICAL ONCE
OUTPATIENT
Start: 2024-06-24 | End: 2024-06-24

## 2024-06-24 RX ORDER — BACITRACIN ZINC 500 [USP'U]/G
OINTMENT TOPICAL ONCE
OUTPATIENT
Start: 2024-06-24 | End: 2024-06-24

## 2024-06-24 RX ORDER — CLOBETASOL PROPIONATE 0.5 MG/G
OINTMENT TOPICAL ONCE
OUTPATIENT
Start: 2024-06-24 | End: 2024-06-24

## 2024-06-24 RX ORDER — GENTAMICIN SULFATE 1 MG/G
OINTMENT TOPICAL ONCE
OUTPATIENT
Start: 2024-06-24 | End: 2024-06-24

## 2024-06-24 RX ADMIN — LIDOCAINE HYDROCHLORIDE 25 ML: 40 SOLUTION TOPICAL at 13:24

## 2024-06-24 NOTE — PROGRESS NOTES
Wound Healing Center Followup Visit Note    Referring Physician : Pankaj Corbett MD  Tori Mathew  MEDICAL RECORD NUMBER:  [id number]  AGE: 73 y.o.   GENDER: male  : 1950  EPISODE DATE:  2024    Subjective:     Chief Complaint   Patient presents with    Wound Check     Left leg       HISTORY of PRESENT ILLNESS HPI   Tori Mathew is a 73 y.o. male who presents today in regards to follow up evaluation and treatment of wound/ulcer.  That patient's past medical, family and social hx were reviewed and changes were made if present.    History of Wound Context:  The patient has had a wound of both calves, minimal skin on the right side, multiple ulcers on the left calf, fat layer exposed which was first noted approximately 2023.  This has been treated by the patient and his niece.  On their initial visit to the wound healing center, 10/23/23, the patient has noted that the wound has not been improving.  The patient has had similar previous wounds in the past.          Patient had lymphedema therapy in the past, with a lymphedema pump at home that the patient used to use on a regular basis, broke down,, is beyond repair and patient was recommended new lymphedema pump for which he was given the prescription     Pt is currently not on abx.     10/23/2023   I long detailed discussion the patient, options, risks benefits and alternatives were explained, patient recommend keep legs elevated decrease swelling component nutrition multivitamin supplement protein supplement were discussed  Because of underlying extensive dermatophytosis and tinea pedis, patient given short-term antifungal cream as well as oral Lamisil therapy  For now patient recommended compression wrap until the ulcers healed  Patient recommended, since his own lymphedema therapy pump broke down completely beyond repair, that he was using a regular basis in the past to keep the ulcerations from coming back and keep the

## 2024-07-01 ENCOUNTER — HOSPITAL ENCOUNTER (OUTPATIENT)
Dept: WOUND CARE | Age: 74
Discharge: HOME | End: 2024-07-01
Attending: SURGERY
Payer: MEDICARE

## 2024-07-01 VITALS
HEART RATE: 89 BPM | TEMPERATURE: 98.4 F | DIASTOLIC BLOOD PRESSURE: 87 MMHG | SYSTOLIC BLOOD PRESSURE: 154 MMHG | RESPIRATION RATE: 18 BRPM

## 2024-07-01 DIAGNOSIS — R76.8 ANA POSITIVE: ICD-10-CM

## 2024-07-01 DIAGNOSIS — L97.222 LOWER LIMB ULCER, CALF, LEFT, WITH FAT LAYER EXPOSED (HCC): Primary | ICD-10-CM

## 2024-07-01 PROCEDURE — 11042 DBRDMT SUBQ TIS 1ST 20SQCM/<: CPT

## 2024-07-01 PROCEDURE — 11045 DBRDMT SUBQ TISS EACH ADDL: CPT | Performed by: SURGERY

## 2024-07-01 PROCEDURE — 11042 DBRDMT SUBQ TIS 1ST 20SQCM/<: CPT | Performed by: SURGERY

## 2024-07-01 PROCEDURE — 11045 DBRDMT SUBQ TISS EACH ADDL: CPT

## 2024-07-01 RX ORDER — LIDOCAINE HYDROCHLORIDE 40 MG/ML
SOLUTION TOPICAL ONCE
Status: COMPLETED | OUTPATIENT
Start: 2024-07-01 | End: 2024-07-01

## 2024-07-01 RX ORDER — LIDOCAINE HYDROCHLORIDE 20 MG/ML
JELLY TOPICAL ONCE
OUTPATIENT
Start: 2024-07-01 | End: 2024-07-01

## 2024-07-01 RX ORDER — LIDOCAINE 50 MG/G
OINTMENT TOPICAL ONCE
OUTPATIENT
Start: 2024-07-01 | End: 2024-07-01

## 2024-07-01 RX ORDER — LIDOCAINE 40 MG/G
CREAM TOPICAL ONCE
OUTPATIENT
Start: 2024-07-01 | End: 2024-07-01

## 2024-07-01 RX ORDER — BETAMETHASONE DIPROPIONATE 0.05 %
OINTMENT (GRAM) TOPICAL ONCE
OUTPATIENT
Start: 2024-07-01 | End: 2024-07-01

## 2024-07-01 RX ORDER — SODIUM CHLOR/HYPOCHLOROUS ACID 0.033 %
SOLUTION, IRRIGATION IRRIGATION ONCE
OUTPATIENT
Start: 2024-07-01 | End: 2024-07-01

## 2024-07-01 RX ORDER — BACITRACIN ZINC AND POLYMYXIN B SULFATE 500; 1000 [USP'U]/G; [USP'U]/G
OINTMENT TOPICAL ONCE
OUTPATIENT
Start: 2024-07-01 | End: 2024-07-01

## 2024-07-01 RX ORDER — IBUPROFEN 200 MG
TABLET ORAL ONCE
OUTPATIENT
Start: 2024-07-01 | End: 2024-07-01

## 2024-07-01 RX ORDER — BACITRACIN ZINC 500 [USP'U]/G
OINTMENT TOPICAL ONCE
OUTPATIENT
Start: 2024-07-01 | End: 2024-07-01

## 2024-07-01 RX ORDER — CLOBETASOL PROPIONATE 0.5 MG/G
OINTMENT TOPICAL ONCE
OUTPATIENT
Start: 2024-07-01 | End: 2024-07-01

## 2024-07-01 RX ORDER — GENTAMICIN SULFATE 1 MG/G
OINTMENT TOPICAL ONCE
OUTPATIENT
Start: 2024-07-01 | End: 2024-07-01

## 2024-07-01 RX ORDER — TRIAMCINOLONE ACETONIDE 1 MG/G
OINTMENT TOPICAL ONCE
OUTPATIENT
Start: 2024-07-01 | End: 2024-07-01

## 2024-07-01 RX ORDER — LIDOCAINE HYDROCHLORIDE 40 MG/ML
SOLUTION TOPICAL ONCE
OUTPATIENT
Start: 2024-07-01 | End: 2024-07-01

## 2024-07-01 RX ADMIN — LIDOCAINE HYDROCHLORIDE 10 ML: 40 SOLUTION TOPICAL at 13:15

## 2024-07-01 NOTE — DISCHARGE INSTRUCTIONS
Visit Discharge/Physician Orders     Discharge condition: Stable  Assessment of pain at discharge: yes  Anesthetic used: lidocaine 4%  Discharge to: Home  Left via:Private automobile  Accompanied by: self  [facility]      Dressing Orders: LEFT LOWER LEG WOUND: Cleanse wounds with normal saline, apply aquaphor to dry skin on legs.Apply  Drawtex to  and cover with ABD pad and  COBAN II from base of toes to base of knees- change weekly at wound care clinic. - Home health to change twice  a week at home one Wednesday and Friday (may use kerlix and coban if insurance wont provide COBAN II)      Right leg: Spandigrip on right leg- on in morning and off at night    Keep wrap dry at all times. If wrap becomes wet or falls 2 inches or painful- may remove wrap and apply daily dressings and spandigrip   Elevate legs as much as possible.      Graft #1 Puraply AM 6/3  Graft #2  Puraply AM 6/4 6/17 Graft on hold     Treatment Orders: Please make an appointment with PCP due to Abnormal blood work     6/17culture taken 6/24- Levaquin ordered  take by mouth as prescribed.   Lymphedema pumps obtained and using   3/25 Lamisil tablets prescription sent to pharmacy- take by mouth as prescribed.   EAT DIET WITH PROTEINS AND VITAMIN C  TAKE A MULTIVITAMIN DAILY IF NOT CONTRAINDICATED       Wound culture 7/8/24     [facility] followup visit : 2 week Dr Maier 1:15pm ___Dr. DERAS 7/17/24_________________________  (Please note your next appointment above and if you are unable to keep, kindly give a 24 hour notice. Thank you.)     Physician signature:__________________________     If you experience any of the following, please call the Wound Care Center during business hours:     * Increase in Pain  * Temperature over 101  * Increase in drainage from your wound  * Drainage with a foul odor  * Bleeding  * Increase in swelling  * Need for compression bandage changes due to slippage, breakthrough drainage.     If you need medical

## 2024-07-01 NOTE — PLAN OF CARE
Problem: Compression therapy:  Goal: Will be free from complications associated with compression therapy  Description: Will be free from complications associated with compression therapy  Outcome: Progressing     Problem: Chronic Conditions and Co-morbidities  Goal: Patient's chronic conditions and co-morbidity symptoms are monitored and maintained or improved  Outcome: Adequate for Discharge     Problem: Wound:  Goal: Will show signs of wound healing; wound closure and no evidence of infection  Description: Will show signs of wound healing; wound closure and no evidence of infection  Outcome: Not Progressing     Problem: Venous:  Goal: Signs of wound healing will improve  Description: Signs of wound healing will improve  Outcome: Not Progressing

## 2024-07-01 NOTE — PROGRESS NOTES
was/were sharply debrided down through and including the removal of epidermis, dermis, and subcutaneous tissue.        Devitalized Tissue Debrided:  fibrin and slough to stimulate bleeding to promote healing, post debridement good bleeding base and wound edges noted    Wound/Ulcer #: 1    Percent of Wound/Ulcer Debrided: 90%    Total Surface Area Debrided:  40 sq cm     Estimated Blood Loss:  Minimal  Hemostasis Achieved:  by pressure    Procedural Pain:  5  / 10   Post Procedural Pain:  4 / 10     Response to treatment:  Well tolerated by patient.     Plan:   Treatment Note please see attached Discharge Instructions    Written patient dismissal instructions given to patient and signed by patient or POA.         Discharge Instructions         Visit Discharge/Physician Orders     Discharge condition: Stable  Assessment of pain at discharge: yes  Anesthetic used: lidocaine 4%  Discharge to: Home  Left via:Private automobile  Accompanied by: self  ECF/HHA: [facility] home [facility]      Dressing Orders: LEFT LOWER LEG WOUNDS: Cleanse wounds with normal saline, apply aquaphor to dry skin on legs.Apply  Drawtex to both wounds  and cover with ABD pad and  COBAN II from base of toes to base of knees- change weekly at wound care clinic. - Home health to change twice  a week at home one Wednesday and Friday (may use kerlix and coban if insurance wont provide COBAN II)      Right leg: Spandigrip on right leg- on in morning and off at night    Keep wrap dry at all times. If wrap becomes wet or falls 2 inches or painful- may remove wrap and apply daily dressings and spandigrip   Elevate legs as much as possible.      Graft #1 Puraply AM 6/3  Graft #2  Puraply AM 6/4 6/17 Graft on hold     Treatment Orders: Please make an appointment with PCP due to Abnormal blood work    6/17culture taken 6/24- Levaquin ordered  take by mouth as prescribed.   Lymphedema pumps obtained and using   3/25 Lamisil tablets prescription sent to pharmacy-

## 2024-07-01 NOTE — DISCHARGE INSTRUCTIONS
Visit Discharge/Physician Orders     Discharge condition: Stable  Assessment of pain at discharge: yes  Anesthetic used: lidocaine 4%  Discharge to: Home  Left via:Private automobile  Accompanied by: self  [facility]      Dressing Orders: LEFT LOWER LEG WOUNDS: Cleanse wounds with normal saline, apply aquaphor to dry skin on legs.Apply  Drawtex to both wounds  and cover with ABD pad and  COBAN II from base of toes to base of knees- change weekly at wound care clinic. - Home health to change twice  a week at home one Wednesday and Friday (may use kerlix and coban if insurance wont provide COBAN II)      Right leg: Spandigrip on right leg- on in morning and off at night    Keep wrap dry at all times. If wrap becomes wet or falls 2 inches or painful- may remove wrap and apply daily dressings and spandigrip   Elevate legs as much as possible.      Graft #1 Puraply AM 6/3  Graft #2  Puraply AM 6/4 6/17 Graft on hold     Treatment Orders: Please make an appointment with PCP due to Abnormal blood work    6/17culture taken 6/24- Levaquin ordered  take by mouth as prescribed.   Lymphedema pumps obtained and using   3/25 Lamisil tablets prescription sent to pharmacy- take by mouth as prescribed.   EAT DIET WITH PROTEINS AND VITAMIN C  TAKE A MULTIVITAMIN DAILY IF NOT CONTRAINDICATED        [facility] followup visit : 1 week Dr Maier 1:15pm ___Consult with PK (PM)__________________________  (Please note your next appointment above and if you are unable to keep, kindly give a 24 hour notice. Thank you.)     Physician signature:__________________________     If you experience any of the following, please call the Wound Care Center during business hours:     * Increase in Pain  * Temperature over 101  * Increase in drainage from your wound  * Drainage with a foul odor  * Bleeding  * Increase in swelling  * Need for compression bandage changes due to slippage, breakthrough drainage.     If you need medical attention

## 2024-07-08 ENCOUNTER — HOSPITAL ENCOUNTER (OUTPATIENT)
Dept: WOUND CARE | Age: 74
Discharge: HOME | End: 2024-07-08
Attending: SURGERY
Payer: MEDICARE

## 2024-07-08 VITALS
SYSTOLIC BLOOD PRESSURE: 152 MMHG | DIASTOLIC BLOOD PRESSURE: 80 MMHG | RESPIRATION RATE: 18 BRPM | HEART RATE: 84 BPM | TEMPERATURE: 97.6 F

## 2024-07-08 DIAGNOSIS — R76.8 ANA POSITIVE: ICD-10-CM

## 2024-07-08 DIAGNOSIS — I87.2 CHRONIC VENOUS INSUFFICIENCY: ICD-10-CM

## 2024-07-08 DIAGNOSIS — L97.222 LOWER LIMB ULCER, CALF, LEFT, WITH FAT LAYER EXPOSED (HCC): Primary | ICD-10-CM

## 2024-07-08 PROCEDURE — 87205 SMEAR GRAM STAIN: CPT

## 2024-07-08 PROCEDURE — 87070 CULTURE OTHR SPECIMN AEROBIC: CPT

## 2024-07-08 PROCEDURE — 11042 DBRDMT SUBQ TIS 1ST 20SQCM/<: CPT | Performed by: SURGERY

## 2024-07-08 PROCEDURE — 87077 CULTURE AEROBIC IDENTIFY: CPT

## 2024-07-08 PROCEDURE — 11045 DBRDMT SUBQ TISS EACH ADDL: CPT

## 2024-07-08 PROCEDURE — 11045 DBRDMT SUBQ TISS EACH ADDL: CPT | Performed by: SURGERY

## 2024-07-08 PROCEDURE — 11042 DBRDMT SUBQ TIS 1ST 20SQCM/<: CPT

## 2024-07-08 RX ORDER — LIDOCAINE HYDROCHLORIDE 20 MG/ML
JELLY TOPICAL ONCE
OUTPATIENT
Start: 2024-07-08 | End: 2024-07-08

## 2024-07-08 RX ORDER — LIDOCAINE 50 MG/G
OINTMENT TOPICAL ONCE
OUTPATIENT
Start: 2024-07-08 | End: 2024-07-08

## 2024-07-08 RX ORDER — BACITRACIN ZINC AND POLYMYXIN B SULFATE 500; 1000 [USP'U]/G; [USP'U]/G
OINTMENT TOPICAL ONCE
OUTPATIENT
Start: 2024-07-08 | End: 2024-07-08

## 2024-07-08 RX ORDER — LIDOCAINE HYDROCHLORIDE 40 MG/ML
SOLUTION TOPICAL ONCE
Status: COMPLETED | OUTPATIENT
Start: 2024-07-08 | End: 2024-07-08

## 2024-07-08 RX ORDER — SODIUM CHLOR/HYPOCHLOROUS ACID 0.033 %
SOLUTION, IRRIGATION IRRIGATION ONCE
OUTPATIENT
Start: 2024-07-08 | End: 2024-07-08

## 2024-07-08 RX ORDER — LIDOCAINE 40 MG/G
CREAM TOPICAL ONCE
OUTPATIENT
Start: 2024-07-08 | End: 2024-07-08

## 2024-07-08 RX ORDER — CLOBETASOL PROPIONATE 0.5 MG/G
OINTMENT TOPICAL ONCE
OUTPATIENT
Start: 2024-07-08 | End: 2024-07-08

## 2024-07-08 RX ORDER — GENTAMICIN SULFATE 1 MG/G
OINTMENT TOPICAL ONCE
OUTPATIENT
Start: 2024-07-08 | End: 2024-07-08

## 2024-07-08 RX ORDER — BACITRACIN ZINC 500 [USP'U]/G
OINTMENT TOPICAL ONCE
OUTPATIENT
Start: 2024-07-08 | End: 2024-07-08

## 2024-07-08 RX ORDER — TRIAMCINOLONE ACETONIDE 1 MG/G
OINTMENT TOPICAL ONCE
OUTPATIENT
Start: 2024-07-08 | End: 2024-07-08

## 2024-07-08 RX ORDER — IBUPROFEN 200 MG
TABLET ORAL ONCE
OUTPATIENT
Start: 2024-07-08 | End: 2024-07-08

## 2024-07-08 RX ORDER — BETAMETHASONE DIPROPIONATE 0.05 %
OINTMENT (GRAM) TOPICAL ONCE
OUTPATIENT
Start: 2024-07-08 | End: 2024-07-08

## 2024-07-08 RX ORDER — LIDOCAINE HYDROCHLORIDE 40 MG/ML
SOLUTION TOPICAL ONCE
OUTPATIENT
Start: 2024-07-08 | End: 2024-07-08

## 2024-07-08 RX ADMIN — LIDOCAINE HYDROCHLORIDE 15 ML: 40 SOLUTION TOPICAL at 13:33

## 2024-07-08 NOTE — PROGRESS NOTES
4%  Discharge to: Home  Left via:Private automobile  Accompanied by: self  ECF/HHA: [facility] home health      Dressing Orders: LEFT LOWER LEG WOUNDS: Cleanse wounds with normal saline, apply aquaphor to dry skin on legs.Apply  Drawtex to both wounds  and cover with ABD pad and  COBAN II from base of toes to base of knees- change weekly at wound care clinic. - Home health to change twice  a week at home one Wednesday and Friday (may use kerlix and coban if insurance wont provide COBAN II)      Right leg: Spandigrip on right leg- on in morning and off at night    Keep wrap dry at all times. If wrap becomes wet or falls 2 inches or painful- may remove wrap and apply daily dressings and spandigrip   Elevate legs as much as possible.      Graft #1 Puraply AM 6/3  Graft #2  Puraply AM 6/4 6/17 Graft on hold     Treatment Orders: Please make an appointment with PCP due to Abnormal blood work     6/17culture taken 6/24- Levaquin ordered  take by mouth as prescribed.   Lymphedema pumps obtained and using   3/25 Lamisil tablets prescription sent to pharmacy- take by mouth as prescribed.   EAT DIET WITH PROTEINS AND VITAMIN C  TAKE A MULTIVITAMIN DAILY IF NOT CONTRAINDICATED       Wound culture 7/8/24     [facility] followup visit : 2 week Dr Maier 1:15pm ___Dr. DERAS 7/17/24_________________________  (Please note your next appointment above and if you are unable to keep, kindly give a 24 hour notice. Thank you.)     Physician signature:__________________________     If you experience any of the following, please call the Wound Care Center during business hours:     * Increase in Pain  * Temperature over 101  * Increase in drainage from your wound  * Drainage with a foul odor  * Bleeding  * Increase in swelling  * Need for compression bandage changes due to slippage, breakthrough drainage.     If you need medical attention outside of the business hours of the Wound Care Centers please contact your PCP or go to the nearest emergency

## 2024-07-12 NOTE — DISCHARGE INSTRUCTIONS
Visit Discharge/Physician Orders     Discharge condition: Stable  Assessment of pain at discharge: yes  Anesthetic used: lidocaine 4%  Discharge to: Home  Left via:Private automobile  Accompanied by: self  [facility]      Dressing Orders: LEFT LOWER LEG WOUND: Cleanse wounds with normal saline, apply aquaphor to dry skin on legs.Apply  Drawtex to  and cover with ABD pad and  COBAN II from base of toes to base of knees- change weekly at wound care clinic. - Home health to change twice  a week at home one Wednesday and Friday (may use kerlix and coban if insurance wont provide COBAN II)      Right leg: Spandigrip on right leg- on in morning and off at night    Keep wrap dry at all times. If wrap becomes wet or falls 2 inches or painful- may remove wrap and apply daily dressings and spandigrip   Elevate legs as much as possible.      Graft #1 Puraply AM 6/3  Graft #2  Puraply AM 6/4 6/17 Graft on hold     Treatment Orders: Please make an appointment with PCP due to Abnormal blood work     6/17culture taken 6/24- Levaquin ordered  take by mouth as prescribed.   Lymphedema pumps obtained and using   3/25 Lamisil tablets prescription sent to pharmacy- take by mouth as prescribed.   EAT DIET WITH PROTEINS AND VITAMIN C  TAKE A MULTIVITAMIN DAILY IF NOT CONTRAINDICATED        Take antibiotic as prescribed     [facility] followup visit : 1 week Dr Maier  __________________________  (Please note your next appointment above and if you are unable to keep, kindly give a 24 hour notice. Thank you.)     Physician signature:__________________________     If you experience any of the following, please call the Wound Care Center during business hours:     * Increase in Pain  * Temperature over 101  * Increase in drainage from your wound  * Drainage with a foul odor  * Bleeding  * Increase in swelling  * Need for compression bandage changes due to slippage, breakthrough drainage.     If you need medical attention outside

## 2024-07-15 RX ORDER — CEPHALEXIN 500 MG/1
1000 CAPSULE ORAL 3 TIMES DAILY
Qty: 60 CAPSULE | Refills: 0 | OUTPATIENT
Start: 2024-07-15 | End: 2024-07-22 | Stop reason: RX

## 2024-07-17 ENCOUNTER — HOSPITAL ENCOUNTER (OUTPATIENT)
Dept: WOUND CARE | Age: 74
Discharge: HOME | End: 2024-07-17
Attending: SURGERY
Payer: MEDICARE

## 2024-07-17 VITALS
HEART RATE: 77 BPM | RESPIRATION RATE: 18 BRPM | BODY MASS INDEX: 40.43 KG/M2 | TEMPERATURE: 97.2 F | HEIGHT: 74 IN | DIASTOLIC BLOOD PRESSURE: 83 MMHG | WEIGHT: 315 LBS | SYSTOLIC BLOOD PRESSURE: 147 MMHG

## 2024-07-17 DIAGNOSIS — L97.222 LOWER LIMB ULCER, CALF, LEFT, WITH FAT LAYER EXPOSED (HCC): Primary | ICD-10-CM

## 2024-07-17 PROCEDURE — 11045 DBRDMT SUBQ TISS EACH ADDL: CPT | Performed by: SURGERY

## 2024-07-17 PROCEDURE — 11042 DBRDMT SUBQ TIS 1ST 20SQCM/<: CPT

## 2024-07-17 PROCEDURE — 11042 DBRDMT SUBQ TIS 1ST 20SQCM/<: CPT | Performed by: SURGERY

## 2024-07-17 PROCEDURE — 11045 DBRDMT SUBQ TISS EACH ADDL: CPT

## 2024-07-17 RX ORDER — GENTAMICIN SULFATE 1 MG/G
OINTMENT TOPICAL ONCE
OUTPATIENT
Start: 2024-07-17 | End: 2024-07-17

## 2024-07-17 RX ORDER — LIDOCAINE 50 MG/G
OINTMENT TOPICAL ONCE
OUTPATIENT
Start: 2024-07-17 | End: 2024-07-17

## 2024-07-17 RX ORDER — BACITRACIN ZINC 500 [USP'U]/G
OINTMENT TOPICAL ONCE
OUTPATIENT
Start: 2024-07-17 | End: 2024-07-17

## 2024-07-17 RX ORDER — LIDOCAINE HYDROCHLORIDE 40 MG/ML
SOLUTION TOPICAL ONCE
OUTPATIENT
Start: 2024-07-17 | End: 2024-07-17

## 2024-07-17 RX ORDER — TRIAMCINOLONE ACETONIDE 1 MG/G
OINTMENT TOPICAL ONCE
OUTPATIENT
Start: 2024-07-17 | End: 2024-07-17

## 2024-07-17 RX ORDER — LIDOCAINE HYDROCHLORIDE 20 MG/ML
JELLY TOPICAL ONCE
OUTPATIENT
Start: 2024-07-17 | End: 2024-07-17

## 2024-07-17 RX ORDER — LIDOCAINE 40 MG/G
CREAM TOPICAL ONCE
OUTPATIENT
Start: 2024-07-17 | End: 2024-07-17

## 2024-07-17 RX ORDER — IBUPROFEN 200 MG
TABLET ORAL ONCE
OUTPATIENT
Start: 2024-07-17 | End: 2024-07-17

## 2024-07-17 RX ORDER — BACITRACIN ZINC AND POLYMYXIN B SULFATE 500; 1000 [USP'U]/G; [USP'U]/G
OINTMENT TOPICAL ONCE
OUTPATIENT
Start: 2024-07-17 | End: 2024-07-17

## 2024-07-17 RX ORDER — CLOBETASOL PROPIONATE 0.5 MG/G
OINTMENT TOPICAL ONCE
OUTPATIENT
Start: 2024-07-17 | End: 2024-07-17

## 2024-07-17 RX ORDER — SODIUM CHLOR/HYPOCHLOROUS ACID 0.033 %
SOLUTION, IRRIGATION IRRIGATION ONCE
OUTPATIENT
Start: 2024-07-17 | End: 2024-07-17

## 2024-07-17 RX ORDER — LIDOCAINE HYDROCHLORIDE 40 MG/ML
SOLUTION TOPICAL ONCE
Status: COMPLETED | OUTPATIENT
Start: 2024-07-17 | End: 2024-07-17

## 2024-07-17 RX ORDER — BETAMETHASONE DIPROPIONATE 0.05 %
OINTMENT (GRAM) TOPICAL ONCE
OUTPATIENT
Start: 2024-07-17 | End: 2024-07-17

## 2024-07-17 RX ADMIN — LIDOCAINE HYDROCHLORIDE 15 ML: 40 SOLUTION TOPICAL at 13:38

## 2024-07-17 NOTE — PROGRESS NOTES
Wound Healing Center Followup Visit Note    Referring Physician : Pankaj Corbett MD  Tori Mathew  MEDICAL RECORD NUMBER:  [id number]  AGE: 74 y.o.   GENDER: male  : 1950  EPISODE DATE:  2024    Subjective:     Chief Complaint   Patient presents with    Wound Check      HISTORY of PRESENT ILLNESS HPI   Tori Mathew is a 74 y.o. male who presents today in regards to follow up evaluation and treatment of wound/ulcer.  That patient's past medical, family and social hx were reviewed and changes were made if present.    History of Wound Context:  The patient has had a wound of both calves, minimal skin on the right side, multiple ulcers on the left calf, fat layer exposed which was first noted approximately 2023.  This has been treated by the patient and his niece.  On their initial visit to the wound healing center, 10/23/23, the patient has noted that the wound has not been improving.  The patient has had similar previous wounds in the past.          Patient had lymphedema therapy in the past, with a lymphedema pump at home that the patient used to use on a regular basis, broke down,, is beyond repair and patient was recommended new lymphedema pump for which he was given the prescription     Pt is currently not on abx.     10/23/2023   I long detailed discussion the patient, options, risks benefits and alternatives were explained, patient recommend keep legs elevated decrease swelling component nutrition multivitamin supplement protein supplement were discussed  Because of underlying extensive dermatophytosis and tinea pedis, patient given short-term antifungal cream as well as oral Lamisil therapy  For now patient recommended compression wrap until the ulcers healed  Patient recommended, since his own lymphedema therapy pump broke down completely beyond repair, that he was using a regular basis in the past to keep the ulcerations from coming back and keep the swelling under

## 2024-07-19 PROBLEM — B35.1 DERMATOPHYTOSIS OF NAIL: Status: RESOLVED | Noted: 2020-07-02 | Resolved: 2024-07-19

## 2024-07-19 PROBLEM — E66.813 CLASS 3 SEVERE OBESITY DUE TO EXCESS CALORIES WITH SERIOUS COMORBIDITY IN ADULT: Status: RESOLVED | Noted: 2023-01-19 | Resolved: 2024-07-19

## 2024-07-19 PROBLEM — E66.01 CLASS 3 SEVERE OBESITY DUE TO EXCESS CALORIES WITH SERIOUS COMORBIDITY IN ADULT (HCC): Status: RESOLVED | Noted: 2023-01-19 | Resolved: 2024-07-19

## 2024-07-19 PROBLEM — M79.89 LEG SWELLING: Status: RESOLVED | Noted: 2023-01-23 | Resolved: 2024-07-19

## 2024-07-19 PROBLEM — B35.3 TINEA PEDIS OF BOTH FEET: Status: RESOLVED | Noted: 2023-01-23 | Resolved: 2024-07-19

## 2024-07-22 RX ORDER — CEPHALEXIN 500 MG/1
1000 CAPSULE ORAL 3 TIMES DAILY
Qty: 60 CAPSULE | Refills: 0 | Status: SHIPPED | OUTPATIENT
Start: 2024-07-22

## 2024-07-23 ENCOUNTER — HOSPITAL ENCOUNTER (OUTPATIENT)
Dept: WOUND CARE | Age: 74
Discharge: HOME | End: 2024-07-23
Attending: SURGERY
Payer: MEDICARE

## 2024-07-23 VITALS
BODY MASS INDEX: 40.43 KG/M2 | WEIGHT: 315 LBS | HEIGHT: 74 IN | TEMPERATURE: 97.6 F | HEART RATE: 87 BPM | DIASTOLIC BLOOD PRESSURE: 90 MMHG | RESPIRATION RATE: 20 BRPM | SYSTOLIC BLOOD PRESSURE: 142 MMHG

## 2024-07-23 DIAGNOSIS — L97.222 LOWER LIMB ULCER, CALF, LEFT, WITH FAT LAYER EXPOSED (HCC): Primary | ICD-10-CM

## 2024-07-23 PROCEDURE — 11042 DBRDMT SUBQ TIS 1ST 20SQCM/<: CPT

## 2024-07-23 PROCEDURE — 11042 DBRDMT SUBQ TIS 1ST 20SQCM/<: CPT | Performed by: SURGERY

## 2024-07-23 PROCEDURE — 11045 DBRDMT SUBQ TISS EACH ADDL: CPT | Performed by: SURGERY

## 2024-07-23 PROCEDURE — 11045 DBRDMT SUBQ TISS EACH ADDL: CPT

## 2024-07-23 RX ORDER — LIDOCAINE HYDROCHLORIDE 20 MG/ML
JELLY TOPICAL ONCE
OUTPATIENT
Start: 2024-07-23 | End: 2024-07-23

## 2024-07-23 RX ORDER — GENTAMICIN SULFATE 1 MG/G
OINTMENT TOPICAL ONCE
OUTPATIENT
Start: 2024-07-23 | End: 2024-07-23

## 2024-07-23 RX ORDER — LIDOCAINE 50 MG/G
OINTMENT TOPICAL ONCE
OUTPATIENT
Start: 2024-07-23 | End: 2024-07-23

## 2024-07-23 RX ORDER — BACITRACIN ZINC AND POLYMYXIN B SULFATE 500; 1000 [USP'U]/G; [USP'U]/G
OINTMENT TOPICAL ONCE
OUTPATIENT
Start: 2024-07-23 | End: 2024-07-23

## 2024-07-23 RX ORDER — CLOBETASOL PROPIONATE 0.5 MG/G
OINTMENT TOPICAL ONCE
OUTPATIENT
Start: 2024-07-23 | End: 2024-07-23

## 2024-07-23 RX ORDER — LIDOCAINE HYDROCHLORIDE 40 MG/ML
SOLUTION TOPICAL ONCE
Status: COMPLETED | OUTPATIENT
Start: 2024-07-23 | End: 2024-07-23

## 2024-07-23 RX ORDER — LIDOCAINE HYDROCHLORIDE 40 MG/ML
SOLUTION TOPICAL ONCE
OUTPATIENT
Start: 2024-07-23 | End: 2024-07-23

## 2024-07-23 RX ORDER — BETAMETHASONE DIPROPIONATE 0.05 %
OINTMENT (GRAM) TOPICAL ONCE
OUTPATIENT
Start: 2024-07-23 | End: 2024-07-23

## 2024-07-23 RX ORDER — BACITRACIN ZINC 500 [USP'U]/G
OINTMENT TOPICAL ONCE
OUTPATIENT
Start: 2024-07-23 | End: 2024-07-23

## 2024-07-23 RX ORDER — IBUPROFEN 200 MG
TABLET ORAL ONCE
OUTPATIENT
Start: 2024-07-23 | End: 2024-07-23

## 2024-07-23 RX ORDER — LIDOCAINE 40 MG/G
CREAM TOPICAL ONCE
OUTPATIENT
Start: 2024-07-23 | End: 2024-07-23

## 2024-07-23 RX ORDER — SODIUM CHLOR/HYPOCHLOROUS ACID 0.033 %
SOLUTION, IRRIGATION IRRIGATION ONCE
OUTPATIENT
Start: 2024-07-23 | End: 2024-07-23

## 2024-07-23 RX ORDER — TRIAMCINOLONE ACETONIDE 1 MG/G
OINTMENT TOPICAL ONCE
OUTPATIENT
Start: 2024-07-23 | End: 2024-07-23

## 2024-07-23 RX ADMIN — LIDOCAINE HYDROCHLORIDE 10 ML: 40 SOLUTION TOPICAL at 10:32

## 2024-07-23 NOTE — PROGRESS NOTES
Cleansed Cleansed with saline 07/01/24 1406   Dressing/Treatment ABD;Alginate with Ag 07/01/24 1406   Offloading for Diabetic Foot Ulcers Offloading ordered 07/01/24 1406   Wound Length (cm) 14.2 cm 07/08/24 1326   Wound Width (cm) 8 cm 07/08/24 1326   Wound Depth (cm) 0.6 cm 07/08/24 1326   Wound Surface Area (cm^2) 113.6 cm^2 07/08/24 1326   Change in Wound Size % (l*w) -2929.33 07/08/24 1326   Wound Volume (cm^3) 68.16 cm^3 07/08/24 1326   Wound Healing % -8988 07/08/24 1326   Post-Procedure Length (cm) 14.2 cm 07/08/24 1351   Post-Procedure Width (cm) 8.1 cm 07/08/24 1351   Post-Procedure Depth (cm) 0.7 cm 07/08/24 1351   Post-Procedure Surface Area (cm^2) 115.02 cm^2 07/08/24 1351   Post-Procedure Volume (cm^3) 80.514 cm^3 07/08/24 1351   Wound Assessment Fibrin 07/08/24 1326   Drainage Amount Copious (>75 % saturated) 07/08/24 1326   Drainage Description Thick;Yellow;Brown 07/08/24 1326   Odor Mild 07/08/24 1326   Colleen-wound Assessment Maceration 07/08/24 1326   Margins Attached edges 02/05/24 1354   Wound Thickness Description not for Pressure Injury Full thickness 02/05/24 1354   Number of days: 259          Procedure Note  Indications:  Based on my examination of this patient's wound(s)/ulcer(s) today, debridement is required to promote healing and evaluate the wound base.    Performed by: Neo Maier MD    Consent obtained:  Yes    Time out taken:  Yes    Pain Control: Anesthetic  Anesthetic: 4% Lidocaine Liquid Topical     Debridement:Excisional Debridement    Using curette the wound(s)/ulcer(s) was/were sharply debrided down through and including the removal of epidermis, dermis, and subcutaneous tissue.        Devitalized Tissue Debrided:  fibrin and slough to stimulate bleeding to promote healing, post debridement good bleeding base and wound edges noted    Wound/Ulcer #: 1    Percent of Wound/Ulcer Debrided: 80%    Total Surface Area Debrided:  50 sq cm     Estimated Blood Loss:

## 2024-07-24 NOTE — DISCHARGE INSTRUCTIONS
Visit Discharge/Physician Orders     Discharge condition: Stable  Assessment of pain at discharge: yes  Anesthetic used: lidocaine 4%  Discharge to: Home  Left via:Private automobile  Accompanied by: self  [facility]      Dressing Orders: LEFT LOWER LEG WOUND: Cleanse wounds with Vashe, apply aquaphor to dry skin on legs.Apply  Drawtex to wound bed  (at home use: UrgoClean Ag- home health please order)  and cover with ABD pad (or super absorber if available) and  COBAN II from base of toes to base of knees- change weekly at wound care clinic. - Home health to change twice  a week at home one Wednesday and Friday (may use kerlix and coban if insurance wont provide COBAN II)       Keep wrap dry at all times. If wrap becomes wet or falls 2 inches or painful- may remove wrap and apply daily dressings and spandigrip   Right leg: Spandigrip on right leg- on in morning and off at night        Treatment Orders: Please make an appointment with PCP due to Abnormal blood work  Lymphedema pumps continue using   Keep pressure off of wound- attempt to rotate leg while keeping leg elevated   Elevate legs as much as possible.   EAT DIET WITH PROTEINS AND VITAMIN C  TAKE A MULTIVITAMIN DAILY IF NOT CONTRAINDICATED        [facility] followup visit : ________1 week PM_________  (Please note your next appointment above and if you are unable to keep, kindly give a 24 hour notice. Thank you.)     Physician signature:__________________________     If you experience any of the following, please call the Wound Care Center during business hours:     * Increase in Pain  * Temperature over 101  * Increase in drainage from your wound  * Drainage with a foul odor  * Bleeding  * Increase in swelling  * Need for compression bandage changes due to slippage, breakthrough drainage.     If you need medical attention outside of the business hours of the Wound Care Centers please contact your PCP or go to the nearest emergency room.

## 2024-07-29 ENCOUNTER — HOSPITAL ENCOUNTER (OUTPATIENT)
Dept: WOUND CARE | Age: 74
Discharge: HOME | End: 2024-07-29
Attending: SURGERY
Payer: MEDICARE

## 2024-07-29 VITALS
TEMPERATURE: 98 F | WEIGHT: 315 LBS | BODY MASS INDEX: 40.43 KG/M2 | HEIGHT: 74 IN | HEART RATE: 91 BPM | SYSTOLIC BLOOD PRESSURE: 138 MMHG | DIASTOLIC BLOOD PRESSURE: 72 MMHG

## 2024-07-29 DIAGNOSIS — L97.222 LOWER LIMB ULCER, CALF, LEFT, WITH FAT LAYER EXPOSED (HCC): Primary | ICD-10-CM

## 2024-07-29 DIAGNOSIS — R76.8 ANA POSITIVE: ICD-10-CM

## 2024-07-29 PROCEDURE — 87077 CULTURE AEROBIC IDENTIFY: CPT

## 2024-07-29 PROCEDURE — 11045 DBRDMT SUBQ TISS EACH ADDL: CPT | Performed by: SURGERY

## 2024-07-29 PROCEDURE — 11042 DBRDMT SUBQ TIS 1ST 20SQCM/<: CPT

## 2024-07-29 PROCEDURE — 87205 SMEAR GRAM STAIN: CPT

## 2024-07-29 PROCEDURE — 11042 DBRDMT SUBQ TIS 1ST 20SQCM/<: CPT | Performed by: SURGERY

## 2024-07-29 PROCEDURE — 11045 DBRDMT SUBQ TISS EACH ADDL: CPT

## 2024-07-29 PROCEDURE — 87070 CULTURE OTHR SPECIMN AEROBIC: CPT

## 2024-07-29 RX ORDER — BACITRACIN ZINC AND POLYMYXIN B SULFATE 500; 1000 [USP'U]/G; [USP'U]/G
OINTMENT TOPICAL ONCE
OUTPATIENT
Start: 2024-07-29 | End: 2024-07-29

## 2024-07-29 RX ORDER — LIDOCAINE HYDROCHLORIDE 20 MG/ML
JELLY TOPICAL ONCE
OUTPATIENT
Start: 2024-07-29 | End: 2024-07-29

## 2024-07-29 RX ORDER — SODIUM CHLOR/HYPOCHLOROUS ACID 0.033 %
SOLUTION, IRRIGATION IRRIGATION ONCE
OUTPATIENT
Start: 2024-07-29 | End: 2024-07-29

## 2024-07-29 RX ORDER — LIDOCAINE 50 MG/G
OINTMENT TOPICAL ONCE
OUTPATIENT
Start: 2024-07-29 | End: 2024-07-29

## 2024-07-29 RX ORDER — LIDOCAINE HYDROCHLORIDE 40 MG/ML
SOLUTION TOPICAL ONCE
OUTPATIENT
Start: 2024-07-29 | End: 2024-07-29

## 2024-07-29 RX ORDER — CLOBETASOL PROPIONATE 0.5 MG/G
OINTMENT TOPICAL ONCE
OUTPATIENT
Start: 2024-07-29 | End: 2024-07-29

## 2024-07-29 RX ORDER — TRIAMCINOLONE ACETONIDE 1 MG/G
OINTMENT TOPICAL ONCE
OUTPATIENT
Start: 2024-07-29 | End: 2024-07-29

## 2024-07-29 RX ORDER — BETAMETHASONE DIPROPIONATE 0.05 %
OINTMENT (GRAM) TOPICAL ONCE
OUTPATIENT
Start: 2024-07-29 | End: 2024-07-29

## 2024-07-29 RX ORDER — LIDOCAINE HYDROCHLORIDE 40 MG/ML
SOLUTION TOPICAL ONCE
Status: COMPLETED | OUTPATIENT
Start: 2024-07-29 | End: 2024-07-29

## 2024-07-29 RX ORDER — IBUPROFEN 200 MG
TABLET ORAL ONCE
OUTPATIENT
Start: 2024-07-29 | End: 2024-07-29

## 2024-07-29 RX ORDER — BACITRACIN ZINC 500 [USP'U]/G
OINTMENT TOPICAL ONCE
OUTPATIENT
Start: 2024-07-29 | End: 2024-07-29

## 2024-07-29 RX ORDER — LIDOCAINE 40 MG/G
CREAM TOPICAL ONCE
OUTPATIENT
Start: 2024-07-29 | End: 2024-07-29

## 2024-07-29 RX ORDER — GENTAMICIN SULFATE 1 MG/G
OINTMENT TOPICAL ONCE
OUTPATIENT
Start: 2024-07-29 | End: 2024-07-29

## 2024-07-29 RX ADMIN — LIDOCAINE HYDROCHLORIDE 10 ML: 40 SOLUTION TOPICAL at 14:05

## 2024-07-31 NOTE — DISCHARGE INSTRUCTIONS
Visit Discharge/Physician Orders     Discharge condition: Stable  Assessment of pain at discharge: yes  Anesthetic used: lidocaine 4%  Discharge to: Home  Left via:Private automobile  Accompanied by: self  [facility]      Dressing Orders: LEFT LOWER LEG WOUND: Cleanse wounds with Vashe, apply aquaphor to dry skin on legs.Apply  Drawtex to wound bed  (at home use: UrgoClean Ag- home health please order)  and cover with ABD pad (or super absorber if available) and  COBAN II from base of toes to base of knees- change weekly at wound care clinic. - Home health to change twice  a week at home one Wednesday and Friday (may use kerlix and coban if insurance wont provide COBAN II)        Keep wrap dry at all times. If wrap becomes wet or falls 2 inches or painful- may remove wrap and apply daily dressings and spandigrip   Right leg: Spandigrip on right leg- on in morning and off at night         Treatment Orders: Please make an appointment with PCP due to Abnormal blood work  Lymphedema pumps continue using   Keep pressure off of wound- attempt to rotate leg while keeping leg elevated   Elevate legs as much as possible.   EAT DIET WITH PROTEINS AND VITAMIN C  TAKE A MULTIVITAMIN DAILY IF NOT CONTRAINDICATED        [facility] followup visit : ________1 week PM_________  (Please note your next appointment above and if you are unable to keep, kindly give a 24 hour notice. Thank you.)     Physician signature:__________________________     If you experience any of the following, please call the Wound Care Center during business hours:     * Increase in Pain  * Temperature over 101  * Increase in drainage from your wound  * Drainage with a foul odor  * Bleeding  * Increase in swelling  * Need for compression bandage changes due to slippage, breakthrough drainage.     If you need medical attention outside of the business hours of the Wound Care Centers please contact your PCP or go to the nearest emergency room.

## 2024-08-02 RX ORDER — AMOXICILLIN 500 MG/1
500 CAPSULE ORAL EVERY 8 HOURS
Qty: 30 CAPSULE | Refills: 0 | Status: SHIPPED | OUTPATIENT
Start: 2024-08-02 | End: 2024-08-12

## 2024-08-02 NOTE — PROGRESS NOTES
Dr Hamilton contacted regarding culture results. Amoxicillin 500mg Q8hrs for 10 days ordered and sent to listed pharmacy. Attempted to contact patient, no answer and no option for voicemail. Dr hamilton previously ordered keflex which is finished today. Lab to be contacted Monday to do sensitivity on enterococcus.   
walking  Associated Signs/Symptoms: Edema, pain, drainage     Ulcer Identification:  Ulcer Type: venous, non-healing/non-surgical, and malignant wound  Contributing Factors: edema, venous stasis, lymphedema, chronic pressure, decreased mobility, and shear force     Diabetic/Pressure/Non Pressure Ulcers only:  Ulcer: Non-Pressure ulcer,       If patient has diabetic lower extremity wounds  Mitchell Classification of diabetic lower extremity wounds:     Grade Description   []  0 No open wound   []  1 Superficial ulcer involving the full skin thickness   []  2 Deep ulcer involves ligament, tendon, joint capsule, or fascia  No bone involvement or abscess presence   []  3 Deep Ulcer with abcess formation and/or osteomyelitis   []  4 Localized gangrene   []  5 Extensive gangrene of the foot      Wound: Patient has a 3 separate ulcers over the posterior lateral aspect left calf with a fat layer exposed with the date and on the right side, minimal skin involvement     Other pertinent information:        1.  The vascular testing that were done, in February of this year were reviewed     1.  Ankle-brachial index, normal with good arterial flow     2.  Bilateral venous ultrasound studies, no DVT, and no evidence of any  reflux at the saphenofemoral junction noted bilaterally                     PAST MEDICAL HISTORY      Diagnosis Date    JEANNE positive 05/13/2024    Ratio 1:320    Ankle ulcer, left, with fat layer exposed (HCC) 11/13/2023    Cellulitis of right lower leg     Chronic venous insufficiency 01/23/2023    Decreased dorsalis pedis pulse 01/23/2023    Dermatophytosis 01/23/2023    Diabetes mellitus ([facility])     Hypokalemia 07/31/2023    Leg swelling 01/23/2023    Lower limb ulcer, calf, left, with fat layer exposed ([facility]) 10/23/2023    Lower limb ulcer, calf, right, limited to breakdown of skin (HCC) 08/28/2023    Lower limb ulcer, calf, right, with fat layer exposed ([facility]) 07/17/2023    Lymphedema     Lymphedema of both

## 2024-08-05 ENCOUNTER — APPOINTMENT (OUTPATIENT)
Dept: CT IMAGING | Age: 74
End: 2024-08-05
Attending: EMERGENCY MEDICINE
Payer: MEDICARE

## 2024-08-05 ENCOUNTER — HOSPITAL ENCOUNTER (OUTPATIENT)
Dept: WOUND CARE | Age: 74
Discharge: HOME | End: 2024-08-05
Attending: SURGERY

## 2024-08-05 ENCOUNTER — HOSPITAL ENCOUNTER (INPATIENT)
Age: 74
LOS: 4 days | Discharge: OTHER INSTITUTION | End: 2024-08-09
Attending: EMERGENCY MEDICINE | Admitting: INTERNAL MEDICINE
Payer: MEDICARE

## 2024-08-05 DIAGNOSIS — R53.1 GENERAL WEAKNESS: ICD-10-CM

## 2024-08-05 DIAGNOSIS — E78.5 DYSLIPIDEMIA: ICD-10-CM

## 2024-08-05 DIAGNOSIS — R73.03 PREDIABETES: ICD-10-CM

## 2024-08-05 DIAGNOSIS — R10.9 ABDOMINAL PAIN, UNSPECIFIED ABDOMINAL LOCATION: Primary | ICD-10-CM

## 2024-08-05 LAB
ALBUMIN SERPL-MCNC: 3.2 G/DL (ref 3.5–5.2)
ALP SERPL-CCNC: 45 U/L (ref 40–129)
ALT SERPL-CCNC: 35 U/L (ref 0–40)
ANION GAP SERPL CALCULATED.3IONS-SCNC: 13 MMOL/L (ref 7–16)
AST SERPL-CCNC: 52 U/L (ref 0–39)
BASOPHILS # BLD: 0 K/UL (ref 0–0.2)
BASOPHILS NFR BLD: 0 % (ref 0–2)
BILIRUB SERPL-MCNC: 0.4 MG/DL (ref 0–1.2)
BILIRUB UR QL STRIP: NEGATIVE
BUN SERPL-MCNC: 15 MG/DL (ref 6–23)
CALCIUM SERPL-MCNC: 8.2 MG/DL (ref 8.6–10.2)
CHLORIDE SERPL-SCNC: 100 MMOL/L (ref 98–107)
CLARITY UR: CLEAR
CO2 SERPL-SCNC: 22 MMOL/L (ref 22–29)
COLOR UR: YELLOW
CREAT SERPL-MCNC: 1 MG/DL (ref 0.7–1.2)
EOSINOPHIL # BLD: 0 K/UL (ref 0.05–0.5)
EOSINOPHILS RELATIVE PERCENT: 0 % (ref 0–6)
EPI CELLS #/AREA URNS HPF: ABNORMAL /HPF
ERYTHROCYTE [DISTWIDTH] IN BLOOD BY AUTOMATED COUNT: 14.7 % (ref 11.5–15)
GFR, ESTIMATED: 79 ML/MIN/1.73M2
GLUCOSE SERPL-MCNC: 89 MG/DL (ref 74–99)
GLUCOSE UR STRIP-MCNC: NEGATIVE MG/DL
HCT VFR BLD AUTO: 37 % (ref 37–54)
HGB BLD-MCNC: 11.7 G/DL (ref 12.5–16.5)
HGB UR QL STRIP.AUTO: ABNORMAL
INR PPP: 1.3
KETONES UR STRIP-MCNC: NEGATIVE MG/DL
LACTATE BLDV-SCNC: 1.8 MMOL/L (ref 0.5–2.2)
LEUKOCYTE ESTERASE UR QL STRIP: NEGATIVE
LIPASE SERPL-CCNC: 23 U/L (ref 13–60)
LYMPHOCYTES NFR BLD: 0.65 K/UL (ref 1.5–4)
LYMPHOCYTES RELATIVE PERCENT: 14 % (ref 20–42)
MAGNESIUM SERPL-MCNC: 1.9 MG/DL (ref 1.6–2.6)
MCH RBC QN AUTO: 26.8 PG (ref 26–35)
MCHC RBC AUTO-ENTMCNC: 31.6 G/DL (ref 32–34.5)
MCV RBC AUTO: 84.9 FL (ref 80–99.9)
MONOCYTES NFR BLD: 0.53 K/UL (ref 0.1–0.95)
MONOCYTES NFR BLD: 11 % (ref 2–12)
NEUTROPHILS NFR BLD: 75 % (ref 43–80)
NEUTS SEG NFR BLD: 3.51 K/UL (ref 1.8–7.3)
NITRITE UR QL STRIP: NEGATIVE
PH UR STRIP: 6 [PH] (ref 5–9)
PLATELET # BLD AUTO: 219 K/UL (ref 130–450)
PMV BLD AUTO: 9.3 FL (ref 7–12)
POTASSIUM SERPL-SCNC: 3.7 MMOL/L (ref 3.5–5)
PROT SERPL-MCNC: 8.4 G/DL (ref 6.4–8.3)
PROT UR STRIP-MCNC: ABNORMAL MG/DL
PROTHROMBIN TIME: 14.6 SEC (ref 9.3–12.4)
RBC # BLD AUTO: 4.36 M/UL (ref 3.8–5.8)
RBC # BLD: NORMAL 10*6/UL
RBC #/AREA URNS HPF: ABNORMAL /HPF
SODIUM SERPL-SCNC: 135 MMOL/L (ref 132–146)
SP GR UR STRIP: 1.01 (ref 1–1.03)
TROPONIN I SERPL HS-MCNC: 19 NG/L (ref 0–11)
TROPONIN I SERPL HS-MCNC: 21 NG/L (ref 0–11)
UROBILINOGEN UR STRIP-ACNC: 1 EU/DL (ref 0–1)
WBC #/AREA URNS HPF: ABNORMAL /HPF
WBC OTHER # BLD: 4.7 K/UL (ref 4.5–11.5)

## 2024-08-05 PROCEDURE — 85610 PROTHROMBIN TIME: CPT

## 2024-08-05 PROCEDURE — 96374 THER/PROPH/DIAG INJ IV PUSH: CPT

## 2024-08-05 PROCEDURE — 80053 COMPREHEN METABOLIC PANEL: CPT

## 2024-08-05 PROCEDURE — 74177 CT ABD & PELVIS W/CONTRAST: CPT

## 2024-08-05 PROCEDURE — 83690 ASSAY OF LIPASE: CPT

## 2024-08-05 PROCEDURE — 84484 ASSAY OF TROPONIN QUANT: CPT

## 2024-08-05 PROCEDURE — 1200000000 HC SEMI PRIVATE

## 2024-08-05 PROCEDURE — 85025 COMPLETE CBC W/AUTO DIFF WBC: CPT

## 2024-08-05 PROCEDURE — 83735 ASSAY OF MAGNESIUM: CPT

## 2024-08-05 PROCEDURE — 83605 ASSAY OF LACTIC ACID: CPT

## 2024-08-05 PROCEDURE — 6360000004 HC RX CONTRAST MEDICATION: Performed by: RADIOLOGY

## 2024-08-05 PROCEDURE — 96372 THER/PROPH/DIAG INJ SC/IM: CPT

## 2024-08-05 PROCEDURE — 99285 EMERGENCY DEPT VISIT HI MDM: CPT

## 2024-08-05 PROCEDURE — 81001 URINALYSIS AUTO W/SCOPE: CPT

## 2024-08-05 PROCEDURE — 93005 ELECTROCARDIOGRAM TRACING: CPT | Performed by: EMERGENCY MEDICINE

## 2024-08-05 PROCEDURE — 2580000003 HC RX 258: Performed by: EMERGENCY MEDICINE

## 2024-08-05 PROCEDURE — 51701 INSERT BLADDER CATHETER: CPT

## 2024-08-05 PROCEDURE — 6360000002 HC RX W HCPCS: Performed by: EMERGENCY MEDICINE

## 2024-08-05 RX ORDER — ONDANSETRON 2 MG/ML
4 INJECTION INTRAMUSCULAR; INTRAVENOUS ONCE
Status: COMPLETED | OUTPATIENT
Start: 2024-08-05 | End: 2024-08-05

## 2024-08-05 RX ORDER — 0.9 % SODIUM CHLORIDE 0.9 %
1000 INTRAVENOUS SOLUTION INTRAVENOUS ONCE
Status: COMPLETED | OUTPATIENT
Start: 2024-08-05 | End: 2024-08-05

## 2024-08-05 RX ORDER — DICYCLOMINE HYDROCHLORIDE 10 MG/ML
20 INJECTION INTRAMUSCULAR ONCE
Status: COMPLETED | OUTPATIENT
Start: 2024-08-05 | End: 2024-08-05

## 2024-08-05 RX ADMIN — ONDANSETRON 4 MG: 2 INJECTION INTRAMUSCULAR; INTRAVENOUS at 15:15

## 2024-08-05 RX ADMIN — IOPAMIDOL 75 ML: 755 INJECTION, SOLUTION INTRAVENOUS at 17:40

## 2024-08-05 RX ADMIN — DICYCLOMINE HYDROCHLORIDE 20 MG: 10 INJECTION, SOLUTION INTRAMUSCULAR at 15:16

## 2024-08-05 RX ADMIN — SODIUM CHLORIDE 1000 ML: 9 INJECTION, SOLUTION INTRAVENOUS at 15:13

## 2024-08-05 ASSESSMENT — PAIN DESCRIPTION - LOCATION: LOCATION: ABDOMEN

## 2024-08-05 ASSESSMENT — PAIN DESCRIPTION - ORIENTATION: ORIENTATION: MID

## 2024-08-05 ASSESSMENT — PAIN SCALES - GENERAL: PAINLEVEL_OUTOF10: 8

## 2024-08-05 ASSESSMENT — PAIN DESCRIPTION - DESCRIPTORS: DESCRIPTORS: ACHING

## 2024-08-05 NOTE — ED PROVIDER NOTES
HPI:  8/5/24,   Time: 3:00 PM EDT       Tori Mathew is a 74 y.o. male presenting to the ED for abd pain, beginning 3 days ago.  The complaint has been persistent, mild in severity, and worsened by nothing.  Brought in by vehicle.  Recently started antibiotics for wound infection.  States has upset stomach.  No diarrhea.  No nausea vomiting.  No fevers chills or sweats.  Nothing makes better.  Patient difficult story    Review of Systems:   Pertinent positives and negatives are stated within HPI, all other systems reviewed and are negative.          --------------------------------------------- PAST HISTORY ---------------------------------------------  Past Medical History:  has a past medical history of JEANNE positive, Ankle ulcer, left, with fat layer exposed ([facility]), Cellulitis of right lower leg, Chronic venous insufficiency, Decreased dorsalis pedis pulse, Dermatophytosis, Diabetes mellitus ([facility]), Hypokalemia, Leg swelling, Lower limb ulcer, calf, left, with fat layer exposed ([facility]), Lower limb ulcer, calf, right, limited to breakdown of skin ([facility]), Lower limb ulcer, calf, right, with fat layer exposed ([facility]), Lymphedema, Lymphedema of both lower extremities, Obesity, Class III, BMI 40-49.9 (morbid obesity) ([facility]), Onychomycosis, 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    Past Surgical History:  has a past surgical history that includes fracture surgery (2005).    Social History:  reports that he has never smoked. He has never used smokeless tobacco. He reports that he does not drink alcohol and does not use drugs.    Family History: family history includes Heart Disease in his father and mother.     The patient’s home medications have been reviewed.    Allergies: Metformin and related        ---------------------------------------------------PHYSICAL EXAM--------------------------------------    Constitutional/General: Alert and

## 2024-08-06 PROBLEM — R10.9 ABDOMINAL PAIN: Status: ACTIVE | Noted: 2024-08-06

## 2024-08-06 LAB
B PARAP IS1001 DNA NPH QL NAA+NON-PROBE: NOT DETECTED
B PERT DNA SPEC QL NAA+PROBE: NOT DETECTED
BASOPHILS # BLD: 0 K/UL (ref 0–0.2)
BASOPHILS NFR BLD: 0 % (ref 0–2)
C PNEUM DNA NPH QL NAA+NON-PROBE: NOT DETECTED
CA-I BLD-SCNC: 1.12 MMOL/L (ref 1.15–1.33)
CRP SERPL HS-MCNC: 83 MG/L (ref 0–5)
EOSINOPHIL # BLD: 0.01 K/UL (ref 0.05–0.5)
EOSINOPHILS RELATIVE PERCENT: 0 % (ref 0–6)
ERYTHROCYTE [DISTWIDTH] IN BLOOD BY AUTOMATED COUNT: 14.9 % (ref 11.5–15)
ERYTHROCYTE [SEDIMENTATION RATE] IN BLOOD BY WESTERGREN METHOD: 111 MM/HR (ref 0–15)
FLUAV RNA NPH QL NAA+NON-PROBE: NOT DETECTED
FLUBV RNA NPH QL NAA+NON-PROBE: NOT DETECTED
GLUCOSE BLD-MCNC: 100 MG/DL (ref 74–99)
GLUCOSE BLD-MCNC: 136 MG/DL (ref 74–99)
HADV DNA NPH QL NAA+NON-PROBE: NOT DETECTED
HCOV 229E RNA NPH QL NAA+NON-PROBE: NOT DETECTED
HCOV HKU1 RNA NPH QL NAA+NON-PROBE: NOT DETECTED
HCOV NL63 RNA NPH QL NAA+NON-PROBE: NOT DETECTED
HCOV OC43 RNA NPH QL NAA+NON-PROBE: NOT DETECTED
HCT VFR BLD AUTO: 35.2 % (ref 37–54)
HGB BLD-MCNC: 11 G/DL (ref 12.5–16.5)
HMPV RNA NPH QL NAA+NON-PROBE: NOT DETECTED
HPIV1 RNA NPH QL NAA+NON-PROBE: NOT DETECTED
HPIV2 RNA NPH QL NAA+NON-PROBE: NOT DETECTED
HPIV3 RNA NPH QL NAA+NON-PROBE: NOT DETECTED
HPIV4 RNA NPH QL NAA+NON-PROBE: NOT DETECTED
IMM GRANULOCYTES # BLD AUTO: 0.03 K/UL (ref 0–0.58)
IMM GRANULOCYTES NFR BLD: 1 % (ref 0–5)
LYMPHOCYTES NFR BLD: 0.69 K/UL (ref 1.5–4)
LYMPHOCYTES RELATIVE PERCENT: 15 % (ref 20–42)
M PNEUMO DNA NPH QL NAA+NON-PROBE: NOT DETECTED
MCH RBC QN AUTO: 26.3 PG (ref 26–35)
MCHC RBC AUTO-ENTMCNC: 31.3 G/DL (ref 32–34.5)
MCV RBC AUTO: 84.2 FL (ref 80–99.9)
MONOCYTES NFR BLD: 0.47 K/UL (ref 0.1–0.95)
MONOCYTES NFR BLD: 10 % (ref 2–12)
NEUTROPHILS NFR BLD: 75 % (ref 43–80)
NEUTS SEG NFR BLD: 3.49 K/UL (ref 1.8–7.3)
PLATELET # BLD AUTO: 237 K/UL (ref 130–450)
PMV BLD AUTO: 9.5 FL (ref 7–12)
PROCALCITONIN SERPL-MCNC: 0.13 NG/ML (ref 0–0.08)
RBC # BLD AUTO: 4.18 M/UL (ref 3.8–5.8)
RSV RNA NPH QL NAA+NON-PROBE: NOT DETECTED
RV+EV RNA NPH QL NAA+NON-PROBE: NOT DETECTED
SARS-COV-2 RNA NPH QL NAA+NON-PROBE: DETECTED
SPECIMEN DESCRIPTION: ABNORMAL
WBC OTHER # BLD: 4.7 K/UL (ref 4.5–11.5)

## 2024-08-06 PROCEDURE — 0202U NFCT DS 22 TRGT SARS-COV-2: CPT

## 2024-08-06 PROCEDURE — 82962 GLUCOSE BLOOD TEST: CPT

## 2024-08-06 PROCEDURE — 84145 PROCALCITONIN (PCT): CPT

## 2024-08-06 PROCEDURE — 85652 RBC SED RATE AUTOMATED: CPT

## 2024-08-06 PROCEDURE — 85025 COMPLETE CBC W/AUTO DIFF WBC: CPT

## 2024-08-06 PROCEDURE — 99222 1ST HOSP IP/OBS MODERATE 55: CPT | Performed by: STUDENT IN AN ORGANIZED HEALTH CARE EDUCATION/TRAINING PROGRAM

## 2024-08-06 PROCEDURE — 87075 CULTR BACTERIA EXCEPT BLOOD: CPT

## 2024-08-06 PROCEDURE — 87205 SMEAR GRAM STAIN: CPT

## 2024-08-06 PROCEDURE — 82330 ASSAY OF CALCIUM: CPT

## 2024-08-06 PROCEDURE — 99223 1ST HOSP IP/OBS HIGH 75: CPT | Performed by: INTERNAL MEDICINE

## 2024-08-06 PROCEDURE — 36415 COLL VENOUS BLD VENIPUNCTURE: CPT

## 2024-08-06 PROCEDURE — 2500000003 HC RX 250 WO HCPCS

## 2024-08-06 PROCEDURE — 6370000000 HC RX 637 (ALT 250 FOR IP)

## 2024-08-06 PROCEDURE — 87040 BLOOD CULTURE FOR BACTERIA: CPT

## 2024-08-06 PROCEDURE — 1200000000 HC SEMI PRIVATE

## 2024-08-06 PROCEDURE — 2580000003 HC RX 258

## 2024-08-06 PROCEDURE — 87070 CULTURE OTHR SPECIMN AEROBIC: CPT

## 2024-08-06 PROCEDURE — 86140 C-REACTIVE PROTEIN: CPT

## 2024-08-06 RX ORDER — SODIUM CHLORIDE 0.9 % (FLUSH) 0.9 %
5-40 SYRINGE (ML) INJECTION PRN
Status: DISCONTINUED | OUTPATIENT
Start: 2024-08-06 | End: 2024-08-09 | Stop reason: HOSPADM

## 2024-08-06 RX ORDER — ENOXAPARIN SODIUM 100 MG/ML
40 INJECTION SUBCUTANEOUS 2 TIMES DAILY
Status: DISCONTINUED | OUTPATIENT
Start: 2024-08-06 | End: 2024-08-09 | Stop reason: HOSPADM

## 2024-08-06 RX ORDER — GUAIFENESIN 400 MG/1
400 TABLET ORAL 3 TIMES DAILY
Status: DISCONTINUED | OUTPATIENT
Start: 2024-08-06 | End: 2024-08-09 | Stop reason: HOSPADM

## 2024-08-06 RX ORDER — POTASSIUM CHLORIDE 7.45 MG/ML
10 INJECTION INTRAVENOUS PRN
Status: DISCONTINUED | OUTPATIENT
Start: 2024-08-06 | End: 2024-08-09 | Stop reason: HOSPADM

## 2024-08-06 RX ORDER — ACETAMINOPHEN 325 MG/1
650 TABLET ORAL EVERY 6 HOURS PRN
Status: DISCONTINUED | OUTPATIENT
Start: 2024-08-06 | End: 2024-08-09 | Stop reason: HOSPADM

## 2024-08-06 RX ORDER — ACETAMINOPHEN 650 MG/1
650 SUPPOSITORY RECTAL EVERY 6 HOURS PRN
Status: DISCONTINUED | OUTPATIENT
Start: 2024-08-06 | End: 2024-08-09 | Stop reason: HOSPADM

## 2024-08-06 RX ORDER — ONDANSETRON 4 MG/1
4 TABLET, ORALLY DISINTEGRATING ORAL EVERY 8 HOURS PRN
Status: DISCONTINUED | OUTPATIENT
Start: 2024-08-06 | End: 2024-08-09 | Stop reason: HOSPADM

## 2024-08-06 RX ORDER — ASPIRIN 81 MG/1
81 TABLET ORAL DAILY
COMMUNITY

## 2024-08-06 RX ORDER — ENOXAPARIN SODIUM 100 MG/ML
40 INJECTION SUBCUTANEOUS DAILY
Status: DISCONTINUED | OUTPATIENT
Start: 2024-08-06 | End: 2024-08-06 | Stop reason: DRUGHIGH

## 2024-08-06 RX ORDER — MAGNESIUM SULFATE IN WATER 40 MG/ML
2000 INJECTION, SOLUTION INTRAVENOUS PRN
Status: DISCONTINUED | OUTPATIENT
Start: 2024-08-06 | End: 2024-08-09 | Stop reason: HOSPADM

## 2024-08-06 RX ORDER — POTASSIUM CHLORIDE 20 MEQ/1
40 TABLET, EXTENDED RELEASE ORAL PRN
Status: DISCONTINUED | OUTPATIENT
Start: 2024-08-06 | End: 2024-08-09 | Stop reason: HOSPADM

## 2024-08-06 RX ORDER — SODIUM CHLORIDE 9 MG/ML
INJECTION, SOLUTION INTRAVENOUS PRN
Status: DISCONTINUED | OUTPATIENT
Start: 2024-08-06 | End: 2024-08-09 | Stop reason: HOSPADM

## 2024-08-06 RX ORDER — ONDANSETRON 2 MG/ML
4 INJECTION INTRAMUSCULAR; INTRAVENOUS EVERY 6 HOURS PRN
Status: DISCONTINUED | OUTPATIENT
Start: 2024-08-06 | End: 2024-08-09 | Stop reason: HOSPADM

## 2024-08-06 RX ORDER — PANTOPRAZOLE SODIUM 40 MG/1
40 TABLET, DELAYED RELEASE ORAL
Status: DISCONTINUED | OUTPATIENT
Start: 2024-08-07 | End: 2024-08-09 | Stop reason: HOSPADM

## 2024-08-06 RX ORDER — SODIUM CHLORIDE 0.9 % (FLUSH) 0.9 %
5-40 SYRINGE (ML) INJECTION EVERY 12 HOURS SCHEDULED
Status: DISCONTINUED | OUTPATIENT
Start: 2024-08-06 | End: 2024-08-09 | Stop reason: HOSPADM

## 2024-08-06 RX ORDER — POLYETHYLENE GLYCOL 3350 17 G/17G
17 POWDER, FOR SOLUTION ORAL DAILY PRN
Status: DISCONTINUED | OUTPATIENT
Start: 2024-08-06 | End: 2024-08-09 | Stop reason: HOSPADM

## 2024-08-06 RX ORDER — ASPIRIN 81 MG/1
81 TABLET, CHEWABLE ORAL DAILY
Status: DISCONTINUED | OUTPATIENT
Start: 2024-08-06 | End: 2024-08-09 | Stop reason: HOSPADM

## 2024-08-06 RX ADMIN — SODIUM CHLORIDE, PRESERVATIVE FREE 10 ML: 5 INJECTION INTRAVENOUS at 20:22

## 2024-08-06 RX ADMIN — GUAIFENESIN 400 MG: 400 TABLET ORAL at 17:07

## 2024-08-06 RX ADMIN — DOXYCYCLINE 100 MG: 100 INJECTION, POWDER, LYOPHILIZED, FOR SOLUTION INTRAVENOUS at 07:47

## 2024-08-06 RX ADMIN — DOXYCYCLINE 100 MG: 100 INJECTION, POWDER, LYOPHILIZED, FOR SOLUTION INTRAVENOUS at 17:10

## 2024-08-06 RX ADMIN — GUAIFENESIN 400 MG: 400 TABLET ORAL at 20:22

## 2024-08-06 ASSESSMENT — PAIN SCALES - GENERAL: PAINLEVEL_OUTOF10: 2

## 2024-08-06 ASSESSMENT — PAIN DESCRIPTION - LOCATION: LOCATION: ABDOMEN

## 2024-08-06 ASSESSMENT — PAIN DESCRIPTION - ORIENTATION: ORIENTATION: MID

## 2024-08-06 ASSESSMENT — PAIN DESCRIPTION - DESCRIPTORS: DESCRIPTORS: ACHING

## 2024-08-06 NOTE — PROGRESS NOTES
Vascular:    Sequence of events noted    Patient, has venous insufficiency, venous stasis ulcer aggravated by underlying lymphedema    For now we will dress the wound daily with Aquacel Ag, Kerlix and 4 - 6 inches Ace bandages, leg elevation and was discharged will follow as an outpatient    Discussed with the patient, I will evaluate the wound tomorrow    Neo Maier MD

## 2024-08-06 NOTE — ED NOTES
One set of blood cultures obtained after 2 attempts. Unable to obtain second set of cultures. Inpatient team notified and order received to consult IV team to assist in obtaining second set of blood cultures.

## 2024-08-06 NOTE — CONSULTS
General Surgery   Consult Note      Patient's Name/Date of Birth: Tori Mathew / 1950    Date: August 5, 2024     PCP: Pankaj Corbett MD     Reason for consult:: Chronic reducible ventral hernia     HPI:   Tori Mathew is a 74 y.o. male who presents for evaluation of abdominal pain.  Patient has been having abdominal pain for the past 2 days.  He was prescribed amoxicillin by Dr. Bc Farris after being seen for his chronic venous stasis wounds.  Patient said that he currently has no abdominal pain.  He has no nausea or vomiting.  He did pass by [employer] on his way to the hospital and ate a burger which went down just fine.  He said he has not had a bowel movement in 2 days, however, he is passing flatus.  He denies any fevers or chills.  He denies any other issues.  Vital signs are stable.  Labs unremarkable.  CTAP with ventral hernia that is unchanged from 2017.  No signs of obstruction on CT scan.  Patient Active Problem List   Diagnosis    Normocytic anemia    Prediabetes    Peripheral venous insufficiency    Morbid obesity with BMI of 45.0-49.9, adult (HCC)    Hyperlipidemia    Lymphedema of both lower extremities    Dermatophytosis    Onychomycosis    Decreased dorsalis pedis pulse    Chronic venous insufficiency    Lower limb ulcer, calf, right, with fat layer exposed (HCC)    Lower limb ulcer, calf, left, with fat layer exposed (HCC)    JEANNE positive       Allergies   Allergen Reactions    Metformin And Related Diarrhea       Past Medical History:   Diagnosis Date    JEANNE positive 05/13/2024    Ratio 1:320    Ankle ulcer, left, with fat layer exposed (HCC) 11/13/2023    Cellulitis of right lower leg     Chronic venous insufficiency 01/23/2023    Decreased dorsalis pedis pulse 01/23/2023    Dermatophytosis 01/23/2023    Diabetes mellitus (HCC)     Hypokalemia 07/31/2023    Leg swelling 01/23/2023    Lower limb ulcer, calf, left, with fat layer exposed (HCC) 10/23/2023    Lower

## 2024-08-06 NOTE — ED NOTES
Patient was seen by prior ED physician.  Patient was brought in because of abdominal pain.  Patient was seen by Dr. Simsm and general surgery did evaluate the patient.  Patient had does have known hernia.  He reportedly was complaining of abdominal pain was recently started on antibiotics for wounds to his lower extremities he has been seen by wound care.  Patient from surgical standpoint is clear.  Patient was still awaiting urinalysis as well as repeat troponin and troponin rechecked in same range.  Patient attempted to ambulate.  Patient has walker as well as cane was not able to ambulate per staff.  Patient lives alone at home.  He does have home care at Wednesdays and Fridays per his report.  With him being weak and unable to ambulate plan will be to admit internal medicine was consulted.     Marcos Castaneda MD  08/05/24 5086

## 2024-08-06 NOTE — PROGRESS NOTES
DVT Prophylaxis Adjustment Policy (DVT Prophylaxis)     This patient is on DVT Prophylaxis medication that requires a dose adjustment      Date Body Weight IBW  Adjusted BW SCr  CrCl Dialysis status   8/6/2024 (!) 154.2 kg (340 lb) Ideal body weight: 82.2 kg (181 lb 3.5 oz)  Adjusted ideal body weight: 111 kg (244 lb 11.7 oz) Serum creatinine: 1 mg/dL 08/05/24 1506  Estimated creatinine clearance: 102 mL/min N/a       Pharmacy has dose-adjusted the DVT Prophylaxis regimen to match   the recommendations from the following table        Ordered Medication:Lovenox 40mg daily    Order Changed/converted to: Lovenox 40mg BID      These changes were made per protocol according to the [facility] Pharmacist   Review for Appropriate Use and Automatic Dose Adjustments of   Subcutaneous Anticoagulants Policy     *Please note this dose may need readjusted if patient's condition changes.    Please contact pharmacy with any questions regarding these changes.    Faisal Mejia RPH  8/6/2024  2:25 AM

## 2024-08-06 NOTE — PROGRESS NOTES
[facility]  Internal Medicine Residency Program  Progress Note - House Team 2    Patient:  Tori Mathew 74 y.o. male MRN: [id number]     Date of Service: 8/7/2024     CC: had concerns including Abdominal Pain (Pt on antibiotics for wound on left leg and now having middle abdominal pain. Denies nausea/vomiting, has some diarrhea).     Overnight events: The patient had no significant events overnight.     Subjective     Patient was seen at bedside this morning. The patient has a dry cough that is improving. He does complain of 3 episodes of diarrhea over night. Patient denies any fever, chills, nausea, vomiting, abdominal pain, chest pain, or shortness of breath.     Objective     Physical Exam:  Vitals: /64   Pulse 73   Temp 97.6 °F (36.4 °C) (Temporal)   Resp 18   Ht 1.88 m (6' 2.02\")   Wt (!) 154.2 kg (340 lb)   SpO2 94%   BMI 43.63 kg/m²     I & O - 24hr: I/O this shift:  In: 480 [P.O.:480]  Out: -    General Appearance: alert, appears stated age, and cooperative  HEENT:  Head: Normal, normocephalic, atraumatic.  Neck: no adenopathy, no carotid bruit, no JVD, supple, symmetrical, trachea midline, and thyroid not enlarged, symmetric, no tenderness/mass/nodules  Lung: clear to auscultation bilaterally  Heart: regular rate and rhythm, S1, S2 normal, no murmur, click, rub or gallop  Abdomen: soft, non-tender; bowel sounds normal; no masses,  no organomegaly  Extremities:   Bilateral lower extremities ace wrapped with heel protectors  Musculokeletal: No joint swelling, no muscle tenderness. ROM normal in all joints of extremities.   Neurologic: Mental status: Alert, oriented, thought content appropriate  Subject  Pertinent Labs & Imaging Studies   thom  CBC:   Lab Results   Component Value Date/Time    WBC 5.0 08/07/2024 05:34 AM    RBC 4.12 08/07/2024 05:34 AM    HGB 10.8 08/07/2024 05:34 AM    HCT 34.9 08/07/2024 05:34 AM    MCV 84.7 08/07/2024 05:34 AM    MCH 26.2 08/07/2024 05:34  deficiency.    New weakness 2 to covid--   +covid-- new cough, Gi distress/diarrhea, ?exac weakness  Seems alert oriented, ?fluctuating \"not sounding like himself\"      Inc procal,  inc ESR/crp  MANY Polymorphonuclear leukocytes       NO EPITHELIAL CELLS     MANY GRAM NEGATIVE RODS     MANY GRAM POSITIVE RODS     MANY GRAM POSITIVE COCCI IN PAIRS Abnormal      Continue doxy    Per kollipara- chronic leg wounds over 9 months  \"venous insufficiency, venous stasis ulcer aggravated by underlying lymphedema   For now we will dress the wound daily with Aquacel Ag, Kerlix and 4 - 6 inches Ace bandages, leg elevation and was discharged will follow as an outpatient\"

## 2024-08-06 NOTE — PROGRESS NOTES
4 Eyes Skin Assessment     NAME:  Tori Mathew  YOB: 1950  MEDICAL RECORD NUMBER:  [id number]    The patient is being assessed for  Admission    I agree that at least one RN has performed a thorough Head to Toe Skin Assessment on the patient. ALL assessment sites listed below have been assessed.      Areas assessed by both nurses:    Head, Face, Ears, Shoulders, Back, Chest, Arms, Elbows, Hands, Sacrum. Buttock, Coccyx, Ischium, and Legs. Feet and Heels        Does the Patient have a Wound? Yes wound(s) were present on assessment. LDA wound assessment was Initiated and completed by RN       Adam Prevention initiated by RN: Yes  Wound Care Orders initiated by RN: Yes    Pressure Injury (Stage 3,4, Unstageable, DTI, NWPT, and Complex wounds) if present, place Wound referral order by RN under : No    New Ostomies, if present place, Ostomy referral order under : No     Nurse 1 eSignature: Electronically signed by Cheri Castillo RN on 8/6/24 at 5:20 PM EDT    **SHARE this note so that the co-signing nurse can place an eSignature**    Nurse 2 eSignature: Electronically signed by Linda Fuller RN on 8/6/24 at 5:56 PM EDT

## 2024-08-06 NOTE — CONSULTS
Highest education level: Not on file   Occupational History    Not on file   Tobacco Use    Smoking status: Never    Smokeless tobacco: Never   Vaping Use    Vaping Use: Never used   Substance and Sexual Activity    Alcohol use: No     Comment: on special occassions    Drug use: No    Sexual activity: Not Currently   Other Topics Concern    Not on file   Social History Narrative    Not on file     Social Determinants of Health     Financial Resource Strain: High Risk (2/19/2020)    Overall Financial Resource Strain (CARDIA)     Difficulty of Paying Living Expenses: Hard   Food Insecurity: No Food Insecurity (2/19/2020)    Hunger Vital Sign     Worried About Running Out of Food in the Last Year: Never true     Ran Out of Food in the Last Year: Never true   Transportation Needs: Not on file   Physical Activity: Not on file   Stress: Not on file   Social Connections: Not on file   Intimate Partner Violence: Not on file   Housing Stability: Not on file        Family History   Problem Relation Age of Onset    Heart Disease Mother     Heart Disease Father        PHYSICAL EXAM:    /76   Pulse 78   Temp 97.4 °F (36.3 °C) (Oral)   Resp 18   Wt (!) 154.2 kg (340 lb)   SpO2 98%   BMI 43.65 kg/m²   CONSTITUTIONAL:  awake, alert, cooperative, no apparent distress, and appears stated age  NEURO:  Normal  EYES:  lids and lashes normal, sclera clear and conjunctiva normal  ENT:  normocepalic, without obvious abnormality  NECK:  supple, symmetrical, trachea midline,  LUNGS:  no increased work of breathing  CARDIOVASCULAR:  regular rate and rhythm  ABDOMEN:  soft, non-distended, non-tender,  SKIN: Chronic wound to posterior left calf.    EXTREMITIES:    R UE Swelling absent   Incisions absent         5/5 Strength, Intact Sensation    L UE Swelling absent   Incisions absent         5/5 Strength, Intact Sensation    R LE Edema present     Incisions absent    Varicose veins absent    Wounds absent    Normal Capillary  Refill   5/5 Strength, Intact Sensation    L LE Edema present     Incisions absent    Varicose veins absent    Wounds present    Normal Capillary Refill   5/5 Strength, Intact Sensation    R brachial  L brachial    R radial  L radial    R femoral  L femoral    R popliteal  L popliteal    R posterior tibial  L posterior tibial    R dorsalis pedis 2+ L dorsalis pedis 2+       LABS:    Lab Results   Component Value Date    WBC 4.7 08/06/2024    HGB 11.0 (L) 08/06/2024    HCT 35.2 (L) 08/06/2024     08/06/2024    PROTIME 14.6 (H) 08/05/2024    INR 1.3 08/05/2024    K 3.7 08/05/2024    BUN 15 08/05/2024    CREATININE 1.0 08/05/2024       RADIOLOGY:  CT ABDOMEN PELVIS W IV CONTRAST Additional Contrast? None    Result Date: 8/5/2024  EXAMINATION: CT OF THE ABDOMEN AND PELVIS WITH CONTRAST 8/5/2024 5:15 pm TECHNIQUE: CT of the abdomen and pelvis was performed with the administration of intravenous contrast. Multiplanar reformatted images are provided for review. Automated exposure control, iterative reconstruction, and/or weight based adjustment of the mA/kV was utilized to reduce the radiation dose to as low as reasonably achievable. COMPARISON: None. HISTORY: ORDERING SYSTEM PROVIDED HISTORY: abd pain TECHNOLOGIST PROVIDED HISTORY: Additional Contrast?->None Reason for exam:->abd pain Decision Support Exception - unselect if not a suspected or confirmed emergency medical condition->Emergency Medical Condition (MA) What reading provider will be dictating this exam?->CRC FINDINGS: Lower Chest:   Visualized lungs are normal. Organs:   The liver, spleen, adrenal glands, pancreas, kidneys and gallbladder are normal. GI/Bowel: Colonic fecal retention.  Right paramidline hernia containing a normal appearing loops of small bowel.  No evidence for small bowel obstruction.  Adjacent fat containing ventral wall hernia is also noted. Pelvis:   Prostatomegaly.  Normal urinary bladder. Peritoneum/Retroperitoneum:   No free fluid

## 2024-08-06 NOTE — H&P
and no joint deformity   Labs and Imaging Studies   Basic Labs  Recent Labs     08/05/24  1506      K 3.7      CO2 22   BUN 15   CREATININE 1.0   GLUCOSE 89   CALCIUM 8.2*       Recent Labs     08/05/24  1506   WBC 4.7   RBC 4.36   HGB 11.7*   HCT 37.0   MCV 84.9   MCH 26.8   MCHC 31.6*   RDW 14.7      MPV 9.3         Imaging Studies:  CT ABDOMEN PELVIS W IV CONTRAST Additional Contrast? None   Final Result   1. Right paramidline hernia containing a normal appearing loops of small   bowel. No evidence for small bowel obstruction. Adjacent fat containing   ventral wall hernia is also noted.   2. Colonic fecal retention.   3. Prostatomegaly.              EKG: normal sinus rhythm, sinus tachycardia, unchanged from previous tracings.    Resident's Assessment and Plan     Assessment and Plan:    This is a 74-year-old male patient with a past medical history of lymphedema, obesity and prediabetes who was admitted to the hospital for evaluation and retreatment of purulent leg wound.  =========================    # Purulent leg wound  -The patient had been on amoxicillin.  -Both of his leg were wrapped at the time of encounter  -Ordered blood culture, wound culture  -ID might be considered by the day team  -Started him on doxycycline  -Has mild transaminitis probably secondary to amoxicillin that he used to take before admission.    # Abdominal pain  -Was assessed by general surgery, and no sign of obstruction was found.  -CT showed paramidline hernia the right side    PT/OT evaluation:   DVT prophylaxis/ GI prophylaxis: Heparin      Godfrey Connelly MD, PGY-2  Attending physician:       [facility]  Internal Medicine Residency / House Medicine Service--addendum to resident note  Attending Physician Statement:  Jono Cotter M.D., F.A.C.P.  Hospital charts reviewed, including other providers notes, relevant labs and imaging. Coordinating care with residents, other providers  and/or counseling patient    I have seen patient/discussed the history and PE with the resident, and I agree with workup/plan and orders as documented by the resident.  (billing based on complexity of Medical decision making)  My Assessment as follows:      Intially came to ER-- abd pain, seen by Gen  surg- hernia stable  Complaints of dry cough  Nausea - new  cough-- doubt related to amoxil     Inc bmi  Chronic lymphedema b/l;  \"lymphedema therapy in the past, with a lymphedema pump at home that the patient used to use on a regular basis, broke down,, is beyond repair and patient was recommended new lymphedema pump for which he was given the prescription \"    Dry necrotic wound- ?acute infection    History of Wound Context:  The patient has had a wound of both calves, minimal skin on the right side, multiple ulcers on the left calf, fat layer exposed which was first noted approximately September 2023      Chronic wound care  Couldn't tolerate PO abx  Wbc 4.7  No fevers   Order procal/crp/sed rate  Wound care +[facility] vascular if acute vs chronic    LLE wound taken down. Per nurse ER  \"Purulent drainage\" noted. - cx pending  Off abx    On CT- Colonic fecal retention. - -loss of stool of stool  Overflow incontinence    Weakness, ?difficulties ambulation-   Inc bmi 43  PT/OT/HENRY

## 2024-08-06 NOTE — ED NOTES
Unable to ambulate even with two assist. Tried to get patient from a sitting to a standing position with three staff members for over 25 minutes. Patient never even attempted to put weight on legs.

## 2024-08-07 PROBLEM — E55.9 VITAMIN D DEFICIENCY: Status: ACTIVE | Noted: 2024-08-07

## 2024-08-07 PROBLEM — E66.01 MORBID OBESITY WITH BMI OF 45.0-49.9, ADULT (HCC): Status: RESOLVED | Noted: 2021-02-12 | Resolved: 2024-08-07

## 2024-08-07 PROBLEM — U07.1 COVID-19: Status: ACTIVE | Noted: 2024-08-07

## 2024-08-07 PROBLEM — L03.90 CELLULITIS: Status: ACTIVE | Noted: 2024-08-07

## 2024-08-07 PROBLEM — E83.51 HYPOCALCEMIA: Status: ACTIVE | Noted: 2024-08-07

## 2024-08-07 LAB
25(OH)D3 SERPL-MCNC: <6 NG/ML (ref 30–100)
ANION GAP SERPL CALCULATED.3IONS-SCNC: 12 MMOL/L (ref 7–16)
BASOPHILS # BLD: 0.01 K/UL (ref 0–0.2)
BASOPHILS NFR BLD: 0 % (ref 0–2)
BUN SERPL-MCNC: 10 MG/DL (ref 6–23)
CA-I BLD-SCNC: 1.12 MMOL/L (ref 1.15–1.33)
CALCIUM SERPL-MCNC: 7.7 MG/DL (ref 8.6–10.2)
CHLORIDE SERPL-SCNC: 102 MMOL/L (ref 98–107)
CO2 SERPL-SCNC: 20 MMOL/L (ref 22–29)
CREAT SERPL-MCNC: 0.8 MG/DL (ref 0.7–1.2)
EOSINOPHIL # BLD: 0.03 K/UL (ref 0.05–0.5)
EOSINOPHILS RELATIVE PERCENT: 1 % (ref 0–6)
ERYTHROCYTE [DISTWIDTH] IN BLOOD BY AUTOMATED COUNT: 15.1 % (ref 11.5–15)
GFR, ESTIMATED: >90 ML/MIN/1.73M2
GLUCOSE SERPL-MCNC: 103 MG/DL (ref 74–99)
HCT VFR BLD AUTO: 34.9 % (ref 37–54)
HGB BLD-MCNC: 10.8 G/DL (ref 12.5–16.5)
IMM GRANULOCYTES # BLD AUTO: 0.03 K/UL (ref 0–0.58)
IMM GRANULOCYTES NFR BLD: 1 % (ref 0–5)
LYMPHOCYTES NFR BLD: 0.74 K/UL (ref 1.5–4)
LYMPHOCYTES RELATIVE PERCENT: 15 % (ref 20–42)
MAGNESIUM SERPL-MCNC: 1.7 MG/DL (ref 1.6–2.6)
MCH RBC QN AUTO: 26.2 PG (ref 26–35)
MCHC RBC AUTO-ENTMCNC: 30.9 G/DL (ref 32–34.5)
MCV RBC AUTO: 84.7 FL (ref 80–99.9)
MICROORGANISM SPEC CULT: ABNORMAL
MICROORGANISM/AGENT SPEC: ABNORMAL
MONOCYTES NFR BLD: 0.43 K/UL (ref 0.1–0.95)
MONOCYTES NFR BLD: 9 % (ref 2–12)
NEUTROPHILS NFR BLD: 75 % (ref 43–80)
NEUTS SEG NFR BLD: 3.8 K/UL (ref 1.8–7.3)
PHOSPHATE SERPL-MCNC: 2.6 MG/DL (ref 2.5–4.5)
PLATELET # BLD AUTO: 240 K/UL (ref 130–450)
PMV BLD AUTO: 9.2 FL (ref 7–12)
POTASSIUM SERPL-SCNC: 3.4 MMOL/L (ref 3.5–5)
RBC # BLD AUTO: 4.12 M/UL (ref 3.8–5.8)
SERVICE CMNT-IMP: ABNORMAL
SODIUM SERPL-SCNC: 134 MMOL/L (ref 132–146)
SPECIMEN DESCRIPTION: ABNORMAL
WBC OTHER # BLD: 5 K/UL (ref 4.5–11.5)

## 2024-08-07 PROCEDURE — 82330 ASSAY OF CALCIUM: CPT

## 2024-08-07 PROCEDURE — 6360000002 HC RX W HCPCS

## 2024-08-07 PROCEDURE — 2580000003 HC RX 258

## 2024-08-07 PROCEDURE — 1200000000 HC SEMI PRIVATE

## 2024-08-07 PROCEDURE — 97535 SELF CARE MNGMENT TRAINING: CPT

## 2024-08-07 PROCEDURE — 36415 COLL VENOUS BLD VENIPUNCTURE: CPT

## 2024-08-07 PROCEDURE — 82306 VITAMIN D 25 HYDROXY: CPT

## 2024-08-07 PROCEDURE — 6370000000 HC RX 637 (ALT 250 FOR IP)

## 2024-08-07 PROCEDURE — 97161 PT EVAL LOW COMPLEX 20 MIN: CPT

## 2024-08-07 PROCEDURE — 97530 THERAPEUTIC ACTIVITIES: CPT

## 2024-08-07 PROCEDURE — 84100 ASSAY OF PHOSPHORUS: CPT

## 2024-08-07 PROCEDURE — 85025 COMPLETE CBC W/AUTO DIFF WBC: CPT

## 2024-08-07 PROCEDURE — 97165 OT EVAL LOW COMPLEX 30 MIN: CPT

## 2024-08-07 PROCEDURE — 83735 ASSAY OF MAGNESIUM: CPT

## 2024-08-07 PROCEDURE — 99232 SBSQ HOSP IP/OBS MODERATE 35: CPT | Performed by: INTERNAL MEDICINE

## 2024-08-07 PROCEDURE — 2500000003 HC RX 250 WO HCPCS

## 2024-08-07 PROCEDURE — 80048 BASIC METABOLIC PNL TOTAL CA: CPT

## 2024-08-07 RX ORDER — VITAMIN B COMPLEX
2000 TABLET ORAL DAILY
Status: DISCONTINUED | OUTPATIENT
Start: 2024-08-07 | End: 2024-08-09 | Stop reason: HOSPADM

## 2024-08-07 RX ORDER — POTASSIUM CHLORIDE 20 MEQ/1
40 TABLET, EXTENDED RELEASE ORAL ONCE
Status: COMPLETED | OUTPATIENT
Start: 2024-08-07 | End: 2024-08-07

## 2024-08-07 RX ORDER — ERGOCALCIFEROL 1.25 MG/1
50000 CAPSULE ORAL WEEKLY
Status: DISCONTINUED | OUTPATIENT
Start: 2024-08-07 | End: 2024-08-09 | Stop reason: HOSPADM

## 2024-08-07 RX ORDER — MAGNESIUM SULFATE IN WATER 40 MG/ML
2000 INJECTION, SOLUTION INTRAVENOUS ONCE
Status: COMPLETED | OUTPATIENT
Start: 2024-08-07 | End: 2024-08-07

## 2024-08-07 RX ADMIN — SODIUM CHLORIDE, PRESERVATIVE FREE 10 ML: 5 INJECTION INTRAVENOUS at 20:09

## 2024-08-07 RX ADMIN — ONDANSETRON 4 MG: 4 TABLET, ORALLY DISINTEGRATING ORAL at 02:33

## 2024-08-07 RX ADMIN — ONDANSETRON 4 MG: 4 TABLET, ORALLY DISINTEGRATING ORAL at 20:08

## 2024-08-07 RX ADMIN — Medication 2000 UNITS: at 12:39

## 2024-08-07 RX ADMIN — ENOXAPARIN SODIUM 40 MG: 100 INJECTION SUBCUTANEOUS at 08:33

## 2024-08-07 RX ADMIN — SODIUM CHLORIDE, PRESERVATIVE FREE 10 ML: 5 INJECTION INTRAVENOUS at 08:34

## 2024-08-07 RX ADMIN — MAGNESIUM SULFATE HEPTAHYDRATE 2000 MG: 40 INJECTION, SOLUTION INTRAVENOUS at 12:39

## 2024-08-07 RX ADMIN — ACETAMINOPHEN 650 MG: 325 TABLET ORAL at 20:08

## 2024-08-07 RX ADMIN — GUAIFENESIN 400 MG: 400 TABLET ORAL at 20:08

## 2024-08-07 RX ADMIN — DOXYCYCLINE 100 MG: 100 INJECTION, POWDER, LYOPHILIZED, FOR SOLUTION INTRAVENOUS at 05:25

## 2024-08-07 RX ADMIN — DOXYCYCLINE 100 MG: 100 INJECTION, POWDER, LYOPHILIZED, FOR SOLUTION INTRAVENOUS at 18:06

## 2024-08-07 RX ADMIN — GUAIFENESIN 400 MG: 400 TABLET ORAL at 08:33

## 2024-08-07 RX ADMIN — ACETAMINOPHEN 650 MG: 325 TABLET ORAL at 08:33

## 2024-08-07 RX ADMIN — PANTOPRAZOLE SODIUM 40 MG: 40 TABLET, DELAYED RELEASE ORAL at 05:25

## 2024-08-07 RX ADMIN — ASPIRIN 81 MG 81 MG: 81 TABLET ORAL at 08:33

## 2024-08-07 RX ADMIN — ERGOCALCIFEROL 50000 UNITS: 1.25 CAPSULE ORAL at 12:39

## 2024-08-07 RX ADMIN — GUAIFENESIN 400 MG: 400 TABLET ORAL at 15:18

## 2024-08-07 RX ADMIN — ACETAMINOPHEN 650 MG: 325 TABLET ORAL at 02:33

## 2024-08-07 RX ADMIN — CALCIUM GLUCONATE 2000 MG: 98 INJECTION, SOLUTION INTRAVENOUS at 08:43

## 2024-08-07 RX ADMIN — POTASSIUM CHLORIDE 40 MEQ: 1500 TABLET, EXTENDED RELEASE ORAL at 08:33

## 2024-08-07 ASSESSMENT — PAIN SCALES - GENERAL: PAINLEVEL_OUTOF10: 4

## 2024-08-07 NOTE — PROGRESS NOTES
Physical Therapy Initial Evaluation    Name: Tori Mathew  : 1950  MRN: [id number]      Date of Service: 2024    Evaluating PT:  Hardik Chun, PT [id number]    Referring provider/PT Order:  PT Eval and Treat   24  PT eval and treat  Start:  24,   End:  24,   ONE TIME,   Standing Count:  1 Occurrences,   R         Yunior Hernandez DO     Room #:  8401/8401-B  Diagnosis:  Weakness [R53.1]  General weakness [R53.1]  Abdominal pain, unspecified abdominal location [R10.9]  PMHx/PSHx:     has a past medical history of JEANNE positive, Ankle ulcer, left, with fat layer exposed (HCC), Cellulitis of right lower leg, Chronic venous insufficiency, Decreased dorsalis pedis pulse, Dermatophytosis, Diabetes mellitus (HCC), Hypokalemia, Leg swelling, Lower limb ulcer, calf, left, with fat layer exposed (HCC), Lower limb ulcer, calf, right, limited to breakdown of skin (HCC), Lower limb ulcer, calf, right, with fat layer exposed (HCC), Lymphedema, Lymphedema of both lower extremities, Obesity, Class III, BMI 40-49.9 (morbid obesity) (HCC), Onychomycosis, Popliteal aneurysm (HCC), Skin ulcer of right calf, limited to breakdown of skin (HCC), and Tinea pedis of both feet.    has a past surgical history that includes fracture surgery ().     Procedure/Surgery:  none  Precautions:  Falls, Droplet plus/COVID-19, FWB (full weight bearing) skin integrity.   Equipment Needs: To be determined,    SUBJECTIVE:    Patient lives alone  in a ranch home  with 3 steps to enter with 1Rail  Bed is on 1 floor and bath is on 1 floor.  Patient ambulated independently, with wheeled walker  PTA. Equipment owned: Cane and Wheeled Walker,  equipment in bathroom in poor condition.     OBJECTIVE:   Initial Evaluation  Date: 24 Treatment Short Term/ Long Term   Goals   AM-PAC 6 Clicks      Was pt agreeable to Eval/treatment? Yes      Does pt have pain? Yes when moving BLE.      Bed Mobility  Rolling: Max    [x] Gait Training to improve gait mechanics, endurance and asses need for appropriate assistive device when appropriate.   [x] Stair Training in preparation for safe discharge home and/or into the community when appropriate  [] Positioning to prevent skin breakdown and contractures  [x] Safety and Education Training   [] Patient/Caregiver Education   [] HEP  [] Gait Team to be added to POC  [] Other     PT long term treatment goals are located in above grid    Frequency of treatments: 2-5x/week x 1-2 weeks.    Time in  1050  Time out  1130    Total Treatment Time  25 minutes     Evaluation Time includes thorough review of current medical information, gathering information on past medical history/social history and prior level of function, completion of standardized testing/informal observation of tasks, assessment of data and education on plan of care and goals.    CPT codes:  [x] Low Complexity PT evaluation 58364  [] Moderate Complexity PT evaluation 60058  [] High Complexity PT evaluation 25109  [] PT Re-evaluation 29868  [] Gait training 93052 - minutes  [] Manual therapy 86011  minutes  [x] Therapeutic activities 70775 25 minutes  [] Therapeutic exercises 00423 - minutes  [] Neuromuscular reeducation 97337 minutes     Hardik Chun, PT [id number]

## 2024-08-07 NOTE — PROGRESS NOTES
Static - max A, retro     Dynamic - unable   Standing: unable   Sitting:  Static: Supervision   Dynamic: Supervision   Standing: moderate assist    Activity Tolerance Poor   good   Visual/  Perceptual Pt reports wfl      Safety Fair -  Decreased insight   Good    Vitals Appears wfl            Hand Dominance ?   AROM (PROM) Strength Additional Info:  Goal: (PRN)   RUE  Decreased ROM   Pt stating he can't move his arms  deferred  fair    Improve overall RUE strength to 4+/5 for participation in functional tasks       LUE Decreased ROM  Pt stating he can't move his arms  deferred  fair     Improve overall LUE strength to 4+/5 for participation in functional tasks       Fine Motor Coordination:appears WFL  Hearing: WFL   Sensation:  No c/o numbness or tingling, light touch assessment appears WFL  Tone: WFL   Edema: BLEs    Comment: Cleared by RN to see pt. Upon arrival patient supine in bed and agreeable to OT session. At end of session, patient supine in bed with call light and phone within reach, all lines and tubes intact.  Overall patient demonstrated decreased independence and safety during completion of ADL/bed mobility tasks. Therapist facilitated ADL tasks, bed mobility to address safety awareness, implementation of fall prevention strategies, & functional engagement throughout daily activities. Pt would benefit from continued skilled OT to increase safety and independence with completion of ADL/IADL tasks for functional independence and quality of life.    Treatment: OT treatment provided this date includes:   ADL-  Instruction/training on safety and adapted techniques for completion of ADLs: Therapist facilitated & pt educated on activity modifications/adaptations to promote implementation of fall prevention strategies, EC/WS strategies, & safety awareness throughout ADLs.   Mobility-  Instruction/training on safety and improved independence with bed mobility   Sitting EOB x 5 minutes to improve  dynamic sitting balance and activity tolerance during ADLs.   Activity tolerance- Instruction/training on energy conservation/work simplification for completion of ADLs:.     Neuromuscular Reeducation to facilitate balance/righting reactions for increased function with ADLs tasks:    Cognitive retraining -  Cues for safety, sequencing, and problem solving    Skilled positioning/alignment-  Therapist facilitated proper positioning/alignment throughout session to maintain skin/joint integrity & proper body mechanics.    Pt appeared to have tolerated session and appears pleasant. Pt instructed on use of call light for assistance and fall prevention. Pt demo'ing fair understanding of education provided. Continue to educate.     Rehab Potential: fair for established goals     LTG: maximize independence with ADLs to return to PLOF    Patient and/or family were instructed on functional diagnosis, prognosis/goals and OT plan of care. Demonstrated fair understanding.     Eval Complexity: Low   History: Expanded chart review of medical records and additional review of physical, cognitive, or psychosocial history related to current functional performance  Exam: 3+ performance deficits  Assistance/Modification:  Mod/Max assistance or modifications required to perform tasks. May have comorbidities that affect occupational performance.    Evaluation time includes thorough review of current medical information, gathering information on past medical & social history & PLOF, completion of standardized testing, informal observation of tasks, consultation with other medical professions/disciplines, assessment of data & development of POC/goals.     Time In: 1050  Time Out: 1130  Total Treatment Time: 25 min    Min Units   OT Eval Low 26708  X     OT Eval Medium 19074      OT Eval High 06304      OT Re-Eval 57683       Therapeutic Ex 76212       Therapeutic Activities 24944  2 0    ADL/Self Care 76722  23  2   Orthotic Management 22877

## 2024-08-07 NOTE — PLAN OF CARE
Problem: Safety - Adult  Goal: Free from fall injury  8/6/2024 2009 by Daxa Meraz, RN  Outcome: Progressing  8/6/2024 1717 by Cheri Castillo, RN  Outcome: Progressing

## 2024-08-07 NOTE — PLAN OF CARE
Problem: Safety - Adult  Goal: Free from fall injury  8/7/2024 0935 by Cheri Castillo, RN  Outcome: Progressing     Problem: Pain  Goal: Verbalizes/displays adequate comfort level or baseline comfort level  8/7/2024 0935 by Cheri Castillo, RN  Outcome: Progressing     Problem: Skin/Tissue Integrity  Goal: Absence of new skin breakdown  Description: 1.  Monitor for areas of redness and/or skin breakdown  2.  Assess vascular access sites hourly  3.  Every 4-6 hours minimum:  Change oxygen saturation probe site  4.  Every 4-6 hours:  If on nasal continuous positive airway pressure, respiratory therapy assess nares and determine need for appliance change or resting period.  8/7/2024 0935 by Cheri Castillo, RN  Outcome: Progressing

## 2024-08-07 NOTE — HOME CARE
PT IS CURRENT WITH [facility] WILL NEED ACTIVE DIPAK ORDERS ON CHART AT TIME OF DC.    Sabrina Cota Lpn  Intake Liaison  [facility]

## 2024-08-07 NOTE — PROGRESS NOTES
LLE wound cleaned with NS opitcel kerlix and ace wrap.  Old dressing with serous foul smelling drainage. Pt foot back on Boot.

## 2024-08-07 NOTE — PROGRESS NOTES
Comprehensive Nutrition Assessment    Type and Reason for Visit:  Initial, Consult, Wound    Nutrition Recommendations/Plan:   Continue current diet. Recommend and start ONS: Gelatein 20 once daily to promote nutrient intake/wound healing.     Nutrition Assessment:    Pt. admitted for abdominal pain/nausea/weaknesss. Noted COVID19 infection and chronic LLE wound 2/2 to lymphedema; Wound care consult pending. PMHx of prediabetes, chronic b/l LE lymphedema,obesity. Will start ONS to promote wound healing and monitor.    Nutrition Related Findings:    A&Ox4, -I/O, soft abd, active BS, +1/+2 edema, mild hypokalemia/hyperglycemia, Wound Type: Wound Consult Pending (x1 LLE)       Current Nutrition Intake & Therapies:    Average Meal Intake: %  Average Supplements Intake: None Ordered  ADULT DIET; Regular    Anthropometric Measures:  Height: 188 cm (6' 2.02\")  Ideal Body Weight (IBW): 190 lbs (86 kg)       Current Body Weight: 154.2 kg (339 lb 15.2 oz) (8/06 No method), 178.9 % IBW. Weight Source: Not Specified  Current BMI (kg/m2): 43.6  Usual Body Weight: 154.2 kg (339 lb 15.2 oz) (2/5/24 lift scale)  % Weight Change (Calculated): 0  Weight Adjustment For: No Adjustment                 BMI Categories: Obese Class 3 (BMI 40.0 or greater)    Nutrition Diagnosis:   Increased nutrient needs related to increase demand for energy/nutrients as evidenced by wounds    Nutrition Interventions:   Food and/or Nutrient Delivery: Continue Current Diet, Start Oral Nutrition Supplement (start ONS: Gelatein 20 once daily)  Nutrition Education/Counseling: Education not indicated  Coordination of Nutrition Care: Continue to monitor while inpatient       Goals:     Goals: PO intake 75% or greater, by next RD assessment (to continue)       Nutrition Monitoring and Evaluation:   Behavioral-Environmental Outcomes: None Identified  Food/Nutrient Intake Outcomes: Food and Nutrient Intake, Supplement Intake  Physical Signs/Symptoms  Outcomes: Biochemical Data, Chewing or Swallowing, GI Status, Fluid Status or Edema, Nausea or Vomiting, Nutrition Focused Physical Findings, Skin, Weight    Discharge Planning:    Too soon to determine     Joaquin Chapa RD  Contact: ext [phone]

## 2024-08-08 PROBLEM — L97.201 VENOUS STASIS ULCER OF CALF LIMITED TO BREAKDOWN OF SKIN WITH VARICOSE VEINS (HCC): Status: ACTIVE | Noted: 2024-08-08

## 2024-08-08 PROBLEM — I83.002 VENOUS STASIS ULCER OF CALF LIMITED TO BREAKDOWN OF SKIN WITH VARICOSE VEINS (HCC): Status: ACTIVE | Noted: 2024-08-08

## 2024-08-08 LAB
ANION GAP SERPL CALCULATED.3IONS-SCNC: 11 MMOL/L (ref 7–16)
BASOPHILS # BLD: 0 K/UL (ref 0–0.2)
BASOPHILS NFR BLD: 0 % (ref 0–2)
BUN SERPL-MCNC: 10 MG/DL (ref 6–23)
CALCIUM SERPL-MCNC: 8.1 MG/DL (ref 8.6–10.2)
CHLORIDE SERPL-SCNC: 104 MMOL/L (ref 98–107)
CO2 SERPL-SCNC: 23 MMOL/L (ref 22–29)
CREAT SERPL-MCNC: 0.8 MG/DL (ref 0.7–1.2)
EKG ATRIAL RATE: 95 BPM
EKG P AXIS: 72 DEGREES
EKG P-R INTERVAL: 218 MS
EKG Q-T INTERVAL: 356 MS
EKG QRS DURATION: 98 MS
EKG QTC CALCULATION (BAZETT): 447 MS
EKG R AXIS: 30 DEGREES
EKG T AXIS: 59 DEGREES
EKG VENTRICULAR RATE: 95 BPM
EOSINOPHIL # BLD: 0.04 K/UL (ref 0.05–0.5)
EOSINOPHILS RELATIVE PERCENT: 1 % (ref 0–6)
ERYTHROCYTE [DISTWIDTH] IN BLOOD BY AUTOMATED COUNT: 14.9 % (ref 11.5–15)
GFR, ESTIMATED: >90 ML/MIN/1.73M2
GLUCOSE SERPL-MCNC: 99 MG/DL (ref 74–99)
HCT VFR BLD AUTO: 34.6 % (ref 37–54)
HGB BLD-MCNC: 10.6 G/DL (ref 12.5–16.5)
IMM GRANULOCYTES # BLD AUTO: 0.04 K/UL (ref 0–0.58)
IMM GRANULOCYTES NFR BLD: 1 % (ref 0–5)
LYMPHOCYTES NFR BLD: 0.85 K/UL (ref 1.5–4)
LYMPHOCYTES RELATIVE PERCENT: 18 % (ref 20–42)
MAGNESIUM SERPL-MCNC: 1.8 MG/DL (ref 1.6–2.6)
MCH RBC QN AUTO: 26 PG (ref 26–35)
MCHC RBC AUTO-ENTMCNC: 30.6 G/DL (ref 32–34.5)
MCV RBC AUTO: 85 FL (ref 80–99.9)
MONOCYTES NFR BLD: 0.42 K/UL (ref 0.1–0.95)
MONOCYTES NFR BLD: 9 % (ref 2–12)
NEUTROPHILS NFR BLD: 72 % (ref 43–80)
NEUTS SEG NFR BLD: 3.49 K/UL (ref 1.8–7.3)
PHOSPHATE SERPL-MCNC: 2.7 MG/DL (ref 2.5–4.5)
PLATELET # BLD AUTO: 269 K/UL (ref 130–450)
PMV BLD AUTO: 9.4 FL (ref 7–12)
POTASSIUM SERPL-SCNC: 3.7 MMOL/L (ref 3.5–5)
RBC # BLD AUTO: 4.07 M/UL (ref 3.8–5.8)
SODIUM SERPL-SCNC: 138 MMOL/L (ref 132–146)
WBC OTHER # BLD: 4.8 K/UL (ref 4.5–11.5)

## 2024-08-08 PROCEDURE — 6370000000 HC RX 637 (ALT 250 FOR IP)

## 2024-08-08 PROCEDURE — 2500000003 HC RX 250 WO HCPCS

## 2024-08-08 PROCEDURE — 36415 COLL VENOUS BLD VENIPUNCTURE: CPT

## 2024-08-08 PROCEDURE — 2580000003 HC RX 258

## 2024-08-08 PROCEDURE — 99232 SBSQ HOSP IP/OBS MODERATE 35: CPT | Performed by: INTERNAL MEDICINE

## 2024-08-08 PROCEDURE — 1200000000 HC SEMI PRIVATE

## 2024-08-08 PROCEDURE — 84100 ASSAY OF PHOSPHORUS: CPT

## 2024-08-08 PROCEDURE — 80048 BASIC METABOLIC PNL TOTAL CA: CPT

## 2024-08-08 PROCEDURE — 85025 COMPLETE CBC W/AUTO DIFF WBC: CPT

## 2024-08-08 PROCEDURE — 6360000002 HC RX W HCPCS

## 2024-08-08 PROCEDURE — 83735 ASSAY OF MAGNESIUM: CPT

## 2024-08-08 PROCEDURE — 93010 ELECTROCARDIOGRAM REPORT: CPT | Performed by: INTERNAL MEDICINE

## 2024-08-08 RX ORDER — DOXYCYCLINE HYCLATE 100 MG/1
100 CAPSULE ORAL EVERY 12 HOURS SCHEDULED
Qty: 14 CAPSULE | Refills: 0 | DISCHARGE
Start: 2024-08-08 | End: 2024-08-15

## 2024-08-08 RX ORDER — ERGOCALCIFEROL 1.25 MG/1
50000 CAPSULE ORAL WEEKLY
Qty: 5 CAPSULE | DISCHARGE
Start: 2024-08-14 | End: 2024-09-12

## 2024-08-08 RX ORDER — CHOLECALCIFEROL (VITAMIN D3) 50 MCG
2000 TABLET ORAL DAILY
Qty: 60 TABLET | DISCHARGE
Start: 2024-08-09

## 2024-08-08 RX ORDER — DOXYCYCLINE HYCLATE 100 MG/1
100 CAPSULE ORAL EVERY 12 HOURS SCHEDULED
Status: DISCONTINUED | OUTPATIENT
Start: 2024-08-08 | End: 2024-08-09 | Stop reason: HOSPADM

## 2024-08-08 RX ORDER — AMOXICILLIN AND CLAVULANATE POTASSIUM 875; 125 MG/1; MG/1
1 TABLET, FILM COATED ORAL EVERY 12 HOURS SCHEDULED
Status: DISCONTINUED | OUTPATIENT
Start: 2024-08-08 | End: 2024-08-09 | Stop reason: HOSPADM

## 2024-08-08 RX ORDER — MAGNESIUM SULFATE IN WATER 40 MG/ML
2000 INJECTION, SOLUTION INTRAVENOUS ONCE
Status: COMPLETED | OUTPATIENT
Start: 2024-08-08 | End: 2024-08-08

## 2024-08-08 RX ORDER — POTASSIUM CHLORIDE 20 MEQ/1
40 TABLET, EXTENDED RELEASE ORAL ONCE
Status: COMPLETED | OUTPATIENT
Start: 2024-08-08 | End: 2024-08-08

## 2024-08-08 RX ORDER — CALCIUM GLUCONATE 10 MG/ML
1000 INJECTION, SOLUTION INTRAVENOUS ONCE
Status: COMPLETED | OUTPATIENT
Start: 2024-08-08 | End: 2024-08-08

## 2024-08-08 RX ORDER — AMOXICILLIN AND CLAVULANATE POTASSIUM 875; 125 MG/1; MG/1
1 TABLET, FILM COATED ORAL EVERY 12 HOURS SCHEDULED
Qty: 20 TABLET | Refills: 0 | DISCHARGE
Start: 2024-08-08 | End: 2024-08-18

## 2024-08-08 RX ORDER — AMOXICILLIN AND CLAVULANATE POTASSIUM 875; 125 MG/1; MG/1
1 TABLET, FILM COATED ORAL EVERY 12 HOURS SCHEDULED
Status: DISCONTINUED | OUTPATIENT
Start: 2024-08-08 | End: 2024-08-08

## 2024-08-08 RX ADMIN — ACETAMINOPHEN 650 MG: 325 TABLET ORAL at 11:31

## 2024-08-08 RX ADMIN — SODIUM CHLORIDE, PRESERVATIVE FREE 10 ML: 5 INJECTION INTRAVENOUS at 20:47

## 2024-08-08 RX ADMIN — AMOXICILLIN AND CLAVULANATE POTASSIUM 1 TABLET: 875; 125 TABLET, FILM COATED ORAL at 20:46

## 2024-08-08 RX ADMIN — CALCIUM GLUCONATE 1000 MG: 10 INJECTION, SOLUTION INTRAVENOUS at 11:33

## 2024-08-08 RX ADMIN — DOXYCYCLINE 100 MG: 100 INJECTION, POWDER, LYOPHILIZED, FOR SOLUTION INTRAVENOUS at 05:09

## 2024-08-08 RX ADMIN — PANTOPRAZOLE SODIUM 40 MG: 40 TABLET, DELAYED RELEASE ORAL at 05:09

## 2024-08-08 RX ADMIN — MAGNESIUM SULFATE HEPTAHYDRATE 2000 MG: 40 INJECTION, SOLUTION INTRAVENOUS at 13:34

## 2024-08-08 RX ADMIN — POTASSIUM CHLORIDE 40 MEQ: 1500 TABLET, EXTENDED RELEASE ORAL at 11:31

## 2024-08-08 RX ADMIN — ASPIRIN 81 MG 81 MG: 81 TABLET ORAL at 11:32

## 2024-08-08 RX ADMIN — Medication 2000 UNITS: at 11:32

## 2024-08-08 RX ADMIN — DOXYCYCLINE HYCLATE 100 MG: 100 CAPSULE ORAL at 20:47

## 2024-08-08 RX ADMIN — GUAIFENESIN 400 MG: 400 TABLET ORAL at 13:32

## 2024-08-08 RX ADMIN — GUAIFENESIN 400 MG: 400 TABLET ORAL at 20:47

## 2024-08-08 ASSESSMENT — PAIN SCALES - GENERAL: PAINLEVEL_OUTOF10: 0

## 2024-08-08 NOTE — DISCHARGE INSTR - COC
Continuity of Care Form    Patient Name: Tori Mathew   :  1950  MRN:  [id number]    Admit date:  2024  Discharge date:  2024    Code Status Order: Full Code   Advance Directives:     Admitting Physician:  Chet Son DO  PCP: Pankaj Corbett MD    Discharging Nurse: lori  Discharging Hospital Unit/Room#: 8401/8401-B  Discharging Unit Phone Number: [phone]    Emergency Contact:   Extended Emergency Contact Information  Primary Emergency Contact: Indira Mathew  Address: [facility]  Home Phone: [phone]  Relation: Niece/Nephew  Secondary Emergency Contact: Marquita MathewMaria Parham Healthdiane   [facility]  Home Phone: [phone]  Mobile Phone: [phone]  Relation: Niece/Nephew    Past Surgical History:  Past Surgical History:   Procedure Laterality Date    FRACTURE SURGERY  2005    right ankle repair       Immunization History:     There is no immunization history on file for this patient.    Active Problems:  Patient Active Problem List   Diagnosis Code    Normocytic anemia D64.9    Prediabetes R73.03    Peripheral venous insufficiency I87.2    Hyperlipidemia E78.5    Lymphedema of both lower extremities I89.0    Dermatophytosis B35.9    Onychomycosis B35.1    Decreased dorsalis pedis pulse R09.89    Chronic venous insufficiency I87.2    Lower limb ulcer, calf, right, with fat layer exposed (HCC) L97.212    Lower limb ulcer, calf, left, with fat layer exposed (HCC) L97.222    JEANNE positive R76.8    Weakness R53.1    Abdominal pain R10.9    Lymphedema I89.0    COVID-19 U07.1    Hypocalcemia E83.51    Cellulitis L03.90    Vitamin D deficiency E55.9       Isolation/Infection:   Isolation            Contact  Droplet Plus          Patient Infection Status       Infection Onset Added Last Indicated Last Indicated By Review Planned Expiration Resolved Resolved By    COVID-19 24 Respiratory Panel, Molecular, with COVID-19 (Restricted: peds pts or suitable admitted

## 2024-08-08 NOTE — PROGRESS NOTES
Vascular:    Sequence of events noted    Clinically patient looks better and feels better    Wound pictures noted, improvement                 Patient currently on Augmentin    Discussed the patient, when discharged, will have the patient follow-up at the wound care center, at the main campus    All his questions were answered      Neo Maier MD

## 2024-08-08 NOTE — PROGRESS NOTES
[facility]  Internal Medicine Residency Program  Progress Note - House Team 2    Patient:  Tori Mathew 74 y.o. male MRN: [id number]     Date of Service: 8/8/2024     CC: had concerns including Abdominal Pain (Pt on antibiotics for wound on left leg and now having middle abdominal pain. Denies nausea/vomiting, has some diarrhea).     Overnight events: The patient had no significant events overnight.      Subjective     Patient was seen at bedside this morning. The patient has no complaints at this time. He is urinating and moving his bowels appropriately. Patient denies any fever, chills, nausea, vomiting, diarrhea, abdominal pain, chest pain, or shortness of breath.     Objective     Physical Exam:  Vitals: /70   Pulse 80   Temp 97.5 °F (36.4 °C)   Resp 20   Ht 1.88 m (6' 2.02\")   Wt (!) 154.2 kg (340 lb)   SpO2 100%   BMI 43.63 kg/m²     I & O - 24hr: I/O this shift:  In: -   Out: 350 [Urine:350]   General Appearance: alert, appears stated age, and cooperative  HEENT:  Head: Normal, normocephalic, atraumatic.  Neck: no adenopathy, no carotid bruit, no JVD, supple, symmetrical, trachea midline, and thyroid not enlarged, symmetric, no tenderness/mass/nodules  Lung: clear to auscultation bilaterally  Heart: regular rate and rhythm, S1, S2 normal, no murmur, click, rub or gallop  Abdomen: soft, non-tender; bowel sounds normal; no masses,  no organomegaly  Extremities: Bilateral lower extremities ace wrapped with heel protectors   Musculokeletal: No joint swelling, no muscle tenderness. ROM normal in all joints of extremities.   Neurologic: Mental status: Alert, oriented, thought content appropriate       Taken from chart review        Subject  Pertinent Labs & Imaging Studies   thom  CBC:   Lab Results   Component Value Date/Time    WBC 4.8 08/08/2024 04:12 AM    RBC 4.07 08/08/2024 04:12 AM    HGB 10.6 08/08/2024 04:12 AM    HCT 34.6 08/08/2024 04:12 AM    MCV 85.0 08/08/2024 04:12

## 2024-08-08 NOTE — DISCHARGE INSTRUCTIONS
Your information:  Name: Tori Mathew  : 1950    Your instructions:    Home health care for dressing change:Left lower leg: clean with normal saline, apply opticell Ag, 4x4, abd pad then wrap with kerlix from base of toes to knee then apply ace wrap. Change dressing daily     What to do after you leave the hospital:    Recommended diet: {diet:58629}    Recommended activity: {discharge activity:64449}        The following personal items were collected during your admission and were returned to you:    Belongings  Dental Appliances: None  Vision - Corrective Lenses: None  Hearing Aid: None  Clothing: At home  Jewelry: None  Electronic Devices: Cell Phone  Weapons (Notify Protective Services/Security): None  Home Medications: None  Valuables Given To: Patient  Provide Name(s) of Who Valuable(s) Were Given To: none    Information obtained by:  By signing below, I understand that if any problems occur once I leave the hospital I am to contact ***.  I understand and acknowledge receipt of the instructions indicated above.     Internal medicine    Follow ups  Please follow up with the internal medicine clinic at [facility] upon discharge from the rehab facility.     Changes in healthcare   Please take all medications as indicated above  Diet: regular diet   Activity: activity as tolerated    Even if you are feeling better and not having symptoms do not stop taking antibiotic earlier then prescribed  New medication prescribed after this hospitalization are Augmentin, Doxycycline, Vitamin D, Please refer to your Med list for details   Please contact us if you have any concerns, wish to change or make an appointment:  Internal medicine clinic   Phone: [phone]  Fax: [phone]  [address] [place] [address]  Or please call the nurse line at [phone].  Should you have further questions in regards to this visit, you can review your clinical note and after visit summary document on your  [employer] account.  Other questions can be directed to our nurse line at [phone].     Other than any new prescriptions given to you today, the list of home medications on this After Visit Summary are based on information provided to us from you and your healthcare providers. This information, including the list, dose, and frequency of medications is only as accurate as the information you provided. If you have any questions or concerns about your home medications, please contact your Primary Care Physician for further clarification.

## 2024-08-08 NOTE — PLAN OF CARE
Problem: Safety - Adult  Goal: Free from fall injury  8/7/2024 2155 by Daxa Meraz, RN  Outcome: Progressing  8/7/2024 0935 by Cheri Castillo, RN  Outcome: Progressing

## 2024-08-08 NOTE — PROGRESS NOTES
Pt LLE dressing changed due to saturation of serous to brown drainage that is foul smelling.  Cleaned with Ns aquacel ag 4x4 ABD kerlix and ace.  Foot back in boot.  Linen changed due to incont of stool.

## 2024-08-08 NOTE — DISCHARGE SUMMARY
[facility]  Discharge Summary    PCP: Pankaj Corbett MD    Admit Date:8/5/2024  Discharge Date: 8/8/2024    Admission Diagnosis:   Purulent leg wound  Abdominal pain    Discharge Diagnosis:  Chronic left lower extremity wound 2/2 to lymphedema   Chronic venous insufficiency   Inability to ambulate   Right paramidline hernia   COVID-19  Cough  Hypocalcemia   Normocytic anemia   Elevated troponin   Elevated AST  Vitamin D deficiency     Hospital Course: Tori Mathew is a 74 y.o. male with PMH of chronic venous insufficiency, prediabetic, and lymphedema, that presented to the ED with cough and nausea. Patient noted that he follows with the wound clinic for a left lower extremity wound. He was recently started on Amoxicillin for an infection and that is when he began feeling nauseous and experiencing a nonproductive cough. He discontinued the medication on 08/04. Patient stated that he talked to his niece on the phone and she told him \"you don't sound like yourself, you should go to the ED.\"     In the ED, patient was unable to ambulate in the ED, which was the concern for admission. He was afebrile and normotensive. Significant lab values included Na 135, K 3.7, BUN 15, Cr 1.0, Mg 1.9. Lactic acid 1.8. Ca 8.2. Troponin 21>19. AST was slightly elevated 52. Lipase 23. CBC revealed WBC 4.7, Hgb 11.7, Hct 37.0. Urinalysis was negative. CT abdomen revealed right paramidline hernia containing a normal appearing loops of small bowel; no evidence for small bowel obstruction; adjacent fat containing ventral wall hernia is also noted; colonic fecal retention; prostatomegaly. Blood, wound, and anaerobic cultures were ordered. Wound care was consulted. Patient was started on Doxycycline 100 MG IV. Surgery was consulted.      Ionized Ca 1.12. Calcium gluconate 1 G IV was given. CBC revealed WBC 4.7, Hgb 11.0, and Hct 35.2. Respiratory panel was ordered. Vascular surgery was

## 2024-08-08 NOTE — FLOWSHEET NOTE
Inpatient Wound Care (Initial consult) 8401B    Admit Date: 8/5/2024  1:52 PM    Reason for consult:  Left lower leg    Significant history: Per H&P    This is a 74-year-old male patient with a past medical history of prediabetes, obesity and chronic fatigue who presented to the emergency department complaining of abdominal pain. The patient states on admission that he had abdominal pain 2 days ago that was associated with nausea, but without vomiting, all over his abdomen and not radiating to anywhere in his body. The patient denies constipation, diarrhea, fever, chest pain and palpitation he had CT abdomen in the emergency department that showed right paramidline hernia containing small bowel without incarceration. The patient was assessed by general surgery in the emergency department and they stated no signs of obstruction. Surgery signed off.     Findings:     08/08/24 0947   Skin Integumentary    Skin Integrity   (dry flaky)   Location BLE   Wound 10/23/23 Leg Left;Lateral;Distal #1 l   Date First Assessed/Time First Assessed: 10/23/23 1352   Primary Wound Type: Venous Ulcer  Location: Leg  Wound Location Orientation: Left;Lateral;Distal  Wound Description (Comments): #1 l   Wound Image    Wound Etiology Venous   Dressing Status New dressing applied   Wound Cleansed Cleansed with saline   Dressing/Treatment Alginate with Ag;ABD;Dry dressing;Ace wrap;Roll gauze   Wound Length (cm) 9 cm   Wound Width (cm) 6 cm   Wound Depth (cm) 0.1 cm   Wound Surface Area (cm^2) 54 cm^2   Change in Wound Size % (l*w) -1340   Wound Volume (cm^3) 5.4 cm^3   Wound Healing % -620   Wound Assessment Pink/red   Drainage Amount Scant (moist but unmeasurable)   Drainage Description Serosanguinous   Odor None   Colleen-wound Assessment Dry/flaky       **Informed Consent**    The patient has given verbal consent to have photos taken of wound and inserted into their chart as part of their permanent medical record for purposes of

## 2024-08-08 NOTE — CARE COORDINATION
Patient with a past medical history of prediabetes, obesity and chronic fatigue is admitted with Covid-19, chronic left lower extremity wound secondary to lymphedema and  chronic nonobstructive ventral hernia. Per Sabrina with [facility], they are currently active with patient. Per internal med note today, Low calcium - 2 to severe vit D deficiency. New weakness 2 to Covid. +covid-- new cough, Gi distress/diarrhea, ?exac weakness. Seems alert oriented, ?fluctuating \"not sounding like himself\". Inc procal,  inc ESR/crp. I called and spoke with patient's niece Indira to explain role, discuss therapy evals and transition of care. Patient lives alone in a 1STH with 3STE with 1HR. Bathroom setup: tub shower, standard toilet. Equipment owned: cane, WW. Niece said patient is a Hoarder. Prior Level of Function: Indep with ADLs , Indep with IADLs; engaged in functional mobility with use of ww or cane Occupation: retired. Follows [employer]. Memory:  F. PT/OT [facility] scores 8/24 and 9/24. Indira would like [facility] for her Uncle and does not have a preference for an agency. Referral made to Paula at [facility]. Await acceptance. A 7000 will need to be completed for the accepting facility. Ambulance form in envelope in soft chart will need to be signed and dated by nursing on day of discharge.  Brynn Hanna RN CM  [phone]        Per Paula at [facility], patient is accepted at [facility] when medically ready, no precert required. Patient and nieces all notified. 7000 completed and placed in envelope in soft chart. Ambulance form in envelope in soft chart will need to be signed and dated by nursing on day of discharge.  Brynn Hanna RN CM  [phone]

## 2024-08-08 NOTE — CARE COORDINATION
Discharge order noted. Transportation set up via [facility] for 11 am tomorrow 8/9 to [facility]. Charge nurse, RN, liaison and patient's niece Indira all notified. 7000 completed and placed in envelope in soft chart. Completed and signed ambulance form in envelop in soft chart.     Brynn Hanna RN   [phone]

## 2024-08-09 VITALS
RESPIRATION RATE: 18 BRPM | BODY MASS INDEX: 40.43 KG/M2 | WEIGHT: 315 LBS | DIASTOLIC BLOOD PRESSURE: 62 MMHG | SYSTOLIC BLOOD PRESSURE: 108 MMHG | TEMPERATURE: 97.5 F | OXYGEN SATURATION: 97 % | HEIGHT: 74 IN | HEART RATE: 73 BPM

## 2024-08-09 PROBLEM — R10.9 ABDOMINAL PAIN: Status: RESOLVED | Noted: 2024-08-06 | Resolved: 2024-08-09

## 2024-08-09 PROBLEM — U07.1 COVID-19: Status: RESOLVED | Noted: 2024-08-07 | Resolved: 2024-08-09

## 2024-08-09 PROBLEM — E83.51 HYPOCALCEMIA: Status: RESOLVED | Noted: 2024-08-07 | Resolved: 2024-08-09

## 2024-08-09 LAB
GLUCOSE BLD-MCNC: 102 MG/DL (ref 74–99)
MICROORGANISM SPEC CULT: ABNORMAL
MICROORGANISM SPEC CULT: NORMAL
MICROORGANISM/AGENT SPEC: ABNORMAL
SPECIMEN DESCRIPTION: ABNORMAL
SPECIMEN DESCRIPTION: NORMAL

## 2024-08-09 PROCEDURE — 6370000000 HC RX 637 (ALT 250 FOR IP)

## 2024-08-09 PROCEDURE — 99238 HOSP IP/OBS DSCHRG MGMT 30/<: CPT | Performed by: INTERNAL MEDICINE

## 2024-08-09 PROCEDURE — 82962 GLUCOSE BLOOD TEST: CPT

## 2024-08-09 PROCEDURE — 6370000000 HC RX 637 (ALT 250 FOR IP): Performed by: INTERNAL MEDICINE

## 2024-08-09 RX ADMIN — ONDANSETRON 4 MG: 4 TABLET, ORALLY DISINTEGRATING ORAL at 03:46

## 2024-08-09 RX ADMIN — AMOXICILLIN AND CLAVULANATE POTASSIUM 1 TABLET: 875; 125 TABLET, FILM COATED ORAL at 07:43

## 2024-08-09 RX ADMIN — DOXYCYCLINE HYCLATE 100 MG: 100 CAPSULE ORAL at 07:43

## 2024-08-09 RX ADMIN — GUAIFENESIN 400 MG: 400 TABLET ORAL at 07:43

## 2024-08-09 RX ADMIN — ASPIRIN 81 MG 81 MG: 81 TABLET ORAL at 07:42

## 2024-08-09 RX ADMIN — PANTOPRAZOLE SODIUM 40 MG: 40 TABLET, DELAYED RELEASE ORAL at 05:26

## 2024-08-09 RX ADMIN — ACETAMINOPHEN 650 MG: 325 TABLET ORAL at 03:46

## 2024-08-09 RX ADMIN — Medication 2000 UNITS: at 07:42

## 2024-08-09 RX ADMIN — PETROLATUM: 420 OINTMENT TOPICAL at 07:44

## 2024-08-09 NOTE — DISCHARGE SUMMARY
.         [facility]  Discharge Summary    PCP: Pankaj Corbett MD    Admit Date:8/5/2024  Discharge Date: 8/9/2024    Admission Diagnosis:   Purulent leg wound  Abdominal pain     Discharge Diagnosis:  Chronic left lower extremity wound 2/2 to lymphedema   Chronic venous insufficiency   Inability to ambulate   Right paramidline hernia   COVID-19  Cough  Hypocalcemia   Normocytic anemia   Elevated troponin   Elevated AST  Vitamin D deficiency      Hospital Course: Tori Mathew is a 74 y.o. male with PMH of chronic venous insufficiency, prediabetic, and lymphedema, that presented to the ED with cough and nausea. Patient noted that he follows with the wound clinic for a left lower extremity wound. He was recently started on Amoxicillin for an infection and that is when he began feeling nauseous and experiencing a nonproductive cough. He discontinued the medication on 08/04. Patient stated that he talked to his niece on the phone and she told him \"you don't sound like yourself, you should go to the ED.\"     In the ED, patient was unable to ambulate in the ED, which was the concern for admission. He was afebrile and normotensive. Significant lab values included Na 135, K 3.7, BUN 15, Cr 1.0, Mg 1.9. Lactic acid 1.8. Ca 8.2. Troponin 21>19. AST was slightly elevated 52. Lipase 23. CBC revealed WBC 4.7, Hgb 11.7, Hct 37.0. Urinalysis was negative. CT abdomen revealed right paramidline hernia containing a normal appearing loops of small bowel; no evidence for small bowel obstruction; adjacent fat containing ventral wall hernia is also noted; colonic fecal retention; prostatomegaly. Blood, wound, and anaerobic cultures were ordered. Wound care was consulted. Patient was started on Doxycycline 100 MG IV. Surgery was consulted.      Ionized Ca 1.12. Calcium gluconate 1 G IV was given. CBC revealed WBC 4.7, Hgb 11.0, and Hct 35.2. Respiratory panel was ordered. Vascular surgery

## 2024-08-09 NOTE — CARE COORDINATION
Discharge order is in. Transportation set up via [facility] for 11 am today to [facility] [place]. Charge nurse, RN, liaison and patient's niece Indira are all aware. [facility]/Destination complete. 7000 completed and placed in envelope in soft chart. Completed and signed ambulance form in envelop in soft chart.     Brynn Hanna RN   [phone]

## 2024-08-11 LAB
MICROORGANISM SPEC CULT: NORMAL
MICROORGANISM SPEC CULT: NORMAL
SERVICE CMNT-IMP: NORMAL
SERVICE CMNT-IMP: NORMAL
SPECIMEN DESCRIPTION: NORMAL
SPECIMEN DESCRIPTION: NORMAL

## 2024-08-12 ENCOUNTER — HOSPITAL ENCOUNTER (OUTPATIENT)
Dept: WOUND CARE | Age: 74
Discharge: HOME | End: 2024-08-12
Attending: SURGERY

## 2024-08-19 ENCOUNTER — HOSPITAL ENCOUNTER (OUTPATIENT)
Dept: WOUND CARE | Age: 74
Discharge: HOME | End: 2024-08-19
Attending: SURGERY
Payer: MEDICARE

## 2024-08-19 VITALS
HEIGHT: 74 IN | TEMPERATURE: 97.3 F | DIASTOLIC BLOOD PRESSURE: 62 MMHG | RESPIRATION RATE: 19 BRPM | WEIGHT: 315 LBS | HEART RATE: 61 BPM | BODY MASS INDEX: 40.43 KG/M2 | SYSTOLIC BLOOD PRESSURE: 118 MMHG

## 2024-08-19 DIAGNOSIS — R76.8 ANA POSITIVE: ICD-10-CM

## 2024-08-19 DIAGNOSIS — L97.211 CALF ULCER, RIGHT, LIMITED TO BREAKDOWN OF SKIN (HCC): ICD-10-CM

## 2024-08-19 DIAGNOSIS — L97.222 LOWER LIMB ULCER, CALF, LEFT, WITH FAT LAYER EXPOSED (HCC): Primary | ICD-10-CM

## 2024-08-19 PROCEDURE — 11042 DBRDMT SUBQ TIS 1ST 20SQCM/<: CPT | Performed by: SURGERY

## 2024-08-19 PROCEDURE — 11045 DBRDMT SUBQ TISS EACH ADDL: CPT | Performed by: SURGERY

## 2024-08-19 PROCEDURE — 11045 DBRDMT SUBQ TISS EACH ADDL: CPT

## 2024-08-19 PROCEDURE — 11042 DBRDMT SUBQ TIS 1ST 20SQCM/<: CPT

## 2024-08-19 RX ORDER — TRIAMCINOLONE ACETONIDE 1 MG/G
OINTMENT TOPICAL ONCE
OUTPATIENT
Start: 2024-08-19 | End: 2024-08-19

## 2024-08-19 RX ORDER — CLOBETASOL PROPIONATE 0.5 MG/G
OINTMENT TOPICAL ONCE
OUTPATIENT
Start: 2024-08-19 | End: 2024-08-19

## 2024-08-19 RX ORDER — IBUPROFEN 200 MG
TABLET ORAL ONCE
OUTPATIENT
Start: 2024-08-19 | End: 2024-08-19

## 2024-08-19 RX ORDER — LIDOCAINE HYDROCHLORIDE 40 MG/ML
SOLUTION TOPICAL ONCE
Status: COMPLETED | OUTPATIENT
Start: 2024-08-19 | End: 2024-08-19

## 2024-08-19 RX ORDER — LIDOCAINE HYDROCHLORIDE 40 MG/ML
SOLUTION TOPICAL ONCE
OUTPATIENT
Start: 2024-08-19 | End: 2024-08-19

## 2024-08-19 RX ORDER — LIDOCAINE 40 MG/G
CREAM TOPICAL ONCE
OUTPATIENT
Start: 2024-08-19 | End: 2024-08-19

## 2024-08-19 RX ORDER — SODIUM CHLOR/HYPOCHLOROUS ACID 0.033 %
SOLUTION, IRRIGATION IRRIGATION ONCE
OUTPATIENT
Start: 2024-08-19 | End: 2024-08-19

## 2024-08-19 RX ORDER — GENTAMICIN SULFATE 1 MG/G
OINTMENT TOPICAL ONCE
OUTPATIENT
Start: 2024-08-19 | End: 2024-08-19

## 2024-08-19 RX ORDER — LIDOCAINE HYDROCHLORIDE 20 MG/ML
JELLY TOPICAL ONCE
OUTPATIENT
Start: 2024-08-19 | End: 2024-08-19

## 2024-08-19 RX ORDER — BACITRACIN ZINC 500 [USP'U]/G
OINTMENT TOPICAL ONCE
OUTPATIENT
Start: 2024-08-19 | End: 2024-08-19

## 2024-08-19 RX ORDER — LIDOCAINE 50 MG/G
OINTMENT TOPICAL ONCE
OUTPATIENT
Start: 2024-08-19 | End: 2024-08-19

## 2024-08-19 RX ORDER — BETAMETHASONE DIPROPIONATE 0.05 %
OINTMENT (GRAM) TOPICAL ONCE
OUTPATIENT
Start: 2024-08-19 | End: 2024-08-19

## 2024-08-19 RX ORDER — BACITRACIN ZINC AND POLYMYXIN B SULFATE 500; 1000 [USP'U]/G; [USP'U]/G
OINTMENT TOPICAL ONCE
OUTPATIENT
Start: 2024-08-19 | End: 2024-08-19

## 2024-08-19 RX ADMIN — LIDOCAINE HYDROCHLORIDE 10 ML: 40 SOLUTION TOPICAL at 13:41

## 2024-08-19 NOTE — PLAN OF CARE
Problem: Chronic Conditions and Co-morbidities  Goal: Patient's chronic conditions and co-morbidity symptoms are monitored and maintained or improved  8/19/2024 1407 by Anali Haynes RN  Outcome: Progressing  8/19/2024 1406 by Anali Haynes RN  Outcome: Progressing     Problem: Wound:  Goal: Will show signs of wound healing; wound closure and no evidence of infection  Description: Will show signs of wound healing; wound closure and no evidence of infection  8/19/2024 1407 by Anali Haynes RN  Outcome: Progressing  8/19/2024 1406 by Anali Haynes RN  Outcome: Progressing     Problem: Venous:  Goal: Signs of wound healing will improve  Description: Signs of wound healing will improve  8/19/2024 1407 by Anali Haynes RN  Outcome: Progressing  8/19/2024 1406 by Anali Haynes RN  Outcome: Progressing     Problem: Compression therapy:  Goal: Will be free from complications associated with compression therapy  Description: Will be free from complications associated with compression therapy  8/19/2024 1407 by Anali Haynes RN  Outcome: Progressing  8/19/2024 1406 by Anali Haynes RN  Outcome: Progressing

## 2024-08-19 NOTE — DISCHARGE INSTRUCTIONS
Visit Discharge/Physician Orders     Discharge condition: Stable  Assessment of pain at discharge: yes  Anesthetic used: lidocaine 4%  Discharge to: [facility]   Left via: public transport   Accompanied by: self  ECF/HHA: [facility]      Dressing Orders: left lower leg(lateral) and right lower leg (posterior)  wound: Cleanse wounds with Vashe, apply aquaphor to dry skin on legs.Apply  Drawtex to wound bed  and cover with ABD pad (or super absorber if available) and  COBAN II from base of toes to base of knees- change weekly at wound care clinic. - and every other day at the facility  (may use kerlix and coban does not have COBAN II)        Keep wrap dry at all times. If wrap becomes wet or falls 2 inches or painful please remove wrap and replace as ordered.  Please wash legs every  dressing change       Treatment Orders: 8/19 Dietitian to follow at facility to order protein supplement and arginaid/valerie for wound healing.   Lymphedema pumps continue using   Keep pressure off of wound- attempt to rotate leg while keeping leg elevated   Elevate legs as much as possible.   EAT DIET WITH PROTEINS AND VITAMIN C  TAKE A MULTIVITAMIN DAILY IF NOT CONTRAINDICATED        [facility] followup visit : ________1 week PM_________  (Please note your next appointment above and if you are unable to keep, kindly give a 24 hour notice. Thank you.)     Physician signature:__________________________     If you experience any of the following, please call the Wound Care Center during business hours:     * Increase in Pain  * Temperature over 101  * Increase in drainage from your wound  * Drainage with a foul odor  * Bleeding  * Increase in swelling  * Need for compression bandage changes due to slippage, breakthrough drainage.     If you need medical attention outside of the business hours of the Wound Care Centers please contact your PCP or go to the nearest emergency room.

## 2024-08-19 NOTE — DISCHARGE INSTR - COC
Continuity of Care Form    Patient Name: Tori Mathew   :  1950  MRN:  [id number]    date:  2024      Wound Care Documentation and Therapy:  Wound 10/23/23 Leg Left;Lateral;Distal #1 l (Active)   Wound Image   24 1347   Wound Etiology Venous 24 0947   Dressing Status Clean 24 0802   Wound Cleansed Cleansed with saline 24 07   Dressing/Treatment Alginate with Ag 24 0700   Offloading for Diabetic Foot Ulcers Offloading ordered 24 1117   Wound Length (cm) 9 cm 24 1347   Wound Width (cm) 6 cm 24 1347   Wound Depth (cm) 0.1 cm 24 1347   Wound Surface Area (cm^2) 54 cm^2 24 1347   Change in Wound Size % (l*w) -1340 24 1347   Wound Volume (cm^3) 5.4 cm^3 24 1347   Wound Healing % -620 24 1347   Post-Procedure Length (cm) 9.4 cm 24 1418   Post-Procedure Width (cm) 6.2 cm 24 1418   Post-Procedure Depth (cm) 0.2 cm 24 1418   Post-Procedure Surface Area (cm^2) 58.28 cm^2 24 1418   Post-Procedure Volume (cm^3) 11.656 cm^3 24 1418   Wound Assessment Epithelialization;Granulation tissue;Other (Comment) 24 1347   Drainage Amount Moderate (25-50%) 24 1347   Drainage Description Sanguinous 24 1347   Odor None 24 1347   Colleen-wound Assessment Fragile 24 1347   Margins Attached edges 24 1354   Wound Thickness Description not for Pressure Injury Full thickness 24 1354   Number of days: 301       Wound 24 Leg Right;Lower;Posterior #9 blocked (Active)   Wound Image   24 1358   Wound Length (cm) 6.1 cm 24 1358   Wound Width (cm) 5 cm 24 1358   Wound Depth (cm) 0.1 cm 24 1358   Wound Surface Area (cm^2) 30.5 cm^2 24 1358   Wound Volume (cm^3) 3.05 cm^3 24 1358   Post-Procedure Length (cm) 6.2 cm 24 1418   Post-Procedure Width (cm) 5.1 cm 24 1418   Post-Procedure Depth (cm) 0.2 cm 248   Post-Procedure Surface Area

## 2024-08-19 NOTE — PROGRESS NOTES
may consider of wound culture  4/15/2024  Ulcer, left calf stable, some exudate, wound cultures done today  4/22/2024  Left ankle calf ulcers, stable over the lateral aspect, patient was started on Bactrim DS 1 tablet p.o. twice daily based upon the wound cultures  Re discussed with patient on importance given the legs elevated to decrease the swelling  4/29/2024  Wound, unchanged, waiting for the ankle-brachial index, if satisfactory, may consider skin grafting with skin substitute  5/6/2024  Patient is also has gotten worse  Repeat wound cultures were done  Patient was recommended blood work, including CBC, CMP, sed rate, CRP, JEANNE, anti-DNA, rheumatoid factor etc.   If the wound continues to get worse, will obtain a open from Dr. Vargas also  5/13/2024  Left leg ulcer unchanged  Patient's most swelling the left knee joint and slightly more swelling of the left leg than usual, hyperlipidemia and's ultrasound        Patient blood work was reviewed     Abnormal JEANNE positive, ratio 1:320, discussed with the patient, the patient was instructed to see his PCP regarding referral to a rheumatologist     Will also request wound care nursing staff to fax results to patient's current PCP     All his questions were answered     5/20/2024  Left lateral calf wound looks  today but no change in the size, still waiting for the patient undergo more follow-up arterial venous ultrasound studies, and then make recommendations including possible skin grafting and based upon the vascular venous ultrasound studies, potential vascular intervention, patient does have an appoint to see Dr. Vargas next week, because of holidays and scheduling conflict  5/29/24  Wound improving  Arterial studies reviewed - pt has adequate flow to heal  Should be a candidate for graft  6/3/2024  Wound looks clean  Wound debrided  Discussed the patient regarding advanced skin substitute with PuraPly AM graft, patient understands and agrees

## 2024-08-26 ENCOUNTER — HOSPITAL ENCOUNTER (OUTPATIENT)
Dept: WOUND CARE | Age: 74
Discharge: HOME | End: 2024-08-26
Attending: SURGERY
Payer: MEDICARE

## 2024-08-26 VITALS
RESPIRATION RATE: 20 BRPM | DIASTOLIC BLOOD PRESSURE: 83 MMHG | HEART RATE: 86 BPM | TEMPERATURE: 97.8 F | WEIGHT: 315 LBS | HEIGHT: 74 IN | SYSTOLIC BLOOD PRESSURE: 144 MMHG | BODY MASS INDEX: 40.43 KG/M2

## 2024-08-26 DIAGNOSIS — R76.8 ANA POSITIVE: ICD-10-CM

## 2024-08-26 DIAGNOSIS — L97.222 LOWER LIMB ULCER, CALF, LEFT, WITH FAT LAYER EXPOSED (HCC): Primary | ICD-10-CM

## 2024-08-26 PROCEDURE — 11042 DBRDMT SUBQ TIS 1ST 20SQCM/<: CPT

## 2024-08-26 PROCEDURE — 11045 DBRDMT SUBQ TISS EACH ADDL: CPT

## 2024-08-26 PROCEDURE — 11042 DBRDMT SUBQ TIS 1ST 20SQCM/<: CPT | Performed by: SURGERY

## 2024-08-26 PROCEDURE — 11045 DBRDMT SUBQ TISS EACH ADDL: CPT | Performed by: SURGERY

## 2024-08-26 RX ORDER — NEOMYCIN/BACITRACIN/POLYMYXINB 3.5-400-5K
OINTMENT (GRAM) TOPICAL ONCE
OUTPATIENT
Start: 2024-08-26 | End: 2024-08-26

## 2024-08-26 RX ORDER — CLOBETASOL PROPIONATE 0.5 MG/G
OINTMENT TOPICAL ONCE
OUTPATIENT
Start: 2024-08-26 | End: 2024-08-26

## 2024-08-26 RX ORDER — LIDOCAINE HYDROCHLORIDE 40 MG/ML
SOLUTION TOPICAL ONCE
Status: COMPLETED | OUTPATIENT
Start: 2024-08-26 | End: 2024-08-26

## 2024-08-26 RX ORDER — BACITRACIN ZINC 500 [USP'U]/G
OINTMENT TOPICAL ONCE
OUTPATIENT
Start: 2024-08-26 | End: 2024-08-26

## 2024-08-26 RX ORDER — LIDOCAINE 40 MG/G
CREAM TOPICAL ONCE
OUTPATIENT
Start: 2024-08-26 | End: 2024-08-26

## 2024-08-26 RX ORDER — LIDOCAINE 50 MG/G
OINTMENT TOPICAL ONCE
OUTPATIENT
Start: 2024-08-26 | End: 2024-08-26

## 2024-08-26 RX ORDER — MUPIROCIN 20 MG/G
OINTMENT TOPICAL ONCE
OUTPATIENT
Start: 2024-08-26 | End: 2024-08-26

## 2024-08-26 RX ORDER — LIDOCAINE HYDROCHLORIDE 20 MG/ML
JELLY TOPICAL ONCE
OUTPATIENT
Start: 2024-08-26 | End: 2024-08-26

## 2024-08-26 RX ORDER — SILVER SULFADIAZINE 10 MG/G
CREAM TOPICAL ONCE
OUTPATIENT
Start: 2024-08-26 | End: 2024-08-26

## 2024-08-26 RX ORDER — BETAMETHASONE DIPROPIONATE 0.05 %
OINTMENT (GRAM) TOPICAL ONCE
OUTPATIENT
Start: 2024-08-26 | End: 2024-08-26

## 2024-08-26 RX ORDER — LIDOCAINE HYDROCHLORIDE 40 MG/ML
SOLUTION TOPICAL ONCE
OUTPATIENT
Start: 2024-08-26 | End: 2024-08-26

## 2024-08-26 RX ORDER — BACITRACIN ZINC AND POLYMYXIN B SULFATE 500; 1000 [USP'U]/G; [USP'U]/G
OINTMENT TOPICAL ONCE
OUTPATIENT
Start: 2024-08-26 | End: 2024-08-26

## 2024-08-26 RX ORDER — GENTAMICIN SULFATE 1 MG/G
OINTMENT TOPICAL ONCE
OUTPATIENT
Start: 2024-08-26 | End: 2024-08-26

## 2024-08-26 RX ORDER — TRIAMCINOLONE ACETONIDE 1 MG/G
OINTMENT TOPICAL ONCE
OUTPATIENT
Start: 2024-08-26 | End: 2024-08-26

## 2024-08-26 RX ORDER — SODIUM CHLOR/HYPOCHLOROUS ACID 0.033 %
SOLUTION, IRRIGATION IRRIGATION ONCE
OUTPATIENT
Start: 2024-08-26 | End: 2024-08-26

## 2024-08-26 RX ADMIN — LIDOCAINE HYDROCHLORIDE 10 ML: 40 SOLUTION TOPICAL at 13:16

## 2024-08-26 NOTE — PROGRESS NOTES
tissue.        Devitalized Tissue Debrided:  fibrin and slough to stimulate bleeding to promote healing, post debridement good bleeding base and wound edges noted    Wound/Ulcer #: 1,    Percent of Wound/Ulcer Debrided: 90%    Total Surface Area Debrided:  50 sq cm     Estimated Blood Loss:  Minimal  Hemostasis Achieved:  by pressure    Procedural Pain:  5  / 10   Post Procedural Pain:  4 / 10     Response to treatment:  Well tolerated by patient.     Plan:   Treatment Note please see attached Discharge Instructions    Written patient dismissal instructions given to patient and signed by patient or POA.         Discharge Instructions         Visit Discharge/Physician Orders     Discharge condition: Stable  Assessment of pain at discharge: yes  Anesthetic used: lidocaine 4%  Discharge to: [facility]   Left via: public transport   Accompanied by: self  ECF/HHA: [facility]      Dressing Orders: left lower leg(lateral) wound: Cleanse wounds with Vashe, apply aquaphor to dry skin on legs. Apply Drawtex to wound bed and cover with ABD pad (or super absorber if available) and COBAN II from base of toes to base of knees- change every day at the facility  (may use kerlix and coban if does not have COBAN II)   Keep wrap dry at all times. If wrap becomes wet or falls 2 inches or painful please remove wrap and replace as ordered.    Right lower leg (posterior)  wound: Healed- Spandgrip on Right leg- On in morning and off at night      Treatment Orders: 8/26- apply pillow under leg to avoid pressure to wound in bed   Please wash legs every  dressing change     8/19 Dietitian to follow at facility to order protein supplement and arginaid/valerie for wound healing.   Lymphedema pumps continue using   Keep pressure off of wound- attempt to rotate leg while keeping leg elevated   Elevate legs as much as possible.   EAT DIET WITH PROTEINS AND VITAMIN C  TAKE A MULTIVITAMIN DAILY IF NOT CONTRAINDICATED        [facility]

## 2024-08-26 NOTE — DISCHARGE INSTRUCTIONS
Visit Discharge/Physician Orders     Discharge condition: Stable  Assessment of pain at discharge: yes  Anesthetic used: lidocaine 4%  Discharge to: [facility]   Left via: public transport   Accompanied by: self  ECF/HHA: [facility]      Dressing Orders: left lower leg(lateral) wound: Cleanse wounds with Vashe, apply aquaphor to dry skin on legs. Apply Drawtex to wound bed and cover with ABD pad (or super absorber if available) and COBAN II from base of toes to base of knees- change every day at the facility  (may use kerlix and coban if does not have COBAN II)   Keep wrap dry at all times. If wrap becomes wet or falls 2 inches or painful please remove wrap and replace as ordered.    Right lower leg (posterior)  wound: Healed- Spandgrip on Right leg- On in morning and off at night      Treatment Orders: 8/26- apply pillow under leg to avoid pressure to wound in bed   Please wash legs every  dressing change     8/19 Dietitian to follow at facility to order protein supplement and arginaid/valerie for wound healing.   Lymphedema pumps continue using   Keep pressure off of wound- attempt to rotate leg while keeping leg elevated   Elevate legs as much as possible.   EAT DIET WITH PROTEINS AND VITAMIN C  TAKE A MULTIVITAMIN DAILY IF NOT CONTRAINDICATED        [facility] followup visit : ________2 weeks PM_________  (Please note your next appointment above and if you are unable to keep, kindly give a 24 hour notice. Thank you.)     Physician signature:__________________________     If you experience any of the following, please call the Wound Care Center during business hours:     * Increase in Pain  * Temperature over 101  * Increase in drainage from your wound  * Drainage with a foul odor  * Bleeding  * Increase in swelling  * Need for compression bandage changes due to slippage, breakthrough drainage.     If you need medical attention outside of the business hours of the Wound Care Centers please contact  your PCP or go to the nearest emergency room.

## 2024-09-05 ENCOUNTER — APPOINTMENT (OUTPATIENT)
Dept: GENERAL RADIOLOGY | Age: 74
DRG: 607 | End: 2024-09-05
Payer: MEDICARE

## 2024-09-05 ENCOUNTER — APPOINTMENT (OUTPATIENT)
Dept: ULTRASOUND IMAGING | Age: 74
DRG: 607 | End: 2024-09-05
Payer: MEDICARE

## 2024-09-05 ENCOUNTER — HOSPITAL ENCOUNTER (INPATIENT)
Age: 74
LOS: 2 days | Discharge: OTHER INSTITUTION | DRG: 607 | End: 2024-09-08
Attending: EMERGENCY MEDICINE | Admitting: INTERNAL MEDICINE
Payer: MEDICARE

## 2024-09-05 DIAGNOSIS — R26.2 AMBULATORY DYSFUNCTION: Primary | ICD-10-CM

## 2024-09-05 DIAGNOSIS — I89.0 LYMPHEDEMA: ICD-10-CM

## 2024-09-05 LAB
ANION GAP SERPL CALCULATED.3IONS-SCNC: 17 MMOL/L (ref 7–16)
BASOPHILS # BLD: 0.03 K/UL (ref 0–0.2)
BASOPHILS NFR BLD: 0 % (ref 0–2)
BUN SERPL-MCNC: 22 MG/DL (ref 6–23)
CALCIUM SERPL-MCNC: 9 MG/DL (ref 8.6–10.2)
CHLORIDE SERPL-SCNC: 100 MMOL/L (ref 98–107)
CO2 SERPL-SCNC: 20 MMOL/L (ref 22–29)
CREAT SERPL-MCNC: 0.9 MG/DL (ref 0.7–1.2)
EOSINOPHIL # BLD: 0.07 K/UL (ref 0.05–0.5)
EOSINOPHILS RELATIVE PERCENT: 1 % (ref 0–6)
ERYTHROCYTE [DISTWIDTH] IN BLOOD BY AUTOMATED COUNT: 17.1 % (ref 11.5–15)
GFR, ESTIMATED: 87 ML/MIN/1.73M2
GLUCOSE SERPL-MCNC: 157 MG/DL (ref 74–99)
HCT VFR BLD AUTO: 41.4 % (ref 37–54)
HGB BLD-MCNC: 12.9 G/DL (ref 12.5–16.5)
IMM GRANULOCYTES # BLD AUTO: 0.05 K/UL (ref 0–0.58)
IMM GRANULOCYTES NFR BLD: 0 % (ref 0–5)
LYMPHOCYTES NFR BLD: 1.13 K/UL (ref 1.5–4)
LYMPHOCYTES RELATIVE PERCENT: 9 % (ref 20–42)
MCH RBC QN AUTO: 26.5 PG (ref 26–35)
MCHC RBC AUTO-ENTMCNC: 31.2 G/DL (ref 32–34.5)
MCV RBC AUTO: 85 FL (ref 80–99.9)
MONOCYTES NFR BLD: 0.7 K/UL (ref 0.1–0.95)
MONOCYTES NFR BLD: 6 % (ref 2–12)
NEUTROPHILS NFR BLD: 84 % (ref 43–80)
NEUTS SEG NFR BLD: 10.26 K/UL (ref 1.8–7.3)
PLATELET # BLD AUTO: 304 K/UL (ref 130–450)
PMV BLD AUTO: 9.1 FL (ref 7–12)
POTASSIUM SERPL-SCNC: 3.8 MMOL/L (ref 3.5–5)
RBC # BLD AUTO: 4.87 M/UL (ref 3.8–5.8)
SODIUM SERPL-SCNC: 137 MMOL/L (ref 132–146)
WBC OTHER # BLD: 12.2 K/UL (ref 4.5–11.5)

## 2024-09-05 PROCEDURE — 80048 BASIC METABOLIC PNL TOTAL CA: CPT

## 2024-09-05 PROCEDURE — 85025 COMPLETE CBC W/AUTO DIFF WBC: CPT

## 2024-09-05 PROCEDURE — 99285 EMERGENCY DEPT VISIT HI MDM: CPT

## 2024-09-05 PROCEDURE — 93970 EXTREMITY STUDY: CPT

## 2024-09-05 PROCEDURE — 73590 X-RAY EXAM OF LOWER LEG: CPT

## 2024-09-05 PROCEDURE — 73521 X-RAY EXAM HIPS BI 2 VIEWS: CPT

## 2024-09-05 PROCEDURE — 6370000000 HC RX 637 (ALT 250 FOR IP)

## 2024-09-05 RX ORDER — HYDROCODONE BITARTRATE AND ACETAMINOPHEN 5; 325 MG/1; MG/1
1 TABLET ORAL ONCE
Status: COMPLETED | OUTPATIENT
Start: 2024-09-05 | End: 2024-09-05

## 2024-09-05 RX ADMIN — HYDROCODONE BITARTRATE AND ACETAMINOPHEN 1 TABLET: 5; 325 TABLET ORAL at 21:12

## 2024-09-06 PROBLEM — R26.2 AMBULATORY DYSFUNCTION: Status: ACTIVE | Noted: 2024-09-06

## 2024-09-06 LAB
ALBUMIN SERPL-MCNC: 3.2 G/DL (ref 3.5–5.2)
ALP SERPL-CCNC: 58 U/L (ref 40–129)
ALT SERPL-CCNC: 12 U/L (ref 0–40)
ANION GAP SERPL CALCULATED.3IONS-SCNC: 18 MMOL/L (ref 7–16)
AST SERPL-CCNC: 22 U/L (ref 0–39)
BASOPHILS # BLD: 0.02 K/UL (ref 0–0.2)
BASOPHILS NFR BLD: 0 % (ref 0–2)
BILIRUB SERPL-MCNC: 0.6 MG/DL (ref 0–1.2)
BUN SERPL-MCNC: 18 MG/DL (ref 6–23)
CALCIUM SERPL-MCNC: 9 MG/DL (ref 8.6–10.2)
CHLORIDE SERPL-SCNC: 102 MMOL/L (ref 98–107)
CHOLEST SERPL-MCNC: 166 MG/DL
CO2 SERPL-SCNC: 19 MMOL/L (ref 22–29)
CREAT SERPL-MCNC: 0.7 MG/DL (ref 0.7–1.2)
EOSINOPHIL # BLD: 0.08 K/UL (ref 0.05–0.5)
EOSINOPHILS RELATIVE PERCENT: 1 % (ref 0–6)
ERYTHROCYTE [DISTWIDTH] IN BLOOD BY AUTOMATED COUNT: 17.2 % (ref 11.5–15)
GFR, ESTIMATED: >90 ML/MIN/1.73M2
GLUCOSE SERPL-MCNC: 103 MG/DL (ref 74–99)
HBA1C MFR BLD: 6 % (ref 4–5.6)
HCT VFR BLD AUTO: 38.6 % (ref 37–54)
HDLC SERPL-MCNC: 61 MG/DL
HGB BLD-MCNC: 12.1 G/DL (ref 12.5–16.5)
IMM GRANULOCYTES # BLD AUTO: 0.03 K/UL (ref 0–0.58)
IMM GRANULOCYTES NFR BLD: 0 % (ref 0–5)
LDLC SERPL CALC-MCNC: 96 MG/DL
LYMPHOCYTES NFR BLD: 0.86 K/UL (ref 1.5–4)
LYMPHOCYTES RELATIVE PERCENT: 10 % (ref 20–42)
MAGNESIUM SERPL-MCNC: 1.6 MG/DL (ref 1.6–2.6)
MCH RBC QN AUTO: 27.2 PG (ref 26–35)
MCHC RBC AUTO-ENTMCNC: 31.3 G/DL (ref 32–34.5)
MCV RBC AUTO: 86.7 FL (ref 80–99.9)
MONOCYTES NFR BLD: 0.9 K/UL (ref 0.1–0.95)
MONOCYTES NFR BLD: 11 % (ref 2–12)
NEUTROPHILS NFR BLD: 77 % (ref 43–80)
NEUTS SEG NFR BLD: 6.47 K/UL (ref 1.8–7.3)
PLATELET # BLD AUTO: 242 K/UL (ref 130–450)
PMV BLD AUTO: 9.5 FL (ref 7–12)
POTASSIUM SERPL-SCNC: 5 MMOL/L (ref 3.5–5)
PROT SERPL-MCNC: 8 G/DL (ref 6.4–8.3)
RBC # BLD AUTO: 4.45 M/UL (ref 3.8–5.8)
SODIUM SERPL-SCNC: 139 MMOL/L (ref 132–146)
TRIGL SERPL-MCNC: 45 MG/DL
VLDLC SERPL CALC-MCNC: 9 MG/DL
WBC OTHER # BLD: 8.4 K/UL (ref 4.5–11.5)

## 2024-09-06 PROCEDURE — 85025 COMPLETE CBC W/AUTO DIFF WBC: CPT

## 2024-09-06 PROCEDURE — 97530 THERAPEUTIC ACTIVITIES: CPT

## 2024-09-06 PROCEDURE — 6370000000 HC RX 637 (ALT 250 FOR IP)

## 2024-09-06 PROCEDURE — 97161 PT EVAL LOW COMPLEX 20 MIN: CPT

## 2024-09-06 PROCEDURE — 6360000002 HC RX W HCPCS

## 2024-09-06 PROCEDURE — 6360000002 HC RX W HCPCS: Performed by: INTERNAL MEDICINE

## 2024-09-06 PROCEDURE — 80053 COMPREHEN METABOLIC PANEL: CPT

## 2024-09-06 PROCEDURE — 82180 ASSAY OF ASCORBIC ACID: CPT

## 2024-09-06 PROCEDURE — 97165 OT EVAL LOW COMPLEX 30 MIN: CPT

## 2024-09-06 PROCEDURE — 2580000003 HC RX 258

## 2024-09-06 PROCEDURE — 83735 ASSAY OF MAGNESIUM: CPT

## 2024-09-06 PROCEDURE — 97535 SELF CARE MNGMENT TRAINING: CPT

## 2024-09-06 PROCEDURE — 80061 LIPID PANEL: CPT

## 2024-09-06 PROCEDURE — 83036 HEMOGLOBIN GLYCOSYLATED A1C: CPT

## 2024-09-06 PROCEDURE — 36415 COLL VENOUS BLD VENIPUNCTURE: CPT

## 2024-09-06 PROCEDURE — 99221 1ST HOSP IP/OBS SF/LOW 40: CPT | Performed by: INTERNAL MEDICINE

## 2024-09-06 PROCEDURE — 1200000000 HC SEMI PRIVATE

## 2024-09-06 RX ORDER — ACETAMINOPHEN 325 MG/1
650 TABLET ORAL EVERY 6 HOURS PRN
Status: DISCONTINUED | OUTPATIENT
Start: 2024-09-06 | End: 2024-09-09 | Stop reason: HOSPADM

## 2024-09-06 RX ORDER — SODIUM CHLORIDE 9 MG/ML
INJECTION, SOLUTION INTRAVENOUS PRN
Status: DISCONTINUED | OUTPATIENT
Start: 2024-09-06 | End: 2024-09-09 | Stop reason: HOSPADM

## 2024-09-06 RX ORDER — ZINC SULFATE 50(220)MG
50 CAPSULE ORAL DAILY
Status: DISCONTINUED | OUTPATIENT
Start: 2024-09-06 | End: 2024-09-09 | Stop reason: HOSPADM

## 2024-09-06 RX ORDER — ASCORBIC ACID 500 MG
500 TABLET ORAL DAILY
Status: DISCONTINUED | OUTPATIENT
Start: 2024-09-06 | End: 2024-09-09 | Stop reason: HOSPADM

## 2024-09-06 RX ORDER — SODIUM CHLORIDE 0.9 % (FLUSH) 0.9 %
5-40 SYRINGE (ML) INJECTION PRN
Status: DISCONTINUED | OUTPATIENT
Start: 2024-09-06 | End: 2024-09-09 | Stop reason: HOSPADM

## 2024-09-06 RX ORDER — POLYETHYLENE GLYCOL 3350 17 G/17G
17 POWDER, FOR SOLUTION ORAL DAILY PRN
Status: DISCONTINUED | OUTPATIENT
Start: 2024-09-06 | End: 2024-09-09 | Stop reason: HOSPADM

## 2024-09-06 RX ORDER — SODIUM CHLORIDE 0.9 % (FLUSH) 0.9 %
5-40 SYRINGE (ML) INJECTION EVERY 12 HOURS SCHEDULED
Status: DISCONTINUED | OUTPATIENT
Start: 2024-09-06 | End: 2024-09-09 | Stop reason: HOSPADM

## 2024-09-06 RX ORDER — ACETAMINOPHEN 650 MG/1
650 SUPPOSITORY RECTAL EVERY 6 HOURS PRN
Status: DISCONTINUED | OUTPATIENT
Start: 2024-09-06 | End: 2024-09-09 | Stop reason: HOSPADM

## 2024-09-06 RX ORDER — ONDANSETRON 2 MG/ML
4 INJECTION INTRAMUSCULAR; INTRAVENOUS EVERY 6 HOURS PRN
Status: DISCONTINUED | OUTPATIENT
Start: 2024-09-06 | End: 2024-09-09 | Stop reason: HOSPADM

## 2024-09-06 RX ORDER — ASPIRIN 81 MG/1
81 TABLET ORAL DAILY
Status: DISCONTINUED | OUTPATIENT
Start: 2024-09-06 | End: 2024-09-09 | Stop reason: HOSPADM

## 2024-09-06 RX ORDER — CHOLECALCIFEROL (VITAMIN D3) 50 MCG
2000 TABLET ORAL DAILY
Status: DISCONTINUED | OUTPATIENT
Start: 2024-09-06 | End: 2024-09-09 | Stop reason: HOSPADM

## 2024-09-06 RX ORDER — ASPIRIN 81 MG/1
81 TABLET, CHEWABLE ORAL DAILY
COMMUNITY

## 2024-09-06 RX ORDER — ONDANSETRON 4 MG/1
4 TABLET, ORALLY DISINTEGRATING ORAL EVERY 8 HOURS PRN
Status: DISCONTINUED | OUTPATIENT
Start: 2024-09-06 | End: 2024-09-09 | Stop reason: HOSPADM

## 2024-09-06 RX ORDER — ENOXAPARIN SODIUM 100 MG/ML
40 INJECTION SUBCUTANEOUS DAILY
Status: DISCONTINUED | OUTPATIENT
Start: 2024-09-06 | End: 2024-09-06

## 2024-09-06 RX ORDER — TRAMADOL HYDROCHLORIDE 50 MG/1
1 TABLET ORAL EVERY 6 HOURS PRN
COMMUNITY
Start: 2024-08-29

## 2024-09-06 RX ORDER — ERGOCALCIFEROL 1.25 MG/1
50000 CAPSULE, LIQUID FILLED ORAL WEEKLY
Status: DISCONTINUED | OUTPATIENT
Start: 2024-09-12 | End: 2024-09-09 | Stop reason: HOSPADM

## 2024-09-06 RX ORDER — ENOXAPARIN SODIUM 100 MG/ML
40 INJECTION SUBCUTANEOUS 2 TIMES DAILY
Status: DISCONTINUED | OUTPATIENT
Start: 2024-09-06 | End: 2024-09-09 | Stop reason: HOSPADM

## 2024-09-06 RX ADMIN — Medication 500 MG: at 09:05

## 2024-09-06 RX ADMIN — ENOXAPARIN SODIUM 40 MG: 100 INJECTION SUBCUTANEOUS at 20:47

## 2024-09-06 RX ADMIN — SODIUM CHLORIDE, PRESERVATIVE FREE 10 ML: 5 INJECTION INTRAVENOUS at 09:10

## 2024-09-06 RX ADMIN — Medication 2000 UNITS: at 09:05

## 2024-09-06 RX ADMIN — SODIUM CHLORIDE, PRESERVATIVE FREE 10 ML: 5 INJECTION INTRAVENOUS at 20:47

## 2024-09-06 RX ADMIN — Medication 50 MG: at 09:05

## 2024-09-06 RX ADMIN — ENOXAPARIN SODIUM 40 MG: 100 INJECTION SUBCUTANEOUS at 09:05

## 2024-09-06 RX ADMIN — ASPIRIN 81 MG: 81 TABLET, COATED ORAL at 09:04

## 2024-09-06 ASSESSMENT — PAIN DESCRIPTION - LOCATION: LOCATION: LEG

## 2024-09-06 ASSESSMENT — PAIN SCALES - GENERAL: PAINLEVEL_OUTOF10: 10

## 2024-09-06 ASSESSMENT — PAIN DESCRIPTION - ORIENTATION: ORIENTATION: RIGHT;LEFT

## 2024-09-06 ASSESSMENT — PAIN DESCRIPTION - PAIN TYPE: TYPE: CHRONIC PAIN

## 2024-09-06 ASSESSMENT — PAIN DESCRIPTION - DESCRIPTORS: DESCRIPTORS: ACHING;HEAVINESS

## 2024-09-07 LAB
ALBUMIN SERPL-MCNC: 3.3 G/DL (ref 3.5–5.2)
ALP SERPL-CCNC: 52 U/L (ref 40–129)
ALT SERPL-CCNC: 9 U/L (ref 0–40)
ANION GAP SERPL CALCULATED.3IONS-SCNC: 15 MMOL/L (ref 7–16)
AST SERPL-CCNC: 11 U/L (ref 0–39)
BASOPHILS # BLD: 0.02 K/UL (ref 0–0.2)
BASOPHILS NFR BLD: 0 % (ref 0–2)
BILIRUB SERPL-MCNC: 0.4 MG/DL (ref 0–1.2)
BILIRUB UR QL STRIP: NEGATIVE
BUN SERPL-MCNC: 16 MG/DL (ref 6–23)
CALCIUM SERPL-MCNC: 8.7 MG/DL (ref 8.6–10.2)
CHLORIDE SERPL-SCNC: 102 MMOL/L (ref 98–107)
CLARITY UR: CLEAR
CO2 SERPL-SCNC: 23 MMOL/L (ref 22–29)
COLOR UR: YELLOW
CREAT SERPL-MCNC: 0.8 MG/DL (ref 0.7–1.2)
EOSINOPHIL # BLD: 0.1 K/UL (ref 0.05–0.5)
EOSINOPHILS RELATIVE PERCENT: 2 % (ref 0–6)
ERYTHROCYTE [DISTWIDTH] IN BLOOD BY AUTOMATED COUNT: 17 % (ref 11.5–15)
GFR, ESTIMATED: >90 ML/MIN/1.73M2
GLUCOSE SERPL-MCNC: 91 MG/DL (ref 74–99)
GLUCOSE UR STRIP-MCNC: NEGATIVE MG/DL
HCT VFR BLD AUTO: 35.8 % (ref 37–54)
HGB BLD-MCNC: 11.3 G/DL (ref 12.5–16.5)
HGB UR QL STRIP.AUTO: ABNORMAL
IMM GRANULOCYTES # BLD AUTO: <0.03 K/UL (ref 0–0.58)
IMM GRANULOCYTES NFR BLD: 0 % (ref 0–5)
KETONES UR STRIP-MCNC: NEGATIVE MG/DL
LEUKOCYTE ESTERASE UR QL STRIP: NEGATIVE
LYMPHOCYTES NFR BLD: 1.04 K/UL (ref 1.5–4)
LYMPHOCYTES RELATIVE PERCENT: 17 % (ref 20–42)
MCH RBC QN AUTO: 27.2 PG (ref 26–35)
MCHC RBC AUTO-ENTMCNC: 31.6 G/DL (ref 32–34.5)
MCV RBC AUTO: 86.3 FL (ref 80–99.9)
MONOCYTES NFR BLD: 0.78 K/UL (ref 0.1–0.95)
MONOCYTES NFR BLD: 13 % (ref 2–12)
NEUTROPHILS NFR BLD: 69 % (ref 43–80)
NEUTS SEG NFR BLD: 4.25 K/UL (ref 1.8–7.3)
NITRITE UR QL STRIP: NEGATIVE
PH UR STRIP: 6.5 [PH] (ref 5–9)
PLATELET # BLD AUTO: 250 K/UL (ref 130–450)
PMV BLD AUTO: 9.3 FL (ref 7–12)
POTASSIUM SERPL-SCNC: 3.9 MMOL/L (ref 3.5–5)
PROT SERPL-MCNC: 7.3 G/DL (ref 6.4–8.3)
PROT UR STRIP-MCNC: NEGATIVE MG/DL
RBC # BLD AUTO: 4.15 M/UL (ref 3.8–5.8)
RBC #/AREA URNS HPF: ABNORMAL /HPF
SODIUM SERPL-SCNC: 140 MMOL/L (ref 132–146)
SP GR UR STRIP: 1.02 (ref 1–1.03)
UROBILINOGEN UR STRIP-ACNC: 0.2 EU/DL (ref 0–1)
WBC #/AREA URNS HPF: ABNORMAL /HPF
WBC OTHER # BLD: 6.2 K/UL (ref 4.5–11.5)

## 2024-09-07 PROCEDURE — 36415 COLL VENOUS BLD VENIPUNCTURE: CPT

## 2024-09-07 PROCEDURE — 6360000002 HC RX W HCPCS: Performed by: INTERNAL MEDICINE

## 2024-09-07 PROCEDURE — 85025 COMPLETE CBC W/AUTO DIFF WBC: CPT

## 2024-09-07 PROCEDURE — 6370000000 HC RX 637 (ALT 250 FOR IP)

## 2024-09-07 PROCEDURE — 81001 URINALYSIS AUTO W/SCOPE: CPT

## 2024-09-07 PROCEDURE — 2580000003 HC RX 258

## 2024-09-07 PROCEDURE — 80053 COMPREHEN METABOLIC PANEL: CPT

## 2024-09-07 PROCEDURE — 99232 SBSQ HOSP IP/OBS MODERATE 35: CPT | Performed by: INTERNAL MEDICINE

## 2024-09-07 PROCEDURE — 1200000000 HC SEMI PRIVATE

## 2024-09-07 RX ADMIN — ACETAMINOPHEN 650 MG: 325 TABLET ORAL at 21:03

## 2024-09-07 RX ADMIN — ASPIRIN 81 MG: 81 TABLET, COATED ORAL at 08:34

## 2024-09-07 RX ADMIN — SODIUM CHLORIDE, PRESERVATIVE FREE 10 ML: 5 INJECTION INTRAVENOUS at 08:43

## 2024-09-07 RX ADMIN — Medication 500 MG: at 08:34

## 2024-09-07 RX ADMIN — ENOXAPARIN SODIUM 40 MG: 100 INJECTION SUBCUTANEOUS at 21:03

## 2024-09-07 RX ADMIN — SODIUM CHLORIDE, PRESERVATIVE FREE 10 ML: 5 INJECTION INTRAVENOUS at 21:06

## 2024-09-07 RX ADMIN — Medication 2000 UNITS: at 08:34

## 2024-09-07 RX ADMIN — ENOXAPARIN SODIUM 40 MG: 100 INJECTION SUBCUTANEOUS at 08:33

## 2024-09-07 RX ADMIN — Medication 50 MG: at 08:34

## 2024-09-07 ASSESSMENT — PAIN DESCRIPTION - PAIN TYPE: TYPE: CHRONIC PAIN

## 2024-09-07 ASSESSMENT — PAIN SCALES - GENERAL
PAINLEVEL_OUTOF10: 5
PAINLEVEL_OUTOF10: 8
PAINLEVEL_OUTOF10: 7

## 2024-09-07 ASSESSMENT — PAIN DESCRIPTION - ONSET: ONSET: ON-GOING

## 2024-09-07 ASSESSMENT — PAIN DESCRIPTION - DESCRIPTORS: DESCRIPTORS: ACHING;DISCOMFORT;HEAVINESS

## 2024-09-07 ASSESSMENT — PAIN DESCRIPTION - LOCATION: LOCATION: LEG

## 2024-09-07 ASSESSMENT — PAIN DESCRIPTION - ORIENTATION: ORIENTATION: RIGHT

## 2024-09-07 ASSESSMENT — PAIN - FUNCTIONAL ASSESSMENT: PAIN_FUNCTIONAL_ASSESSMENT: PREVENTS OR INTERFERES SOME ACTIVE ACTIVITIES AND ADLS

## 2024-09-08 VITALS
WEIGHT: 315 LBS | BODY MASS INDEX: 39.17 KG/M2 | HEIGHT: 75 IN | TEMPERATURE: 96.5 F | SYSTOLIC BLOOD PRESSURE: 126 MMHG | RESPIRATION RATE: 18 BRPM | DIASTOLIC BLOOD PRESSURE: 73 MMHG | HEART RATE: 76 BPM | OXYGEN SATURATION: 99 %

## 2024-09-08 LAB
ALBUMIN SERPL-MCNC: 3.4 G/DL (ref 3.5–5.2)
ALP SERPL-CCNC: 49 U/L (ref 40–129)
ALT SERPL-CCNC: 8 U/L (ref 0–40)
ANION GAP SERPL CALCULATED.3IONS-SCNC: 13 MMOL/L (ref 7–16)
AST SERPL-CCNC: 11 U/L (ref 0–39)
BASOPHILS # BLD: 0.02 K/UL (ref 0–0.2)
BASOPHILS NFR BLD: 0 % (ref 0–2)
BILIRUB SERPL-MCNC: 0.3 MG/DL (ref 0–1.2)
BUN SERPL-MCNC: 12 MG/DL (ref 6–23)
CALCIUM SERPL-MCNC: 8.3 MG/DL (ref 8.6–10.2)
CHLORIDE SERPL-SCNC: 102 MMOL/L (ref 98–107)
CO2 SERPL-SCNC: 24 MMOL/L (ref 22–29)
CREAT SERPL-MCNC: 0.7 MG/DL (ref 0.7–1.2)
EOSINOPHIL # BLD: 0.15 K/UL (ref 0.05–0.5)
EOSINOPHILS RELATIVE PERCENT: 3 % (ref 0–6)
ERYTHROCYTE [DISTWIDTH] IN BLOOD BY AUTOMATED COUNT: 16.9 % (ref 11.5–15)
GFR, ESTIMATED: >90 ML/MIN/1.73M2
GLUCOSE SERPL-MCNC: 91 MG/DL (ref 74–99)
HCT VFR BLD AUTO: 37.2 % (ref 37–54)
HGB BLD-MCNC: 11.4 G/DL (ref 12.5–16.5)
IMM GRANULOCYTES # BLD AUTO: <0.03 K/UL (ref 0–0.58)
IMM GRANULOCYTES NFR BLD: 0 % (ref 0–5)
LYMPHOCYTES NFR BLD: 1.34 K/UL (ref 1.5–4)
LYMPHOCYTES RELATIVE PERCENT: 26 % (ref 20–42)
MCH RBC QN AUTO: 26.3 PG (ref 26–35)
MCHC RBC AUTO-ENTMCNC: 30.6 G/DL (ref 32–34.5)
MCV RBC AUTO: 85.9 FL (ref 80–99.9)
MONOCYTES NFR BLD: 0.65 K/UL (ref 0.1–0.95)
MONOCYTES NFR BLD: 13 % (ref 2–12)
NEUTROPHILS NFR BLD: 57 % (ref 43–80)
NEUTS SEG NFR BLD: 2.93 K/UL (ref 1.8–7.3)
PLATELET # BLD AUTO: 273 K/UL (ref 130–450)
PMV BLD AUTO: 9.3 FL (ref 7–12)
POTASSIUM SERPL-SCNC: 4 MMOL/L (ref 3.5–5)
PROT SERPL-MCNC: 7.2 G/DL (ref 6.4–8.3)
RBC # BLD AUTO: 4.33 M/UL (ref 3.8–5.8)
SODIUM SERPL-SCNC: 139 MMOL/L (ref 132–146)
WBC OTHER # BLD: 5.1 K/UL (ref 4.5–11.5)

## 2024-09-08 PROCEDURE — 6370000000 HC RX 637 (ALT 250 FOR IP)

## 2024-09-08 PROCEDURE — 6360000002 HC RX W HCPCS: Performed by: INTERNAL MEDICINE

## 2024-09-08 PROCEDURE — 6370000000 HC RX 637 (ALT 250 FOR IP): Performed by: INTERNAL MEDICINE

## 2024-09-08 PROCEDURE — 2580000003 HC RX 258

## 2024-09-08 PROCEDURE — 99232 SBSQ HOSP IP/OBS MODERATE 35: CPT | Performed by: INTERNAL MEDICINE

## 2024-09-08 PROCEDURE — 36415 COLL VENOUS BLD VENIPUNCTURE: CPT

## 2024-09-08 PROCEDURE — 80053 COMPREHEN METABOLIC PANEL: CPT

## 2024-09-08 PROCEDURE — 85025 COMPLETE CBC W/AUTO DIFF WBC: CPT

## 2024-09-08 RX ORDER — ENOXAPARIN SODIUM 100 MG/ML
40 INJECTION SUBCUTANEOUS 2 TIMES DAILY
DISCHARGE
Start: 2024-09-08 | End: 2024-09-22

## 2024-09-08 RX ORDER — ASCORBIC ACID 500 MG
500 TABLET ORAL DAILY
DISCHARGE
Start: 2024-09-09

## 2024-09-08 RX ORDER — ZINC SULFATE 50(220)MG
50 CAPSULE ORAL DAILY
DISCHARGE
Start: 2024-09-09

## 2024-09-08 RX ADMIN — Medication 50 MG: at 08:43

## 2024-09-08 RX ADMIN — PETROLATUM: 420 OINTMENT TOPICAL at 09:29

## 2024-09-08 RX ADMIN — SODIUM CHLORIDE, PRESERVATIVE FREE 10 ML: 5 INJECTION INTRAVENOUS at 08:43

## 2024-09-08 RX ADMIN — ENOXAPARIN SODIUM 40 MG: 100 INJECTION SUBCUTANEOUS at 20:43

## 2024-09-08 RX ADMIN — Medication 2000 UNITS: at 08:43

## 2024-09-08 RX ADMIN — ENOXAPARIN SODIUM 40 MG: 100 INJECTION SUBCUTANEOUS at 08:42

## 2024-09-08 RX ADMIN — POLYETHYLENE GLYCOL 3350 17 G: 17 POWDER, FOR SOLUTION ORAL at 13:50

## 2024-09-08 RX ADMIN — Medication 500 MG: at 08:42

## 2024-09-08 RX ADMIN — ASPIRIN 81 MG: 81 TABLET, COATED ORAL at 08:43

## 2024-09-08 ASSESSMENT — PAIN SCALES - GENERAL: PAINLEVEL_OUTOF10: 5

## 2024-09-10 LAB — VIT C SERPL-MCNC: 34 UMOL/L (ref 23–114)

## 2024-09-23 ENCOUNTER — HOSPITAL ENCOUNTER (OUTPATIENT)
Dept: WOUND CARE | Age: 74
Discharge: HOME | End: 2024-09-23
Attending: SURGERY
Payer: MEDICARE

## 2024-09-23 VITALS
SYSTOLIC BLOOD PRESSURE: 128 MMHG | BODY MASS INDEX: 39.17 KG/M2 | WEIGHT: 315 LBS | HEIGHT: 75 IN | TEMPERATURE: 96.7 F | RESPIRATION RATE: 18 BRPM | DIASTOLIC BLOOD PRESSURE: 76 MMHG | HEART RATE: 70 BPM

## 2024-09-23 DIAGNOSIS — L97.222 LOWER LIMB ULCER, CALF, LEFT, WITH FAT LAYER EXPOSED (HCC): Primary | ICD-10-CM

## 2024-09-23 PROCEDURE — 11042 DBRDMT SUBQ TIS 1ST 20SQCM/<: CPT | Performed by: SURGERY

## 2024-09-23 PROCEDURE — 11042 DBRDMT SUBQ TIS 1ST 20SQCM/<: CPT

## 2024-09-23 PROCEDURE — 11045 DBRDMT SUBQ TISS EACH ADDL: CPT | Performed by: SURGERY

## 2024-09-23 RX ORDER — BETAMETHASONE DIPROPIONATE 0.05 %
OINTMENT (GRAM) TOPICAL ONCE
OUTPATIENT
Start: 2024-09-23 | End: 2024-09-23

## 2024-09-23 RX ORDER — LIDOCAINE 40 MG/G
CREAM TOPICAL ONCE
OUTPATIENT
Start: 2024-09-23 | End: 2024-09-23

## 2024-09-23 RX ORDER — NEOMYCIN/BACITRACIN/POLYMYXINB 3.5-400-5K
OINTMENT (GRAM) TOPICAL ONCE
OUTPATIENT
Start: 2024-09-23 | End: 2024-09-23

## 2024-09-23 RX ORDER — LIDOCAINE 50 MG/G
OINTMENT TOPICAL ONCE
OUTPATIENT
Start: 2024-09-23 | End: 2024-09-23

## 2024-09-23 RX ORDER — LIDOCAINE HYDROCHLORIDE 20 MG/ML
JELLY TOPICAL ONCE
OUTPATIENT
Start: 2024-09-23 | End: 2024-09-23

## 2024-09-23 RX ORDER — TRIAMCINOLONE ACETONIDE 1 MG/G
OINTMENT TOPICAL ONCE
OUTPATIENT
Start: 2024-09-23 | End: 2024-09-23

## 2024-09-23 RX ORDER — CLOBETASOL PROPIONATE 0.5 MG/G
OINTMENT TOPICAL ONCE
OUTPATIENT
Start: 2024-09-23 | End: 2024-09-23

## 2024-09-23 RX ORDER — BACITRACIN ZINC 500 [USP'U]/G
OINTMENT TOPICAL ONCE
OUTPATIENT
Start: 2024-09-23 | End: 2024-09-23

## 2024-09-23 RX ORDER — MUPIROCIN 20 MG/G
OINTMENT TOPICAL ONCE
OUTPATIENT
Start: 2024-09-23 | End: 2024-09-23

## 2024-09-23 RX ORDER — LIDOCAINE HYDROCHLORIDE 40 MG/ML
SOLUTION TOPICAL ONCE
Status: COMPLETED | OUTPATIENT
Start: 2024-09-23 | End: 2024-09-23

## 2024-09-23 RX ORDER — SODIUM CHLOR/HYPOCHLOROUS ACID 0.033 %
SOLUTION, IRRIGATION IRRIGATION ONCE
OUTPATIENT
Start: 2024-09-23 | End: 2024-09-23

## 2024-09-23 RX ORDER — GENTAMICIN SULFATE 1 MG/G
OINTMENT TOPICAL ONCE
OUTPATIENT
Start: 2024-09-23 | End: 2024-09-23

## 2024-09-23 RX ORDER — LIDOCAINE HYDROCHLORIDE 40 MG/ML
SOLUTION TOPICAL ONCE
OUTPATIENT
Start: 2024-09-23 | End: 2024-09-23

## 2024-09-23 RX ORDER — SILVER SULFADIAZINE 10 MG/G
CREAM TOPICAL ONCE
OUTPATIENT
Start: 2024-09-23 | End: 2024-09-23

## 2024-09-23 RX ORDER — BACITRACIN ZINC AND POLYMYXIN B SULFATE 500; 1000 [USP'U]/G; [USP'U]/G
OINTMENT TOPICAL ONCE
OUTPATIENT
Start: 2024-09-23 | End: 2024-09-23

## 2024-09-23 RX ADMIN — LIDOCAINE HYDROCHLORIDE 10 ML: 40 SOLUTION TOPICAL at 13:15

## 2024-09-24 NOTE — DISCHARGE INSTRUCTIONS
Visit Discharge/Physician Orders     Discharge condition: Stable  Assessment of pain at discharge: yes  Anesthetic used: lidocaine 4%  Discharge to: [facility]   Left via: public transport   Accompanied by: self  ECF/HHA: [facility]     Dressing Orders: left lower leg(lateral) wound: Cleanse wound with Vashe, apply aquaphor to dry skin on legs. Apply Drawtex to wound bed and cover with ABD pad (or super absorber if available) and COBAN II from base of toes to base of knees- change every other day at the facility  (may use kerlix and coban if does not have COBAN II)   Keep wrap dry at all times. If wrap becomes wet or falls 2 inches or painful please remove wrap and replace as ordered.     9/23 When dressing removed please wash legs and feet before putting new wrap on.      Right lower leg (posterior)  wound: Healed- Spandgrip on Right leg- On in morning and off at night       Treatment Orders: 8/26- apply pillow under leg to avoid pressure to wound in bed   Please wash legs every  dressing change     8/19 Dietitian to follow at facility to order protein supplement and arginaid/valerie for wound healing.   Lymphedema pumps continue using   Keep pressure off of wound- attempt to rotate leg while keeping leg elevated   Elevate legs as much as possible.   EAT DIET WITH PROTEINS AND VITAMIN C  TAKE A MULTIVITAMIN DAILY IF NOT CONTRAINDICATED        [facility] followup visit : ________1 week PM_________  (Please note your next appointment above and if you are unable to keep, kindly give a 24 hour notice. Thank you.)     Physician signature:__________________________     If you experience any of the following, please call the Wound Care Center during business hours:     * Increase in Pain  * Temperature over 101  * Increase in drainage from your wound  * Drainage with a foul odor  * Bleeding  * Increase in swelling  * Need for compression bandage changes due to slippage, breakthrough drainage.     If you need

## 2024-09-30 ENCOUNTER — HOSPITAL ENCOUNTER (OUTPATIENT)
Dept: WOUND CARE | Age: 74
Discharge: HOME | End: 2024-09-30
Attending: SURGERY
Payer: MEDICARE

## 2024-09-30 VITALS
WEIGHT: 315 LBS | BODY MASS INDEX: 39.17 KG/M2 | RESPIRATION RATE: 20 BRPM | HEART RATE: 74 BPM | HEIGHT: 75 IN | DIASTOLIC BLOOD PRESSURE: 90 MMHG | TEMPERATURE: 97 F | SYSTOLIC BLOOD PRESSURE: 152 MMHG

## 2024-09-30 DIAGNOSIS — L97.222 LOWER LIMB ULCER, CALF, LEFT, WITH FAT LAYER EXPOSED (HCC): Primary | ICD-10-CM

## 2024-09-30 PROCEDURE — 11042 DBRDMT SUBQ TIS 1ST 20SQCM/<: CPT

## 2024-09-30 PROCEDURE — 11042 DBRDMT SUBQ TIS 1ST 20SQCM/<: CPT | Performed by: SURGERY

## 2024-09-30 RX ORDER — GENTAMICIN SULFATE 1 MG/G
OINTMENT TOPICAL ONCE
OUTPATIENT
Start: 2024-09-30 | End: 2024-09-30

## 2024-09-30 RX ORDER — SILVER SULFADIAZINE 10 MG/G
CREAM TOPICAL ONCE
OUTPATIENT
Start: 2024-09-30 | End: 2024-09-30

## 2024-09-30 RX ORDER — BACITRACIN ZINC AND POLYMYXIN B SULFATE 500; 1000 [USP'U]/G; [USP'U]/G
OINTMENT TOPICAL ONCE
OUTPATIENT
Start: 2024-09-30 | End: 2024-09-30

## 2024-09-30 RX ORDER — LIDOCAINE HYDROCHLORIDE 20 MG/ML
JELLY TOPICAL ONCE
OUTPATIENT
Start: 2024-09-30 | End: 2024-09-30

## 2024-09-30 RX ORDER — LIDOCAINE 50 MG/G
OINTMENT TOPICAL ONCE
OUTPATIENT
Start: 2024-09-30 | End: 2024-09-30

## 2024-09-30 RX ORDER — SODIUM CHLOR/HYPOCHLOROUS ACID 0.033 %
SOLUTION, IRRIGATION IRRIGATION ONCE
OUTPATIENT
Start: 2024-09-30 | End: 2024-09-30

## 2024-09-30 RX ORDER — CLOBETASOL PROPIONATE 0.5 MG/G
OINTMENT TOPICAL ONCE
OUTPATIENT
Start: 2024-09-30 | End: 2024-09-30

## 2024-09-30 RX ORDER — NEOMYCIN/BACITRACIN/POLYMYXINB 3.5-400-5K
OINTMENT (GRAM) TOPICAL ONCE
OUTPATIENT
Start: 2024-09-30 | End: 2024-09-30

## 2024-09-30 RX ORDER — MUPIROCIN 20 MG/G
OINTMENT TOPICAL ONCE
OUTPATIENT
Start: 2024-09-30 | End: 2024-09-30

## 2024-09-30 RX ORDER — LIDOCAINE HYDROCHLORIDE 40 MG/ML
SOLUTION TOPICAL ONCE
Status: COMPLETED | OUTPATIENT
Start: 2024-09-30 | End: 2024-09-30

## 2024-09-30 RX ORDER — BACITRACIN ZINC 500 [USP'U]/G
OINTMENT TOPICAL ONCE
OUTPATIENT
Start: 2024-09-30 | End: 2024-09-30

## 2024-09-30 RX ORDER — BETAMETHASONE DIPROPIONATE 0.05 %
OINTMENT (GRAM) TOPICAL ONCE
OUTPATIENT
Start: 2024-09-30 | End: 2024-09-30

## 2024-09-30 RX ORDER — LIDOCAINE HYDROCHLORIDE 40 MG/ML
SOLUTION TOPICAL ONCE
OUTPATIENT
Start: 2024-09-30 | End: 2024-09-30

## 2024-09-30 RX ORDER — LIDOCAINE 40 MG/G
CREAM TOPICAL ONCE
OUTPATIENT
Start: 2024-09-30 | End: 2024-09-30

## 2024-09-30 RX ORDER — TRIAMCINOLONE ACETONIDE 1 MG/G
OINTMENT TOPICAL ONCE
OUTPATIENT
Start: 2024-09-30 | End: 2024-09-30

## 2024-09-30 RX ADMIN — LIDOCAINE HYDROCHLORIDE 30 ML: 40 SOLUTION TOPICAL at 13:12

## 2024-09-30 NOTE — PROGRESS NOTES
Wound Healing Center Followup Visit Note    Referring Physician : Pankaj Corbett MD  Tori Mathew  MEDICAL RECORD NUMBER:  [id number]  AGE: 74 y.o.   GENDER: male  : 1950  EPISODE DATE:  2024    Subjective:     Chief Complaint   Patient presents with    Wound Check     \"Left leg\"      HISTORY of PRESENT ILLNESS HPI   Tori Mathew is a 74 y.o. male who presents today in regards to follow up evaluation and treatment of wound/ulcer.  That patient's past medical, family and social hx were reviewed and changes were made if present.    History of Wound Context:  The patient has had a wound of both calves, minimal skin on the right side, multiple ulcers on the left calf, fat layer exposed which was first noted approximately 2023.  This has been treated by the patient and his niece.  On their initial visit to the wound healing center, 10/23/23, the patient has noted that the wound has not been improving.  The patient has had similar previous wounds in the past.          Patient had lymphedema therapy in the past, with a lymphedema pump at home that the patient used to use on a regular basis, broke down,, is beyond repair and patient was recommended new lymphedema pump for which he was given the prescription     Pt is currently not on abx.     10/23/2023   I long detailed discussion the patient, options, risks benefits and alternatives were explained, patient recommend keep legs elevated decrease swelling component nutrition multivitamin supplement protein supplement were discussed  Because of underlying extensive dermatophytosis and tinea pedis, patient given short-term antifungal cream as well as oral Lamisil therapy  For now patient recommended compression wrap until the ulcers healed  Patient recommended, since his own lymphedema therapy pump broke down completely beyond repair, that he was using a regular basis in the past to keep the ulcerations from coming back and keep the

## 2024-10-07 ENCOUNTER — HOSPITAL ENCOUNTER (OUTPATIENT)
Dept: WOUND CARE | Age: 74
Discharge: HOME | End: 2024-10-07
Attending: SURGERY

## 2024-10-07 NOTE — DISCHARGE INSTRUCTIONS
Visit Discharge/Physician Orders     Discharge condition: Stable  Assessment of pain at discharge: yes  Anesthetic used: lidocaine 4%  Discharge to: [facility]   Left via: public transport   Accompanied by: self  ECF/HHA: [facility]     Dressing Orders: left lower leg(lateral) wound: Cleanse wound with Vashe, apply aquaphor to dry skin on legs. Apply Drawtex to wound bed and cover with ABD pad (or super absorber if available) and COBAN II from base of toes to base of knees- change every other day at the facility  (may use kerlix and coban if does not have COBAN II)   Keep wrap dry at all times. If wrap becomes wet or falls 2 inches or painful please remove wrap and replace as ordered.     9/23 When dressing removed please wash legs and feet before putting new wrap on.      Right lower leg (posterior)  wound: Healed- Spandgrip on Right leg- On in morning and off at night       Treatment Orders: 8/26- apply pillow under leg to avoid pressure to wound in bed   Please wash legs every  dressing change     8/19 Dietitian to follow at facility to order protein supplement and arginaid/valerie for wound healing.   Lymphedema pumps continue using   Keep pressure off of wound- attempt to rotate leg while keeping leg elevated   Elevate legs as much as possible.   EAT DIET WITH PROTEINS AND VITAMIN C  TAKE A MULTIVITAMIN DAILY IF NOT CONTRAINDICATED        [facility] followup visit : ________1 week PM_________  (Please note your next appointment above and if you are unable to keep, kindly give a 24 hour notice. Thank you.)     Physician signature:__________________________     If you experience any of the following, please call the Wound Care Center during business hours:     * Increase in Pain  * Temperature over 101  * Increase in drainage from your wound  * Drainage with a foul odor  * Bleeding  * Increase in swelling  * Need for compression bandage changes due to slippage, breakthrough drainage.     If you need

## 2024-10-14 ENCOUNTER — HOSPITAL ENCOUNTER (OUTPATIENT)
Dept: WOUND CARE | Age: 74
Discharge: HOME | End: 2024-10-14
Attending: SURGERY
Payer: MEDICARE

## 2024-10-14 VITALS
HEIGHT: 75 IN | RESPIRATION RATE: 20 BRPM | TEMPERATURE: 97.5 F | SYSTOLIC BLOOD PRESSURE: 150 MMHG | DIASTOLIC BLOOD PRESSURE: 78 MMHG | HEART RATE: 102 BPM | BODY MASS INDEX: 39.17 KG/M2 | WEIGHT: 315 LBS

## 2024-10-14 DIAGNOSIS — L97.212 LOWER LIMB ULCER, CALF, RIGHT, WITH FAT LAYER EXPOSED (HCC): ICD-10-CM

## 2024-10-14 DIAGNOSIS — L97.222 LOWER LIMB ULCER, CALF, LEFT, WITH FAT LAYER EXPOSED (HCC): Primary | ICD-10-CM

## 2024-10-14 PROCEDURE — 11042 DBRDMT SUBQ TIS 1ST 20SQCM/<: CPT

## 2024-10-14 PROCEDURE — 11042 DBRDMT SUBQ TIS 1ST 20SQCM/<: CPT | Performed by: SURGERY

## 2024-10-14 RX ORDER — LIDOCAINE HYDROCHLORIDE 20 MG/ML
JELLY TOPICAL ONCE
OUTPATIENT
Start: 2024-10-14 | End: 2024-10-14

## 2024-10-14 RX ORDER — GENTAMICIN SULFATE 1 MG/G
OINTMENT TOPICAL ONCE
OUTPATIENT
Start: 2024-10-14 | End: 2024-10-14

## 2024-10-14 RX ORDER — BACITRACIN ZINC AND POLYMYXIN B SULFATE 500; 1000 [USP'U]/G; [USP'U]/G
OINTMENT TOPICAL ONCE
OUTPATIENT
Start: 2024-10-14 | End: 2024-10-14

## 2024-10-14 RX ORDER — CLOBETASOL PROPIONATE 0.5 MG/G
OINTMENT TOPICAL ONCE
OUTPATIENT
Start: 2024-10-14 | End: 2024-10-14

## 2024-10-14 RX ORDER — NEOMYCIN/BACITRACIN/POLYMYXINB 3.5-400-5K
OINTMENT (GRAM) TOPICAL ONCE
OUTPATIENT
Start: 2024-10-14 | End: 2024-10-14

## 2024-10-14 RX ORDER — MUPIROCIN 20 MG/G
OINTMENT TOPICAL ONCE
OUTPATIENT
Start: 2024-10-14 | End: 2024-10-14

## 2024-10-14 RX ORDER — SILVER SULFADIAZINE 10 MG/G
CREAM TOPICAL ONCE
OUTPATIENT
Start: 2024-10-14 | End: 2024-10-14

## 2024-10-14 RX ORDER — BETAMETHASONE DIPROPIONATE 0.05 %
OINTMENT (GRAM) TOPICAL ONCE
OUTPATIENT
Start: 2024-10-14 | End: 2024-10-14

## 2024-10-14 RX ORDER — TRIAMCINOLONE ACETONIDE 1 MG/G
OINTMENT TOPICAL ONCE
OUTPATIENT
Start: 2024-10-14 | End: 2024-10-14

## 2024-10-14 RX ORDER — LIDOCAINE HYDROCHLORIDE 40 MG/ML
SOLUTION TOPICAL ONCE
Status: COMPLETED | OUTPATIENT
Start: 2024-10-14 | End: 2024-10-14

## 2024-10-14 RX ORDER — LIDOCAINE HYDROCHLORIDE 40 MG/ML
SOLUTION TOPICAL ONCE
OUTPATIENT
Start: 2024-10-14 | End: 2024-10-14

## 2024-10-14 RX ORDER — LIDOCAINE 40 MG/G
CREAM TOPICAL ONCE
OUTPATIENT
Start: 2024-10-14 | End: 2024-10-14

## 2024-10-14 RX ORDER — SODIUM CHLOR/HYPOCHLOROUS ACID 0.033 %
SOLUTION, IRRIGATION IRRIGATION ONCE
OUTPATIENT
Start: 2024-10-14 | End: 2024-10-14

## 2024-10-14 RX ORDER — BACITRACIN ZINC 500 [USP'U]/G
OINTMENT TOPICAL ONCE
OUTPATIENT
Start: 2024-10-14 | End: 2024-10-14

## 2024-10-14 RX ORDER — LIDOCAINE 50 MG/G
OINTMENT TOPICAL ONCE
OUTPATIENT
Start: 2024-10-14 | End: 2024-10-14

## 2024-10-14 RX ADMIN — LIDOCAINE HYDROCHLORIDE 10 ML: 40 SOLUTION TOPICAL at 13:32

## 2024-10-14 NOTE — DISCHARGE INSTRUCTIONS
Visit Discharge/Physician Orders     Discharge condition: Stable  Assessment of pain at discharge: yes  Anesthetic used: lidocaine 4%  Discharge to: [facility]   Left via: public transport   Accompanied by: self  ECF/HHA: [facility]     Dressing Orders: right medial leg AND left /lateral  leg wound: Cleanse wound with Vashe, apply aquaphor to dry skin on legs. Apply Drawtex to wound bed and cover with ABD pad (or super absorber if available) and COBAN II from base of toes to base of knees- change every other day at the facility  (may use kerlix and coban if does not have COBAN II)   Keep wrap dry at all times. If wrap becomes wet or falls 2 inches or painful please remove wrap and replace as ordered.     9/23 When dressing removed please wash legs and feet before putting new wrap on.      Treatment Orders: 8/26- apply pillow under leg to avoid pressure to wound in bed   Please wash legs every  dressing change     8/19 Dietitian to follow at facility to order protein supplement and arginaid/valerie for wound healing.   Lymphedema pumps continue using   Keep pressure off of wound- attempt to rotate leg while keeping leg elevated   Elevate legs as much as possible.   EAT DIET WITH PROTEINS AND VITAMIN C  TAKE A MULTIVITAMIN DAILY IF NOT CONTRAINDICATED        [facility] followup visit : ________1 week PM_________  (Please note your next appointment above and if you are unable to keep, kindly give a 24 hour notice. Thank you.)     Physician signature:__________________________     If you experience any of the following, please call the Wound Care Center during business hours:     * Increase in Pain  * Temperature over 101  * Increase in drainage from your wound  * Drainage with a foul odor  * Bleeding  * Increase in swelling  * Need for compression bandage changes due to slippage, breakthrough drainage.     If you need medical attention outside of the business hours of the Wound Care Centers please

## 2024-10-14 NOTE — PROGRESS NOTES
Wound Healing Center Followup Visit Note    Referring Physician : Pankaj Corbett MD  Tori Mathew  MEDICAL RECORD NUMBER:  [id number]  AGE: 74 y.o.   GENDER: male  : 1950  EPISODE DATE:  10/14/2024    Subjective:     Chief Complaint   Patient presents with    Wound Check     Left leg      HISTORY of PRESENT ILLNESS HPI   Tori Mathew is a 74 y.o. male who presents today in regards to follow up evaluation and treatment of wound/ulcer.  That patient's past medical, family and social hx were reviewed and changes were made if present.    History of Wound Context:  The patient has had a wound of both calves, minimal skin on the right side, multiple ulcers on the left calf, fat layer exposed which was first noted approximately 2023.  This has been treated by the patient and his niece.  On their initial visit to the wound healing center, 10/23/23, the patient has noted that the wound has not been improving.  The patient has had similar previous wounds in the past.          Patient had lymphedema therapy in the past, with a lymphedema pump at home that the patient used to use on a regular basis, broke down,, is beyond repair and patient was recommended new lymphedema pump for which he was given the prescription     Pt is currently not on abx.     10/23/2023   I long detailed discussion the patient, options, risks benefits and alternatives were explained, patient recommend keep legs elevated decrease swelling component nutrition multivitamin supplement protein supplement were discussed  Because of underlying extensive dermatophytosis and tinea pedis, patient given short-term antifungal cream as well as oral Lamisil therapy  For now patient recommended compression wrap until the ulcers healed  Patient recommended, since his own lymphedema therapy pump broke down completely beyond repair, that he was using a regular basis in the past to keep the ulcerations from coming back and keep the

## 2024-10-21 ENCOUNTER — HOSPITAL ENCOUNTER (OUTPATIENT)
Dept: WOUND CARE | Age: 74
Discharge: HOME | End: 2024-10-21
Attending: SURGERY
Payer: MEDICARE

## 2024-10-21 VITALS
RESPIRATION RATE: 20 BRPM | DIASTOLIC BLOOD PRESSURE: 88 MMHG | SYSTOLIC BLOOD PRESSURE: 170 MMHG | TEMPERATURE: 98.5 F | HEART RATE: 98 BPM

## 2024-10-21 DIAGNOSIS — L97.221 LOWER LIMB ULCER, CALF, LEFT, LIMITED TO BREAKDOWN OF SKIN (HCC): Primary | ICD-10-CM

## 2024-10-21 DIAGNOSIS — L97.212 LOWER LIMB ULCER, CALF, RIGHT, WITH FAT LAYER EXPOSED (HCC): ICD-10-CM

## 2024-10-21 PROBLEM — R53.1 WEAKNESS: Status: RESOLVED | Noted: 2024-08-05 | Resolved: 2024-10-21

## 2024-10-21 PROBLEM — L03.90 CELLULITIS: Status: RESOLVED | Noted: 2024-08-07 | Resolved: 2024-10-21

## 2024-10-21 PROCEDURE — 87077 CULTURE AEROBIC IDENTIFY: CPT

## 2024-10-21 PROCEDURE — 11042 DBRDMT SUBQ TIS 1ST 20SQCM/<: CPT

## 2024-10-21 PROCEDURE — 87070 CULTURE OTHR SPECIMN AEROBIC: CPT

## 2024-10-21 PROCEDURE — 86403 PARTICLE AGGLUT ANTBDY SCRN: CPT

## 2024-10-21 PROCEDURE — 87205 SMEAR GRAM STAIN: CPT

## 2024-10-21 PROCEDURE — 11042 DBRDMT SUBQ TIS 1ST 20SQCM/<: CPT | Performed by: SURGERY

## 2024-10-21 NOTE — DISCHARGE INSTRUCTIONS
Visit Discharge/Physician Orders     Discharge condition: Stable  Assessment of pain at discharge: yes  Anesthetic used: lidocaine 4%  Discharge to: [facility]  Left via: public transport   Accompanied by: self  ECF/HHA: [facility]     Dressing Orders: right medial leg AND left lateral  leg wound: Cleanse wound with Vashe, apply aquaphor to dry skin on legs. Apply Drawtex to wound bed and cover with ABD pad (or super absorber if available) and COBAN II from base of toes to base of knees- change every other day at the facility  (may use kerlix and coban if does not have COBAN II)   Keep wrap dry at all times. If wrap becomes wet or falls 2 inches or painful please remove wrap and replace as ordered.     9/23 When dressing removed please wash legs and feet before putting new wrap on.      Treatment Orders: 10/21 Culture taken to left medial leg wound  8/26- apply pillow under leg to avoid pressure to wound in bed   Please wash legs every  dressing change     8/19 Dietitian to follow at facility to order protein supplement and arginaid/valerie for wound healing.   Lymphedema pumps continue using   Keep pressure off of wound- attempt to rotate leg while keeping leg elevated   Elevate legs as much as possible.   EAT DIET WITH PROTEINS AND VITAMIN C  TAKE A MULTIVITAMIN DAILY IF NOT CONTRAINDICATED        [facility] followup visit : ________1 week PM_________  (Please note your next appointment above and if you are unable to keep, kindly give a 24 hour notice. Thank you.)     Physician signature:__________________________     If you experience any of the following, please call the Wound Care Center during business hours:     * Increase in Pain  * Temperature over 101  * Increase in drainage from your wound  * Drainage with a foul odor  * Bleeding  * Increase in swelling  * Need for compression bandage changes due to slippage, breakthrough drainage.     If you need medical attention outside of the business hours of the Wound

## 2024-10-22 NOTE — DISCHARGE INSTRUCTIONS
Visit Discharge/Physician Orders     Discharge condition: Stable  Assessment of pain at discharge: yes  Anesthetic used: lidocaine 4%  Discharge to: [facility]  Left via: public transport   Accompanied by: self  ECF/HHA: [facility]     Dressing Orders: right medial leg AND left lateral  leg wound: Cleanse wound with Vashe, apply aquaphor to dry skin on legs. Apply Drawtex to wound bed and cover with ABD pad (or super absorber if available) and COBAN II from base of toes to base of knees- change every other day at the facility  (may use kerlix and coban if does not have COBAN II)   Keep wrap dry at all times. If wrap becomes wet or falls 2 inches or painful please remove wrap and replace as ordered.     9/23 When dressing removed please wash legs and feet before putting new wrap on.      Treatment Orders: 10/21 Culture taken 10/28 continue antibiotic and please fax lab work CMP [phone]  8/26- apply pillow under leg to avoid pressure to wound in bed   Please wash legs every  dressing change     8/19 Dietitian to follow at facility to order protein supplement and arginaid/valerie for wound healing.   Lymphedema pumps continue using   Keep pressure off of wound- attempt to rotate leg while keeping leg elevated   Elevate legs as much as possible.   EAT DIET WITH PROTEINS AND VITAMIN C  TAKE A MULTIVITAMIN DAILY IF NOT CONTRAINDICATED        [facility] followup visit : ________1 week PM_________  (Please note your next appointment above and if you are unable to keep, kindly give a 24 hour notice. Thank you.)     Physician signature:__________________________     If you experience any of the following, please call the Wound Care Center during business hours:     * Increase in Pain  * Temperature over 101  * Increase in drainage from your wound  * Drainage with a foul odor  * Bleeding  * Increase in swelling  * Need for compression bandage changes due to slippage, breakthrough drainage.     If you need medical attention

## 2024-10-25 RX ORDER — SULFAMETHOXAZOLE AND TRIMETHOPRIM 800; 160 MG/1; MG/1
2 TABLET ORAL 2 TIMES DAILY
Qty: 28 TABLET | Refills: 0 | Status: SHIPPED | OUTPATIENT
Start: 2024-10-25 | End: 2024-11-01

## 2024-10-25 NOTE — PROGRESS NOTES
Dr. Maier reviewed wound culture results. New order noted for bactrim DS 2 tabs po for 7 days. (Increased dosage due to patient weight) and lab work on Monday to be sent to wound care when results back.  Patient nursing [facility], called and notified Nurse Green of antibiotic order and verified pharmacy. Results and orders faxed to [phone] with confirmation back   
        Electronically signed by Neo Maier MD on 10/21/2024 a

## 2024-10-28 ENCOUNTER — HOSPITAL ENCOUNTER (OUTPATIENT)
Dept: WOUND CARE | Age: 74
Discharge: HOME | End: 2024-10-28
Attending: SURGERY
Payer: MEDICARE

## 2024-10-28 VITALS
HEART RATE: 68 BPM | RESPIRATION RATE: 20 BRPM | TEMPERATURE: 96.4 F | DIASTOLIC BLOOD PRESSURE: 58 MMHG | SYSTOLIC BLOOD PRESSURE: 138 MMHG

## 2024-10-28 DIAGNOSIS — L97.222 LOWER LIMB ULCER, CALF, LEFT, WITH FAT LAYER EXPOSED (HCC): Primary | ICD-10-CM

## 2024-10-28 DIAGNOSIS — L97.221 LOWER LIMB ULCER, CALF, LEFT, LIMITED TO BREAKDOWN OF SKIN (HCC): ICD-10-CM

## 2024-10-28 DIAGNOSIS — L97.212 LOWER LIMB ULCER, CALF, RIGHT, WITH FAT LAYER EXPOSED (HCC): ICD-10-CM

## 2024-10-28 PROCEDURE — 11042 DBRDMT SUBQ TIS 1ST 20SQCM/<: CPT

## 2024-10-28 PROCEDURE — 11042 DBRDMT SUBQ TIS 1ST 20SQCM/<: CPT | Performed by: SURGERY

## 2024-10-28 RX ORDER — RIVASTIGMINE TARTRATE 4.5 MG/1
4.5 CAPSULE ORAL 2 TIMES DAILY
COMMUNITY

## 2024-10-28 RX ORDER — RIVASTIGMINE TARTRATE 3 MG/1
3 CAPSULE ORAL 2 TIMES DAILY
COMMUNITY

## 2024-10-28 RX ORDER — LANOLIN ALCOHOL/MO/W.PET/CERES
400 CREAM (GRAM) TOPICAL DAILY
COMMUNITY

## 2024-10-28 RX ORDER — M-VIT,TX,IRON,MINS/CALC/FOLIC 27MG-0.4MG
1 TABLET ORAL DAILY
COMMUNITY

## 2024-10-28 NOTE — PROGRESS NOTES
Wound Healing Center Followup Visit Note    Referring Physician : Pankaj Corbett MD  Tori Mathew  MEDICAL RECORD NUMBER:  [id number]  AGE: 74 y.o.   GENDER: male  : 1950  EPISODE DATE:  10/28/2024    Subjective:     Chief Complaint   Patient presents with    Wound Check     Bilateral lower legs      HISTORY of PRESENT ILLNESS HPI   Tori Mathew is a 74 y.o. male who presents today in regards to follow up evaluation and treatment of wound/ulcer.  That patient's past medical, family and social hx were reviewed and changes were made if present.    History of Wound Context:  The patient has had a wound of both calves, minimal skin on the right side, multiple ulcers on the left calf, fat layer exposed which was first noted approximately 2023.  This has been treated by the patient and his niece.  On their initial visit to the wound healing center, 10/23/23, the patient has noted that the wound has not been improving.  The patient has had similar previous wounds in the past.          Patient had lymphedema therapy in the past, with a lymphedema pump at home that the patient used to use on a regular basis, broke down,, is beyond repair and patient was recommended new lymphedema pump for which he was given the prescription     Pt is currently not on abx.     10/23/2023   I long detailed discussion the patient, options, risks benefits and alternatives were explained, patient recommend keep legs elevated decrease swelling component nutrition multivitamin supplement protein supplement were discussed  Because of underlying extensive dermatophytosis and tinea pedis, patient given short-term antifungal cream as well as oral Lamisil therapy  For now patient recommended compression wrap until the ulcers healed  Patient recommended, since his own lymphedema therapy pump broke down completely beyond repair, that he was using a regular basis in the past to keep the ulcerations from coming back and

## 2024-11-04 ENCOUNTER — HOSPITAL ENCOUNTER (OUTPATIENT)
Dept: WOUND CARE | Age: 74
Discharge: HOME | End: 2024-11-04
Attending: SURGERY
Payer: MEDICARE

## 2024-11-04 VITALS
HEIGHT: 75 IN | SYSTOLIC BLOOD PRESSURE: 134 MMHG | RESPIRATION RATE: 21 BRPM | DIASTOLIC BLOOD PRESSURE: 86 MMHG | HEART RATE: 94 BPM | WEIGHT: 315 LBS | BODY MASS INDEX: 39.17 KG/M2 | TEMPERATURE: 98.2 F

## 2024-11-04 DIAGNOSIS — L97.212 LOWER LIMB ULCER, CALF, RIGHT, WITH FAT LAYER EXPOSED (HCC): Primary | ICD-10-CM

## 2024-11-04 DIAGNOSIS — L97.221 LOWER LIMB ULCER, CALF, LEFT, LIMITED TO BREAKDOWN OF SKIN (HCC): ICD-10-CM

## 2024-11-04 PROCEDURE — 11042 DBRDMT SUBQ TIS 1ST 20SQCM/<: CPT

## 2024-11-04 PROCEDURE — 11042 DBRDMT SUBQ TIS 1ST 20SQCM/<: CPT | Performed by: SURGERY

## 2024-11-04 RX ORDER — SULFAMETHOXAZOLE AND TRIMETHOPRIM 800; 160 MG/1; MG/1
1 TABLET ORAL 2 TIMES DAILY
COMMUNITY

## 2024-11-04 RX ORDER — UBIDECARENONE 75 MG
50 CAPSULE ORAL DAILY
COMMUNITY

## 2024-11-04 RX ORDER — FERROUS SULFATE 325(65) MG
325 TABLET ORAL EVERY OTHER DAY
COMMUNITY

## 2024-11-04 RX ORDER — ACETAMINOPHEN 325 MG/1
650 TABLET ORAL EVERY 8 HOURS
COMMUNITY

## 2024-11-04 RX ORDER — LIDOCAINE HYDROCHLORIDE 40 MG/ML
SOLUTION TOPICAL ONCE
Status: COMPLETED | OUTPATIENT
Start: 2024-11-04 | End: 2024-11-04

## 2024-11-04 RX ADMIN — LIDOCAINE HYDROCHLORIDE 4 ML: 40 SOLUTION TOPICAL at 13:48

## 2024-11-04 NOTE — PROGRESS NOTES
an appointment regarding referral to see a rheumatologist  Based upon the recent wound cultures, may consider Apligraft   6/24/2024  Wound no significant changes noted, wound cultures, Pseudomonas, will treat him with Levaquin 750 mg p.o. daily for 10 days, discussed with the patient, hopefully the wound improves, can reconsider advanced wound therapy  7/1/2024  Wound, no improvement, patient again was instructed last week again to to make sure that he does follow-up with his PCP regarding referral to rheumatology because of abnormal JEANNE potentially could be contributing for the nonhealing of the wound  Also recommended him, second opinion appointment Dr. Vargas for his input  Discussed with nursing staff regarding the above  All his questions were answered  7/8/2024  Ulcer left leg no change, patient has appointment to see Dr. Vargas next week for his input again repeat cultures were done  The patient was instructed to offload the left calf area by placing a pillow under the knee and also not to sleep on the left side  7/23/2024  Dr. Vargas's input appreciated  No changes noted in the size of the wound  We discussed the patient regarding the importance of keep the legs elevated to 3 pillows above the heart level and also avoid prolonged sitting in the chair with legs hanging down and also not to sleep on the left side  Importance of nutrition was discussed  Will reevaluate the wound, if necessary will repeat wound cultures, based upon that may consider advanced skin substitute also  7/29/2024  For left lateral calf ulcer, today seems to be slightly better with less drainage, still at the green-black base, wound cultures were done again to rule out underlying Pseudomonas infection  Rediscussed patient regarding offloading the area by sleeping on his right side  8/19/2024  Patient was recently was in the hospital with multiple medical issues including COVID infection, currently at the facility  He has

## 2024-11-04 NOTE — DISCHARGE INSTRUCTIONS
Visit Discharge/Physician Orders     Discharge condition: Stable  Assessment of pain at discharge: yes  Anesthetic used: lidocaine 4%  Discharge to: ECF  Left via: public transport   Accompanied by: self  ECF/HHA: [facility]     Dressing Orders: right medial leg AND left lateral  leg wound: Cleanse wound with Vashe, apply aquaphor to dry skin on legs. Apply Drawtex to wound bed and cover with ABD pad (or super absorber if available) and COBAN II from base of toes to base of knees- change on Fridays at the facility  (may use kerlix and coban if does not have COBAN II)   Keep wrap dry at all times. If wrap becomes wet or falls 2 inches or painful please remove wrap and replace as ordered.     9/23 When dressing removed please wash legs and feet before putting new wrap on OR shower on Fridays at the facility      Treatment Orders: 10/21 Culture taken 10/28 continue antibiotic and please fax lab work CMP [phone]  8/26- apply pillow under leg to avoid pressure to wound in bed   Please wash legs every  dressing change     8/19 Dietitian to follow at facility to order protein supplement and arginaid/valerie for wound healing.   Lymphedema pumps continue using   Keep pressure off of wound- attempt to rotate leg while keeping leg elevated   Elevate legs as much as possible.   EAT DIET WITH PROTEINS AND VITAMIN C  TAKE A MULTIVITAMIN DAILY IF NOT CONTRAINDICATED        [facility] followup visit : ________1 week PM_________  (Please note your next appointment above and if you are unable to keep, kindly give a 24 hour notice. Thank you.)     Physician signature:__________________________     If you experience any of the following, please call the Wound Care Center during business hours:     * Increase in Pain  * Temperature over 101  * Increase in drainage from your wound  * Drainage with a foul odor  * Bleeding  * Increase in swelling  * Need for compression bandage changes due to slippage, breakthrough drainage.

## 2024-11-06 NOTE — DISCHARGE INSTRUCTIONS
Visit Discharge/Physician Orders     Discharge condition: Stable  Assessment of pain at discharge: yes  Anesthetic used: lidocaine 4%  Discharge to: ECF  Left via: public transport   Accompanied by: self  ECF/HHA: [facility]     Dressing Orders: right medial leg AND left lateral  leg wound: Cleanse wound with Vashe, apply aquaphor to dry skin on legs. Apply Drawtex to wound bed and cover with ABD pad (or super absorber if available) and COBAN II from base of toes to base of knees- change on Fridays at the facility  (may use kerlix and coban if does not have COBAN II)   Keep wrap dry at all times. If wrap becomes wet or falls 2 inches or painful please remove wrap and replace as ordered.     9/23 When dressing removed please wash legs and feet before putting new wrap on OR shower on Fridays at the facility      Treatment Orders: 10/21 Culture taken 10/28 continue antibiotic and please fax lab work CMP [phone]  8/26- apply pillow under leg to avoid pressure to wound in bed   Please wash legs every  dressing change     8/19 Dietitian to follow at facility to order protein supplement and arginaid/valerie for wound healing.   Lymphedema pumps continue using   Keep pressure off of wound- attempt to rotate leg while keeping leg elevated   Elevate legs as much as possible.   EAT DIET WITH PROTEINS AND VITAMIN C  TAKE A MULTIVITAMIN DAILY IF NOT CONTRAINDICATED        [facility] followup visit : ________1 week PM_________  (Please note your next appointment above and if you are unable to keep, kindly give a 24 hour notice. Thank you.)     Physician signature:__________________________     If you experience any of the following, please call the Wound Care Center during business hours:     * Increase in Pain  * Temperature over 101  * Increase in drainage from your wound  * Drainage with a foul odor  * Bleeding  * Increase in swelling  * Need for compression bandage changes due to slippage, breakthrough

## 2024-11-11 ENCOUNTER — HOSPITAL ENCOUNTER (OUTPATIENT)
Dept: WOUND CARE | Age: 74
Discharge: HOME | End: 2024-11-11
Attending: SURGERY

## 2024-11-18 ENCOUNTER — HOSPITAL ENCOUNTER (OUTPATIENT)
Dept: WOUND CARE | Age: 74
Discharge: HOME | End: 2024-11-18
Attending: SURGERY
Payer: MEDICARE

## 2024-11-18 VITALS
TEMPERATURE: 97.1 F | HEIGHT: 75 IN | BODY MASS INDEX: 39.17 KG/M2 | DIASTOLIC BLOOD PRESSURE: 99 MMHG | SYSTOLIC BLOOD PRESSURE: 143 MMHG | RESPIRATION RATE: 18 BRPM | HEART RATE: 109 BPM | WEIGHT: 315 LBS

## 2024-11-18 DIAGNOSIS — L97.222 LOWER LIMB ULCER, CALF, LEFT, WITH FAT LAYER EXPOSED (HCC): Primary | ICD-10-CM

## 2024-11-18 DIAGNOSIS — L97.212 LOWER LIMB ULCER, CALF, RIGHT, WITH FAT LAYER EXPOSED (HCC): ICD-10-CM

## 2024-11-18 PROCEDURE — 11042 DBRDMT SUBQ TIS 1ST 20SQCM/<: CPT | Performed by: NURSE PRACTITIONER

## 2024-11-18 PROCEDURE — 11045 DBRDMT SUBQ TISS EACH ADDL: CPT | Performed by: NURSE PRACTITIONER

## 2024-11-18 PROCEDURE — 11042 DBRDMT SUBQ TIS 1ST 20SQCM/<: CPT

## 2024-11-18 PROCEDURE — 11045 DBRDMT SUBQ TISS EACH ADDL: CPT

## 2024-11-18 NOTE — DISCHARGE INSTRUCTIONS
Visit Discharge/Physician Orders     Discharge condition: Stable  Assessment of pain at discharge: yes  Anesthetic used: lidocaine 4%  Discharge to: ECF  Left via: public transport   Accompanied by: self  ECF/HHA: [facility]     Dressing Orders: right medial leg AND left lateral  leg wound: Cleanse wound with Vashe, apply aquaphor to dry skin on legs. Apply Alginate AG  to wound bed and cover with ABD pad  and COBAN II from base of toes to base of knees- change EVERY DAY   (may use kerlix and coban if does not have COBAN II)   Keep wrap dry at all times. If wrap becomes wet or falls 2 inches or painful please remove wrap and replace as ordered.     9/23 When dressing removed please wash legs and feet before putting new wrap on OR shower on Fridays at the facility      Treatment Orders: 10/21 Culture taken 10/28 continue antibiotic and please fax lab work CMP [phone]  8/26- apply pillow under leg to avoid pressure to wound in bed   Please wash legs every  dressing change     8/19 Dietitian to follow at facility to order protein supplement and arginaid/valerie for wound healing.   Lymphedema pumps- was at patients home- need to obtain to use at facility   Keep pressure off of wound- attempt to rotate leg while keeping leg elevated   Elevate legs as much as possible.   EAT DIET WITH PROTEINS AND VITAMIN C  TAKE A MULTIVITAMIN DAILY IF NOT CONTRAINDICATED        [facility] followup visit : ________1 week PM_________  (Please note your next appointment above and if you are unable to keep, kindly give a 24 hour notice. Thank you.)     Physician signature:__________________________     If you experience any of the following, please call the Wound Care Center during business hours:     * Increase in Pain  * Temperature over 101  * Increase in drainage from your wound  * Drainage with a foul odor  * Bleeding  * Increase in swelling  * Need for compression bandage changes due to slippage, breakthrough drainage.

## 2024-11-18 NOTE — PROGRESS NOTES
Post-Procedure Surface Area (cm^2) 0 cm^2 11/18/24 1316   Post-Procedure Volume (cm^3) 0 cm^3 11/18/24 1316   Wound Assessment Epithelialization 11/04/24 1342   Drainage Amount None (dry) 11/04/24 1342   Drainage Description Serosanguinous 10/28/24 1406   Odor None 11/04/24 1342   Colleen-wound Assessment Intact 11/04/24 1342   Margins Attached edges 10/14/24 1320   Wound Thickness Description not for Pressure Injury Full thickness 10/21/24 1416   Number of days: 35       Wound 11/18/24 Leg Right;Posterior #3 (Active)   Wound Image   11/18/24 1316   Wound Etiology Venous 11/18/24 1316   Wound Length (cm) 37 cm 11/18/24 1316   Wound Width (cm) 22 cm 11/18/24 1316   Wound Depth (cm) 0.3 cm 11/18/24 1316   Wound Surface Area (cm^2) 814 cm^2 11/18/24 1316   Wound Volume (cm^3) 244.2 cm^3 11/18/24 1316   Post-Procedure Length (cm) 37.5 cm 11/18/24 1345   Post-Procedure Width (cm) 22.2 cm 11/18/24 1345   Post-Procedure Depth (cm) 0.4 cm 11/18/24 1345   Post-Procedure Surface Area (cm^2) 832.5 cm^2 11/18/24 1345   Post-Procedure Volume (cm^3) 333 cm^3 11/18/24 1345   Wound Assessment Pink/red;Slough;Fibrin;Bleeding 11/18/24 1316   Drainage Amount Copious (>75 % saturated) 11/18/24 1316   Drainage Description Serosanguinous;Brown 11/18/24 1316   Odor Mild 11/18/24 1316   Colleen-wound Assessment Dry/flaky;Maceration 11/18/24 1316   Margins Attached edges 11/18/24 1316   Wound Thickness Description not for Pressure Injury Full thickness 11/18/24 1316   Number of days: 0          Procedure Note  Indications:  Based on my examination of this patient's wound(s)/ulcer(s) today, debridement is required to promote healing and evaluate the wound base.    Performed by: JO Quinn - CNP    Consent obtained:  Yes    Time out taken:  Yes    Pain Control: Anesthetic  Anesthetic: 4% Lidocaine Liquid Topical     Debridement:Excisional Debridement    Using curette the wound(s)/ulcer(s) was/were sharply debrided down through and

## 2024-11-25 ENCOUNTER — HOSPITAL ENCOUNTER (OUTPATIENT)
Dept: WOUND CARE | Age: 74
Discharge: HOME | End: 2024-11-25
Attending: SURGERY
Payer: MEDICARE

## 2024-11-25 VITALS
RESPIRATION RATE: 20 BRPM | HEIGHT: 75 IN | SYSTOLIC BLOOD PRESSURE: 140 MMHG | DIASTOLIC BLOOD PRESSURE: 70 MMHG | WEIGHT: 315 LBS | HEART RATE: 50 BPM | BODY MASS INDEX: 39.17 KG/M2 | TEMPERATURE: 98 F

## 2024-11-25 DIAGNOSIS — L97.221 LOWER LIMB ULCER, CALF, LEFT, LIMITED TO BREAKDOWN OF SKIN (HCC): ICD-10-CM

## 2024-11-25 DIAGNOSIS — L97.212 LOWER LIMB ULCER, CALF, RIGHT, WITH FAT LAYER EXPOSED (HCC): Primary | ICD-10-CM

## 2024-11-25 PROCEDURE — 11045 DBRDMT SUBQ TISS EACH ADDL: CPT | Performed by: SURGERY

## 2024-11-25 PROCEDURE — 11042 DBRDMT SUBQ TIS 1ST 20SQCM/<: CPT

## 2024-11-25 PROCEDURE — 11045 DBRDMT SUBQ TISS EACH ADDL: CPT

## 2024-11-25 PROCEDURE — 11042 DBRDMT SUBQ TIS 1ST 20SQCM/<: CPT | Performed by: SURGERY

## 2024-11-25 RX ORDER — SODIUM CHLOR/HYPOCHLOROUS ACID 0.033 %
SOLUTION, IRRIGATION IRRIGATION ONCE
OUTPATIENT
Start: 2024-11-25 | End: 2024-11-25

## 2024-11-25 RX ORDER — LIDOCAINE HYDROCHLORIDE 40 MG/ML
SOLUTION TOPICAL ONCE
OUTPATIENT
Start: 2024-11-25 | End: 2024-11-25

## 2024-11-25 RX ORDER — BACITRACIN ZINC AND POLYMYXIN B SULFATE 500; 1000 [USP'U]/G; [USP'U]/G
OINTMENT TOPICAL ONCE
OUTPATIENT
Start: 2024-11-25 | End: 2024-11-25

## 2024-11-25 RX ORDER — TRIAMCINOLONE ACETONIDE 1 MG/G
OINTMENT TOPICAL ONCE
OUTPATIENT
Start: 2024-11-25 | End: 2024-11-25

## 2024-11-25 RX ORDER — LIDOCAINE HYDROCHLORIDE 20 MG/ML
JELLY TOPICAL ONCE
OUTPATIENT
Start: 2024-11-25 | End: 2024-11-25

## 2024-11-25 RX ORDER — NEOMYCIN/BACITRACIN/POLYMYXINB 3.5-400-5K
OINTMENT (GRAM) TOPICAL ONCE
OUTPATIENT
Start: 2024-11-25 | End: 2024-11-25

## 2024-11-25 RX ORDER — CLOBETASOL PROPIONATE 0.5 MG/G
OINTMENT TOPICAL ONCE
OUTPATIENT
Start: 2024-11-25 | End: 2024-11-25

## 2024-11-25 RX ORDER — BACITRACIN ZINC 500 [USP'U]/G
OINTMENT TOPICAL ONCE
OUTPATIENT
Start: 2024-11-25 | End: 2024-11-25

## 2024-11-25 RX ORDER — MUPIROCIN 20 MG/G
OINTMENT TOPICAL ONCE
OUTPATIENT
Start: 2024-11-25 | End: 2024-11-25

## 2024-11-25 RX ORDER — GENTAMICIN SULFATE 1 MG/G
OINTMENT TOPICAL ONCE
OUTPATIENT
Start: 2024-11-25 | End: 2024-11-25

## 2024-11-25 RX ORDER — LIDOCAINE 40 MG/G
CREAM TOPICAL ONCE
OUTPATIENT
Start: 2024-11-25 | End: 2024-11-25

## 2024-11-25 RX ORDER — BETAMETHASONE DIPROPIONATE 0.5 MG/G
CREAM TOPICAL ONCE
OUTPATIENT
Start: 2024-11-25 | End: 2024-11-25

## 2024-11-25 RX ORDER — SILVER SULFADIAZINE 10 MG/G
CREAM TOPICAL ONCE
OUTPATIENT
Start: 2024-11-25 | End: 2024-11-25

## 2024-11-25 RX ORDER — LIDOCAINE 50 MG/G
OINTMENT TOPICAL ONCE
OUTPATIENT
Start: 2024-11-25 | End: 2024-11-25

## 2024-11-25 NOTE — PROGRESS NOTES
Wound Healing Center Followup Visit Note    Referring Physician : Pankaj Corbett MD  Tori Mathew  MEDICAL RECORD NUMBER:  [id number]  AGE: 74 y.o.   GENDER: male  : 1950  EPISODE DATE:  2024    Subjective:     Chief Complaint   Patient presents with    Wound Check     BLE      HISTORY of PRESENT ILLNESS HPI   Tori Mathew is a 74 y.o. male who presents today in regards to follow up evaluation and treatment of wound/ulcer.  That patient's past medical, family and social hx were reviewed and changes were made if present.    History of Wound Context:  The patient has had a wound of both calves, minimal skin on the right side, multiple ulcers on the left calf, fat layer exposed which was first noted approximately 2023.  This has been treated by the patient and his niece.  On their initial visit to the wound healing center, 10/23/23, the patient has noted that the wound has not been improving.  The patient has had similar previous wounds in the past.          Patient had lymphedema therapy in the past, with a lymphedema pump at home that the patient used to use on a regular basis, broke down,, is beyond repair and patient was recommended new lymphedema pump for which he was given the prescription     Pt is currently not on abx.     10/23/2023   I long detailed discussion the patient, options, risks benefits and alternatives were explained, patient recommend keep legs elevated decrease swelling component nutrition multivitamin supplement protein supplement were discussed  Because of underlying extensive dermatophytosis and tinea pedis, patient given short-term antifungal cream as well as oral Lamisil therapy  For now patient recommended compression wrap until the ulcers healed  Patient recommended, since his own lymphedema therapy pump broke down completely beyond repair, that he was using a regular basis in the past to keep the ulcerations from coming back and keep the swelling

## 2024-11-25 NOTE — DISCHARGE INSTRUCTIONS
Visit Discharge/Physician Orders     Discharge condition: Stable  Assessment of pain at discharge: yes  Anesthetic used: lidocaine 4%  Discharge to: ECF  Left via: public transport   Accompanied by: self  ECF/HHA: [facility]     Dressing Orders: right posterior leg AND left lateral leg wound: Cleanse wound with Vashe, apply aquaphor to dry skin on legs. Apply Alginate AG  to wound bed and cover with ABD pad  and COBAN II from base of toes to base of knees- change EVERY DAY   (may use kerlix and coban if does not have COBAN II)   Keep wrap dry at all times. If wrap becomes wet or falls 2 inches or painful please remove wrap and replace as ordered.     9/23 When dressing removed please wash legs and feet before putting new wrap on OR shower at facility      Treatment Orders: 10/21 Culture taken 10/28 continue antibiotic and please fax lab work CMP [phone]  8/26- apply pillow under leg to avoid pressure to wound in bed   Please wash legs every  dressing change     8/19 Dietitian to follow at facility to order protein supplement and arginaid/valerie for wound healing.   Lymphedema pumps- was at patients home- need to obtain to use at facility   Keep pressure off of wound- attempt to rotate leg while keeping leg elevated   Elevate legs as much as possible.   EAT DIET WITH PROTEINS AND VITAMIN C  TAKE A MULTIVITAMIN DAILY IF NOT CONTRAINDICATED        [facility] followup visit : ________1 week PM_________  (Please note your next appointment above and if you are unable to keep, kindly give a 24 hour notice. Thank you.)     Physician signature:__________________________     If you experience any of the following, please call the Wound Care Center during business hours:     * Increase in Pain  * Temperature over 101  * Increase in drainage from your wound  * Drainage with a foul odor  * Bleeding  * Increase in swelling  * Need for compression bandage changes due to slippage, breakthrough drainage.     If you need

## 2024-12-02 ENCOUNTER — HOSPITAL ENCOUNTER (OUTPATIENT)
Dept: WOUND CARE | Age: 74
Discharge: HOME | End: 2024-12-02
Attending: SURGERY
Payer: MEDICARE

## 2024-12-02 VITALS
WEIGHT: 315 LBS | HEART RATE: 118 BPM | BODY MASS INDEX: 39.17 KG/M2 | HEIGHT: 75 IN | SYSTOLIC BLOOD PRESSURE: 154 MMHG | TEMPERATURE: 97.7 F | DIASTOLIC BLOOD PRESSURE: 95 MMHG | RESPIRATION RATE: 20 BRPM

## 2024-12-02 DIAGNOSIS — L97.221 LOWER LIMB ULCER, CALF, LEFT, LIMITED TO BREAKDOWN OF SKIN (HCC): ICD-10-CM

## 2024-12-02 DIAGNOSIS — L97.212 LOWER LIMB ULCER, CALF, RIGHT, WITH FAT LAYER EXPOSED (HCC): Primary | ICD-10-CM

## 2024-12-02 DIAGNOSIS — L97.222 LOWER LIMB ULCER, CALF, LEFT, WITH FAT LAYER EXPOSED (HCC): ICD-10-CM

## 2024-12-02 PROCEDURE — 11045 DBRDMT SUBQ TISS EACH ADDL: CPT | Performed by: SURGERY

## 2024-12-02 PROCEDURE — 11045 DBRDMT SUBQ TISS EACH ADDL: CPT

## 2024-12-02 PROCEDURE — 11042 DBRDMT SUBQ TIS 1ST 20SQCM/<: CPT | Performed by: SURGERY

## 2024-12-02 PROCEDURE — 11042 DBRDMT SUBQ TIS 1ST 20SQCM/<: CPT

## 2024-12-02 RX ORDER — LIDOCAINE 40 MG/G
CREAM TOPICAL ONCE
OUTPATIENT
Start: 2024-12-02 | End: 2024-12-02

## 2024-12-02 RX ORDER — LIDOCAINE HYDROCHLORIDE 40 MG/ML
SOLUTION TOPICAL ONCE
OUTPATIENT
Start: 2024-12-02 | End: 2024-12-02

## 2024-12-02 RX ORDER — BACITRACIN ZINC AND POLYMYXIN B SULFATE 500; 1000 [USP'U]/G; [USP'U]/G
OINTMENT TOPICAL ONCE
OUTPATIENT
Start: 2024-12-02 | End: 2024-12-02

## 2024-12-02 RX ORDER — SODIUM CHLOR/HYPOCHLOROUS ACID 0.033 %
SOLUTION, IRRIGATION IRRIGATION ONCE
OUTPATIENT
Start: 2024-12-02 | End: 2024-12-02

## 2024-12-02 RX ORDER — MUPIROCIN 20 MG/G
OINTMENT TOPICAL ONCE
OUTPATIENT
Start: 2024-12-02 | End: 2024-12-02

## 2024-12-02 RX ORDER — LIDOCAINE HYDROCHLORIDE 20 MG/ML
JELLY TOPICAL ONCE
OUTPATIENT
Start: 2024-12-02 | End: 2024-12-02

## 2024-12-02 RX ORDER — LIDOCAINE 50 MG/G
OINTMENT TOPICAL ONCE
OUTPATIENT
Start: 2024-12-02 | End: 2024-12-02

## 2024-12-02 RX ORDER — BETAMETHASONE DIPROPIONATE 0.5 MG/G
CREAM TOPICAL ONCE
OUTPATIENT
Start: 2024-12-02 | End: 2024-12-02

## 2024-12-02 RX ORDER — LIDOCAINE HYDROCHLORIDE 40 MG/ML
SOLUTION TOPICAL ONCE
Status: COMPLETED | OUTPATIENT
Start: 2024-12-02 | End: 2024-12-02

## 2024-12-02 RX ORDER — CLOBETASOL PROPIONATE 0.5 MG/G
OINTMENT TOPICAL ONCE
OUTPATIENT
Start: 2024-12-02 | End: 2024-12-02

## 2024-12-02 RX ORDER — SILVER SULFADIAZINE 10 MG/G
CREAM TOPICAL ONCE
OUTPATIENT
Start: 2024-12-02 | End: 2024-12-02

## 2024-12-02 RX ORDER — TRIAMCINOLONE ACETONIDE 1 MG/G
OINTMENT TOPICAL ONCE
OUTPATIENT
Start: 2024-12-02 | End: 2024-12-02

## 2024-12-02 RX ORDER — NEOMYCIN/BACITRACIN/POLYMYXINB 3.5-400-5K
OINTMENT (GRAM) TOPICAL ONCE
OUTPATIENT
Start: 2024-12-02 | End: 2024-12-02

## 2024-12-02 RX ORDER — BACITRACIN ZINC 500 [USP'U]/G
OINTMENT TOPICAL ONCE
OUTPATIENT
Start: 2024-12-02 | End: 2024-12-02

## 2024-12-02 RX ORDER — GENTAMICIN SULFATE 1 MG/G
OINTMENT TOPICAL ONCE
OUTPATIENT
Start: 2024-12-02 | End: 2024-12-02

## 2024-12-02 RX ADMIN — LIDOCAINE HYDROCHLORIDE 10 ML: 40 SOLUTION TOPICAL at 13:34

## 2024-12-02 NOTE — PROGRESS NOTES
consider of wound culture  4/15/2024  Ulcer, left calf stable, some exudate, wound cultures done today  4/22/2024  Left ankle calf ulcers, stable over the lateral aspect, patient was started on Bactrim DS 1 tablet p.o. twice daily based upon the wound cultures  Re discussed with patient on importance given the legs elevated to decrease the swelling  4/29/2024  Wound, unchanged, waiting for the ankle-brachial index, if satisfactory, may consider skin grafting with skin substitute  5/6/2024  Patient is also has gotten worse  Repeat wound cultures were done  Patient was recommended blood work, including CBC, CMP, sed rate, CRP, JEANNE, anti-DNA, rheumatoid factor etc.   If the wound continues to get worse, will obtain a open from Dr. Vargas also  5/13/2024  Left leg ulcer unchanged  Patient's most swelling the left knee joint and slightly more swelling of the left leg than usual, hyperlipidemia and's ultrasound        Patient blood work was reviewed     Abnormal JEANNE positive, ratio 1:320, discussed with the patient, the patient was instructed to see his PCP regarding referral to a rheumatologist     Will also request wound care nursing staff to fax results to patient's current PCP     All his questions were answered     5/20/2024  Left lateral calf wound looks  today but no change in the size, still waiting for the patient undergo more follow-up arterial venous ultrasound studies, and then make recommendations including possible skin grafting and based upon the vascular venous ultrasound studies, potential vascular intervention, patient does have an appoint to see Dr. Vargas next week, because of holidays and scheduling conflict  5/29/24  Wound improving  Arterial studies reviewed - pt has adequate flow to heal  Should be a candidate for graft  6/3/2024  Wound looks clean  Wound debrided  Discussed the patient regarding advanced skin substitute with PuraPly AM graft, patient understands and agrees

## 2024-12-02 NOTE — DISCHARGE INSTRUCTIONS
Visit Discharge/Physician Orders     Discharge condition: Stable  Assessment of pain at discharge: yes  Anesthetic used: lidocaine 4%  Discharge to: ECF  Left via: public transport   Accompanied by: self  ECF/HHA: [facility]     Dressing Orders: right posterior leg AND left lateral leg wound: Cleanse wound with Vashe, apply aquaphor to dry skin on legs. Apply Alginate AG  to wound bed and cover with ABD pad - apply ABD pads to top of wrap before wrapping- and UNNA BOOT then  COBAN from base of toes to base of knees- change EVERY DAY   Keep wrap dry at all times. If wrap becomes wet or falls 2 inches or painful please remove wrap and replace as ordered.     9/23 When dressing removed please wash legs and feet before putting new wrap on OR shower at facility      Treatment Orders: 10/21 Culture taken 10/28 continue antibiotic and please fax lab work CMP [phone]  8/26- apply pillow under leg to avoid pressure to wound in bed - have patient lay down between meals d/t legs rubbing on wheelchair   Please wash legs every  dressing change     8/19 Dietitian to follow at facility to order protein supplement and arginaid/valerie for wound healing.   Lymphedema pumps- was at patients home- need to obtain to use at facility   Keep pressure off of wound- attempt to rotate leg while keeping leg elevated   Elevate legs as much as possible.   EAT DIET WITH PROTEINS AND VITAMIN C  TAKE A MULTIVITAMIN DAILY IF NOT CONTRAINDICATED        [facility] followup visit : ________1 week PM_________  (Please note your next appointment above and if you are unable to keep, kindly give a 24 hour notice. Thank you.)     Physician signature:__________________________     If you experience any of the following, please call the Wound Care Center during business hours:     * Increase in Pain  * Temperature over 101  * Increase in drainage from your wound  * Drainage with a foul odor  * Bleeding  * Increase in swelling  * Need for

## 2024-12-03 NOTE — DISCHARGE INSTRUCTIONS
Visit Discharge/Physician Orders     Discharge condition: Stable  Assessment of pain at discharge: yes  Anesthetic used: lidocaine 4%  Discharge to: ECF  Left via: public transport   Accompanied by: self  ECF/HHA: [facility]     Dressing Orders: right posterior leg AND left lateral leg wound: Cleanse wound with Vashe, apply aquaphor to dry skin on legs. Apply Alginate AG  to wound bed and cover with ABD pad - apply ABD pads to top of wrap before wrapping- and UNNA BOOT then  COBAN from base of toes to base of knees- change EVERY DAY   Keep wrap dry at all times. If wrap becomes wet or falls 2 inches or painful please remove wrap and replace as ordered.     9/23 When dressing removed please wash legs and feet before putting new wrap on OR shower at facility      Treatment Orders: 12/9 Culture taken   8/26- apply pillow under leg to avoid pressure to wound in bed - have patient lay down between meals d/t legs rubbing on wheelchair   Please wash legs every  dressing change     8/19 Dietitian to follow at facility to order protein supplement and arginaid/valerie for wound healing.   Lymphedema pumps- was at patients home- need to obtain to use at facility   Keep pressure off of wound- attempt to rotate leg while keeping leg elevated   Elevate legs as much as possible.   EAT DIET WITH PROTEINS AND VITAMIN C  TAKE A MULTIVITAMIN DAILY IF NOT CONTRAINDICATED        [facility] followup visit : ________1 week PM_________  (Please note your next appointment above and if you are unable to keep, kindly give a 24 hour notice. Thank you.)     Physician signature:__________________________     If you experience any of the following, please call the Wound Care Center during business hours:     * Increase in Pain  * Temperature over 101  * Increase in drainage from your wound  * Drainage with a foul odor  * Bleeding  * Increase in swelling  * Need for compression bandage changes due to slippage, breakthrough drainage.     If you

## 2024-12-09 ENCOUNTER — HOSPITAL ENCOUNTER (OUTPATIENT)
Dept: WOUND CARE | Age: 74
Discharge: HOME | End: 2024-12-09
Attending: SURGERY
Payer: MEDICARE

## 2024-12-09 VITALS — DIASTOLIC BLOOD PRESSURE: 70 MMHG | HEART RATE: 78 BPM | TEMPERATURE: 96.9 F | SYSTOLIC BLOOD PRESSURE: 142 MMHG

## 2024-12-09 DIAGNOSIS — L97.221 LOWER LIMB ULCER, CALF, LEFT, LIMITED TO BREAKDOWN OF SKIN (HCC): ICD-10-CM

## 2024-12-09 DIAGNOSIS — L97.212 LOWER LIMB ULCER, CALF, RIGHT, WITH FAT LAYER EXPOSED (HCC): Primary | ICD-10-CM

## 2024-12-09 DIAGNOSIS — L97.222 LOWER LIMB ULCER, CALF, LEFT, WITH FAT LAYER EXPOSED (HCC): ICD-10-CM

## 2024-12-09 PROBLEM — L97.211 CALF ULCER, RIGHT, LIMITED TO BREAKDOWN OF SKIN (HCC): Status: RESOLVED | Noted: 2024-08-19 | Resolved: 2024-12-09

## 2024-12-09 PROCEDURE — 87205 SMEAR GRAM STAIN: CPT

## 2024-12-09 PROCEDURE — 11045 DBRDMT SUBQ TISS EACH ADDL: CPT

## 2024-12-09 PROCEDURE — 87070 CULTURE OTHR SPECIMN AEROBIC: CPT

## 2024-12-09 PROCEDURE — 87077 CULTURE AEROBIC IDENTIFY: CPT

## 2024-12-09 PROCEDURE — 11045 DBRDMT SUBQ TISS EACH ADDL: CPT | Performed by: SURGERY

## 2024-12-09 PROCEDURE — 11042 DBRDMT SUBQ TIS 1ST 20SQCM/<: CPT | Performed by: SURGERY

## 2024-12-09 PROCEDURE — 11042 DBRDMT SUBQ TIS 1ST 20SQCM/<: CPT

## 2024-12-09 RX ORDER — LIDOCAINE 40 MG/G
CREAM TOPICAL ONCE
OUTPATIENT
Start: 2024-12-09 | End: 2024-12-09

## 2024-12-09 RX ORDER — GENTAMICIN SULFATE 1 MG/G
OINTMENT TOPICAL ONCE
OUTPATIENT
Start: 2024-12-09 | End: 2024-12-09

## 2024-12-09 RX ORDER — SILVER SULFADIAZINE 10 MG/G
CREAM TOPICAL ONCE
OUTPATIENT
Start: 2024-12-09 | End: 2024-12-09

## 2024-12-09 RX ORDER — LIDOCAINE HYDROCHLORIDE 20 MG/ML
JELLY TOPICAL ONCE
OUTPATIENT
Start: 2024-12-09 | End: 2024-12-09

## 2024-12-09 RX ORDER — MUPIROCIN 20 MG/G
OINTMENT TOPICAL ONCE
OUTPATIENT
Start: 2024-12-09 | End: 2024-12-09

## 2024-12-09 RX ORDER — LIDOCAINE 50 MG/G
OINTMENT TOPICAL ONCE
OUTPATIENT
Start: 2024-12-09 | End: 2024-12-09

## 2024-12-09 RX ORDER — LIDOCAINE HYDROCHLORIDE 40 MG/ML
SOLUTION TOPICAL ONCE
OUTPATIENT
Start: 2024-12-09 | End: 2024-12-09

## 2024-12-09 RX ORDER — NEOMYCIN/BACITRACIN/POLYMYXINB 3.5-400-5K
OINTMENT (GRAM) TOPICAL ONCE
OUTPATIENT
Start: 2024-12-09 | End: 2024-12-09

## 2024-12-09 RX ORDER — CLOBETASOL PROPIONATE 0.5 MG/G
OINTMENT TOPICAL ONCE
OUTPATIENT
Start: 2024-12-09 | End: 2024-12-09

## 2024-12-09 RX ORDER — LIDOCAINE HYDROCHLORIDE 40 MG/ML
SOLUTION TOPICAL ONCE
Status: COMPLETED | OUTPATIENT
Start: 2024-12-09 | End: 2024-12-09

## 2024-12-09 RX ORDER — BACITRACIN ZINC 500 [USP'U]/G
OINTMENT TOPICAL ONCE
OUTPATIENT
Start: 2024-12-09 | End: 2024-12-09

## 2024-12-09 RX ORDER — TRIAMCINOLONE ACETONIDE 1 MG/G
OINTMENT TOPICAL ONCE
OUTPATIENT
Start: 2024-12-09 | End: 2024-12-09

## 2024-12-09 RX ORDER — SODIUM CHLOR/HYPOCHLOROUS ACID 0.033 %
SOLUTION, IRRIGATION IRRIGATION ONCE
OUTPATIENT
Start: 2024-12-09 | End: 2024-12-09

## 2024-12-09 RX ORDER — BACITRACIN ZINC AND POLYMYXIN B SULFATE 500; 1000 [USP'U]/G; [USP'U]/G
OINTMENT TOPICAL ONCE
OUTPATIENT
Start: 2024-12-09 | End: 2024-12-09

## 2024-12-09 RX ORDER — BETAMETHASONE DIPROPIONATE 0.5 MG/G
CREAM TOPICAL ONCE
OUTPATIENT
Start: 2024-12-09 | End: 2024-12-09

## 2024-12-09 RX ADMIN — LIDOCAINE HYDROCHLORIDE 15 ML: 40 SOLUTION TOPICAL at 13:26

## 2024-12-09 NOTE — PROGRESS NOTES
ulcers of the posterior right calf mainly skin, ulcer over the posterior lateral aspect left leg, fat layer exposed, wound looks improved  8/26/2024  Patient, has maceration of the wounds of the left leg and also some frustration and skin peeling off for the ulcer area on the left side, patient telling me that they are not changing dressings as supposed to pick at the on the supplies at the facility, discussed with nursing staff to discuss and contact the director of nursing at the facility so that they can do the dressings more frequently as ordered  Right calf ulcer healed, extensive hyperkeratotic skin was debrided, left calf ulcer, debrided, fat layer  9/23/2024  Patient did not come to the wound care center for the last few weeks  Patient undergoing a compression wraps at the facility and almost every other day basis  Ulcer of the left calf better  Patient still has significant hyperkeratotic skin and dry scaly skin, discussed patient nursing staff to see if the facility can scrub his legs with a sponge to get rid of the loosely attached dry scaly skin and hyperkeratotic skin and then mop with dry and then apply bandage every other day  9/30/2024  Ulcer slightly improved, hyperkeratotic skin improving  10/14/2024  Patient has a small ulcer on the medial aspect of the right upper calf, when one of the scaly keratotic skin fell off looks fairly clean  Left posterior calf wound, improved  10/21/2024  Left posterior calf wound improved, mainly skin  Right medial calf, worsening, necrotic area fat layer exposed, wound cultures were done, consider compression wrapping, discussed with the patient  10/28/2024  Left calf skin only right calf improved, some fat layer exposed  Discussed with the nursing staff regarding the BMP, to be drawn at the facility today  11/4/2024  Right calf ulcer improved underneath the eschar and scab, some fat layer exposed, left calf ulcer improving, fat layer still present  Discussed with

## 2024-12-09 NOTE — DISCHARGE INSTR - COC
Continuity of Care Form    Patient Name: Tori Mathew   :  1950  MRN:  [id number]     date:  2024   Wound Care Documentation and Therapy:  Wound 10/23/23 Leg Left;Lateral;Distal #1 l (Active)   Wound Image   24 1342   Wound Etiology Venous 24 1117   Dressing Status New dressing applied;Clean;Dry;Intact 24 1359   Wound Cleansed Vashe 24 1359   Dressing/Treatment ABD;Alginate with Ag 24 1359   Offloading for Diabetic Foot Ulcers Offloading ordered 24 1117   Wound Length (cm) 8.3 cm 24 1314   Wound Width (cm) 8.5 cm 24 1314   Wound Depth (cm) 0.1 cm 24 1314   Wound Surface Area (cm^2) 70.55 cm^2 24 1314   Change in Wound Size % (l*w) -1781.33 24 1314   Wound Volume (cm^3) 7.055 cm^3 24 1314   Wound Healing % -841 24 1314   Post-Procedure Length (cm) 8.5 cm 24 1338   Post-Procedure Width (cm) 8.6 cm 24 1338   Post-Procedure Depth (cm) 0.2 cm 24 1338   Post-Procedure Surface Area (cm^2) 73.1 cm^2 24 1338   Post-Procedure Volume (cm^3) 14.62 cm^3 24 1338   Wound Assessment Pink/red;Fibrin;Granulation tissue 24 1314   Drainage Amount Moderate (25-50%) 24 1314   Drainage Description Serous;Brown 24 1314   Odor None 24 1314   Colleen-wound Assessment Dry/flaky 24 1314   Margins Attached edges 24 1354   Wound Thickness Description not for Pressure Injury Full thickness 24 1354   Number of days: 413       Wound 10/14/24 Leg Right;Medial #2 (Active)   Wound Image   24 1342   Dressing Status New dressing applied;Clean;Dry;Intact 24 1439   Wound Cleansed Vashe 24 1439   Dressing/Treatment ABD;Other (comment) 24 1439   Wound Length (cm) 0 cm 24 1316   Wound Width (cm) 0 cm 24 1316   Wound Depth (cm) 0 cm 24 1316   Wound Surface Area (cm^2) 0 cm^2 24 1316   Change in Wound Size % (l*w) 100 24 1316   Wound Volume (cm^3) 0

## 2024-12-16 ENCOUNTER — HOSPITAL ENCOUNTER (OUTPATIENT)
Dept: WOUND CARE | Age: 74
Discharge: HOME | End: 2024-12-16
Attending: SURGERY
Payer: MEDICARE

## 2024-12-16 VITALS
RESPIRATION RATE: 20 BRPM | WEIGHT: 315 LBS | DIASTOLIC BLOOD PRESSURE: 88 MMHG | TEMPERATURE: 96.9 F | SYSTOLIC BLOOD PRESSURE: 150 MMHG | HEART RATE: 88 BPM | BODY MASS INDEX: 39.17 KG/M2 | HEIGHT: 75 IN

## 2024-12-16 DIAGNOSIS — L97.212 LOWER LIMB ULCER, CALF, RIGHT, WITH FAT LAYER EXPOSED (HCC): Primary | ICD-10-CM

## 2024-12-16 DIAGNOSIS — L97.221 LOWER LIMB ULCER, CALF, LEFT, LIMITED TO BREAKDOWN OF SKIN (HCC): ICD-10-CM

## 2024-12-16 DIAGNOSIS — L97.222 LOWER LIMB ULCER, CALF, LEFT, WITH FAT LAYER EXPOSED (HCC): ICD-10-CM

## 2024-12-16 PROCEDURE — 11042 DBRDMT SUBQ TIS 1ST 20SQCM/<: CPT | Performed by: SURGERY

## 2024-12-16 PROCEDURE — 11042 DBRDMT SUBQ TIS 1ST 20SQCM/<: CPT

## 2024-12-16 PROCEDURE — 11045 DBRDMT SUBQ TISS EACH ADDL: CPT

## 2024-12-16 PROCEDURE — 11045 DBRDMT SUBQ TISS EACH ADDL: CPT | Performed by: SURGERY

## 2024-12-16 RX ORDER — BETAMETHASONE DIPROPIONATE 0.5 MG/G
CREAM TOPICAL ONCE
OUTPATIENT
Start: 2024-12-16 | End: 2024-12-16

## 2024-12-16 RX ORDER — LIDOCAINE HYDROCHLORIDE 40 MG/ML
SOLUTION TOPICAL ONCE
Status: COMPLETED | OUTPATIENT
Start: 2024-12-16 | End: 2024-12-16

## 2024-12-16 RX ORDER — CLOBETASOL PROPIONATE 0.5 MG/G
OINTMENT TOPICAL ONCE
OUTPATIENT
Start: 2024-12-16 | End: 2024-12-16

## 2024-12-16 RX ORDER — NEOMYCIN/BACITRACIN/POLYMYXINB 3.5-400-5K
OINTMENT (GRAM) TOPICAL ONCE
OUTPATIENT
Start: 2024-12-16 | End: 2024-12-16

## 2024-12-16 RX ORDER — DOXYCYCLINE 100 MG/1
100 CAPSULE ORAL 2 TIMES DAILY
Qty: 30 CAPSULE | Refills: 0 | Status: SHIPPED | OUTPATIENT
Start: 2024-12-16 | End: 2024-12-31

## 2024-12-16 RX ORDER — SODIUM CHLOR/HYPOCHLOROUS ACID 0.033 %
SOLUTION, IRRIGATION IRRIGATION ONCE
OUTPATIENT
Start: 2024-12-16 | End: 2024-12-16

## 2024-12-16 RX ORDER — MUPIROCIN 20 MG/G
OINTMENT TOPICAL ONCE
OUTPATIENT
Start: 2024-12-16 | End: 2024-12-16

## 2024-12-16 RX ORDER — SILVER SULFADIAZINE 10 MG/G
CREAM TOPICAL ONCE
OUTPATIENT
Start: 2024-12-16 | End: 2024-12-16

## 2024-12-16 RX ORDER — LIDOCAINE HYDROCHLORIDE 20 MG/ML
JELLY TOPICAL ONCE
OUTPATIENT
Start: 2024-12-16 | End: 2024-12-16

## 2024-12-16 RX ORDER — LIDOCAINE 40 MG/G
CREAM TOPICAL ONCE
OUTPATIENT
Start: 2024-12-16 | End: 2024-12-16

## 2024-12-16 RX ORDER — LIDOCAINE 50 MG/G
OINTMENT TOPICAL ONCE
OUTPATIENT
Start: 2024-12-16 | End: 2024-12-16

## 2024-12-16 RX ORDER — GENTAMICIN SULFATE 1 MG/G
OINTMENT TOPICAL ONCE
OUTPATIENT
Start: 2024-12-16 | End: 2024-12-16

## 2024-12-16 RX ORDER — BACITRACIN ZINC AND POLYMYXIN B SULFATE 500; 1000 [USP'U]/G; [USP'U]/G
OINTMENT TOPICAL ONCE
OUTPATIENT
Start: 2024-12-16 | End: 2024-12-16

## 2024-12-16 RX ORDER — LIDOCAINE HYDROCHLORIDE 40 MG/ML
SOLUTION TOPICAL ONCE
OUTPATIENT
Start: 2024-12-16 | End: 2024-12-16

## 2024-12-16 RX ORDER — LEVOFLOXACIN 750 MG/1
750 TABLET, FILM COATED ORAL DAILY
Qty: 10 TABLET | Refills: 0 | Status: SHIPPED | OUTPATIENT
Start: 2024-12-16 | End: 2024-12-26

## 2024-12-16 RX ORDER — BACITRACIN ZINC 500 [USP'U]/G
OINTMENT TOPICAL ONCE
OUTPATIENT
Start: 2024-12-16 | End: 2024-12-16

## 2024-12-16 RX ORDER — TRIAMCINOLONE ACETONIDE 1 MG/G
OINTMENT TOPICAL ONCE
OUTPATIENT
Start: 2024-12-16 | End: 2024-12-16

## 2024-12-16 RX ADMIN — LIDOCAINE HYDROCHLORIDE 30 ML: 40 SOLUTION TOPICAL at 13:42

## 2024-12-16 NOTE — PROGRESS NOTES
Wound Healing Center Followup Visit Note    Referring Physician : Pankaj Corbett MD  Tori Mathew  MEDICAL RECORD NUMBER:  [id number]  AGE: 74 y.o.   GENDER: male  : 1950  EPISODE DATE:  2024    Subjective:     Chief Complaint   Patient presents with    Wound Check     Bilateral legs       HISTORY of PRESENT ILLNESS HPI   Tori Mathew is a 74 y.o. male who presents today in regards to follow up evaluation and treatment of wound/ulcer.  That patient's past medical, family and social hx were reviewed and changes were made if present.    History of Wound Context:  The patient has had a wound of both calves, minimal skin on the right side, multiple ulcers on the left calf, fat layer exposed which was first noted approximately 2023.  This has been treated by the patient and his niece.  On their initial visit to the wound healing center, 10/23/23, the patient has noted that the wound has not been improving.  The patient has had similar previous wounds in the past.          Patient had lymphedema therapy in the past, with a lymphedema pump at home that the patient used to use on a regular basis, broke down,, is beyond repair and patient was recommended new lymphedema pump for which he was given the prescription     Pt is currently not on abx.     10/23/2023   I long detailed discussion the patient, options, risks benefits and alternatives were explained, patient recommend keep legs elevated decrease swelling component nutrition multivitamin supplement protein supplement were discussed  Because of underlying extensive dermatophytosis and tinea pedis, patient given short-term antifungal cream as well as oral Lamisil therapy  For now patient recommended compression wrap until the ulcers healed  Patient recommended, since his own lymphedema therapy pump broke down completely beyond repair, that he was using a regular basis in the past to keep the ulcerations from coming back and keep the

## 2024-12-16 NOTE — DISCHARGE INSTRUCTIONS
Visit Discharge/Physician Orders     Discharge condition: Stable  Assessment of pain at discharge: yes  Anesthetic used: lidocaine 4%  Discharge to: ECF  Left via: public transport   Accompanied by: self  ECF/HHA: [facility]     Dressing Orders: right posterior leg AND left lateral leg wounds to any open areas:  Cleanse wound with Vashe, apply aquaphor to dry skin on legs. Apply Alginate AG  to wound bed and cover with ABD pad - apply ABD pads to top of wrap before wrapping- and UNNA BOOT then  COBAN from base of toes to base of knees- change EVERY DAY   Keep wrap dry at all times. If wrap becomes wet or falls 2 inches or painful please remove wrap and replace as ordered.     9/23 When dressing removed please wash legs and feet before putting new wrap on OR shower at facility      Treatment Orders: 12/9 Culture taken - Doxcycline and Levaquin ordered please see prescriptions sent with patient   8/26- apply pillow under leg to avoid pressure to wound in bed - have patient lay down between meals d/t legs rubbing on wheelchair   Please wash legs every  dressing change     8/19 Dietitian to follow at facility to order protein supplement and arginaid/valerie for wound healing.   Lymphedema pumps- was at patients home- need to obtain to use at facility   Keep pressure off of wound- attempt to rotate leg while keeping leg elevated   Elevate legs as much as possible.   EAT DIET WITH PROTEINS AND VITAMIN C  TAKE A MULTIVITAMIN DAILY IF NOT CONTRAINDICATED        [facility] followup visit : ________1 week PM_________  (Please note your next appointment above and if you are unable to keep, kindly give a 24 hour notice. Thank you.)     Physician signature:__________________________     If you experience any of the following, please call the Wound Care Center during business hours:     * Increase in Pain  * Temperature over 101  * Increase in drainage from your wound  * Drainage with a foul odor  * Bleeding  * Increase in

## 2024-12-17 NOTE — DISCHARGE INSTRUCTIONS
Visit Discharge/Physician Orders     Discharge condition: Stable  Assessment of pain at discharge: yes  Anesthetic used: lidocaine 4%  Discharge to: ECF  Left via: public transport   Accompanied by: self  ECF/HHA: [facility]     Dressing Orders: right posterior leg AND left lateral leg wounds to any open areas:  Cleanse wound with Vashe, apply aquaphor to dry skin on legs. Apply Alginate AG  to wound bed and cover with ABD pad - apply ABD pads to top of wrap before wrapping- and UNNA BOOT then  COBAN from base of toes to base of knees- change EVERY DAY   Keep wrap dry at all times. If wrap becomes wet or falls 2 inches or painful please remove wrap and replace as ordered.     9/23 When dressing removed please wash legs and feet before putting new wrap on OR shower at facility      Treatment Orders: 12/9 Culture taken - Doxcycline and Levaquin ordered - complete as prescribed   8/26- apply pillow under leg to avoid pressure to wound in bed - have patient lay down between meals d/t legs rubbing on wheelchair   Please wash legs every  dressing change    continue protein supplement and arginaid/valerie for wound healing.   Lymphedema pumps- was at patients home- need to obtain to use at facility   Keep pressure off of wound- attempt to rotate leg while keeping leg elevated   Elevate legs as much as possible.   EAT DIET WITH PROTEINS AND VITAMIN C  TAKE A MULTIVITAMIN DAILY IF NOT CONTRAINDICATED        [facility] followup visit : ________1 week PM_________  (Please note your next appointment above and if you are unable to keep, kindly give a 24 hour notice. Thank you.)     Physician signature:__________________________     If you experience any of the following, please call the Wound Care Center during business hours:     * Increase in Pain  * Temperature over 101  * Increase in drainage from your wound  * Drainage with a foul odor  * Bleeding  * Increase in swelling  * Need for compression bandage changes due to

## 2024-12-23 ENCOUNTER — HOSPITAL ENCOUNTER (OUTPATIENT)
Dept: WOUND CARE | Age: 74
Discharge: HOME | End: 2024-12-23
Attending: SURGERY
Payer: MEDICARE

## 2024-12-23 VITALS
DIASTOLIC BLOOD PRESSURE: 67 MMHG | BODY MASS INDEX: 39.17 KG/M2 | HEIGHT: 75 IN | HEART RATE: 42 BPM | WEIGHT: 315 LBS | TEMPERATURE: 97.3 F | SYSTOLIC BLOOD PRESSURE: 139 MMHG

## 2024-12-23 DIAGNOSIS — L97.212 LOWER LIMB ULCER, CALF, RIGHT, WITH FAT LAYER EXPOSED (HCC): Primary | ICD-10-CM

## 2024-12-23 DIAGNOSIS — L97.221 LOWER LIMB ULCER, CALF, LEFT, LIMITED TO BREAKDOWN OF SKIN (HCC): ICD-10-CM

## 2024-12-23 DIAGNOSIS — L97.222 LOWER LIMB ULCER, CALF, LEFT, WITH FAT LAYER EXPOSED (HCC): ICD-10-CM

## 2024-12-23 PROCEDURE — 11045 DBRDMT SUBQ TISS EACH ADDL: CPT | Performed by: SURGERY

## 2024-12-23 PROCEDURE — 11045 DBRDMT SUBQ TISS EACH ADDL: CPT

## 2024-12-23 PROCEDURE — 11042 DBRDMT SUBQ TIS 1ST 20SQCM/<: CPT | Performed by: SURGERY

## 2024-12-23 PROCEDURE — 11042 DBRDMT SUBQ TIS 1ST 20SQCM/<: CPT

## 2024-12-23 RX ORDER — SODIUM CHLOR/HYPOCHLOROUS ACID 0.033 %
SOLUTION, IRRIGATION IRRIGATION ONCE
Status: CANCELLED | OUTPATIENT
Start: 2024-12-23 | End: 2024-12-23

## 2024-12-23 RX ORDER — BETAMETHASONE DIPROPIONATE 0.5 MG/G
CREAM TOPICAL ONCE
OUTPATIENT
Start: 2024-12-23 | End: 2024-12-23

## 2024-12-23 RX ORDER — MUPIROCIN 20 MG/G
OINTMENT TOPICAL ONCE
OUTPATIENT
Start: 2024-12-23 | End: 2024-12-23

## 2024-12-23 RX ORDER — TRIAMCINOLONE ACETONIDE 1 MG/G
OINTMENT TOPICAL ONCE
Status: CANCELLED | OUTPATIENT
Start: 2024-12-23 | End: 2024-12-23

## 2024-12-23 RX ORDER — BACITRACIN ZINC AND POLYMYXIN B SULFATE 500; 1000 [USP'U]/G; [USP'U]/G
OINTMENT TOPICAL ONCE
Status: CANCELLED | OUTPATIENT
Start: 2024-12-23 | End: 2024-12-23

## 2024-12-23 RX ORDER — LIDOCAINE HYDROCHLORIDE 40 MG/ML
SOLUTION TOPICAL ONCE
OUTPATIENT
Start: 2024-12-23 | End: 2024-12-23

## 2024-12-23 RX ORDER — SODIUM CHLOR/HYPOCHLOROUS ACID 0.033 %
SOLUTION, IRRIGATION IRRIGATION ONCE
OUTPATIENT
Start: 2024-12-23 | End: 2024-12-23

## 2024-12-23 RX ORDER — GENTAMICIN SULFATE 1 MG/G
OINTMENT TOPICAL ONCE
Status: CANCELLED | OUTPATIENT
Start: 2024-12-23 | End: 2024-12-23

## 2024-12-23 RX ORDER — SILVER SULFADIAZINE 10 MG/G
CREAM TOPICAL ONCE
Status: CANCELLED | OUTPATIENT
Start: 2024-12-23 | End: 2024-12-23

## 2024-12-23 RX ORDER — GENTAMICIN SULFATE 1 MG/G
OINTMENT TOPICAL ONCE
OUTPATIENT
Start: 2024-12-23 | End: 2024-12-23

## 2024-12-23 RX ORDER — LIDOCAINE HYDROCHLORIDE 20 MG/ML
JELLY TOPICAL ONCE
Status: CANCELLED | OUTPATIENT
Start: 2024-12-23 | End: 2024-12-23

## 2024-12-23 RX ORDER — CLOBETASOL PROPIONATE 0.5 MG/G
OINTMENT TOPICAL ONCE
OUTPATIENT
Start: 2024-12-23 | End: 2024-12-23

## 2024-12-23 RX ORDER — MUPIROCIN 20 MG/G
OINTMENT TOPICAL ONCE
Status: CANCELLED | OUTPATIENT
Start: 2024-12-23 | End: 2024-12-23

## 2024-12-23 RX ORDER — LIDOCAINE 50 MG/G
OINTMENT TOPICAL ONCE
OUTPATIENT
Start: 2024-12-23 | End: 2024-12-23

## 2024-12-23 RX ORDER — BACITRACIN ZINC 500 [USP'U]/G
OINTMENT TOPICAL ONCE
OUTPATIENT
Start: 2024-12-23 | End: 2024-12-23

## 2024-12-23 RX ORDER — LIDOCAINE HYDROCHLORIDE 20 MG/ML
JELLY TOPICAL ONCE
OUTPATIENT
Start: 2024-12-23 | End: 2024-12-23

## 2024-12-23 RX ORDER — LIDOCAINE HYDROCHLORIDE 40 MG/ML
SOLUTION TOPICAL ONCE
Status: DISCONTINUED | OUTPATIENT
Start: 2024-12-23 | End: 2024-12-24 | Stop reason: HOSPADM

## 2024-12-23 RX ORDER — BACITRACIN ZINC AND POLYMYXIN B SULFATE 500; 1000 [USP'U]/G; [USP'U]/G
OINTMENT TOPICAL ONCE
OUTPATIENT
Start: 2024-12-23 | End: 2024-12-23

## 2024-12-23 RX ORDER — LIDOCAINE 40 MG/G
CREAM TOPICAL ONCE
OUTPATIENT
Start: 2024-12-23 | End: 2024-12-23

## 2024-12-23 RX ORDER — BACITRACIN ZINC 500 [USP'U]/G
OINTMENT TOPICAL ONCE
Status: CANCELLED | OUTPATIENT
Start: 2024-12-23 | End: 2024-12-23

## 2024-12-23 RX ORDER — NEOMYCIN/BACITRACIN/POLYMYXINB 3.5-400-5K
OINTMENT (GRAM) TOPICAL ONCE
OUTPATIENT
Start: 2024-12-23 | End: 2024-12-23

## 2024-12-23 RX ORDER — BETAMETHASONE DIPROPIONATE 0.5 MG/G
CREAM TOPICAL ONCE
Status: CANCELLED | OUTPATIENT
Start: 2024-12-23 | End: 2024-12-23

## 2024-12-23 RX ORDER — TRIAMCINOLONE ACETONIDE 1 MG/G
OINTMENT TOPICAL ONCE
OUTPATIENT
Start: 2024-12-23 | End: 2024-12-23

## 2024-12-23 RX ORDER — LIDOCAINE HYDROCHLORIDE 40 MG/ML
SOLUTION TOPICAL ONCE
Status: COMPLETED | OUTPATIENT
Start: 2024-12-23 | End: 2024-12-23

## 2024-12-23 RX ORDER — SILVER SULFADIAZINE 10 MG/G
CREAM TOPICAL ONCE
OUTPATIENT
Start: 2024-12-23 | End: 2024-12-23

## 2024-12-23 RX ORDER — NEOMYCIN/BACITRACIN/POLYMYXINB 3.5-400-5K
OINTMENT (GRAM) TOPICAL ONCE
Status: CANCELLED | OUTPATIENT
Start: 2024-12-23 | End: 2024-12-23

## 2024-12-23 RX ORDER — LIDOCAINE 50 MG/G
OINTMENT TOPICAL ONCE
Status: CANCELLED | OUTPATIENT
Start: 2024-12-23 | End: 2024-12-23

## 2024-12-23 RX ORDER — LIDOCAINE 40 MG/G
CREAM TOPICAL ONCE
Status: CANCELLED | OUTPATIENT
Start: 2024-12-23 | End: 2024-12-23

## 2024-12-23 RX ORDER — CLOBETASOL PROPIONATE 0.5 MG/G
OINTMENT TOPICAL ONCE
Status: CANCELLED | OUTPATIENT
Start: 2024-12-23 | End: 2024-12-23

## 2024-12-23 RX ADMIN — LIDOCAINE HYDROCHLORIDE 10 ML: 40 SOLUTION TOPICAL at 13:17

## 2024-12-23 NOTE — PROGRESS NOTES
please call the Wound Care Center during business hours:     * Increase in Pain  * Temperature over 101  * Increase in drainage from your wound  * Drainage with a foul odor  * Bleeding  * Increase in swelling  * Need for compression bandage changes due to slippage, breakthrough drainage.     If you need medical attention outside of the business hours of the Wound Care Centers please contact your PCP or go to the nearest emergency room.         Electronically signed by Neo Maier MD on 12/23/2024 a

## 2024-12-26 NOTE — DISCHARGE INSTRUCTIONS
Visit Discharge/Physician Orders     Discharge condition: Stable  Assessment of pain at discharge: yes  Anesthetic used: lidocaine 4%  Discharge to: ECF  Left via: public transport   Accompanied by: self  ECF/HHA: [facility]     Dressing Orders: right posterior leg AND left lateral leg wounds to any open areas:  Cleanse wound with Vashe, apply aquaphor to dry skin on legs. Apply Alginate AG  to wound bed and cover with ABD pad - apply ABD pads to top of wrap before wrapping- and UNNA BOOT then  COBAN from base of toes to base of knees- change EVERY DAY   Keep wrap dry at all times. If wrap becomes wet or falls 2 inches or painful please remove wrap and replace as ordered.     9/23 When dressing removed please wash legs and feet before putting new wrap on OR shower at facility      Treatment Orders: 12/30 Please have patient evaluated by PT or PCP for a new wheelchair, patients legs do not fit well and having pain   8/26- apply pillow under leg to avoid pressure to wound in bed - have patient lay down between meals d/t legs rubbing on wheelchair   continue protein supplement and arginaid/valerie for wound healing.   Lymphedema pumps- was at patients home- need to obtain to use at facility   Keep pressure off of wound- attempt to rotate leg while keeping leg elevated   Elevate legs as much as possible.   EAT DIET WITH PROTEINS AND VITAMIN C  TAKE A MULTIVITAMIN DAILY IF NOT CONTRAINDICATED        [facility] followup visit : ________1 week PM_________  (Please note your next appointment above and if you are unable to keep, kindly give a 24 hour notice. Thank you.)     Physician signature:__________________________     If you experience any of the following, please call the Wound Care Center during business hours:     * Increase in Pain  * Temperature over 101  * Increase in drainage from your wound  * Drainage with a foul odor  * Bleeding  * Increase in swelling  * Need for compression bandage changes due to

## 2024-12-30 ENCOUNTER — HOSPITAL ENCOUNTER (OUTPATIENT)
Dept: WOUND CARE | Age: 74
Discharge: HOME | End: 2024-12-30
Attending: SURGERY
Payer: MEDICARE

## 2024-12-30 VITALS
WEIGHT: 315 LBS | RESPIRATION RATE: 20 BRPM | TEMPERATURE: 96.6 F | HEART RATE: 62 BPM | DIASTOLIC BLOOD PRESSURE: 78 MMHG | SYSTOLIC BLOOD PRESSURE: 142 MMHG | BODY MASS INDEX: 39.17 KG/M2 | HEIGHT: 75 IN

## 2024-12-30 DIAGNOSIS — L97.221 LOWER LIMB ULCER, CALF, LEFT, LIMITED TO BREAKDOWN OF SKIN (HCC): ICD-10-CM

## 2024-12-30 DIAGNOSIS — L97.222 LOWER LIMB ULCER, CALF, LEFT, WITH FAT LAYER EXPOSED (HCC): ICD-10-CM

## 2024-12-30 DIAGNOSIS — L97.212 LOWER LIMB ULCER, CALF, RIGHT, WITH FAT LAYER EXPOSED (HCC): Primary | ICD-10-CM

## 2024-12-30 PROCEDURE — 11045 DBRDMT SUBQ TISS EACH ADDL: CPT

## 2024-12-30 PROCEDURE — 11045 DBRDMT SUBQ TISS EACH ADDL: CPT | Performed by: SURGERY

## 2024-12-30 PROCEDURE — 11042 DBRDMT SUBQ TIS 1ST 20SQCM/<: CPT

## 2024-12-30 PROCEDURE — 11042 DBRDMT SUBQ TIS 1ST 20SQCM/<: CPT | Performed by: SURGERY

## 2024-12-30 RX ORDER — NEOMYCIN/BACITRACIN/POLYMYXINB 3.5-400-5K
OINTMENT (GRAM) TOPICAL ONCE
OUTPATIENT
Start: 2024-12-30 | End: 2024-12-30

## 2024-12-30 RX ORDER — SODIUM CHLOR/HYPOCHLOROUS ACID 0.033 %
SOLUTION, IRRIGATION IRRIGATION ONCE
OUTPATIENT
Start: 2024-12-30 | End: 2024-12-30

## 2024-12-30 RX ORDER — TRIAMCINOLONE ACETONIDE 1 MG/G
OINTMENT TOPICAL ONCE
OUTPATIENT
Start: 2024-12-30 | End: 2024-12-30

## 2024-12-30 RX ORDER — BACITRACIN ZINC AND POLYMYXIN B SULFATE 500; 1000 [USP'U]/G; [USP'U]/G
OINTMENT TOPICAL ONCE
OUTPATIENT
Start: 2024-12-30 | End: 2024-12-30

## 2024-12-30 RX ORDER — LIDOCAINE HYDROCHLORIDE 20 MG/ML
JELLY TOPICAL ONCE
OUTPATIENT
Start: 2024-12-30 | End: 2024-12-30

## 2024-12-30 RX ORDER — MUPIROCIN 20 MG/G
OINTMENT TOPICAL ONCE
OUTPATIENT
Start: 2024-12-30 | End: 2024-12-30

## 2024-12-30 RX ORDER — LIDOCAINE 40 MG/G
CREAM TOPICAL ONCE
OUTPATIENT
Start: 2024-12-30 | End: 2024-12-30

## 2024-12-30 RX ORDER — LIDOCAINE HYDROCHLORIDE 40 MG/ML
SOLUTION TOPICAL ONCE
OUTPATIENT
Start: 2024-12-30 | End: 2024-12-30

## 2024-12-30 RX ORDER — BACITRACIN ZINC 500 [USP'U]/G
OINTMENT TOPICAL ONCE
OUTPATIENT
Start: 2024-12-30 | End: 2024-12-30

## 2024-12-30 RX ORDER — BETAMETHASONE DIPROPIONATE 0.5 MG/G
CREAM TOPICAL ONCE
OUTPATIENT
Start: 2024-12-30 | End: 2024-12-30

## 2024-12-30 RX ORDER — LIDOCAINE HYDROCHLORIDE 40 MG/ML
SOLUTION TOPICAL ONCE
Status: COMPLETED | OUTPATIENT
Start: 2024-12-30 | End: 2024-12-30

## 2024-12-30 RX ORDER — SILVER SULFADIAZINE 10 MG/G
CREAM TOPICAL ONCE
OUTPATIENT
Start: 2024-12-30 | End: 2024-12-30

## 2024-12-30 RX ORDER — CLOBETASOL PROPIONATE 0.5 MG/G
OINTMENT TOPICAL ONCE
OUTPATIENT
Start: 2024-12-30 | End: 2024-12-30

## 2024-12-30 RX ORDER — LIDOCAINE 50 MG/G
OINTMENT TOPICAL ONCE
OUTPATIENT
Start: 2024-12-30 | End: 2024-12-30

## 2024-12-30 RX ORDER — GENTAMICIN SULFATE 1 MG/G
OINTMENT TOPICAL ONCE
OUTPATIENT
Start: 2024-12-30 | End: 2024-12-30

## 2024-12-30 RX ADMIN — LIDOCAINE HYDROCHLORIDE 15 ML: 40 SOLUTION TOPICAL at 13:48

## 2025-01-06 ENCOUNTER — HOSPITAL ENCOUNTER (OUTPATIENT)
Dept: WOUND CARE | Age: 75
Discharge: HOME | End: 2025-01-06
Attending: SURGERY
Payer: MEDICARE

## 2025-01-06 VITALS
RESPIRATION RATE: 18 BRPM | TEMPERATURE: 96.8 F | SYSTOLIC BLOOD PRESSURE: 155 MMHG | HEART RATE: 85 BPM | DIASTOLIC BLOOD PRESSURE: 97 MMHG

## 2025-01-06 DIAGNOSIS — L97.212 LOWER LIMB ULCER, CALF, RIGHT, WITH FAT LAYER EXPOSED (HCC): ICD-10-CM

## 2025-01-06 DIAGNOSIS — L97.221 LOWER LIMB ULCER, CALF, LEFT, LIMITED TO BREAKDOWN OF SKIN (HCC): ICD-10-CM

## 2025-01-06 DIAGNOSIS — L97.222 LOWER LIMB ULCER, CALF, LEFT, WITH FAT LAYER EXPOSED (HCC): Primary | ICD-10-CM

## 2025-01-06 PROCEDURE — 11042 DBRDMT SUBQ TIS 1ST 20SQCM/<: CPT

## 2025-01-06 PROCEDURE — 11042 DBRDMT SUBQ TIS 1ST 20SQCM/<: CPT | Performed by: SURGERY

## 2025-01-06 RX ORDER — BACITRACIN ZINC AND POLYMYXIN B SULFATE 500; 1000 [USP'U]/G; [USP'U]/G
OINTMENT TOPICAL ONCE
OUTPATIENT
Start: 2025-01-06 | End: 2025-01-06

## 2025-01-06 RX ORDER — LIDOCAINE HYDROCHLORIDE 40 MG/ML
SOLUTION TOPICAL ONCE
Status: COMPLETED | OUTPATIENT
Start: 2025-01-06 | End: 2025-01-06

## 2025-01-06 RX ORDER — MUPIROCIN 20 MG/G
OINTMENT TOPICAL ONCE
OUTPATIENT
Start: 2025-01-06 | End: 2025-01-06

## 2025-01-06 RX ORDER — BETAMETHASONE DIPROPIONATE 0.5 MG/G
CREAM TOPICAL ONCE
OUTPATIENT
Start: 2025-01-06 | End: 2025-01-06

## 2025-01-06 RX ORDER — SODIUM CHLOR/HYPOCHLOROUS ACID 0.033 %
SOLUTION, IRRIGATION IRRIGATION ONCE
OUTPATIENT
Start: 2025-01-06 | End: 2025-01-06

## 2025-01-06 RX ORDER — NEOMYCIN/BACITRACIN/POLYMYXINB 3.5-400-5K
OINTMENT (GRAM) TOPICAL ONCE
OUTPATIENT
Start: 2025-01-06 | End: 2025-01-06

## 2025-01-06 RX ORDER — LIDOCAINE HYDROCHLORIDE 20 MG/ML
JELLY TOPICAL ONCE
OUTPATIENT
Start: 2025-01-06 | End: 2025-01-06

## 2025-01-06 RX ORDER — TRIAMCINOLONE ACETONIDE 1 MG/G
OINTMENT TOPICAL ONCE
OUTPATIENT
Start: 2025-01-06 | End: 2025-01-06

## 2025-01-06 RX ORDER — LIDOCAINE 40 MG/G
CREAM TOPICAL ONCE
OUTPATIENT
Start: 2025-01-06 | End: 2025-01-06

## 2025-01-06 RX ORDER — SILVER SULFADIAZINE 10 MG/G
CREAM TOPICAL ONCE
OUTPATIENT
Start: 2025-01-06 | End: 2025-01-06

## 2025-01-06 RX ORDER — LIDOCAINE HYDROCHLORIDE 40 MG/ML
SOLUTION TOPICAL ONCE
OUTPATIENT
Start: 2025-01-06 | End: 2025-01-06

## 2025-01-06 RX ORDER — GENTAMICIN SULFATE 1 MG/G
OINTMENT TOPICAL ONCE
OUTPATIENT
Start: 2025-01-06 | End: 2025-01-06

## 2025-01-06 RX ORDER — LIDOCAINE 50 MG/G
OINTMENT TOPICAL ONCE
OUTPATIENT
Start: 2025-01-06 | End: 2025-01-06

## 2025-01-06 RX ORDER — CLOBETASOL PROPIONATE 0.5 MG/G
OINTMENT TOPICAL ONCE
OUTPATIENT
Start: 2025-01-06 | End: 2025-01-06

## 2025-01-06 RX ORDER — BACITRACIN ZINC 500 [USP'U]/G
OINTMENT TOPICAL ONCE
OUTPATIENT
Start: 2025-01-06 | End: 2025-01-06

## 2025-01-06 RX ADMIN — LIDOCAINE HYDROCHLORIDE: 40 SOLUTION TOPICAL at 13:34

## 2025-01-06 NOTE — DISCHARGE INSTRUCTIONS
Visit Discharge/Physician Orders     Discharge condition: Stable  Assessment of pain at discharge: yes  Anesthetic used: lidocaine 4%  Discharge to: ECF  Left via: public transport   Accompanied by: self  ECF/HHA: [facility]     Dressing Orders: right posterior leg AND left lateral leg wounds to any open areas:  Cleanse wound with Vashe, apply aquaphor to dry skin on legs. Apply Alginate AG  to wound bed and cover with ABD pad - apply ABD pads to top of wrap before wrapping- and UNNA BOOT then  COBAN from base of toes to base of knees- change EVERY DAY   Keep wrap dry at all times. If wrap becomes wet or falls 2 inches or painful please remove wrap and replace as ordered.     9/23 When dressing removed please wash legs and feet before putting new wrap on OR shower at facility      Treatment Orders: 12/30 Please have patient evaluated by PT or PCP for a new wheelchair, patients legs do not fit well and having pain   8/26- apply pillow under leg to avoid pressure to wound in bed - have patient lay down between meals d/t legs rubbing on wheelchair   continue protein supplement and arginaid/valerie for wound healing.   Lymphedema pumps- was at patients home- need to obtain to use at facility   Keep pressure off of wound- attempt to rotate leg while keeping leg elevated   Elevate legs as much as possible.   EAT DIET WITH PROTEINS AND VITAMIN C  TAKE A MULTIVITAMIN DAILY IF NOT CONTRAINDICATED        [facility] followup visit : ________1 week PM_________  (Please note your next appointment above and if you are unable to keep, kindly give a 24 hour notice. Thank you.)     Physician signature:__________________________     If you experience any of the following, please call the Wound Care Center during business hours:     * Increase in Pain  * Temperature over 101  * Increase in drainage from your wound  * Drainage with a foul odor  * Bleeding  * Increase in swelling  * Need for compression bandage changes due to

## 2025-01-06 NOTE — PROGRESS NOTES
Wound Healing Center Followup Visit Note    Referring Physician : Pankaj Corbett MD  Tori Mathew  MEDICAL RECORD NUMBER:  [id number]  AGE: 74 y.o.   GENDER: male  : 1950  EPISODE DATE:  2025    Subjective:     Chief Complaint   Patient presents with    Wound Check     Both legs       HISTORY of PRESENT ILLNESS HPI   Tori Mathew is a 74 y.o. male who presents today in regards to follow up evaluation and treatment of wound/ulcer.  That patient's past medical, family and social hx were reviewed and changes were made if present.    History of Wound Context:  The patient has had a wound of both calves, minimal skin on the right side, multiple ulcers on the left calf, fat layer exposed which was first noted approximately 2023.  This has been treated by the patient and his niece.  On their initial visit to the wound healing center, 10/23/23, the patient has noted that the wound has not been improving.  The patient has had similar previous wounds in the past.          Patient had lymphedema therapy in the past, with a lymphedema pump at home that the patient used to use on a regular basis, broke down,, is beyond repair and patient was recommended new lymphedema pump for which he was given the prescription     Pt is currently not on abx.     10/23/2023   I long detailed discussion the patient, options, risks benefits and alternatives were explained, patient recommend keep legs elevated decrease swelling component nutrition multivitamin supplement protein supplement were discussed  Because of underlying extensive dermatophytosis and tinea pedis, patient given short-term antifungal cream as well as oral Lamisil therapy  For now patient recommended compression wrap until the ulcers healed  Patient recommended, since his own lymphedema therapy pump broke down completely beyond repair, that he was using a regular basis in the past to keep the ulcerations from coming back and keep the

## 2025-01-13 ENCOUNTER — HOSPITAL ENCOUNTER (OUTPATIENT)
Dept: WOUND CARE | Age: 75
Discharge: HOME | End: 2025-01-13
Attending: SURGERY
Payer: MEDICARE

## 2025-01-13 VITALS
DIASTOLIC BLOOD PRESSURE: 93 MMHG | TEMPERATURE: 97.7 F | BODY MASS INDEX: 39.17 KG/M2 | RESPIRATION RATE: 20 BRPM | HEART RATE: 86 BPM | HEIGHT: 75 IN | SYSTOLIC BLOOD PRESSURE: 139 MMHG | WEIGHT: 315 LBS

## 2025-01-13 DIAGNOSIS — L97.222 LOWER LIMB ULCER, CALF, LEFT, WITH FAT LAYER EXPOSED (HCC): ICD-10-CM

## 2025-01-13 DIAGNOSIS — I89.0 LYMPHEDEMA OF BOTH LOWER EXTREMITIES: ICD-10-CM

## 2025-01-13 DIAGNOSIS — L97.212 LOWER LIMB ULCER, CALF, RIGHT, WITH FAT LAYER EXPOSED (HCC): Primary | ICD-10-CM

## 2025-01-13 DIAGNOSIS — L97.221 LOWER LIMB ULCER, CALF, LEFT, LIMITED TO BREAKDOWN OF SKIN (HCC): ICD-10-CM

## 2025-01-13 PROCEDURE — 29580 STRAPPING UNNA BOOT: CPT

## 2025-01-13 PROCEDURE — 99212 OFFICE O/P EST SF 10 MIN: CPT | Performed by: SURGERY

## 2025-01-13 RX ORDER — LIDOCAINE HYDROCHLORIDE 40 MG/ML
SOLUTION TOPICAL ONCE
Status: CANCELLED | OUTPATIENT
Start: 2025-01-13 | End: 2025-01-13

## 2025-01-13 RX ORDER — GENTAMICIN SULFATE 1 MG/G
OINTMENT TOPICAL ONCE
Status: CANCELLED | OUTPATIENT
Start: 2025-01-13 | End: 2025-01-13

## 2025-01-13 RX ORDER — SILVER SULFADIAZINE 10 MG/G
CREAM TOPICAL ONCE
Status: CANCELLED | OUTPATIENT
Start: 2025-01-13 | End: 2025-01-13

## 2025-01-13 RX ORDER — SODIUM CHLOR/HYPOCHLOROUS ACID 0.033 %
SOLUTION, IRRIGATION IRRIGATION ONCE
Status: CANCELLED | OUTPATIENT
Start: 2025-01-13 | End: 2025-01-13

## 2025-01-13 RX ORDER — TRIAMCINOLONE ACETONIDE 1 MG/G
OINTMENT TOPICAL ONCE
Status: CANCELLED | OUTPATIENT
Start: 2025-01-13 | End: 2025-01-13

## 2025-01-13 RX ORDER — BACITRACIN ZINC 500 [USP'U]/G
OINTMENT TOPICAL ONCE
Status: CANCELLED | OUTPATIENT
Start: 2025-01-13 | End: 2025-01-13

## 2025-01-13 RX ORDER — LIDOCAINE 50 MG/G
OINTMENT TOPICAL ONCE
Status: CANCELLED | OUTPATIENT
Start: 2025-01-13 | End: 2025-01-13

## 2025-01-13 RX ORDER — LIDOCAINE HYDROCHLORIDE 20 MG/ML
JELLY TOPICAL ONCE
Status: CANCELLED | OUTPATIENT
Start: 2025-01-13 | End: 2025-01-13

## 2025-01-13 RX ORDER — BETAMETHASONE DIPROPIONATE 0.5 MG/G
CREAM TOPICAL ONCE
Status: CANCELLED | OUTPATIENT
Start: 2025-01-13 | End: 2025-01-13

## 2025-01-13 RX ORDER — CLOBETASOL PROPIONATE 0.5 MG/G
OINTMENT TOPICAL ONCE
Status: CANCELLED | OUTPATIENT
Start: 2025-01-13 | End: 2025-01-13

## 2025-01-13 RX ORDER — NEOMYCIN/BACITRACIN/POLYMYXINB 3.5-400-5K
OINTMENT (GRAM) TOPICAL ONCE
Status: CANCELLED | OUTPATIENT
Start: 2025-01-13 | End: 2025-01-13

## 2025-01-13 RX ORDER — MUPIROCIN 20 MG/G
OINTMENT TOPICAL ONCE
Status: CANCELLED | OUTPATIENT
Start: 2025-01-13 | End: 2025-01-13

## 2025-01-13 RX ORDER — BACITRACIN ZINC AND POLYMYXIN B SULFATE 500; 1000 [USP'U]/G; [USP'U]/G
OINTMENT TOPICAL ONCE
Status: CANCELLED | OUTPATIENT
Start: 2025-01-13 | End: 2025-01-13

## 2025-01-13 RX ORDER — LIDOCAINE 40 MG/G
CREAM TOPICAL ONCE
Status: CANCELLED | OUTPATIENT
Start: 2025-01-13 | End: 2025-01-13

## 2025-01-13 RX ORDER — LIDOCAINE HYDROCHLORIDE 40 MG/ML
SOLUTION TOPICAL ONCE
Status: DISCONTINUED | OUTPATIENT
Start: 2025-01-13 | End: 2025-01-13

## 2025-01-13 NOTE — DISCHARGE INSTRUCTIONS
Visit Discharge/Physician Orders     Discharge condition: Stable  Assessment of pain at discharge: none   Anesthetic used: none   Discharge to: ECF  Left via: public transport   Accompanied by: self  ECF/HHA: [facility] *NOTE NEW ORDERS 1/13      Dressing Orders: right posterior leg AND left lateral leg wounds healed- to prevent wounds from returning- Apply  UNNA BOOT then  COBAN from base of toes to base of knees- change twice a week- Monday and Thursdays- until seen by lymphedema therapy    Keep wrap dry at all times. If wrap becomes wet or falls 2 inches or painful please remove wrap and replace as ordered.     9/23 When dressing removed please wash legs and feet before putting new wrap on OR shower at facility      Treatment Orders: 1/13 Lymphedema therapy referral sent to [facility]  12/30 Please have patient evaluated by PT or PCP for a new wheelchair, patients legs do not fit well and having pain   8/26- apply pillow under leg to avoid pressure to wound in bed - have patient lay down between meals d/t legs rubbing on wheelchair   Lymphedema pumps- was at patients home- need to obtain to use at facility   Keep pressure off of wound- attempt to rotate leg while keeping leg elevated   Elevate legs as much as possible.   EAT DIET WITH PROTEINS AND VITAMIN C  TAKE A MULTIVITAMIN DAILY IF NOT CONTRAINDICATED        [facility] followup visit : ________CALL AS NEEDED- REFERRAL FOR LYMPHEDEMA THERAPY AT [facility] _________  (Please note your next appointment above and if you are unable to keep, kindly give a 24 hour notice. Thank you.)     Physician signature:__________________________     If you experience any of the following, please call the Wound Care Center during business hours:     * Increase in Pain  * Temperature over 101  * Increase in drainage from your wound  * Drainage with a foul odor  * Bleeding  * Increase in swelling  * Need for compression bandage changes due to slippage,

## 2025-01-13 NOTE — PLAN OF CARE
Problem: Wound:  Goal: Will show signs of wound healing; wound closure and no evidence of infection  Description: Will show signs of wound healing; wound closure and no evidence of infection  1/13/2025 1334 by Anali Haynes RN  Outcome: Adequate for Discharge  1/13/2025 1309 by Anali Haynes RN  Outcome: Progressing     Problem: Compression therapy:  Goal: Will be free from complications associated with compression therapy  Description: Will be free from complications associated with compression therapy  1/13/2025 1334 by Anali Haynes RN  Outcome: Adequate for Discharge  1/13/2025 1309 by Anali Haynes RN  Outcome: Progressing

## 2025-01-13 NOTE — PROGRESS NOTES
Lymphedema therapy eval and treat, face sheet and doctors note sent to [facility] lymphedema with confirmation back   
11/18/24 1316   Dressing Status New dressing applied 12/30/24 1418   Wound Cleansed Vashe 12/30/24 1418   Dressing/Treatment Alginate with Ag;ABD 12/30/24 1418   Offloading for Diabetic Foot Ulcers Offloading ordered 12/30/24 1418   Wound Length (cm) 0 cm 01/13/25 1308   Wound Width (cm) 0 cm 01/13/25 1308   Wound Depth (cm) 0 cm 01/13/25 1308   Wound Surface Area (cm^2) 0 cm^2 01/13/25 1308   Change in Wound Size % (l*w) 100 01/13/25 1308   Wound Volume (cm^3) 0 cm^3 01/13/25 1308   Wound Healing % 100 01/13/25 1308   Post-Procedure Length (cm) 0 cm 01/13/25 1323   Post-Procedure Width (cm) 0 cm 01/13/25 1323   Post-Procedure Depth (cm) 0 cm 01/13/25 1323   Post-Procedure Surface Area (cm^2) 0 cm^2 01/13/25 1323   Post-Procedure Volume (cm^3) 0 cm^3 01/13/25 1323   Wound Assessment Epithelialization;Dry 01/13/25 1308   Drainage Amount None (dry) 01/13/25 1308   Drainage Description Brown;Yellow 01/06/25 1336   Odor None 01/13/25 1308   Colleen-wound Assessment Dry/flaky 01/13/25 1308   Margins Attached edges 11/18/24 1316   Wound Thickness Description not for Pressure Injury Full thickness 11/18/24 1316   Number of days: 56          Procedure Note  Indications:  Based on my examination of this patient's wound(s)/ulcer(s) today, debridement is required to promote healing and evaluate the wound base.    Performed by: Neo Maier MD    Consent obtained:  Yes    Time out taken:  Yes    Pain Control: Anesthetic  Anesthetic: None     Debridement: Wound not debrided, wounds healed      Response to treatment:  Well tolerated by patient.     Plan:   Treatment Note please see attached Discharge Instructions    Written patient dismissal instructions given to patient and signed by patient or POA.         Discharge Instructions         Visit Discharge/Physician Orders     Discharge condition: Stable  Assessment of pain at discharge: none   Anesthetic used: none   Discharge to: ECF  Left via: public transport   Accompanied

## 2025-01-22 ENCOUNTER — HOSPITAL ENCOUNTER (EMERGENCY)
Age: 75
Discharge: HOME | End: 2025-01-22
Attending: EMERGENCY MEDICINE
Payer: MEDICARE

## 2025-01-22 VITALS
OXYGEN SATURATION: 96 % | RESPIRATION RATE: 17 BRPM | DIASTOLIC BLOOD PRESSURE: 83 MMHG | HEART RATE: 89 BPM | TEMPERATURE: 97.5 F | SYSTOLIC BLOOD PRESSURE: 124 MMHG

## 2025-01-22 DIAGNOSIS — R22.0 LIP SWELLING: Primary | ICD-10-CM

## 2025-01-22 PROCEDURE — 99283 EMERGENCY DEPT VISIT LOW MDM: CPT

## 2025-01-22 PROCEDURE — 6370000000 HC RX 637 (ALT 250 FOR IP): Performed by: EMERGENCY MEDICINE

## 2025-01-22 RX ORDER — PREDNISONE 20 MG/1
TABLET ORAL
Qty: 5 TABLET | Refills: 0 | Status: SHIPPED | OUTPATIENT
Start: 2025-01-22

## 2025-01-22 RX ORDER — DIPHENHYDRAMINE HCL 25 MG
25 CAPSULE ORAL EVERY 6 HOURS PRN
Qty: 30 CAPSULE | Refills: 0 | Status: SHIPPED | OUTPATIENT
Start: 2025-01-22 | End: 2025-02-01

## 2025-01-22 RX ORDER — PREDNISONE 20 MG/1
60 TABLET ORAL ONCE
Status: COMPLETED | OUTPATIENT
Start: 2025-01-22 | End: 2025-01-22

## 2025-01-22 RX ORDER — OXYCODONE AND ACETAMINOPHEN 5; 325 MG/1; MG/1
1 TABLET ORAL ONCE
Status: COMPLETED | OUTPATIENT
Start: 2025-01-22 | End: 2025-01-22

## 2025-01-22 RX ADMIN — PREDNISONE 60 MG: 20 TABLET ORAL at 12:25

## 2025-01-22 RX ADMIN — OXYCODONE HYDROCHLORIDE AND ACETAMINOPHEN 1 TABLET: 5; 325 TABLET ORAL at 15:19

## 2025-01-22 ASSESSMENT — PAIN SCALES - GENERAL
PAINLEVEL_OUTOF10: 6
PAINLEVEL_OUTOF10: 2

## 2025-01-22 ASSESSMENT — PAIN DESCRIPTION - ORIENTATION: ORIENTATION: OTHER (COMMENT)

## 2025-01-22 ASSESSMENT — PAIN DESCRIPTION - DESCRIPTORS: DESCRIPTORS: ACHING;DULL;DISCOMFORT

## 2025-01-22 ASSESSMENT — PAIN DESCRIPTION - LOCATION: LOCATION: KNEE

## 2025-01-22 ASSESSMENT — LIFESTYLE VARIABLES: HOW OFTEN DO YOU HAVE A DRINK CONTAINING ALCOHOL: NEVER

## 2025-01-22 NOTE — DISCHARGE INSTRUCTIONS
If you are taking an ace inhibitor, stop it.  Your med list says you are not, but if you are and its not listed, stop it

## 2025-01-22 NOTE — ED PROVIDER NOTES
SpO2 98%   Oxygen Saturation Interpretation: Normal    The patient’s available past medical records and past encounters were reviewed.        ------------------------------ ED COURSE/MEDICAL DECISION MAKING----------------------  Medications   predniSONE (DELTASONE) tablet 60 mg (60 mg Oral Given 1/22/25 1225)         ED COURSE:  ED Course as of 01/22/25 1449   Wed Jan 22, 2025   1449 maría Dalton mildly improved [AT]      ED Course User Index  [AT] Eleazar Simms MD             This patient's ED course included: a personal history and physicial examination    This patient has remained hemodynamically stable during their ED course.      Re-Evaluations:             Re-evaluation.  Patient’s symptoms are improving    Re-examination  1/22/25   12:08 PM EST          Vital Signs:   Vitals:    01/22/25 1155 01/22/25 1215   BP: (!) 166/103 (!) 128/98   Pulse: 83 85   Resp: 17    Temp: 97.6 °F (36.4 °C)    SpO2: 97% 98%             Counseling:   The emergency provider has spoken with the patient and discussed today’s results, in addition to providing specific details for the plan of care and counseling regarding the diagnosis and prognosis.  Questions are answered at this time and they are agreeable with the plan.       --------------------------------- IMPRESSION AND DISPOSITION ---------------------------------    IMPRESSION  1. Lip swelling        DISPOSITION  Disposition: Discharge to nursing home  Patient condition is stable    NOTE: This report was transcribed using voice recognition software. Every effort was made to ensure accuracy; however, inadvertent computerized transcription errors may be present        Eleazar Simms MD  01/22/25 1449

## 2025-01-22 NOTE — ED NOTES
Nurse to nurse report called to TORSTEN Arevalo. Pt to be transferred back to [facility]. [phone]. [facility] to  pt @ [phone]. Will continue to monitor.